# Patient Record
Sex: FEMALE | Race: BLACK OR AFRICAN AMERICAN | Employment: FULL TIME | URBAN - METROPOLITAN AREA
[De-identification: names, ages, dates, MRNs, and addresses within clinical notes are randomized per-mention and may not be internally consistent; named-entity substitution may affect disease eponyms.]

---

## 2017-01-04 ENCOUNTER — TELEPHONE (OUTPATIENT)
Dept: INTERNAL MEDICINE CLINIC | Age: 47
End: 2017-01-04

## 2017-01-04 NOTE — TELEPHONE ENCOUNTER
Patient called in to get her lab results. Patient was informed that results are not ready yet and that she will receive a call once we receive them. Please call patient when we get the results.

## 2017-01-05 NOTE — TELEPHONE ENCOUNTER
Called patient to give positive Ureaplasma results but patient states that she saw her GYN yesterday and she is completely back to normal.  I re-iterated to her that the test was only done because she was tearful that even though she was treated for BV and Yeast and her tests were all negative, that she was still symptomatic. It was explained to the patient while in the office that the ureaplasma does not cause yeast or BV but could possible cause vaginal discharge and she agreed to have the testing done. As this was explained to her again today and she is no longer symptomatic, she has declined treatment. I explained that at this time, she needs no further follow up.

## 2017-03-17 ENCOUNTER — OFFICE VISIT (OUTPATIENT)
Dept: FAMILY MEDICINE CLINIC | Age: 47
End: 2017-03-17

## 2017-03-17 ENCOUNTER — HOSPITAL ENCOUNTER (OUTPATIENT)
Dept: LAB | Age: 47
Discharge: HOME | End: 2017-03-17

## 2017-03-17 VITALS
SYSTOLIC BLOOD PRESSURE: 136 MMHG | DIASTOLIC BLOOD PRESSURE: 80 MMHG | HEART RATE: 77 BPM | HEIGHT: 62 IN | TEMPERATURE: 98.9 F | WEIGHT: 198 LBS | RESPIRATION RATE: 16 BRPM | OXYGEN SATURATION: 98 % | BODY MASS INDEX: 36.44 KG/M2

## 2017-03-17 DIAGNOSIS — M10.079 ACUTE IDIOPATHIC GOUT INVOLVING TOE, UNSPECIFIED LATERALITY: Primary | ICD-10-CM

## 2017-03-17 PROCEDURE — 99001 SPECIMEN HANDLING PT-LAB: CPT | Performed by: FAMILY MEDICINE

## 2017-03-17 RX ORDER — INDOMETHACIN 50 MG/1
50 CAPSULE ORAL 3 TIMES DAILY
Qty: 90 CAP | Refills: 2 | Status: SHIPPED | OUTPATIENT
Start: 2017-03-17 | End: 2017-04-26

## 2017-03-17 RX ORDER — ALLOPURINOL 100 MG/1
100 TABLET ORAL DAILY
Qty: 90 TAB | Refills: 1 | Status: SHIPPED | OUTPATIENT
Start: 2017-03-17 | End: 2017-12-07 | Stop reason: ALTCHOICE

## 2017-03-17 NOTE — PROGRESS NOTES
HISTORY OF PRESENT ILLNESS    Kamille flynn a 42y.o. year old female comes in today to be evaluated and treated for: bilateral foot pain    Patients symptoms have been present for 4 months. Pain level 6/10 bilateral feet, It is unchanged with different shoes. Patient has tried:  mobic from Dr. Omi Carrizales. It is described as pain in bilateral feet great toe into foot. Will swell at ended of day. Did try arch supports as well. Current Outpatient Prescriptions   Medication Sig Dispense Refill    meloxicam (MOBIC) 15 mg tablet Take 1 Tab by mouth daily. 30 Tab 1    nystatin (MYCOSTATIN) topical cream Apply  to affected area two (2) times a day. 30 g 0    triamcinolone acetonide (KENALOG) 0.1 % topical cream Apply  to affected area two (2) times daily as needed for Skin Irritation. use thin layer 60 g 0    ibuprofen (MOTRIN) 800 mg tablet   0    fluticasone (FLONASE) 50 mcg/actuation nasal spray 2 Sprays by Both Nostrils route daily. 1 Bottle 3    ferrous fumarate 324 mg (106 mg iron) tab Take 1 tablet by mouth two (2) times a day. 190 tablet 1     Past Medical History:   Diagnosis Date    Left foot pain     Plantar fasciitis, left     Right shoulder pain        ROS:  No numb. Objective:  Visit Vitals    /80 (BP 1 Location: Right arm, BP Patient Position: Sitting)    Pulse 77    Temp 98.9 °F (37.2 °C) (Oral)    Resp 16    Ht 5' 2\" (1.575 m)    Wt 198 lb (89.8 kg)    SpO2 98%    BMI 36.21 kg/m2       GEN: Appears stated age in NAD. HEAD:  Normocephalic, atraumatic. NEURO:  Sensation intact light touch B/L lower extremities. MS:  Strength normal throughout upper and lower extremities bilateral .   bilateral  ankle/foot:  Positive tenderness at 1st MTP lateral.  Anterior drawer test negative. Talar tilt negative. Kleiger test negative. Syndesmosis squeeze negative. negative fibular head tenderness. Fibular head motion normal. Gait normal.  no clubbing/cyanosis.   ROM normal.  EXT: no clubbing/cyanosis. no edema. SKIN: Warm/dry without rash. Assessment/Plan:     ICD-10-CM ICD-9-CM    1. Acute idiopathic gout involving toe, unspecified laterality M10.079 274.01      Orders Placed This Encounter    URIC ACID     Standing Status:   Future     Standing Expiration Date:   8/87/3373    METABOLIC PANEL, BASIC     Standing Status:   Future     Standing Expiration Date:   12/12/2017     Scheduling Instructions:      [facility] [place]      Monday-Friday  8am-4:30pm            [address]: 7 am  7:30 pm, Saturday: 8 am  noon    CBC WITH AUTOMATED DIFF     Standing Status:   Future     Standing Expiration Date:   6/15/2017     Scheduling Instructions:      [facility] [place]      Monday-Friday  8am-4:30pm            [address]: 6 am  7:30 pm, Saturday: 7 am  noon    indomethacin (INDOCIN) 50 mg capsule     Sig: Take 1 Cap by mouth three (3) times daily for 90 days. Dispense:  90 Cap     Refill:  2    allopurinol (ZYLOPRIM) 100 mg tablet     Sig: Take 1 Tab by mouth daily. Dispense:  90 Tab     Refill:  1     Will use indocin for pain and once controlled likely start allopurinol once I notify result next week. Patient verbalized understanding of plan.

## 2017-03-17 NOTE — PROGRESS NOTES
Patient is currently not taking the following medications and wants them removed from their list:    no    Learning Assessment (baseline): complete  Depression Screening: complete      1. Have you been to the ER, urgent care clinic since your last visit? Hospitalized since your last visit? No    2. Have you seen or consulted any other health care providers outside of the [address] [place] [address] since your last visit? Include any pap smears or colon screening.  Yes Dr. Charlie Garcia (foot)      Patient is due for the following immunizations:    Influenza: declined

## 2017-03-17 NOTE — MR AVS SNAPSHOT
Visit Information Date & Time Provider Department Dept. Phone Encounter #  
 3/17/2017  9:00 AM Alberto Isidro, [facility] [id number] Follow-up Instructions Return if symptoms worsen or fail to improve. Follow-up and Disposition History Upcoming Health Maintenance Date Due DTaP/Tdap/Td series (1 - Tdap) 9/3/1993 INFLUENZA AGE 9 TO ADULT 8/1/2016 PAP AKA CERVICAL CYTOLOGY 5/4/2018 COLONOSCOPY 2/17/2026 Allergies as of 3/17/2017  Review Complete On: 3/17/2017 By: Alberto Isidro DO Severity Noted Reaction Type Reactions Codeine High 02/21/2012    Nausea and Vomiting Patient states she gets jittery, has upset stomach Current Immunizations  Never Reviewed Name Date Influenza Vaccine (Quad) PF 9/18/2014 Not reviewed this visit You Were Diagnosed With   
  
 Codes Comments Acute idiopathic gout involving toe, unspecified laterality    -  Primary ICD-10-CM: M10.079 ICD-9-CM: 274.01 Vitals BP Pulse Temp Resp Height(growth percentile) Weight(growth percentile) 136/80 (BP 1 Location: Right arm, BP Patient Position: Sitting) 77 98.9 °F (37.2 °C) (Oral) 16 5' 2\" (1.575 m) 198 lb (89.8 kg) SpO2 BMI OB Status Smoking Status 98% 36.21 kg/m2 Having regular periods Never Smoker BMI and BSA Data Body Mass Index Body Surface Area  
 36.21 kg/m 2 1.98 m 2 Preferred Pharmacy Pharmacy Name Phone [facility] [address] [place] [address] [phone] Your Updated Medication List  
  
   
This list is accurate as of: 3/17/17  9:15 AM.  Always use your most recent med list.  
  
  
  
  
 allopurinol 100 mg tablet Commonly known as:  Josefina Bibber Take 1 Tab by mouth daily. ferrous fumarate 324 mg (106 mg iron) Tab Take 1 tablet by mouth two (2) times a day. fluticasone 50 mcg/actuation nasal spray Commonly known as:  Laura Fryer 2 Sprays by Both Nostrils route daily. ibuprofen 800 mg tablet Commonly known as:  MOTRIN  
  
 indomethacin 50 mg capsule Commonly known as:  INDOCIN Take 1 Cap by mouth three (3) times daily for 90 days. nystatin topical cream  
Commonly known as:  MYCOSTATIN Apply  to affected area two (2) times a day. triamcinolone acetonide 0.1 % topical cream  
Commonly known as:  KENALOG Apply  to affected area two (2) times daily as needed for Skin Irritation. use thin layer Prescriptions Sent to Pharmacy Refills  
 indomethacin (INDOCIN) 50 mg capsule 2 Sig: Take 1 Cap by mouth three (3) times daily for 90 days. Class: Normal  
 Pharmacy: [facility] [id number] [place] [address] Ph #: [phone] Route: Oral  
 allopurinol (ZYLOPRIM) 100 mg tablet 1 Sig: Take 1 Tab by mouth daily. Class: Normal  
 Pharmacy: [facility] [id number] [place] [address] Ph #: [phone] Route: Oral  
  
Follow-up Instructions Return if symptoms worsen or fail to improve. To-Do List   
 03/17/2017 Lab:  URIC ACID Around 04/16/2017 Lab:  CBC WITH AUTOMATED DIFF Around 06/15/2017 Lab:  METABOLIC PANEL, BASIC Patient Instructions Gout: Care Instructions Your Care Instructions Gout is a form of arthritis caused by a buildup of uric acid crystals in a joint. It causes sudden attacks of pain, swelling, redness, and stiffness, usually in one joint, especially the big toe. Gout usually comes on without a cause. But it can be brought on by drinking alcohol (especially beer) or eating seafood and red meat. Taking certain medicines, such as diuretics or aspirin, also can bring on an attack of gout. Taking your medicines as prescribed and following up with your doctor regularly can help you avoid gout attacks in the future. Follow-up care is a key part of your treatment and safety. Be sure to make and go to all appointments, and call your doctor if you are having problems. Its also a good idea to know your test results and keep a list of the medicines you take. How can you care for yourself at home? · If the joint is swollen, put ice or a cold pack on the area for 10 to 20 minutes at a time. Put a thin cloth between the ice and your skin. · Prop up the sore limb on a pillow when you ice it or anytime you sit or lie down during the next 3 days. Try to keep it above the level of your heart. This will help reduce swelling. · Rest sore joints. Avoid activities that put weight or strain on the joints for a few days. Take short rest breaks from your regular activities during the day. · Take your medicines exactly as prescribed. Call your doctor if you think you are having a problem with your medicine. · Take pain medicines exactly as directed. ¨ If the doctor gave you a prescription medicine for pain, take it as prescribed. ¨ If you are not taking a prescription pain medicine, ask your doctor if you can take an over-the-counter medicine. · Eat less seafood and red meat. · Check with your doctor before drinking alcohol. · Losing weight, if you are overweight, may help reduce attacks of gout. But do not go on a [employer]. \" Losing a lot of weight in a short amount of time can cause a gout attack. When should you call for help? Call your doctor now or seek immediate medical care if: 
· You have a fever. · The joint is so painful you cannot use it. · You have sudden, unexplained swelling, redness, warmth, or severe pain in one or more joints. Watch closely for changes in your health, and be sure to contact your doctor if: 
· You have joint pain. · Your symptoms get worse or are not improving after 2 or 3 days. Where can you learn more? Go to http://elana-bela.info/. Enter U096 in the search box to learn more about \"Gout: Care Instructions. \" Current as of: February 24, 2016 Content Version: 11.1 © 0111-2521 [employer]. Care instructions adapted under license by [employer] (which disclaims liability or warranty for this information). If you have questions about a medical condition or this instruction, always ask your healthcare professional. [address] any warranty or liability for your use of this information. Introducing [facility] & HEALTH SERVICES! Dear Monique Read: 
Thank you for requesting a [employer] account. Our records indicate that you already have an active [employer] account. You can access your account anytime at [email or web address] Did you know that you can access your hospital and ER discharge instructions at any time in [employer]? You can also review all of your test results from your hospital stay or ER visit. Additional Information If you have questions, please visit the Frequently Asked Questions section of the [employer] website at [email or web address] Remember, [employer] is NOT to be used for urgent needs. For medical emergencies, dial 911. Now available from your iPhone and Android! Please provide this summary of care documentation to your next provider. Your primary care clinician is listed as [address]. If you have any questions after today's visit, please call [phone].

## 2017-03-17 NOTE — PATIENT INSTRUCTIONS

## 2017-03-18 LAB
BASOPHILS # BLD AUTO: 0.1 X10E3/UL (ref 0–0.2)
BASOPHILS NFR BLD AUTO: 1 %
BUN SERPL-MCNC: 10 MG/DL (ref 6–24)
BUN/CREAT SERPL: 12 (ref 9–23)
CALCIUM SERPL-MCNC: 9.1 MG/DL (ref 8.7–10.2)
CHLORIDE SERPL-SCNC: 98 MMOL/L (ref 96–106)
CO2 SERPL-SCNC: 22 MMOL/L (ref 18–29)
CREAT SERPL-MCNC: 0.85 MG/DL (ref 0.57–1)
EOSINOPHIL # BLD AUTO: 0.1 X10E3/UL (ref 0–0.4)
EOSINOPHIL NFR BLD AUTO: 1 %
ERYTHROCYTE [DISTWIDTH] IN BLOOD BY AUTOMATED COUNT: 13.5 % (ref 12.3–15.4)
GLUCOSE SERPL-MCNC: 87 MG/DL (ref 65–99)
HCT VFR BLD AUTO: 36.8 % (ref 34–46.6)
HGB BLD-MCNC: 12.4 G/DL (ref 11.1–15.9)
IMM GRANULOCYTES # BLD: 0 X10E3/UL (ref 0–0.1)
IMM GRANULOCYTES NFR BLD: 0 %
LYMPHOCYTES # BLD AUTO: 1.9 X10E3/UL (ref 0.7–3.1)
LYMPHOCYTES NFR BLD AUTO: 24 %
MCH RBC QN AUTO: 30 PG (ref 26.6–33)
MCHC RBC AUTO-ENTMCNC: 33.7 G/DL (ref 31.5–35.7)
MCV RBC AUTO: 89 FL (ref 79–97)
MONOCYTES # BLD AUTO: 0.7 X10E3/UL (ref 0.1–0.9)
MONOCYTES NFR BLD AUTO: 9 %
NEUTROPHILS # BLD AUTO: 5.2 X10E3/UL (ref 1.4–7)
NEUTROPHILS NFR BLD AUTO: 65 %
PLATELET # BLD AUTO: 315 X10E3/UL (ref 150–379)
POTASSIUM SERPL-SCNC: 4.2 MMOL/L (ref 3.5–5.2)
RBC # BLD AUTO: 4.14 X10E6/UL (ref 3.77–5.28)
SODIUM SERPL-SCNC: 135 MMOL/L (ref 134–144)
URATE SERPL-MCNC: 5 MG/DL (ref 2.5–7.1)
WBC # BLD AUTO: 7.9 X10E3/UL (ref 3.4–10.8)

## 2017-03-22 ENCOUNTER — TELEPHONE (OUTPATIENT)
Dept: FAMILY MEDICINE CLINIC | Age: 47
End: 2017-03-22

## 2017-03-23 ENCOUNTER — TELEPHONE (OUTPATIENT)
Dept: FAMILY MEDICINE CLINIC | Age: 47
End: 2017-03-23

## 2017-03-23 NOTE — TELEPHONE ENCOUNTER
Pt called about lab results and would like to know if she needs to still take indocin. Pt states that she hasn't picked up zyloprim yet.  Please contact pt

## 2017-03-23 NOTE — TELEPHONE ENCOUNTER
Called and advised patient per Dr. Sean Jorge:  Should start zyloprim to decrease uric acid and prevent flares.  Use indocin as needed. Patient verbalized understanding.

## 2017-04-10 ENCOUNTER — OFFICE VISIT (OUTPATIENT)
Dept: ORTHOPEDIC SURGERY | Age: 47
End: 2017-04-10

## 2017-04-10 VITALS
TEMPERATURE: 98.4 F | SYSTOLIC BLOOD PRESSURE: 137 MMHG | BODY MASS INDEX: 42.88 KG/M2 | HEART RATE: 67 BPM | DIASTOLIC BLOOD PRESSURE: 86 MMHG | HEIGHT: 62 IN | WEIGHT: 233 LBS

## 2017-04-10 DIAGNOSIS — G57.93 NEUROPATHY OF BOTH FEET: ICD-10-CM

## 2017-04-10 DIAGNOSIS — M77.42 METATARSALGIA OF BOTH FEET: Primary | ICD-10-CM

## 2017-04-10 DIAGNOSIS — L84 CALLUS OF FOOT: ICD-10-CM

## 2017-04-10 DIAGNOSIS — M77.41 METATARSALGIA OF BOTH FEET: Primary | ICD-10-CM

## 2017-04-10 RX ORDER — AMMONIUM LACTATE 12 G/100G
CREAM TOPICAL 2 TIMES DAILY
Qty: 280 G | Refills: 0 | Status: SHIPPED | OUTPATIENT
Start: 2017-04-10 | End: 2017-04-10 | Stop reason: CLARIF

## 2017-04-10 RX ORDER — DICLOFENAC SODIUM 20 MG/G
2 SOLUTION TOPICAL 2 TIMES DAILY
Qty: 1 BOTTLE | Refills: 0 | Status: SHIPPED | OUTPATIENT
Start: 2017-04-10 | End: 2019-02-28

## 2017-04-10 RX ORDER — AMMONIUM LACTATE 12 G/100G
CREAM TOPICAL 2 TIMES DAILY
Qty: 280 G | Refills: 0 | Status: SHIPPED | OUTPATIENT
Start: 2017-04-10 | End: 2019-03-24

## 2017-04-10 NOTE — MR AVS SNAPSHOT
Visit Information Date & Time Provider Department Dept. Phone Encounter #  
 4/10/2017 10:40 AM Tori Fernandez MD [facility] [phone] [id number] Follow-up Instructions Return in about 4 weeks (around 5/8/2017) for follow up evaluation. Routing History Upcoming Health Maintenance Date Due DTaP/Tdap/Td series (1 - Tdap) 9/3/1993 INFLUENZA AGE 9 TO ADULT 8/1/2016 PAP AKA CERVICAL CYTOLOGY 5/4/2018 COLONOSCOPY 2/17/2026 Allergies as of 4/10/2017  Review Complete On: 4/10/2017 By: Jessica Guerra Severity Noted Reaction Type Reactions Codeine High 02/21/2012    Nausea and Vomiting Patient states she gets jittery, has upset stomach Current Immunizations  Never Reviewed Name Date Influenza Vaccine (Quad) PF 9/18/2014 Not reviewed this visit You Were Diagnosed With   
  
 Codes Comments Metatarsalgia of both feet    -  Primary ICD-10-CM: M77.41, M77.42 
ICD-9-CM: 726.70 Sesamoid pain     ICD-10-CM: M89.9 ICD-9-CM: 733.90 Neuropathy     ICD-10-CM: G62.9 ICD-9-CM: 355.9 Callus of foot     ICD-10-CM: L84 
ICD-9-CM: 468 Vitals BP Pulse Temp Height(growth percentile) Weight(growth percentile) BMI  
 137/86 67 98.4 °F (36.9 °C) (Oral) 5' 2\" (1.575 m) 233 lb (105.7 kg) 42.62 kg/m2 OB Status Smoking Status Having regular periods Never Smoker BMI and BSA Data Body Mass Index Body Surface Area  
 42.62 kg/m 2 2.15 m 2 Preferred Pharmacy Pharmacy Name Phone Bette Jarrell [address] [address] [facility], [address] [place] [address] Avenue [phone] Your Updated Medication List  
  
   
This list is accurate as of: 4/10/17 12:18 PM.  Always use your most recent med list.  
  
  
  
  
 allopurinol 100 mg tablet Commonly known as:  Marcio Hurt Take 1 Tab by mouth daily.   
  
 ammonium lactate 12 % topical cream  
 Commonly known as:  LAC-HYDRIN Apply  to affected area two (2) times a day. rub in to affected area well  
  
 diclofenac sodium 20 mg/gram /actuation(2 %) Sopm  
Commonly known as:  PENNSAID  
2 Pump(s) by Apply Externally route two (2) times a day. ferrous fumarate 324 mg (106 mg iron) Tab Take 1 tablet by mouth two (2) times a day. fluticasone 50 mcg/actuation nasal spray Commonly known as:  Nicole Thelma 2 Sprays by Both Nostrils route daily. ibuprofen 800 mg tablet Commonly known as:  MOTRIN  
  
 indomethacin 50 mg capsule Commonly known as:  INDOCIN Take 1 Cap by mouth three (3) times daily for 90 days. nystatin topical cream  
Commonly known as:  MYCOSTATIN Apply  to affected area two (2) times a day. triamcinolone acetonide 0.1 % topical cream  
Commonly known as:  KENALOG Apply  to affected area two (2) times daily as needed for Skin Irritation. use thin layer Prescriptions Printed Refills  
 ammonium lactate (LAC-HYDRIN) 12 % topical cream 0 Sig: Apply  to affected area two (2) times a day. rub in to affected area well Class: Print Route: Topical  
  
Prescriptions Sent to Pharmacy Refills  
 diclofenac sodium (PENNSAID) 20 mg/gram /actuation(2 %) sopm 0 Si Pump(s) by Apply Externally route two (2) times a day. Class: Normal  
 Pharmacy: [address] [place] [address] Melvin Collazo [address] Ph #: [phone] Route: Apply Externally We Performed the Following AMB SUPPLY ORDER [[id number] Custom] Comments:  
 Order date: 4/10/2017 Custom Trilam Orthotic bilat to offload great toe Dx: metatarsalgia Follow-up Instructions Return in about 4 weeks (around 2017) for follow up evaluation. Patient Instructions Order provided for Lac-Hydrin, please apply to affected area as directed Order provided for bilateral custom orthotics to offload 1st MTP joint Order provided for Pennsaid. This will be mailed to your home address. Please follow up in 4 weeks or sooner as needed Metatarsalgia: Care Instructions Your Care Instructions Metatarsalgia (say \"met-renetta-tar-SAL-kaiser-renetta\") is pain in the ball of the foot. It sometimes spreads to the toes. The ball of the foot is the bottom of the foot, where the toes join the foot. While walking might be very painful, the pain is usually not a sign of a serious or permanent problem. But any pain can affect your life, so it is important that you treat it. Pain in this area can be caused by many things. For example, you may have this pain if you stand or walk a lot or wear tight shoes or high heels. Your pain might be caused by inflammation of a joint (capsulitis). It is most common in the joint at the base of the second toe, near the ball of the foot. Capsulitis happens when ligaments that go around the joint become inflamed. The joint may be swollen. It may feel like there is a small rock under it. You may have had an X-ray if your doctor wanted to make sure a more serious problem is not causing your pain. Treatment may consist of home care, such as rest, wearing different shoes, and taking over-the-counter pain medicines. It can take months for the pain to go away. If the ligaments around a joint are torn, or if a toe has started to slant toward the toe next to it, you may need surgery. Follow-up care is a key part of your treatment and safety. Be sure to make and go to all appointments, and call your doctor if you are having problems. It's also a good idea to know your test results and keep a list of the medicines you take. How can you care for yourself at home? · Rest your foot. If an activity is causing the pain, find another one to do that does not put so much pressure on your foot.  
· Put ice or a cold pack on your foot when it hurts or after you've done something that usually causes pain. Do this for 10 to 20 minutes at a time. Put a thin cloth between the ice and your skin. · Take an over-the-counter pain medicine, such as acetaminophen (Tylenol), ibuprofen (Advil, Motrin), or naproxen (Aleve). Be safe with medicines. Read and follow all instructions on the label. · Do not take two or more pain medicines at the same time unless the doctor told you to. Many pain medicines have acetaminophen, which is Tylenol. Too much acetaminophen (Tylenol) can be harmful. · Wear roomy, comfortable shoes. · If your doctor recommends it, use special pads to relieve the pressure on your foot. The pads may fit into your shoes, or they may stick to the soles of your feet. · Ask your doctor about using orthotic shoe devices. These are molded pieces of rubber, leather, metal, plastic, or other synthetic material that are inserted into a shoe. · Wear shoes with good arch support. · Try not to wear high heels or narrow shoes. · Follow your doctor's or physical therapist's directions for exercise. When should you call for help? Watch closely for changes in your health, and be sure to contact your doctor if: 
· You have new or worse symptoms. · You do not get better as expected. Where can you learn more? Go to http://elana-bela.info/. Enter O312 in the search box to learn more about \"Metatarsalgia: Care Instructions. \" Current as of: August 19, 2016 Content Version: 11.2 © 8559-1726 [employer]. Care instructions adapted under license by [employer] (which disclaims liability or warranty for this information). If you have questions about a medical condition or this instruction, always ask your healthcare professional. [place] [address] any warranty or liability for your use of this information. Introducing hospitals & HEALTH SERVICES!    
 Dear Elis Dixon: 
 Thank you for requesting a [employer] account. Our records indicate that you already have an active [employer] account. You can access your account anytime at [email or web address] Did you know that you can access your hospital and ER discharge instructions at any time in [employer]? You can also review all of your test results from your hospital stay or ER visit. Additional Information If you have questions, please visit the Frequently Asked Questions section of the [employer] website at [email or web address] Remember, [employer] is NOT to be used for urgent needs. For medical emergencies, dial 911. Now available from your iPhone and Android! Please provide this summary of care documentation to your next provider. Your primary care clinician is listed as [address]. If you have any questions after today's visit, please call [phone].

## 2017-04-10 NOTE — PROGRESS NOTES
AMBULATORY PROGRESS NOTE      Patient: Sampson Deras             MRN: [id number]     SSN: [id number] There is no height or weight on file to calculate BMI. YOB: 1970     AGE: 42 y.o. EX: female    PCP: Dom Rose MD    IMPRESSION/DIAGNOSIS AND TREATMENT PLAN     DIAGNOSES  No diagnosis found. No orders of the defined types were placed in this encounter. Sampson Deras understands her diagnoses and the proposed plan. Plan:    1)  2)    RTO     HPI AND EXAMINATION     Margarita Gamboa IS A 42 y.o. female who presents to my outpatient office for follow up of left foot metatarsalgia, bilateral foot pain, and bursitis disorder. At last visit, I ordered Spenco firm arch supports, and I prescribed Mobic 15 mg. The patient presents to the office today    Patient works as a  and spends the majority of her day walking. Patient denies gastric disorders and denies taking blood thinners.     ANKLE/FOOT Bilateral     Psychiatry: Alert, Oriented x 3; Speech normal in context and clarity,    Memory intact grossly, no involuntary movements - tremors, no dementia  Gait: normal  Tenderness: moderate tenderness bilateral great toe pain medial aspect (medial eminence), left greater than right, left number two toe plantar (methead) tenderness  Cutaneous: WNL on left foot, but, Fullness to dorsal part of right midfoot  Joint Motion: WNL  Joint / Tendon Stability: No Ankle or Subtalar instability or joint laxity. No peroneal sublux ability or dislocation  Alignment: Bilateral Planar Valgus   Neuro Motor/Sensory: NL/NL, Vascular: NL foot/ankle pulses  Lymphatics: No extremity lymphedema, No calf swelling, no tenderness to calf muscles.     CHART REVIEW     Past Medical History:   Diagnosis Date    Left foot pain     Plantar fasciitis, left     Right shoulder pain      Current Outpatient Prescriptions   Medication Sig    indomethacin (INDOCIN) 50 mg capsule Take 1 Cap by mouth three (3) times daily for 90 days.  allopurinol (ZYLOPRIM) 100 mg tablet Take 1 Tab by mouth daily.  nystatin (MYCOSTATIN) topical cream Apply  to affected area two (2) times a day.  triamcinolone acetonide (KENALOG) 0.1 % topical cream Apply  to affected area two (2) times daily as needed for Skin Irritation. use thin layer    ibuprofen (MOTRIN) 800 mg tablet     ferrous fumarate 324 mg (106 mg iron) tab Take 1 tablet by mouth two (2) times a day.  fluticasone (FLONASE) 50 mcg/actuation nasal spray 2 Sprays by Both Nostrils route daily. No current facility-administered medications for this visit. Allergies   Allergen Reactions    Codeine Nausea and Vomiting     Patient states she gets jittery, has upset stomach      Past Surgical History:   Procedure Laterality Date    HX TUBAL LIGATION  2009    Tubal ligation     Social History     Occupational History    nursing home [employer]     Social History Main Topics    Smoking status: Never Smoker    Smokeless tobacco: Never Used    Alcohol use No    Drug use: No    Sexual activity: Yes     Partners: Male     Family History   Problem Relation Age of Onset    Hypertension Mother     Hypertension Father     Arthritis-osteo Other        REVIEW OF SYSTEMS : 4/10/2017  ALL BELOW ARE Negative except : SEE HPI       Constitutional: Negative for fever, chills and weight loss. Neg Weigh Loss  Cardiovascular: Negative for chest pain, claudication and leg swelling. SOB, SMITH   Gastrointestinal: Negative for  pain, N/V/D/C, Blood in stool or urine,dysuria, hematuria,        Incontinence, pelvic pain  Musculoskeletal: see HPI. Neurological: Negative for dizziness and weakness. Negative for headaches,Visual Changes, Confusion, Seizures,   Psychiatric/Behavioral: Negative for depression, memory loss and substance abuse. Extremities:  Negative for  hair changes, rash or skin lesion changes.   Hematologic: Negative for Bleeding problems, bruising, pallor or swollen lymph nodes. Peripheral Vascular: No calf pain, vascular vein tenderness to calf pain              No calf throbbing, posterior knee throbbing pain    DIAGNOSTIC IMAGING     No notes on file    Written by Nitza Schaefer, as dictated by Luc Lowe MD. I, , Luc Lowe MD, confirm that all documentation is accurate.

## 2017-04-10 NOTE — PATIENT INSTRUCTIONS
Order provided for Lac-Hydrin, please apply to affected area as directed  Order provided for bilateral custom orthotics to offload 1st MTP joint  Order provided for Pennsaid. This will be mailed to your home address. Please follow up in 4 weeks or sooner as needed             Metatarsalgia: Care Instructions  Your Care Instructions    Metatarsalgia (say \"met-uh-tar-SAL-kaiser-uh\") is pain in the ball of the foot. It sometimes spreads to the toes. The ball of the foot is the bottom of the foot, where the toes join the foot. While walking might be very painful, the pain is usually not a sign of a serious or permanent problem. But any pain can affect your life, so it is important that you treat it. Pain in this area can be caused by many things. For example, you may have this pain if you stand or walk a lot or wear tight shoes or high heels. Your pain might be caused by inflammation of a joint (capsulitis). It is most common in the joint at the base of the second toe, near the ball of the foot. Capsulitis happens when ligaments that go around the joint become inflamed. The joint may be swollen. It may feel like there is a small rock under it. You may have had an X-ray if your doctor wanted to make sure a more serious problem is not causing your pain. Treatment may consist of home care, such as rest, wearing different shoes, and taking over-the-counter pain medicines. It can take months for the pain to go away. If the ligaments around a joint are torn, or if a toe has started to slant toward the toe next to it, you may need surgery. Follow-up care is a key part of your treatment and safety. Be sure to make and go to all appointments, and call your doctor if you are having problems. It's also a good idea to know your test results and keep a list of the medicines you take. How can you care for yourself at home? · Rest your foot.  If an activity is causing the pain, find another one to do that does not put so much pressure on your foot. · Put ice or a cold pack on your foot when it hurts or after you've done something that usually causes pain. Do this for 10 to 20 minutes at a time. Put a thin cloth between the ice and your skin. · Take an over-the-counter pain medicine, such as acetaminophen (Tylenol), ibuprofen (Advil, Motrin), or naproxen (Aleve). Be safe with medicines. Read and follow all instructions on the label. · Do not take two or more pain medicines at the same time unless the doctor told you to. Many pain medicines have acetaminophen, which is Tylenol. Too much acetaminophen (Tylenol) can be harmful. · Wear roomy, comfortable shoes. · If your doctor recommends it, use special pads to relieve the pressure on your foot. The pads may fit into your shoes, or they may stick to the soles of your feet. · Ask your doctor about using orthotic shoe devices. These are molded pieces of rubber, leather, metal, plastic, or other synthetic material that are inserted into a shoe. · Wear shoes with good arch support. · Try not to wear high heels or narrow shoes. · Follow your doctor's or physical therapist's directions for exercise. When should you call for help? Watch closely for changes in your health, and be sure to contact your doctor if:  · You have new or worse symptoms. · You do not get better as expected. Where can you learn more? Go to http://elana-bela.info/. Enter X674 in the search box to learn more about \"Metatarsalgia: Care Instructions. \"  Current as of: August 19, 2016  Content Version: 11.2  © 3457-1469 [employer]. Care instructions adapted under license by [employer] (which disclaims liability or warranty for this information). If you have questions about a medical condition or this instruction, always ask your healthcare professional. [address] any warranty or liability for your use of this information.

## 2017-04-10 NOTE — PROGRESS NOTES
Patient: Marianne Maya                MRN: [id number]       SSN: [id number]  YOB: 1970                         AGE: 42 y.o. SEX: female    Bev Hammond MD      Chief Complaint:   Chief Complaint   Patient presents with    Foot Pain     Speedy         HPI:     Patient is a 42 y.o. female who presents today for follow up evaluation of bilateral foot pain. Patient was last seen in the office by Dr. Sonia Brenner on 11/8/16 and was provided prescription for Mobic and instructed to obtain Marika Sanders Dr. She states that the Spenco Arch supports do not help and she noticed no difference with the Mobic. She states that she went to see her PCP and he ordered labs and advised that she has gout and prescribed Allopurinol and Indomethacin. She states that these medications are making her feel worse. I reviewed the patient's labs completed on 3/17/17 and her Uric Acid is 5 which is within normal limits. She states that the pain resides in the following areas: bilateral great toe pain medial aspect, and to the plantar great toe tatarsal head in the area of sesamoids. She also reports some sharp, shooting pain on occasion. She reports no history of DM an her blood glucose level was WNL on labs completed on 3/17/17. Patient works as a  and spends the majority of her day walking. Patient denies gastric disorders and denies taking blood thinners. PHYSICAL EXAM:       Visit Vitals    /86    Pulse 67    Temp 98.4 °F (36.9 °C) (Oral)    Ht 5' 2\" (1.575 m)    Wt 233 lb (105.7 kg)    BMI 42.62 kg/m2       GEN:  Alert, well developed, well nourished, well appearing 42 y.o. female in no acute distress. PSYCH:  Normal affect, mood, and conduct.  alert, oriented x 3 alert, oriented x 3, no dementia  M/S EXAMINATION OF: ANKLE/FOOT Bilateral  Gait: normal  Tenderness: No tenderness bilateral great toe pain medial aspect (medial eminence), No warmth, redness or soft tissue swelling. She reports some discomfort at great toe MTP region as well as plantar. There is a callus noted in the area of the great toe MTP  Cutaneous: WNL on left foot, but, Fullness to dorsal part of right midfoot  Joint Motion: WNL  Joint / Tendon Stability: No Ankle or Subtalar instability or joint laxity. No peroneal sublux ability or dislocation  Alignment: Bilateral Planar Valgus   Neuro Motor/Sensory: NL/NL, Vascular: NL foot/ankle pulses  Lymphatics: No extremity lymphedema, No calf swelling, no tenderness to calf muscles. IMPRESSION:       Encounter Diagnoses     ICD-10-CM ICD-9-CM   1. Metatarsalgia of both feet M77.41 726.70    M77.42    2. Sesamoid pain M89.9 733.90   3. Neuropathy G62.9 355.9   4. Callus of foot L84 700       PLAN:         Orders Placed This Encounter    AMB SUPPLY ORDER    diclofenac sodium (PENNSAID) 20 mg/gram /actuation(2 %) sopm    ammonium lactate (LAC-HYDRIN) 12 % topical cream               Discontinue Mobic, Allopurinol and Indomethocin  Order provided for Lac-Hydrin, please apply to affected area as directed  Order provided for bilateral custom orthotics to offload 1st MTP joint  Order provided for Pennsaid. This will be mailed to your home address. Please follow up in 4 weeks or sooner as needed            Patient understands treatment plan and has been provided with patient education. PAST MEDICAL HISTORY:     Past Medical History:   Diagnosis Date    Left foot pain     Plantar fasciitis, left     Right shoulder pain        MEDICATIONS:     Current Outpatient Prescriptions   Medication Sig    diclofenac sodium (PENNSAID) 20 mg/gram /actuation(2 %) sopm 2 Pump(s) by Apply Externally route two (2) times a day.  ammonium lactate (LAC-HYDRIN) 12 % topical cream Apply  to affected area two (2) times a day. rub in to affected area well    indomethacin (INDOCIN) 50 mg capsule Take 1 Cap by mouth three (3) times daily for 90 days.  allopurinol (ZYLOPRIM) 100 mg tablet Take 1 Tab by mouth daily.  nystatin (MYCOSTATIN) topical cream Apply  to affected area two (2) times a day.  triamcinolone acetonide (KENALOG) 0.1 % topical cream Apply  to affected area two (2) times daily as needed for Skin Irritation. use thin layer    ibuprofen (MOTRIN) 800 mg tablet     ferrous fumarate 324 mg (106 mg iron) tab Take 1 tablet by mouth two (2) times a day.  fluticasone (FLONASE) 50 mcg/actuation nasal spray 2 Sprays by Both Nostrils route daily. No current facility-administered medications for this visit. ALLERGIES:     Allergies   Allergen Reactions    Codeine Nausea and Vomiting     Patient states she gets jittery, has upset stomach          PAST SURGICAL HISTORY:     Past Surgical History:   Procedure Laterality Date    HX TUBAL LIGATION  2009    Tubal ligation       SOCIAL HISTORY:     Social History     Social History    Marital status:      Spouse name: N/A    Number of children: N/A    Years of education: N/A     Occupational History    [employer]     Social History Main Topics    Smoking status: Never Smoker    Smokeless tobacco: Never Used    Alcohol use No    Drug use: No    Sexual activity: Yes     Partners: Male     Other Topics Concern    Not on file     Social History Narrative       FAMILY HISTORY:     Family History   Problem Relation Age of Onset    Hypertension Mother     Hypertension Father     Arthritis-osteo Other        REVIEW OF SYSTEMS:       REVIEW OF SYSTEMS:     Otherwise as noted in HPI     REVIEW OF SYSTEMS: All Below are Negative except: See HPI      RADIOGRAPHS & DIAGNOSTIC STUDIES     No results found for any visits on 04/10/17.     [address] PA-C  4/10/2017

## 2017-04-19 NOTE — TELEPHONE ENCOUNTER
Dr. Jet Barr discontinued Mobic at the last office visit due to the patient being unable to find relief with the medication. A prescription for alternative medication (Pennsaid) was provided to the patient. Please advise. Last Visit: 04/10/2017 with MD Oquendo    Next Appointment: noted to f/u in 4 weeks   Previous Refill Encounters: 11/08/2016 per MD Oquendo #[id number] with 1 refill     Requested Prescriptions     Pending Prescriptions Disp Refills    meloxicam (MOBIC) 15 mg tablet 30 Tab 1     Sig: Take 1 Tab by mouth daily.

## 2017-04-24 RX ORDER — MELOXICAM 15 MG/1
15 TABLET ORAL DAILY
Qty: 30 TAB | Refills: 1 | OUTPATIENT
Start: 2017-04-24

## 2017-04-25 NOTE — TELEPHONE ENCOUNTER
Called and spoke with the patient to advise her of the denied request/message below. She seemed a little confused when I mentioned Pennsaid and stated she is not sure what that is and reports she has never used this medication. She also states she is not sure why Dr. Lenora Stapleton discontinued the medication when she has been taking it for about a year with great benefit. She wishes to receive further refills of Meloxicam. Please advise.

## 2017-04-26 RX ORDER — MELOXICAM 15 MG/1
15 TABLET ORAL DAILY
Qty: 30 TAB | Refills: 1 | Status: SHIPPED | OUTPATIENT
Start: 2017-04-26 | End: 2017-12-07 | Stop reason: ALTCHOICE

## 2017-10-13 ENCOUNTER — OFFICE VISIT (OUTPATIENT)
Dept: FAMILY MEDICINE CLINIC | Age: 47
End: 2017-10-13

## 2017-10-13 VITALS
OXYGEN SATURATION: 99 % | SYSTOLIC BLOOD PRESSURE: 130 MMHG | HEART RATE: 75 BPM | WEIGHT: 237.2 LBS | TEMPERATURE: 97.5 F | DIASTOLIC BLOOD PRESSURE: 80 MMHG | BODY MASS INDEX: 43.65 KG/M2 | RESPIRATION RATE: 16 BRPM | HEIGHT: 62 IN

## 2017-10-13 DIAGNOSIS — M25.561 PAIN IN BOTH KNEES, UNSPECIFIED CHRONICITY: Primary | ICD-10-CM

## 2017-10-13 DIAGNOSIS — G89.29 CHRONIC BILATERAL LOW BACK PAIN WITHOUT SCIATICA: ICD-10-CM

## 2017-10-13 DIAGNOSIS — M25.511 CHRONIC RIGHT SHOULDER PAIN: ICD-10-CM

## 2017-10-13 DIAGNOSIS — M54.50 CHRONIC BILATERAL LOW BACK PAIN WITHOUT SCIATICA: ICD-10-CM

## 2017-10-13 DIAGNOSIS — G89.29 CHRONIC RIGHT SHOULDER PAIN: ICD-10-CM

## 2017-10-13 DIAGNOSIS — M25.562 PAIN IN BOTH KNEES, UNSPECIFIED CHRONICITY: Primary | ICD-10-CM

## 2017-10-13 RX ORDER — IBUPROFEN 800 MG/1
800 TABLET ORAL
Qty: 100 TAB | Refills: 1 | Status: SHIPPED | OUTPATIENT
Start: 2017-10-13 | End: 2019-03-24

## 2017-10-13 RX ORDER — IBUPROFEN 800 MG/1
TABLET ORAL
COMMUNITY
End: 2017-10-13 | Stop reason: SDUPTHER

## 2017-10-13 NOTE — MR AVS SNAPSHOT
Visit Information Date & Time Provider Department Dept. Phone Encounter #  
 10/13/2017  8:00 AM Sacha Quiñonez  [facility] [id number] Your Appointments 10/18/2017  3:30 PM  
New Patient with Dontrell Díaz MD  
[address] [facility], [address] and [facility] ([facility]) Appt Note: bilat knee pain and rt shoulder pain nx bcbs hk/bcbs fep [address], [address] [facility]  
[phone] [address] [place] [address], [facility] [address] Upcoming Health Maintenance Date Due DTaP/Tdap/Td series (1 - Tdap) 9/3/1993 INFLUENZA AGE 9 TO ADULT 8/1/2017 PAP AKA CERVICAL CYTOLOGY 5/4/2018 COLONOSCOPY 2/17/2026 Allergies as of 10/13/2017  Review Complete On: 10/13/2017 By: Sacha Quiñonez NP Severity Noted Reaction Type Reactions Codeine High 02/21/2012    Nausea and Vomiting Patient states she gets jittery, has upset stomach Current Immunizations  Never Reviewed Name Date Influenza Vaccine (Quad) PF 9/18/2014 Not reviewed this visit You Were Diagnosed With   
  
 Codes Comments Pain in both knees, unspecified chronicity    -  Primary ICD-10-CM: M25.561, M25.562 ICD-9-CM: 719.46 Chronic bilateral low back pain without sciatica     ICD-10-CM: M54.5, G89.29 ICD-9-CM: 724.2, 338.29 Chronic right shoulder pain     ICD-10-CM: M25.511, G89.29 ICD-9-CM: 719.41, 338.29 Vitals BP Pulse Temp Resp Height(growth percentile) Weight(growth percentile) 130/80 (BP 1 Location: Left arm, BP Patient Position: Sitting) 75 97.5 °F (36.4 °C) (Oral) 16 5' 2\" (1.575 m) 237 lb 3.2 oz (107.6 kg) LMP SpO2 BMI OB Status Smoking Status 10/10/2017 99% 43.38 kg/m2 Having regular periods Never Smoker BMI and BSA Data Body Mass Index Body Surface Area  
 43.38 kg/m 2 2.17 m 2 Preferred Pharmacy Pharmacy Name [facility] [place], [address] [facility] [phone] Your Updated Medication List  
  
   
This list is accurate as of: 10/13/17  8:36 AM.  Always use your most recent med list.  
  
  
  
  
 allopurinol 100 mg tablet Commonly known as:  Ollen Epley Take 1 Tab by mouth daily. ammonium lactate 12 % topical cream  
Commonly known as:  LAC-HYDRIN Apply  to affected area two (2) times a day. rub in to affected area well  
  
 diclofenac sodium 20 mg/gram /actuation(2 %) Sopm  
Commonly known as:  PENNSAID  
2 Pump(s) by Apply Externally route two (2) times a day. ferrous fumarate 324 mg (106 mg iron) Tab Take 1 tablet by mouth two (2) times a day. fluticasone 50 mcg/actuation nasal spray Commonly known as:  Carlos Riser 2 Sprays by Both Nostrils route daily. ibuprofen 800 mg tablet Commonly known as:  MOTRIN Take 1 Tab by mouth every eight (8) hours as needed for Pain.  
  
 meloxicam 15 mg tablet Commonly known as:  MOBIC Take 1 Tab by mouth daily. nystatin topical cream  
Commonly known as:  MYCOSTATIN Apply  to affected area two (2) times a day. triamcinolone acetonide 0.1 % topical cream  
Commonly known as:  KENALOG Apply  to affected area two (2) times daily as needed for Skin Irritation. use thin layer Prescriptions Sent to Pharmacy Refills  
 ibuprofen (MOTRIN) 800 mg tablet 1 Sig: Take 1 Tab by mouth every eight (8) hours as needed for Pain. Class: Normal  
 Pharmacy: [facility], [address] Ph #: [phone] Route: Oral  
  
We Performed the Following REFERRAL TO ORTHOPEDICS [PNN091 Custom] Referral Information Referral ID Referred By Referred To  
  
 [id number] [facility] Not Available Visits Status Start Date End Date 1 New Request 10/13/17 10/13/18  If your referral has a status of pending review or denied, additional information will be sent to support the outcome of this decision. Introducing [facility]! Dear Nataly Darling: 
Thank you for requesting a "[employer] account. Our records indicate that you already have an active "[employer] account. You can access your account anytime at [email or web address] Did you know that you can access your hospital and ER discharge instructions at any time in "[employer]? You can also review all of your test results from your hospital stay or ER visit. Additional Information If you have questions, please visit the Frequently Asked Questions section of the "[employer] website at [email or web address] Remember, "[employer] is NOT to be used for urgent needs. For medical emergencies, dial 911. Now available from your iPhone and Android! Please provide this summary of care documentation to your next provider. Your primary care clinician is listed as [address]. If you have any questions after today's visit, please call [phone].

## 2017-10-13 NOTE — PROGRESS NOTES
1. Have you been to the ER, urgent care clinic since your last visit? Hospitalized since your last visit? No    2. Have you seen or consulted any other health care providers outside of the [address] [place] [address] since your last visit? Include any pap smears or colon screening.  No

## 2017-10-13 NOTE — PROGRESS NOTES
HISTORY OF PRESENT ILLNESS  Shahid Yates is a 41 y.o. female. Patient presents today with right should, bilateral knee and low back pain. HPI  Pt has done everything she can, even physical therapy. Pt has an appointment with Dr Aashish Dean schedule. She is limited range of motion with her shoulder and has pain going down her arm. Review of Systems   Constitutional: Negative. Respiratory: Negative. Cardiovascular: Negative. Musculoskeletal: Positive for back pain and joint pain (casie knees and right shoulder. ). Visit Vitals    /80 (BP 1 Location: Left arm, BP Patient Position: Sitting)    Pulse 75    Temp 97.5 °F (36.4 °C) (Oral)    Resp 16    Ht 5' 2\" (1.575 m)    Wt 237 lb 3.2 oz (107.6 kg)    LMP 10/10/2017    SpO2 99%    BMI 43.38 kg/m2       Physical Exam   Constitutional: She appears well-developed. No distress. Cardiovascular: Normal rate, regular rhythm and normal heart sounds. No murmur heard. Pulmonary/Chest: Effort normal and breath sounds normal. No respiratory distress. She has no wheezes. She has no rales. Musculoskeletal:        Right shoulder: She exhibits decreased range of motion, tenderness, effusion and crepitus. Left shoulder: Normal.        Right knee: She exhibits decreased range of motion and effusion. Tenderness found. Left knee: She exhibits decreased range of motion and effusion. Tenderness found. Lumbar back: She exhibits decreased range of motion, tenderness and spasm. ASSESSMENT and PLAN    ICD-10-CM ICD-9-CM    1. Pain in both knees, unspecified chronicity M25.561 719.46 REFERRAL TO ORTHOPEDICS    M25.562  ibuprofen (MOTRIN) 800 mg tablet   2. Chronic bilateral low back pain without sciatica M54.5 724.2 REFERRAL TO ORTHOPEDICS    G89.29 338.29 ibuprofen (MOTRIN) 800 mg tablet   3.  Chronic right shoulder pain M25.511 719.41 REFERRAL TO ORTHOPEDICS    G89.29 338.29 ibuprofen (MOTRIN) 800 mg tablet     PLAN:  Pt was given ref to ortho at her request.  Pt is to RTC with any concerns. Pt was given after visit summary.

## 2017-10-18 ENCOUNTER — OFFICE VISIT (OUTPATIENT)
Dept: ORTHOPEDIC SURGERY | Age: 47
End: 2017-10-18

## 2017-10-18 VITALS
BODY MASS INDEX: 43.43 KG/M2 | HEART RATE: 71 BPM | HEIGHT: 62 IN | WEIGHT: 236 LBS | DIASTOLIC BLOOD PRESSURE: 74 MMHG | RESPIRATION RATE: 18 BRPM | SYSTOLIC BLOOD PRESSURE: 116 MMHG | OXYGEN SATURATION: 100 % | TEMPERATURE: 97.6 F

## 2017-10-18 DIAGNOSIS — M25.562 ACUTE PAIN OF BOTH KNEES: ICD-10-CM

## 2017-10-18 DIAGNOSIS — M25.561 ACUTE PAIN OF BOTH KNEES: ICD-10-CM

## 2017-10-18 DIAGNOSIS — M17.0 PRIMARY OSTEOARTHRITIS OF BOTH KNEES: Primary | ICD-10-CM

## 2017-10-18 RX ORDER — TRIAMCINOLONE ACETONIDE 40 MG/ML
40 INJECTION, SUSPENSION INTRA-ARTICULAR; INTRAMUSCULAR ONCE
Qty: 1 ML | Refills: 0
Start: 2017-10-18 | End: 2017-10-18

## 2017-10-18 RX ORDER — LIDOCAINE HYDROCHLORIDE 10 MG/ML
INJECTION, SOLUTION EPIDURAL; INFILTRATION; INTRACAUDAL; PERINEURAL
Qty: 3 ML | Refills: 0
Start: 2017-10-18 | End: 2017-10-18

## 2017-10-18 NOTE — MR AVS SNAPSHOT
Visit Information Date & Time Provider Department Dept. Phone Encounter #  
 10/18/2017  3:30 PM Gisselle Suzanne, [address] [facility] [phone] [id number] Upcoming Health Maintenance Date Due DTaP/Tdap/Td series (1 - Tdap) 9/3/1993 INFLUENZA AGE 9 TO ADULT 8/1/2017 PAP AKA CERVICAL CYTOLOGY 5/4/2018 COLONOSCOPY 2/17/2026 Allergies as of 10/18/2017  Review Complete On: 10/18/2017 By: Barron Hernandez Severity Noted Reaction Type Reactions Codeine High 02/21/2012    Nausea and Vomiting Patient states she gets jittery, has upset stomach Current Immunizations  Never Reviewed Name Date Influenza Vaccine (Quad) PF 9/18/2014 Not reviewed this visit You Were Diagnosed With   
  
 Codes Comments Acute pain of both knees    -  Primary ICD-10-CM: M25.561, M25.562 ICD-9-CM: 338.19, 719.46 Primary osteoarthritis of both knees     ICD-10-CM: M17.0 ICD-9-CM: 715.16 Vitals BP Pulse Temp Resp Height(growth percentile) Weight(growth percentile) 116/74 71 97.6 °F (36.4 °C) (Oral) 18 5' 2\" (1.575 m) 236 lb (107 kg) LMP SpO2 BMI OB Status Smoking Status 10/10/2017 100% 43.16 kg/m2 Having regular periods Never Smoker BMI and BSA Data Body Mass Index Body Surface Area  
 43.16 kg/m 2 2.16 m 2 Preferred Pharmacy Pharmacy Name Phone ON-SITE [address] [phone] Your Updated Medication List  
  
   
This list is accurate as of: 10/18/17  5:00 PM.  Always use your most recent med list.  
  
  
  
  
 allopurinol 100 mg tablet Commonly known as:  Veronica Daniels Take 1 Tab by mouth daily. ammonium lactate 12 % topical cream  
Commonly known as:  LAC-HYDRIN Apply  to affected area two (2) times a day.  rub in to affected area well  
  
 diclofenac sodium 20 mg/gram /actuation(2 %) Sopm  
Commonly known as:  PENNSAID  
 2 Pump(s) by Apply Externally route two (2) times a day. ferrous fumarate 324 mg (106 mg iron) Tab Take 1 tablet by mouth two (2) times a day. fluticasone 50 mcg/actuation nasal spray Commonly known as:  Nikki Fetch 2 Sprays by Both Nostrils route daily. ibuprofen 800 mg tablet Commonly known as:  MOTRIN Take 1 Tab by mouth every eight (8) hours as needed for Pain.  
  
 meloxicam 15 mg tablet Commonly known as:  MOBIC Take 1 Tab by mouth daily. nystatin topical cream  
Commonly known as:  MYCOSTATIN Apply  to affected area two (2) times a day. triamcinolone acetonide 0.1 % topical cream  
Commonly known as:  KENALOG Apply  to affected area two (2) times daily as needed for Skin Irritation. use thin layer We Performed the Following AMB POC XRAY, KNEE; COMPLETE, 4+ VIEW [09942 CPT(R)] AMB POC XRAY, KNEE; COMPLETE, 4+ VIEW [92626 CPT(R)] Patient Instructions Please follow up in 4 weeks. You are advised to contact us if your condition worsens. Knee Arthritis: Care Instructions Your Care Instructions Knee arthritis is a breakdown of the cartilage that cushions your knee joint. When the cartilage wears down, your bones rub against each other. This causes pain and stiffness. Knee arthritis tends to get worse with time. Treatment for knee arthritis involves reducing pain, making the leg muscles stronger, and staying at a healthy body weight. The treatment usually does not improve the health of the cartilage, but it can reduce pain and improve how well your knee works. You can take simple measures to protect your knee joints, ease your pain, and help you stay active. Follow-up care is a key part of your treatment and safety. Be sure to make and go to all appointments, and call your doctor if you are having problems. It's also a good idea to know your test results and keep a list of the medicines you take. How can you care for yourself at home? · Know that knee arthritis will cause more pain on some days than on others. · Stay at a healthy weight. Lose weight if you are overweight. When you stand up, the pressure on your knees from every pound of body weight is multiplied four times. So if you lose 10 pounds, you will reduce the pressure on your knees by 40 pounds. · Talk to your doctor or physical therapist about exercises that will help ease joint pain. ¨ Stretch to help prevent stiffness and to prevent injury before you exercise. You may enjoy gentle forms of yoga to help keep your knee joints and muscles flexible. ¨ Walk instead of jog. ¨ Ride a bike. This makes your thigh muscles stronger and takes pressure off your knee. ¨ Wear well-fitting and comfortable shoes. ¨ Exercise in chest-deep water. This can help you exercise longer with less pain. ¨ Avoid exercises that include squatting or kneeling. They can put a lot of strain on your knees. ¨ Talk to your doctor to make sure that the exercise you do is not making the arthritis worse. · Do not sit for long periods of time. Try to walk once in a while to keep your knee from getting stiff. · Ask your doctor or physical therapist whether shoe inserts may reduce your arthritis pain. · If you can afford it, get new athletic shoes at least every year. This can help reduce the strain on your knees. · Use a device to help you do everyday activities. ¨ A cane or walking stick can help you keep your balance when you walk. Hold the cane or walking stick in the hand opposite the painful knee. ¨ If you feel like you may fall when you walk, try using crutches or a front-wheeled walker. These can prevent falls that could cause more damage to your knee. ¨ A knee brace may help keep your knee stable and prevent pain. ¨ You also can use other things to make life easier, such as a higher toilet seat and handrails in the bathtub or shower. · Take pain medicines exactly as directed. ¨ Do not wait until you are in severe pain. You will get better results if you take it sooner. ¨ If you are not taking a prescription pain medicine, take an over-the-counter medicine such as acetaminophen (Tylenol), ibuprofen (Advil, Motrin), or naproxen (Aleve). Read and follow all instructions on the label. ¨ Do not take two or more pain medicines at the same time unless the doctor told you to. Many pain medicines have acetaminophen, which is Tylenol. Too much acetaminophen (Tylenol) can be harmful. ¨ Tell your doctor if you take a blood thinner, have diabetes, or have allergies to shellfish. · Ask your doctor if you might benefit from a shot of steroid medicine into your knee. This may provide pain relief for several months. · Many people take the supplements glucosamine and chondroitin for osteoarthritis. Some people feel they help, but the medical research does not show that they work. Talk to your doctor before you take these supplements. When should you call for help? Call your doctor now or seek immediate medical care if: 
· You have sudden swelling, warmth, or pain in your knee. · You have knee pain and a fever or rash. · You have such bad pain that you cannot use your knee. Watch closely for changes in your health, and be sure to contact your doctor if you have any problems. Where can you learn more? Go to http://elana-bela.info/. Enter Q048 in the search box to learn more about \"Knee Arthritis: Care Instructions. \" Current as of: October 31, 2016 Content Version: 11.3 © 3972-0560 [employer]. Care instructions adapted under license by [employer] (which disclaims liability or warranty for this information).  If you have questions about a medical condition or this instruction, always ask your healthcare professional. [address] any warranty or liability for your use of this information. Knee Arthritis: Exercises Your Care Instructions Here are some examples of exercises for knee arthritis. Start each exercise slowly. Ease off the exercise if you start to have pain. Your doctor or physical therapist will tell you when you can start these exercises and which ones will work best for you. How to do the exercises Knee flexion with heel slide 1. Lie on your back with your knees bent. 2. Slide your heel back by bending your affected knee as far as you can. Then hook your other foot around your ankle to help pull your heel even farther back. 3. Hold for about 6 seconds, then rest for up to 10 seconds. 4. Repeat 8 to 12 times. 5. Switch legs and repeat steps 1 through 4, even if only one knee is sore. [place] 1. Sit with your affected leg straight and supported on the floor or a firm bed. Place a small, rolled-up towel under your knee. Your other leg should be bent, with that foot flat on the floor. 2. Tighten the thigh muscles of your affected leg by pressing the back of your knee down into the towel. 3. Hold for about 6 seconds, then rest for up to 10 seconds. 4. Repeat 8 to 12 times. 5. Switch legs and repeat steps 1 through 4, even if only one knee is sore. Straight-leg raises to the front 1. Lie on your back with your good knee bent so that your foot rests flat on the floor. Your affected leg should be straight. Make sure that your low back has a normal curve. You should be able to slip your hand in between the floor and the small of your back, with your palm touching the floor and your back touching the back of your hand. 2. Tighten the thigh muscles in your affected leg by pressing the back of your knee flat down to the floor. Hold your knee straight. 3. Keeping the thigh muscles tight and your leg straight, lift your affected leg up so that your heel is about 12 inches off the floor. Hold for about 6 seconds, then lower slowly. 4. Relax for up to 10 seconds between repetitions. 5. Repeat 8 to 12 times. 6. Switch legs and repeat steps 1 through 5, even if only one knee is sore. Active knee flexion 1. Lie on your stomach with your knees straight. If your kneecap is uncomfortable, roll up a washcloth and put it under your leg just above your kneecap. 2. Lift the foot of your affected leg by bending the knee so that you bring the foot up toward your buttock. If this motion hurts, try it without bending your knee quite as far. This may help you avoid any painful motion. 3. Slowly move your leg up and down. 4. Repeat 8 to 12 times. 5. Switch legs and repeat steps 1 through 4, even if only one knee is sore. Quadriceps stretch (facedown) 1. Lie flat on your stomach, and rest your face on the floor. 2. Wrap a towel or belt strap around the lower part of your affected leg. Then use the towel or belt strap to slowly pull your heel toward your buttock until you feel a stretch. 3. Hold for about 15 to 30 seconds, then relax your leg against the towel or belt strap. 4. Repeat 2 to 4 times. 5. Switch legs and repeat steps 1 through 4, even if only one knee is sore. Stationary exercise bike If you do not have a stationary exercise bike at home, you can find one to ride at your local health club or community center. 1. Adjust the height of the bike seat so that your knee is slightly bent when your leg is extended downward. If your knee hurts when the pedal reaches the top, you can raise the seat so that your knee does not bend as much. 2. Start slowly. At first, try to do 5 to 10 minutes of cycling with little to no resistance. Then increase your time and the resistance bit by bit until you can do 20 to 30 minutes without pain. 3. If you start to have pain, rest your knee until your pain gets back to the level that is normal for you. Or cycle for less time or with less effort. Follow-up care is a key part of your treatment and safety. Be sure to make and go to all appointments, and call your doctor if you are having problems. It's also a good idea to know your test results and keep a list of the medicines you take. Where can you learn more? Go to http://elana-bela.info/. Enter C159 in the search box to learn more about \"Knee Arthritis: Exercises. \" Current as of: March 21, 2017 Content Version: 11.3 © 4753-9295 [employer]. Care instructions adapted under license by [employer] (which disclaims liability or warranty for this information). If you have questions about a medical condition or this instruction, always ask your healthcare professional. [address] any warranty or liability for your use of this information. Introducing [facility]! Dear Sanya Head: 
Thank you for requesting a [employer] account. Our records indicate that you already have an active [employer] account. You can access your account anytime at [email or web address] Did you know that you can access your hospital and ER discharge instructions at any time in [employer]? You can also review all of your test results from your hospital stay or ER visit. Additional Information If you have questions, please visit the Frequently Asked Questions section of the [employer] website at [email or web address] Remember, [employer] is NOT to be used for urgent needs. For medical emergencies, dial 911. Now available from your iPhone and Android! Please provide this summary of care documentation to your next provider. Your primary care clinician is listed as [address]. If you have any questions after today's visit, please call [phone].

## 2017-10-18 NOTE — LETTER
NOTIFICATION RETURN TO WORK / SCHOOL 
 
10/18/2017 4:58 PM 
 
Ms. Tereza Lockwood [address] 
[address] To Whom It May Concern: 
 
Tereza Lockwood is currently under the care of [address] [place] [address] Larry Atwood. Please allow Ms. Madan Garcia to remain out of work until 10/23/2017. If there are questions or concerns please have the patient contact our office.  
 
 
 
Sincerely, 
 
 
Silvano Thornton MD

## 2017-10-18 NOTE — PROCEDURES
DIAGNOSTIC STUDIES:  X-rays of the bilateral knees:    Right knee, three views, AP, lateral, and oblique: There is some medial joint space narrowing. No osteolytic or osteoblastic lesions are seen. No fracture, subluxation, or dislocation. No gross joint effusions to the right knee on these three views. Left knee, three views: There is medial greater than lateral compartment knee OA. There is patellofemoral joint OA of the left knee. No osteolytic or osteoblastic lesions are seen on these three views of the left knee.

## 2017-10-18 NOTE — PROGRESS NOTES
AMBULATORY PROGRESS NOTE      Patient: Ritika Torres             MRN: [id number]     SSN: [id number] Body mass index is 43.16 kg/(m^2). YOB: 1970     AGE: 41 y.o. EX: female    PCP: Vipul Chopra MD    IMPRESSION/DIAGNOSIS AND TREATMENT PLAN     DIAGNOSES  1. Primary osteoarthritis of both knees    2. Acute pain of both knees        Orders Placed This Encounter    DRAIN/INJECT LARGE JOINT/BURSA    [72085] Knee 4V    [id number] Knee 4V    TRIAMCINOLONE ACETONIDE INJ    DISCONTD: triamcinolone acetonide (KENALOG) 40 mg/mL injection    DISCONTD: lidocaine, PF, (XYLOCAINE) 10 mg/mL (1 %) injection    DISCONTD: triamcinolone acetonide (KENALOG) 40 mg/mL injection    DISCONTD: lidocaine, PF, (XYLOCAINE) 10 mg/mL (1 %) injection    triamcinolone acetonide (KENALOG) 40 mg/mL injection      Margarita Rasmussen understands her diagnoses and the proposed plan. Plan:    1) Cortisone Injection to the Bilateral Knees: 1 mL of Kenalog and 3 mL Lidocaine  2) Work Note: Remain out of work for the next two days. Return to work, 10/23/2017. RTO - 4 weeks    HPI AND EXAMINATION     Ritika Torres IS A 41 y.o. female who presents to my outpatient office for follow up evaluation of bilateral foot pain. At last visit, Ahsan Mcdonnell PA-C, I recommended to discontinue Mobic, ordered Lac-Hydrin, and provided bilateral custom orthotics to offload 1st MTP joint. The patient presents to the office today stating that she experiences pain along her bilateral knees. She reports pain while squatting, bending, and while using stairs or steps. The patient notes that she had injections to her bilateral knees about a year ago. Ms. Antwon Jordan states that she cannot stand for prolonged periods of time without experiencing pain. She endorses start-up stiffness. The patient denies using blood thinners. The patient works as a  for [employer].      GEN:  Alert, well developed, well nourished, well appearing 42 y.o. female in no acute distress. PSYCH:  Normal affect, mood, and conduct. alert, oriented x 3 alert, oriented x 3, no dementia  M/S EXAMINATION OF: ANKLE/FOOT Bilateral  Gait: normal  Tenderness: No tenderness bilateral great toe pain medial aspect (medial eminence), No warmth, redness or soft tissue swelling. She reports some discomfort at great toe MTP region as well as plantar. There is a callus noted in the area of the great toe MTP  Cutaneous: WNL on left foot, but, Fullness to dorsal part of right midfoot  Joint Motion: WNL  Joint / Tendon Stability: No Ankle or Subtalar instability or joint laxity. No peroneal sublux ability or dislocation  Alignment: Bilateral Planar Valgus   Neuro Motor/Sensory: NL/NL  Vascular: NL foot/ankle pulses  Lymphatics: No extremity lymphedema, No calf swelling, no tenderness to calf muscles. CHART REVIEW     Past Medical History:   Diagnosis Date    Left foot pain     Plantar fasciitis, left     Right shoulder pain      Current Outpatient Prescriptions   Medication Sig    triamcinolone acetonide (KENALOG) 40 mg/mL injection 1 mL by Intra artICUlar route once for 1 dose.  ibuprofen (MOTRIN) 800 mg tablet Take 1 Tab by mouth every eight (8) hours as needed for Pain.  diclofenac sodium (PENNSAID) 20 mg/gram /actuation(2 %) sopm 2 Pump(s) by Apply Externally route two (2) times a day.  ammonium lactate (LAC-HYDRIN) 12 % topical cream Apply  to affected area two (2) times a day. rub in to affected area well    nystatin (MYCOSTATIN) topical cream Apply  to affected area two (2) times a day.  triamcinolone acetonide (KENALOG) 0.1 % topical cream Apply  to affected area two (2) times daily as needed for Skin Irritation. use thin layer    meloxicam (MOBIC) 15 mg tablet Take 1 Tab by mouth daily.  allopurinol (ZYLOPRIM) 100 mg tablet Take 1 Tab by mouth daily.     ferrous fumarate 324 mg (106 mg iron) tab Take 1 tablet by mouth two (2) times a day.  fluticasone (FLONASE) 50 mcg/actuation nasal spray 2 Sprays by Both Nostrils route daily. No current facility-administered medications for this visit. Allergies   Allergen Reactions    Codeine Nausea and Vomiting     Patient states she gets jittery, has upset stomach      Past Surgical History:   Procedure Laterality Date    HX TUBAL LIGATION  2009    Tubal ligation     Social History     Occupational History    [employer]term [employer]     Social History Main Topics    Smoking status: Never Smoker    Smokeless tobacco: Never Used    Alcohol use No    Drug use: No    Sexual activity: Yes     Partners: Male     Family History   Problem Relation Age of Onset    Hypertension Mother     Hypertension Father     Arthritis-osteo Other        REVIEW OF SYSTEMS : 10/18/2017  ALL BELOW ARE Negative except : SEE HPI       Constitutional: Negative for fever, chills and weight loss. Neg Weigh Loss  Cardiovascular: Negative for chest pain, claudication and leg swelling. SOB, SMITH   Gastrointestinal: Negative for  pain, N/V/D/C, Blood in stool or urine,dysuria, hematuria,        Incontinence, pelvic pain  Musculoskeletal: see HPI. Neurological: Negative for dizziness and weakness. Negative for headaches,Visual Changes, Confusion, Seizures,   Psychiatric/Behavioral: Negative for depression, memory loss and substance abuse. Extremities:  Negative for  hair changes, rash or skin lesion changes. Hematologic: Negative for Bleeding problems, bruising, pallor or swollen lymph nodes.   Peripheral Vascular: No calf pain, vascular vein tenderness to calf pain              No calf throbbing, posterior knee throbbing pain    DIAGNOSTIC IMAGING      Dictation on: 10/18/2017  5:01 PM by: Ivan Perez [[id number]]         Written by Lainey Phillips, as dictated by Ion Massey MD. IDr., Ion Massey MD, confirm that all documentation is accurate.

## 2017-10-18 NOTE — PATIENT INSTRUCTIONS
Please follow up in 4 weeks. You are advised to contact us if your condition worsens. Knee Arthritis: Care Instructions  Your Care Instructions  Knee arthritis is a breakdown of the cartilage that cushions your knee joint. When the cartilage wears down, your bones rub against each other. This causes pain and stiffness. Knee arthritis tends to get worse with time. Treatment for knee arthritis involves reducing pain, making the leg muscles stronger, and staying at a healthy body weight. The treatment usually does not improve the health of the cartilage, but it can reduce pain and improve how well your knee works. You can take simple measures to protect your knee joints, ease your pain, and help you stay active. Follow-up care is a key part of your treatment and safety. Be sure to make and go to all appointments, and call your doctor if you are having problems. It's also a good idea to know your test results and keep a list of the medicines you take. How can you care for yourself at home? · Know that knee arthritis will cause more pain on some days than on others. · Stay at a healthy weight. Lose weight if you are overweight. When you stand up, the pressure on your knees from every pound of body weight is multiplied four times. So if you lose 10 pounds, you will reduce the pressure on your knees by 40 pounds. · Talk to your doctor or physical therapist about exercises that will help ease joint pain. ¨ Stretch to help prevent stiffness and to prevent injury before you exercise. You may enjoy gentle forms of yoga to help keep your knee joints and muscles flexible. ¨ Walk instead of jog. ¨ Ride a bike. This makes your thigh muscles stronger and takes pressure off your knee. ¨ Wear well-fitting and comfortable shoes. ¨ Exercise in chest-deep water. This can help you exercise longer with less pain. ¨ Avoid exercises that include squatting or kneeling. They can put a lot of strain on your knees.   ¨ Talk to your doctor to make sure that the exercise you do is not making the arthritis worse. · Do not sit for long periods of time. Try to walk once in a while to keep your knee from getting stiff. · Ask your doctor or physical therapist whether shoe inserts may reduce your arthritis pain. · If you can afford it, get new athletic shoes at least every year. This can help reduce the strain on your knees. · Use a device to help you do everyday activities. ¨ A cane or walking stick can help you keep your balance when you walk. Hold the cane or walking stick in the hand opposite the painful knee. ¨ If you feel like you may fall when you walk, try using crutches or a front-wheeled walker. These can prevent falls that could cause more damage to your knee. ¨ A knee brace may help keep your knee stable and prevent pain. ¨ You also can use other things to make life easier, such as a higher toilet seat and handrails in the bathtub or shower. · Take pain medicines exactly as directed. ¨ Do not wait until you are in severe pain. You will get better results if you take it sooner. ¨ If you are not taking a prescription pain medicine, take an over-the-counter medicine such as acetaminophen (Tylenol), ibuprofen (Advil, Motrin), or naproxen (Aleve). Read and follow all instructions on the label. ¨ Do not take two or more pain medicines at the same time unless the doctor told you to. Many pain medicines have acetaminophen, which is Tylenol. Too much acetaminophen (Tylenol) can be harmful. ¨ Tell your doctor if you take a blood thinner, have diabetes, or have allergies to shellfish. · Ask your doctor if you might benefit from a shot of steroid medicine into your knee. This may provide pain relief for several months. · Many people take the supplements glucosamine and chondroitin for osteoarthritis. Some people feel they help, but the medical research does not show that they work.  Talk to your doctor before you take these supplements. When should you call for help? Call your doctor now or seek immediate medical care if:  · You have sudden swelling, warmth, or pain in your knee. · You have knee pain and a fever or rash. · You have such bad pain that you cannot use your knee. Watch closely for changes in your health, and be sure to contact your doctor if you have any problems. Where can you learn more? Go to http://elana-bela.info/. Enter J200 in the search box to learn more about \"Knee Arthritis: Care Instructions. \"  Current as of: October 31, 2016  Content Version: 11.3  © 9944-9124 [employer]. Care instructions adapted under license by [employer] (which disclaims liability or warranty for this information). If you have questions about a medical condition or this instruction, always ask your healthcare professional. [place] [address] any warranty or liability for your use of this information. Knee Arthritis: Exercises  Your Care Instructions  Here are some examples of exercises for knee arthritis. Start each exercise slowly. Ease off the exercise if you start to have pain. Your doctor or physical therapist will tell you when you can start these exercises and which ones will work best for you. How to do the exercises  Knee flexion with heel slide    1. Lie on your back with your knees bent. 2. Slide your heel back by bending your affected knee as far as you can. Then hook your other foot around your ankle to help pull your heel even farther back. 3. Hold for about 6 seconds, then rest for up to 10 seconds. 4. Repeat 8 to 12 times. 5. Switch legs and repeat steps 1 through 4, even if only one knee is sore. Quad sets    1. Sit with your affected leg straight and supported on the floor or a firm bed. Place a small, rolled-up towel under your knee. Your other leg should be bent, with that foot flat on the floor.   2. Tighten the thigh muscles of your affected leg by pressing the back of your knee down into the towel. 3. Hold for about 6 seconds, then rest for up to 10 seconds. 4. Repeat 8 to 12 times. 5. Switch legs and repeat steps 1 through 4, even if only one knee is sore. Straight-leg raises to the front    1. Lie on your back with your good knee bent so that your foot rests flat on the floor. Your affected leg should be straight. Make sure that your low back has a normal curve. You should be able to slip your hand in between the floor and the small of your back, with your palm touching the floor and your back touching the back of your hand. 2. Tighten the thigh muscles in your affected leg by pressing the back of your knee flat down to the floor. Hold your knee straight. 3. Keeping the thigh muscles tight and your leg straight, lift your affected leg up so that your heel is about 12 inches off the floor. Hold for about 6 seconds, then lower slowly. 4. Relax for up to 10 seconds between repetitions. 5. Repeat 8 to 12 times. 6. Switch legs and repeat steps 1 through 5, even if only one knee is sore. Active knee flexion    1. Lie on your stomach with your knees straight. If your kneecap is uncomfortable, roll up a washcloth and put it under your leg just above your kneecap. 2. Lift the foot of your affected leg by bending the knee so that you bring the foot up toward your buttock. If this motion hurts, try it without bending your knee quite as far. This may help you avoid any painful motion. 3. Slowly move your leg up and down. 4. Repeat 8 to 12 times. 5. Switch legs and repeat steps 1 through 4, even if only one knee is sore. Quadriceps stretch (facedown)    1. Lie flat on your stomach, and rest your face on the floor. 2. Wrap a towel or belt strap around the lower part of your affected leg. Then use the towel or belt strap to slowly pull your heel toward your buttock until you feel a stretch.   3. Hold for about 15 to 30 seconds, then relax your leg against the towel or belt strap. 4. Repeat 2 to 4 times. 5. Switch legs and repeat steps 1 through 4, even if only one knee is sore. Stationary exercise bike    If you do not have a stationary exercise bike at home, you can find one to ride at your local health club or community center. 1. Adjust the height of the bike seat so that your knee is slightly bent when your leg is extended downward. If your knee hurts when the pedal reaches the top, you can raise the seat so that your knee does not bend as much. 2. Start slowly. At first, try to do 5 to 10 minutes of cycling with little to no resistance. Then increase your time and the resistance bit by bit until you can do 20 to 30 minutes without pain. 3. If you start to have pain, rest your knee until your pain gets back to the level that is normal for you. Or cycle for less time or with less effort. Follow-up care is a key part of your treatment and safety. Be sure to make and go to all appointments, and call your doctor if you are having problems. It's also a good idea to know your test results and keep a list of the medicines you take. Where can you learn more? Go to http://elana-bela.info/. Enter C159 in the search box to learn more about \"Knee Arthritis: Exercises. \"  Current as of: March 21, 2017  Content Version: 11.3  © 9135-5587 [employer]. Care instructions adapted under license by [employer] (which disclaims liability or warranty for this information). If you have questions about a medical condition or this instruction, always ask your healthcare professional. [address] any warranty or liability for your use of this information.

## 2017-11-08 ENCOUNTER — OFFICE VISIT (OUTPATIENT)
Dept: ORTHOPEDIC SURGERY | Age: 47
End: 2017-11-08

## 2017-11-08 VITALS
HEART RATE: 57 BPM | OXYGEN SATURATION: 100 % | TEMPERATURE: 98.2 F | SYSTOLIC BLOOD PRESSURE: 117 MMHG | WEIGHT: 235 LBS | HEIGHT: 62 IN | BODY MASS INDEX: 43.24 KG/M2 | DIASTOLIC BLOOD PRESSURE: 79 MMHG

## 2017-11-08 DIAGNOSIS — M75.101 ROTATOR CUFF TEAR, NON-TRAUMATIC, RIGHT: Primary | ICD-10-CM

## 2017-11-08 NOTE — PATIENT INSTRUCTIONS
MRI of the Shoulder: About This Test  What is it? MRI (magnetic resonance imaging) is a test that uses a magnetic field and pulses of radio wave energy to make pictures of the organs and structures inside the body. An MRI can give your doctor information about your shoulder, the bones around it, and the tissues around it, such as cartilage, ligaments, and tendons. When you have an MRI, you lie on a table and the table moves into the MRI machine. Why is this test done? An MRI of the shoulder can find problems such as damage to the cartilage, tendons, and ligaments around the shoulder. It can also look for the cause of shoulder pain and find rotator cuff problems. How can you prepare for the test?  Talk to your doctor about all your health conditions before the test. For example, tell your doctor if:  · You are allergic to any medicines. · You are or might be pregnant. · You have a pacemaker, an artificial limb, any metal pins or metal parts in your body, metal heart valves, metal clips in your brain, metal implants in your ears, or any other implanted or prosthetic medical device. · You have an intrauterine device (IUD) in place. · You get nervous in confined spaces. You may need medicine to help you relax. · You wear a patch that contains medicine. · You have kidney disease. What happens before the test?  · You will remove all metal objects, such as hearing aids, dentures, jewelry, watches, and hairpins. · You will take off all or most of your clothes and change into a gown. If you do leave some clothes on, make sure you take everything out of your pockets. · You may have contrast material (dye) put into your arm through a tube called an IV or directly into your shoulder joint. Contrast material helps doctors see specific organs, blood vessels, and most tumors. What happens during the test?  · You will lie on your back on a table that is part of the MRI scanner.  Your head, chest, and arms may be held with straps to help you remain still. · The table will slide into the space that contains the magnet. A device called a coil may be placed over or wrapped around your shoulder. · Inside the scanner you will hear a fan and feel air moving. You may hear tapping, thumping, or snapping noises. You may be given earplugs or headphones to reduce the noise. · You will be asked to hold still during the scan. You may be asked to hold your breath for short periods. · You may be alone in the scanning room, but a technologist will be watching you through a window and talking with you during the test.  What else should you know about the test?  · An MRI does not hurt. · If a dye is used, you may feel a quick sting or pinch and some coolness when the IV is started. The dye may give you a metallic taste in your mouth. Some people feel sick to their stomach or get a headache. · If you breastfeed and are concerned about whether the dye used in this test is safe, talk to your doctor. Most experts believe that very little dye passes into breast milk and even less is passed on to the baby. But if you prefer, you can store some of your breast milk ahead of time and use it for a day or two after the test.  · You may feel warmth in the area being examined. This is normal.  How long does the test take? · The test usually takes 30 to 60 minutes but can take as long as 2 hours. What happens after the test?  · You will probably be able to go home right away, depending on the reason for the test.  · You can go back to your usual activities right away. Follow-up care is a key part of your treatment and safety. Be sure to make and go to all appointments, and call your doctor if you are having problems. It's also a good idea to keep a list of the medicines you take. Ask your doctor when you can expect to have your test results. Where can you learn more? Go to http://elana-bela.info/.   Enter K233 in the search box to learn more about \"MRI of the Shoulder: About This Test.\"  Current as of: October 14, 2016  Content Version: 11.4  © 9373-2391 [employer]. Care instructions adapted under license by [employer] (which disclaims liability or warranty for this information). If you have questions about a medical condition or this instruction, always ask your healthcare professional. [place] [address] any warranty or liability for your use of this information.

## 2017-11-08 NOTE — PROGRESS NOTES
Camron Raza  1970   Chief Complaint   Patient presents with    Shoulder Pain     right        HISTORY OF PRESENT ILLNESS  Camron Raza is a 41 y.o. female who presents today for evaluation of right shoulder pain. Patient referred by Dr. Gary Givens for further evaluation. she rates her pain 0/10 today. Pain has been present for about 5 years. Recalls a TV fell onto her shoulder while at work. States the shoulder has bothered her since. Reports that the shoulder occassionally locks up on her for 2-3 minutes at a time, not associated with any movement or activuty. Patient describes the pain as aching and catching that is Intermittent in nature. Symptoms are worse with Activity, Work and motions of the arm and is better with  Rest. Associated symptoms include locking. Since problem started, it: is unchanged. Pain does not wake patient up at night. Has taken no medication for the problem. Works as a . Has tried following treatments: Injections:NO; Brace:NO;  Therapy:NO; Cane/Crutch:NO       Allergies   Allergen Reactions    Codeine Nausea and Vomiting     Patient states she gets jittery, has upset stomach         Past Medical History:   Diagnosis Date    Left foot pain     Plantar fasciitis, left     Right shoulder pain       Social History     Social History    Marital status:      Spouse name: N/A    Number of children: N/A    Years of education: N/A     Occupational History    [employer]     Social History Main Topics    Smoking status: Never Smoker    Smokeless tobacco: Never Used    Alcohol use No    Drug use: No    Sexual activity: Yes     Partners: Male     Other Topics Concern    Not on file     Social History Narrative      Past Surgical History:   Procedure Laterality Date    HX TUBAL LIGATION  2009    Tubal ligation      Family History   Problem Relation Age of Onset    Hypertension Mother     Hypertension Father     Arthritis-osteo Other Current Outpatient Prescriptions   Medication Sig    ibuprofen (MOTRIN) 800 mg tablet Take 1 Tab by mouth every eight (8) hours as needed for Pain.  ammonium lactate (LAC-HYDRIN) 12 % topical cream Apply  to affected area two (2) times a day. rub in to affected area well    nystatin (MYCOSTATIN) topical cream Apply  to affected area two (2) times a day.  triamcinolone acetonide (KENALOG) 0.1 % topical cream Apply  to affected area two (2) times daily as needed for Skin Irritation. use thin layer    meloxicam (MOBIC) 15 mg tablet Take 1 Tab by mouth daily.  diclofenac sodium (PENNSAID) 20 mg/gram /actuation(2 %) sopm 2 Pump(s) by Apply Externally route two (2) times a day.  allopurinol (ZYLOPRIM) 100 mg tablet Take 1 Tab by mouth daily.  ferrous fumarate 324 mg (106 mg iron) tab Take 1 tablet by mouth two (2) times a day.  fluticasone (FLONASE) 50 mcg/actuation nasal spray 2 Sprays by Both Nostrils route daily. No current facility-administered medications for this visit. REVIEW OF SYSTEM   Patient denies: Weight loss, Fever/Chills, HA, Visual changes, Fatigue, Chest pain, SOB, Abdominal pain, N/V/D/C, Blood in stool or urine, Edema. Pertinent positive as above in HPI. All others were negative    PHYSICAL EXAM:   Visit Vitals    /79    Pulse (!) 57    Temp 98.2 °F (36.8 °C) (Oral)    Ht 5' 2\" (1.575 m)    Wt 235 lb (106.6 kg)    LMP 10/10/2017    SpO2 100%    BMI 42.98 kg/m2     The patient is a well-developed, well-nourished female   in no acute distress. The patient is alert and oriented times three. The patient is alert and oriented times three. Mood and affect are normal.  LYMPHATIC: lymph nodes are not enlarged and are within normal limits  SKIN: normal in color and non tender to palpation. There are no bruises or abrasions noted. NEUROLOGICAL: Motor sensory exam is within normal limits. Reflexes are equal bilaterally.  There is normal sensation to pinprick and light touch  MUSCULOSKELETAL:  Examination Right shoulder   Skin Intact   AC joint tenderness -   Biceps tenderness -   Forward flexion/Elevation    Active abduction    Glenohumeral abduction 60   External rotation ROM 45   Internal rotation ROM 30   Apprehension -   Rachells Relocation -   Jerk -   Load and Shift -   Obriens -   Speeds -   Impingement sign +   Supraspinatus/Empty Can ++   External Rotation Strength -, 5/5   Lift Off/Belly Press -, 5/5   Neurovascular Intact          IMAGING:   XR of the right shoulder dated 12/11/14 was reviewed and read:   IMPRESSION:  1. Degenerative changes right shoulder. 2. Synovial osteochondromatosis    MRI of the right shoulder dated 12/19/17 was reviewed and read: Impression:  1. Rotator cuff tendinosis with no focal tear. Subacromial bursitis present  2. Degenerative joint disease with large osteophyte in the humeral head and  flattening, irregularity of the glenoid with attenuated labrum. 3. Multiple loose bodies within the biceps tendon sheath. 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 Also possible loose  bodies in the subscapularis recess. 4. Red marrow reconversion. Clinical correlation? Marrow replacement process in malignancy or chronic anemia?         IMPRESSION:      ICD-10-CM ICD-9-CM    1. Rotator cuff tear, non-traumatic, right M75.101 727.61 MRI SHOULDER RT WO CONT        PLAN:  1. I am concerned about the locking that the patient is experiencing. I would like to proceed with an MRI to assess the loose bodies found in the first MRI performed in 2014. Risk factors include: n/a  2. No cortisone injection indicated today   3. No Physical/Occupational Therapy indicated today  4. Yes diagnostic test indicated today: MRI RIGHT SHOULDER  5. No durable medical equipment indicated today  6. No referral indicated today   7. No medications indicated today  8.  No Narcotic indicated today    RTC following completion of the MRI  Office note will be sent to the referring provider. Follow-up Disposition: Not on File    Scribed by Toño Javier [address]) as dictated by Marla Neumann MD    I, Dr. Marla Neumann, confirm that all documentation is accurate.     Marla Neumann M.D.   [facility]

## 2017-11-15 ENCOUNTER — OFFICE VISIT (OUTPATIENT)
Dept: ORTHOPEDIC SURGERY | Age: 47
End: 2017-11-15

## 2017-11-15 VITALS
DIASTOLIC BLOOD PRESSURE: 75 MMHG | TEMPERATURE: 97.4 F | HEART RATE: 79 BPM | SYSTOLIC BLOOD PRESSURE: 125 MMHG | WEIGHT: 236 LBS | BODY MASS INDEX: 43.43 KG/M2 | HEIGHT: 62 IN | OXYGEN SATURATION: 100 %

## 2017-11-15 DIAGNOSIS — M22.41 CHONDROMALACIA OF RIGHT PATELLOFEMORAL JOINT: ICD-10-CM

## 2017-11-15 DIAGNOSIS — M17.0 PRIMARY OSTEOARTHRITIS OF BOTH KNEES: Primary | ICD-10-CM

## 2017-11-15 NOTE — PATIENT INSTRUCTIONS
Please follow up in 4 weeks. You are advised to contact us if your condition worsens. Knee Arthritis: Exercises  Your Care Instructions  Here are some examples of exercises for knee arthritis. Start each exercise slowly. Ease off the exercise if you start to have pain. Your doctor or physical therapist will tell you when you can start these exercises and which ones will work best for you. How to do the exercises  Knee flexion with heel slide    1. Lie on your back with your knees bent. 2. Slide your heel back by bending your affected knee as far as you can. Then hook your other foot around your ankle to help pull your heel even farther back. 3. Hold for about 6 seconds, then rest for up to 10 seconds. 4. Repeat 8 to 12 times. 5. Switch legs and repeat steps 1 through 4, even if only one knee is sore. Quad sets    1. Sit with your affected leg straight and supported on the floor or a firm bed. Place a small, rolled-up towel under your knee. Your other leg should be bent, with that foot flat on the floor. 2. Tighten the thigh muscles of your affected leg by pressing the back of your knee down into the towel. 3. Hold for about 6 seconds, then rest for up to 10 seconds. 4. Repeat 8 to 12 times. 5. Switch legs and repeat steps 1 through 4, even if only one knee is sore. Straight-leg raises to the front    1. Lie on your back with your good knee bent so that your foot rests flat on the floor. Your affected leg should be straight. Make sure that your low back has a normal curve. You should be able to slip your hand in between the floor and the small of your back, with your palm touching the floor and your back touching the back of your hand. 2. Tighten the thigh muscles in your affected leg by pressing the back of your knee flat down to the floor. Hold your knee straight. 3. Keeping the thigh muscles tight and your leg straight, lift your affected leg up so that your heel is about 12 inches off the floor. Hold for about 6 seconds, then lower slowly. 4. Relax for up to 10 seconds between repetitions. 5. Repeat 8 to 12 times. 6. Switch legs and repeat steps 1 through 5, even if only one knee is sore. Active knee flexion    1. Lie on your stomach with your knees straight. If your kneecap is uncomfortable, roll up a washcloth and put it under your leg just above your kneecap. 2. Lift the foot of your affected leg by bending the knee so that you bring the foot up toward your buttock. If this motion hurts, try it without bending your knee quite as far. This may help you avoid any painful motion. 3. Slowly move your leg up and down. 4. Repeat 8 to 12 times. 5. Switch legs and repeat steps 1 through 4, even if only one knee is sore. Quadriceps stretch (facedown)    1. Lie flat on your stomach, and rest your face on the floor. 2. Wrap a towel or belt strap around the lower part of your affected leg. Then use the towel or belt strap to slowly pull your heel toward your buttock until you feel a stretch. 3. Hold for about 15 to 30 seconds, then relax your leg against the towel or belt strap. 4. Repeat 2 to 4 times. 5. Switch legs and repeat steps 1 through 4, even if only one knee is sore. Stationary exercise bike    1. If you do not have a stationary exercise bike at home, you can find one to ride at your local health club or community center. 2. Adjust the height of the bike seat so that your knee is slightly bent when your leg is extended downward. If your knee hurts when the pedal reaches the top, you can raise the seat so that your knee does not bend as much. 3. Start slowly. At first, try to do 5 to 10 minutes of cycling with little to no resistance. Then increase your time and the resistance bit by bit until you can do 20 to 30 minutes without pain. 4. If you start to have pain, rest your knee until your pain gets back to the level that is normal for you.  Or cycle for less time or with less effort. Follow-up care is a key part of your treatment and safety. Be sure to make and go to all appointments, and call your doctor if you are having problems. It's also a good idea to know your test results and keep a list of the medicines you take. Where can you learn more? Go to http://elana-bela.info/. Enter C159 in the search box to learn more about \"Knee Arthritis: Exercises. \"  Current as of: March 21, 2017  Content Version: 11.4  © 4152-4547 [employer]. Care instructions adapted under license by [employer] (which disclaims liability or warranty for this information). If you have questions about a medical condition or this instruction, always ask your healthcare professional. [address] any warranty or liability for your use of this information.

## 2017-11-15 NOTE — PROGRESS NOTES
AMBULATORY PROGRESS NOTE      Patient: Haim Fonseca             MRN: [id number]     SSN: [id number] Body mass index is 43.16 kg/(m^2). YOB: 1970     AGE: 41 y.o. EX: female    PCP: Roberta Ca MD    IMPRESSION/DIAGNOSIS AND TREATMENT PLAN     DIAGNOSES  1. Primary osteoarthritis of both knees    2. Chondromalacia of right patellofemoral joint        Orders Placed This Encounter    AMB SUPPLY ORDER    REFERRAL TO [address] [place] [address] understands her diagnoses and the proposed plan. Plan:    1) Referral for Physical Therapy: Isometric Quadricept strengthening. 2) DME Order: Right Knee Brace  3) Continue activity modification as directed. 4) Euflexxa brochure given    RTO - 4 weeks    HPI AND EXAMINATION     Margarita Castillo IS A 41 y.o. female who presents to my outpatient office for follow up evaluation of bilateral foot pain. At last visit, I injected Cortisone to the bilateral knee. The patient presents to the office today with a CC of continuing bilateral knee pain (R>L). Patient states that she has been going to the gym to strengthen her bilateral legs. She has been instructed about the exercises that would be good for her condition. Additionally, she notes continuing pain while going up and down steps. The patient denies using blood thinners. The patient works as a  for [employer] well developed, well nourished, well appearing 42 y. o. female in no acute distress. PSYCH:  Normal affect, mood, and conduct. alert, oriented x 3 alert, oriented x 3, no dementia  M/S EXAMINATION OF: ANKLE/FOOT Bilateral  Gait: normal  Tenderness: No tenderness bilateral great toe pain medial aspect (medial eminence), No warmth, redness or soft tissue swelling. She reports some discomfort at great toe MTP region as well as plantar.  There is a callus noted in the area of the great toe MTP  Cutaneous: WNL on left foot, but, Fullness to dorsal part of right midfoot  Joint Motion: WNL  Joint / Tendon Stability: No Ankle or Subtalar instability or joint laxity.                                                               No peroneal sublux ability or dislocation  Alignment: Bilateral Planar Valgus   Neuro Motor/Sensory: NL/NL  Vascular: NL foot/ankle pulses  Lymphatics: No extremity lymphedema, No calf swelling, no tenderness to calf muscles. Knees:  Bilateral        Gait: normal         Cutaneous: Skin intact, no abrasions, blisters, wounds, erythema        Effusion: Is not present        Crepitus:  mild PF joint crepitus (right knee)       Tenderness:Lateral joint line    Alignment of Knee: mild varus when standing        ROM: full range of motion        Fullness or swelling: None to popliteal fossa region        Stability: No instability to anterior, posterior, varus, valgus stress testing        Trust: No varus trust with gait        Contractures: No Achilles or Gastrocnemius Contractures. Calf tenderness: Absent for calf or gastrocnemius muscle regions            Soft, supple, non tender, non taut lower extremity compartments  Extremities:   No embolic phenomena to the toes          No significant edema to the foot and or toes. Edema is not present to distal 1/3 tib/fib or ankle regions. Lower extremities are warm and appear well perfused    DVT: No evidence of DVT seen on examination at this time     No calf swelling, no tenderness to calf muscles  Lymphatic:  No Evidence of Lymphedema  Vascular: Medial Border of Tibia Region: Edema is not present        Pulses: Dorsalis Pedis &  Posterior Tibial Pulses : Palpable yes        Varicosities Lower Limbs : None noted . Neuro: Negative bilateral Straight leg raise (seated position)    See Musculoskeletal section for pertinent individual extremity examination    No abnormal hand/wrist, foot/ankle, or facial/neck tremors.     Extensor mechanism is intact    CHART REVIEW     Past Medical History:   Diagnosis Date    Left foot pain     Plantar fasciitis, left     Right shoulder pain      Current Outpatient Prescriptions   Medication Sig    ibuprofen (MOTRIN) 800 mg tablet Take 1 Tab by mouth every eight (8) hours as needed for Pain.  ammonium lactate (LAC-HYDRIN) 12 % topical cream Apply  to affected area two (2) times a day. rub in to affected area well    nystatin (MYCOSTATIN) topical cream Apply  to affected area two (2) times a day.  triamcinolone acetonide (KENALOG) 0.1 % topical cream Apply  to affected area two (2) times daily as needed for Skin Irritation. use thin layer    meloxicam (MOBIC) 15 mg tablet Take 1 Tab by mouth daily.  diclofenac sodium (PENNSAID) 20 mg/gram /actuation(2 %) sopm 2 Pump(s) by Apply Externally route two (2) times a day.  allopurinol (ZYLOPRIM) 100 mg tablet Take 1 Tab by mouth daily.  ferrous fumarate 324 mg (106 mg iron) tab Take 1 tablet by mouth two (2) times a day.  fluticasone (FLONASE) 50 mcg/actuation nasal spray 2 Sprays by Both Nostrils route daily. No current facility-administered medications for this visit. Allergies   Allergen Reactions    Codeine Nausea and Vomiting     Patient states she gets jittery, has upset stomach      Past Surgical History:   Procedure Laterality Date    HX TUBAL LIGATION  2009    Tubal ligation     Social History     Occupational History    senior care [employer]     Social History Main Topics    Smoking status: Never Smoker    Smokeless tobacco: Never Used    Alcohol use No    Drug use: No    Sexual activity: Yes     Partners: Male     Family History   Problem Relation Age of Onset    Hypertension Mother     Hypertension Father     Arthritis-osteo Other        REVIEW OF SYSTEMS : 11/15/2017  ALL BELOW ARE Negative except : SEE HPI       Constitutional: Negative for fever, chills and weight loss.  Neg Weigh Loss  Cardiovascular: Negative for chest pain, claudication and leg swelling. SOB, SMITH   Gastrointestinal: Negative for  pain, N/V/D/C, Blood in stool or urine,dysuria, hematuria,        Incontinence, pelvic pain  Musculoskeletal: see HPI. Neurological: Negative for dizziness and weakness. Negative for headaches,Visual Changes, Confusion, Seizures,   Psychiatric/Behavioral: Negative for depression, memory loss and substance abuse. Extremities:  Negative for  hair changes, rash or skin lesion changes. Hematologic: Negative for Bleeding problems, bruising, pallor or swollen lymph nodes. Peripheral Vascular: No calf pain, vascular vein tenderness to calf pain              No calf throbbing, posterior knee throbbing pain    DIAGNOSTIC IMAGING     No notes on file    Written by Juana Bedolla, as dictated by Octavia Lizarraga MD. IDr., Octavia Lizarraga MD, confirm that all documentation is accurate.

## 2017-11-15 NOTE — MR AVS SNAPSHOT
Visit Information Date & Time Provider Department Dept. Phone Encounter #  
 11/15/2017  7:40 AM Dianna Reed, [facility] [phone] Upcoming Health Maintenance Date Due DTaP/Tdap/Td series (1 - Tdap) 9/3/1993 Influenza Age 5 to Adult 8/1/2017 PAP AKA CERVICAL CYTOLOGY 5/4/2018 COLONOSCOPY 2/17/2026 Allergies as of 11/15/2017  Review Complete On: 11/15/2017 By: Dianna Reed MD  
  
 Severity Noted Reaction Type Reactions Codeine High 02/21/2012    Nausea and Vomiting Patient states she gets jittery, has upset stomach Current Immunizations  Never Reviewed Name Date Influenza Vaccine (Quad) PF 9/18/2014 Not reviewed this visit You Were Diagnosed With   
  
 Codes Comments Primary osteoarthritis of both knees    -  Primary ICD-10-CM: M17.0 ICD-9-CM: 715.16 Chondromalacia of right patellofemoral joint     ICD-10-CM: M22.41 
ICD-9-CM: 717.7 Vitals BP Pulse Temp Height(growth percentile) Weight(growth percentile) SpO2  
 125/75 79 97.4 °F (36.3 °C) (Oral) 5' 2\" (1.575 m) 236 lb (107 kg) 100% BMI OB Status Smoking Status 43.16 kg/m2 Having regular periods Never Smoker BMI and BSA Data Body Mass Index Body Surface Area  
 43.16 kg/m 2 2.16 m 2 Preferred Pharmacy Pharmacy Name Phone ON-SITE [address] [phone] Your Updated Medication List  
  
   
This list is accurate as of: 11/15/17  8:38 AM.  Always use your most recent med list.  
  
  
  
  
 allopurinol 100 mg tablet Commonly known as:  July Casa Take 1 Tab by mouth daily. ammonium lactate 12 % topical cream  
Commonly known as:  LAC-HYDRIN Apply  to affected area two (2) times a day.  rub in to affected area well  
  
 diclofenac sodium 20 mg/gram /actuation(2 %) Sopm  
Commonly known as:  PENNSAID  
 2 Pump(s) by Apply Externally route two (2) times a day. ferrous fumarate 324 mg (106 mg iron) Tab Take 1 tablet by mouth two (2) times a day. fluticasone 50 mcg/actuation nasal spray Commonly known as:  Lindalee Wheaton 2 Sprays by Both Nostrils route daily. ibuprofen 800 mg tablet Commonly known as:  MOTRIN Take 1 Tab by mouth every eight (8) hours as needed for Pain.  
  
 meloxicam 15 mg tablet Commonly known as:  MOBIC Take 1 Tab by mouth daily. nystatin topical cream  
Commonly known as:  MYCOSTATIN Apply  to affected area two (2) times a day. triamcinolone acetonide 0.1 % topical cream  
Commonly known as:  KENALOG Apply  to affected area two (2) times daily as needed for Skin Irritation. use thin layer We Performed the Following AMB SUPPLY ORDER [[id number] Custom] Comments:  
 Right knee brace REFERRAL TO PHYSICAL THERAPY [YUJ22 Custom] Comments:  
 Evaluation and treatment of OA of both knees, Patellofemoral chondromalacia . Please treat two to three times a week for four weeks. Please utilize the following: isometric quadricept strenghtening. Referral Information Referral ID Referred By Referred To  
  
 [id number] SIDDHARTHA SHAHID [address]   
   [address] [place] [address] [address] [place] [address] Phone: [phone] Fax: [phone] Visits Status Start Date End Date 1 New Request 11/15/17 11/15/18 If your referral has a status of pending review or denied, additional information will be sent to support the outcome of this decision. Patient Instructions Please follow up in 4 weeks. You are advised to contact us if your condition worsens. Knee Arthritis: Exercises Your Care Instructions Here are some examples of exercises for knee arthritis. Start each exercise slowly. Ease off the exercise if you start to have pain. Your doctor or physical therapist will tell you when you can start these exercises and which ones will work best for you. How to do the exercises Knee flexion with heel slide 1. Lie on your back with your knees bent. 2. Slide your heel back by bending your affected knee as far as you can. Then hook your other foot around your ankle to help pull your heel even farther back. 3. Hold for about 6 seconds, then rest for up to 10 seconds. 4. Repeat 8 to 12 times. 5. Switch legs and repeat steps 1 through 4, even if only one knee is sore. [facility] 1. Sit with your affected leg straight and supported on the floor or a firm bed. Place a small, rolled-up towel under your knee. Your other leg should be bent, with that foot flat on the floor. 2. Tighten the thigh muscles of your affected leg by pressing the back of your knee down into the towel. 3. Hold for about 6 seconds, then rest for up to 10 seconds. 4. Repeat 8 to 12 times. 5. Switch legs and repeat steps 1 through 4, even if only one knee is sore. Straight-leg raises to the front 1. Lie on your back with your good knee bent so that your foot rests flat on the floor. Your affected leg should be straight. Make sure that your low back has a normal curve. You should be able to slip your hand in between the floor and the small of your back, with your palm touching the floor and your back touching the back of your hand. 2. Tighten the thigh muscles in your affected leg by pressing the back of your knee flat down to the floor. Hold your knee straight. 3. Keeping the thigh muscles tight and your leg straight, lift your affected leg up so that your heel is about 12 inches off the floor. Hold for about 6 seconds, then lower slowly. 4. Relax for up to 10 seconds between repetitions. 5. Repeat 8 to 12 times. 6. Switch legs and repeat steps 1 through 5, even if only one knee is sore. Active knee flexion 1. Lie on your stomach with your knees straight. If your kneecap is uncomfortable, roll up a washcloth and put it under your leg just above your kneecap. 2. Lift the foot of your affected leg by bending the knee so that you bring the foot up toward your buttock. If this motion hurts, try it without bending your knee quite as far. This may help you avoid any painful motion. 3. Slowly move your leg up and down. 4. Repeat 8 to 12 times. 5. Switch legs and repeat steps 1 through 4, even if only one knee is sore. Quadriceps stretch (facedown) 1. Lie flat on your stomach, and rest your face on the floor. 2. Wrap a towel or belt strap around the lower part of your affected leg. Then use the towel or belt strap to slowly pull your heel toward your buttock until you feel a stretch. 3. Hold for about 15 to 30 seconds, then relax your leg against the towel or belt strap. 4. Repeat 2 to 4 times. 5. Switch legs and repeat steps 1 through 4, even if only one knee is sore. Stationary exercise bike 1. If you do not have a stationary exercise bike at home, you can find one to ride at your local health club or community center. 2. Adjust the height of the bike seat so that your knee is slightly bent when your leg is extended downward. If your knee hurts when the pedal reaches the top, you can raise the seat so that your knee does not bend as much. 3. Start slowly. At first, try to do 5 to 10 minutes of cycling with little to no resistance. Then increase your time and the resistance bit by bit until you can do 20 to 30 minutes without pain. 4. If you start to have pain, rest your knee until your pain gets back to the level that is normal for you. Or cycle for less time or with less effort. Follow-up care is a key part of your treatment and safety. Be sure to make and go to all appointments, and call your doctor if you are having problems.  It's also a good idea to know your test results and keep a list of the medicines you take. Where can you learn more? Go to http://elana-bela.info/. Enter C159 in the search box to learn more about \"Knee Arthritis: Exercises. \" Current as of: March 21, 2017 Content Version: 11.4 © 2572-6111 [employer]. Care instructions adapted under license by [employer] (which disclaims liability or warranty for this information). If you have questions about a medical condition or this instruction, always ask your healthcare professional. [address] any warranty or liability for your use of this information. Introducing [facility]! Dear Gigi Space: 
Thank you for requesting a Asurint account. Our records indicate that you already have an active Asurint account. You can access your account anytime at [email or web address] Did you know that you can access your hospital and ER discharge instructions at any time in Asurint? You can also review all of your test results from your hospital stay or ER visit. Additional Information If you have questions, please visit the Frequently Asked Questions section of the Asurint website at [email or web address] Remember, Asurint is NOT to be used for urgent needs. For medical emergencies, dial 911. Now available from your iPhone and Android! Please provide this summary of care documentation to your next provider. Your primary care clinician is listed as [address]. If you have any questions after today's visit, please call [phone].

## 2017-11-27 ENCOUNTER — HOSPITAL ENCOUNTER (OUTPATIENT)
Dept: PHYSICAL THERAPY | Age: 47
Discharge: HOME | End: 2017-11-27
Payer: COMMERCIAL

## 2017-11-27 PROCEDURE — 97110 THERAPEUTIC EXERCISES: CPT

## 2017-11-27 PROCEDURE — 97162 PT EVAL MOD COMPLEX 30 MIN: CPT

## 2017-11-27 NOTE — PROGRESS NOTES
[facility]  [address]  [place], [address]  [phone] fax    Plan of Care/ Statement of Necessity for Physical Therapy Services    Patient name: Elda Lilly Start of Care: 2017   Referral source: Kandy George MD : 1970    Medical Diagnosis: Right knee pain [M25.561]  Left knee pain [M25.562]   Onset Date:2017    Treatment Diagnosis: R>L knee pain   Prior Hospitalization: see medical history Provider#: [id number]   Medications: Verified on Patient summary List    Comorbidities: R hip bursitis, anemia, arthritis   Prior Level of Function: Pt reports intermittent knee pain in past relieved with injections. Pain with work duties and ADLs as of late     The Plan of Care and following information is based on the information from the initial evaluation. Assessment/ key information: Pt is a 40 y/o F presenting with c/o R>L knee pain since September, with no known RHYS. She reports previous knee pain \"years ago\" that subsided with injections. Injections earlier this month did not improve her pain. Pain worse with ascending stairs/curbs and squatting to lift, pt denies any instability complaints. Upon examination, painful distal vastus lateralis TTP with crepitus during PROM. Poor eccentric quad strength and pain with quad activation on R. Pt would benefit from PT to address deficits in flexibility, strength and ADL ease to improve quality of life.     Evaluation Complexity History MEDIUM  Complexity : 1-2 comorbidities / personal factors will impact the outcome/ POC ; Examination MEDIUM Complexity : 3 Standardized tests and measures addressing body structure, function, activity limitation and / or participation in recreation  ;Presentation LOW Complexity : Stable, uncomplicated  ;Clinical Decision Making MEDIUM Complexity : FOTO score of 26-74  Overall Complexity Rating: MEDIUM  Problem List: pain affecting function, decrease strength, edema affecting function, impaired gait/ balance, decrease ADL/ functional abilitiies, decrease activity tolerance and decrease flexibility/ joint mobility   Treatment Plan may include any combination of the following: Therapeutic exercise, Therapeutic activities, Neuromuscular re-education, Physical agent/modality, Gait/balance training, Manual therapy, Patient education, Self Care training, Functional mobility training and Stair training  Patient / Family readiness to learn indicated by: trying to perform skills  Persons(s) to be included in education: patient (P)  Barriers to Learning/Limitations: None  Patient Goal (s): Get rid of this pain  Patient Self Reported Health Status: excellent  Rehabilitation Potential: good    Short Term Goals: To be accomplished in 2 weeks:  1. Pt will be I and compliant with HEP to promote self management of condition. 2. Pt will demonstrate pain free quad setting on R for improved knee mechanics with daily tasks. Long Term Goals: To be accomplished in 4 weeks:  1. Pt will demonstrate step down from 6\" step without pain or instability to improve ease of community ambulation. 2. Pt will demonstrate 5/5 HS and glute max strength for improved stability with work duties. 3. Pt will report ability to ambulate 10 stairs with only a little bit of difficulty to improve functional mobility. Frequency / Duration: Patient to be seen 2 times per week for 4 weeks. Patient/ Caregiver education and instruction: Diagnosis, prognosis, activity modification and exercises   [x]  Plan of care has been reviewed with LUCRECIA Castro DPT, CMTPT 11/27/2017 8:14 AM    ________________________________________________________________________    I certify that the above Therapy Services are being furnished while the patient is under my care. I agree with the treatment plan and certify that this therapy is necessary.     Physician's Signature:____________________ Date:____________Time: _________    Please sign and return to [facility] [address] [place] [address] & [facility] [address] [facility]  [place], [address]  [phone] fax

## 2017-11-29 ENCOUNTER — HOSPITAL ENCOUNTER (OUTPATIENT)
Dept: PHYSICAL THERAPY | Age: 47
Discharge: HOME | End: 2017-11-29
Payer: COMMERCIAL

## 2017-11-29 PROCEDURE — 97112 NEUROMUSCULAR REEDUCATION: CPT

## 2017-11-29 PROCEDURE — 97110 THERAPEUTIC EXERCISES: CPT

## 2017-11-29 NOTE — PROGRESS NOTES
PT DAILY TREATMENT NOTE     Patient Name: Dmitry Adame  Date:2017  : 1970  [x]  Patient  Verified  Payor: Amy Amaro / Plan: Horacio Paniagua / Product Type: HMO /    In time:7:30  Out time:8:03  Total Treatment Time (min): 33  Visit #: 2 of 8    Treatment Area: Right knee pain [M25.561]  Left knee pain [M25.562]    SUBJECTIVE  Pain Level (0-10 scale): /10  Any medication changes, allergies to medications, adverse drug reactions, diagnosis change, or new procedure performed?: [x] No    [] Yes (see summary sheet for update)  Subjective functional status/changes:   [] No changes reported  Pt reports no new complaints of pain. Pt reports continued (R) >(L) knee pain. Pt reports performing HEP without difficulty. OBJECTIVE    23 min Therapeutic Exercise:  [x] See flow sheet :   Rationale: increase ROM and increase strength to improve the patients ability to perform functional activities. 8 min Neuromuscular Re-education:  [x]  See flow sheet :   Rationale: increase strength, improve coordination and increase proprioception  to improve the patients ability to perform functional activities. With   [] TE   [] TA   [] neuro   [] other: Patient Education: [x] Review HEP    [] Progressed/Changed HEP based on:   [] positioning   [] body mechanics   [] transfers   [] heat/ice application    [] other:      Other Objective/Functional Measures: initiated exercises as per flow sheet. Quad lag present with supine SLR on (R)     Pain Level (0-10 scale) post treatment: 1/10    ASSESSMENT/Changes in Function: Pt demonstrates quad weakness on (R) as evidenced by quad lag with supine SLR.      Patient will continue to benefit from skilled PT services to modify and progress therapeutic interventions, address functional mobility deficits, address ROM deficits, address strength deficits, analyze and address soft tissue restrictions, analyze and cue movement patterns, analyze and modify body mechanics/ergonomics and assess and modify postural abnormalities to attain remaining goals. []  See Plan of Care  []  See progress note/recertification  []  See Discharge Summary         Progress towards goals / Updated goals:  Short Term Goals: To be accomplished in 2 weeks:  1. Pt will be I and compliant with HEP to promote self management of condition. - progressing per patient report. 11/29/17  2. Pt will demonstrate pain free quad setting on R for improved knee mechanics with daily tasks. Long Term Goals: To be accomplished in 4 weeks:  1. Pt will demonstrate step down from 6\" step without pain or instability to improve ease of community ambulation. 2. Pt will demonstrate 5/5 HS and glute max strength for improved stability with work duties.   3. Pt will report ability to ambulate 10 stairs with only a little bit of difficulty to improve functional mobility.       PLAN  []  Upgrade activities as tolerated     [x]  Continue plan of care  []  Update interventions per flow sheet       []  Discharge due to:_  []  Other:_      Jessica Kumar, PTA 11/29/2017  7:47 AM    Future Appointments  Date Time Provider Yi Osborn   12/5/2017 7:30 AM Jessica Kumar, PTA [facility]   12/8/2017 7:00 AM Lance Prom, PTA [facility]   12/12/2017 7:30 AM Jessica Kumar, PTA [facility]   12/13/2017 7:35 AM Kahlil Lainez MD [address]   12/15/2017 7:30 AM Lance Prom, PTA [facility]   12/18/2017 7:30 AM Janae Mitchell [facility]   12/21/2017 7:30 AM Janae Leghorlilibeth [facility]

## 2017-12-05 ENCOUNTER — HOSPITAL ENCOUNTER (OUTPATIENT)
Dept: PHYSICAL THERAPY | Age: 47
Discharge: HOME | End: 2017-12-05
Payer: COMMERCIAL

## 2017-12-05 PROCEDURE — 97110 THERAPEUTIC EXERCISES: CPT

## 2017-12-05 PROCEDURE — 97112 NEUROMUSCULAR REEDUCATION: CPT

## 2017-12-05 NOTE — PROGRESS NOTES
PT DAILY TREATMENT NOTE     Patient Name: Phoenix Klein  Date:2017  : 1970  [x]  Patient  Verified  Payor: Petrona Contreras / Plan: Subblimes Kin / Product Type: HMO /    In time:7:30  Out time:8:02  Total Treatment Time (min): 32  Visit #: 3 of 8    Treatment Area: Right knee pain [M25.561]  Left knee pain [M25.562]    SUBJECTIVE  Pain Level (0-10 scale): 3/10  Any medication changes, allergies to medications, adverse drug reactions, diagnosis change, or new procedure performed?: [x] No    [] Yes (see summary sheet for update)  Subjective functional status/changes:   [] No changes reported  Pt reports no new complaints of pain. Pt reports compliance with HEP. \"I feel popping in my right knee when I am walking. \"     OBJECTIVE    24 min Therapeutic Exercise:  [x] See flow sheet :   Rationale: increase ROM, increase strength and improve coordination to improve the patients ability to tolerate ADLs. 8 min Neuromuscular Re-education:  [x]  See flow sheet :   Rationale: increase strength, improve coordination and increase proprioception  to improve the patients ability to perform functional activities. With   [] TE   [] TA   [] neuro   [] other: Patient Education: [x] Review HEP    [] Progressed/Changed HEP based on:   [] positioning   [] body mechanics   [] transfers   [] heat/ice application    [] other:      Other Objective/Functional Measures: added LAQ    Pain Level (0-10 scale) post treatment: 0/10    ASSESSMENT/Changes in Function: Pt demonstrates fair control with all exercises but is limited due to pain. Patient will continue to benefit from skilled PT services to modify and progress therapeutic interventions, address functional mobility deficits, address ROM deficits, address strength deficits, analyze and address soft tissue restrictions, analyze and cue movement patterns and analyze and modify body mechanics/ergonomics to attain remaining goals.      []  See Plan of Care  []  See progress note/recertification  []  See Discharge Summary         Progress towards goals / Updated goals:  Short Term Goals: To be accomplished in 2 weeks:  1. Pt will be I and compliant with HEP to promote self management of condition. - progressing per patient report. 11/29/17  2. Pt will demonstrate pain free quad setting on R for improved knee mechanics with daily tasks. Long Term Goals: To be accomplished in 4 weeks:  1. Pt will demonstrate step down from 6\" step without pain or instability to improve ease of community ambulation. 2. Pt will demonstrate 5/5 HS and glute max strength for improved stability with work duties. 3. Pt will report ability to ambulate 10 stairs with only a little bit of difficulty to improve functional mobility.     PLAN  []  Upgrade activities as tolerated     [x]  Continue plan of care  []  Update interventions per flow sheet       []  Discharge due to:_  []  Other:_      Monie Yao PTA 12/5/2017  7:36 AM    Future Appointments  Date Time Provider Yi Osborn   12/8/2017 7:00 AM Andreia Reyes PTA [facility]   12/12/2017 7:30 AM Monie Yao PTA [facility]   12/13/2017 7:35 AM Silvano Thornton MD [place] [address]   12/15/2017 7:30 AM Andreia Reyes PTA [facility]   12/18/2017 7:30 AM Cosmo Scheuermann [facility]   12/21/2017 7:30 AM Uche Eaton [facility]

## 2017-12-07 ENCOUNTER — OFFICE VISIT (OUTPATIENT)
Dept: FAMILY MEDICINE CLINIC | Age: 47
End: 2017-12-07

## 2017-12-07 VITALS
TEMPERATURE: 98 F | RESPIRATION RATE: 18 BRPM | SYSTOLIC BLOOD PRESSURE: 120 MMHG | WEIGHT: 238 LBS | BODY MASS INDEX: 43.79 KG/M2 | HEART RATE: 78 BPM | HEIGHT: 62 IN | DIASTOLIC BLOOD PRESSURE: 68 MMHG | OXYGEN SATURATION: 98 %

## 2017-12-07 DIAGNOSIS — B37.31 CANDIDA VAGINITIS: Primary | ICD-10-CM

## 2017-12-07 PROBLEM — E66.01 OBESITY, MORBID (HCC): Status: ACTIVE | Noted: 2017-12-07

## 2017-12-07 RX ORDER — METRONIDAZOLE 500 MG/1
500 TABLET ORAL 2 TIMES DAILY
Qty: 14 TAB | Refills: 0 | Status: SHIPPED | OUTPATIENT
Start: 2017-12-07 | End: 2017-12-14

## 2017-12-07 RX ORDER — FLUCONAZOLE 150 MG/1
150 TABLET ORAL DAILY
Qty: 1 TAB | Refills: 0 | Status: SHIPPED | OUTPATIENT
Start: 2017-12-07 | End: 2017-12-08

## 2017-12-07 NOTE — MR AVS SNAPSHOT
Visit Information Date & Time Provider Department Dept. Phone Encounter #  
 12/7/2017 12:40 PM Kenneth Hills[address] [phone] [id number] Your Appointments 12/13/2017  7:35 AM  
Follow Up with Dianna Reed MD  
[address] [facility], [address] and [facility] [place] [address] ([address]) Appt Note: 1 month casie knee f/u  
 [address], [address] [facility]  
[phone] [address] [facility], [facility] [address] Upcoming Health Maintenance Date Due DTaP/Tdap/Td series (1 - Tdap) 9/3/1993 Influenza Age 5 to Adult 8/1/2017 PAP AKA CERVICAL CYTOLOGY 5/4/2018 COLONOSCOPY 2/17/2026 Allergies as of 12/7/2017  Review Complete On: 12/7/2017 By: iMke Luke LPN Severity Noted Reaction Type Reactions Codeine High 02/21/2012    Nausea and Vomiting Patient states she gets jittery, has upset stomach Current Immunizations  Never Reviewed Name Date Influenza Vaccine (Quad) PF 9/18/2014 Not reviewed this visit You Were Diagnosed With   
  
 Codes Comments Candida vaginitis    -  Primary ICD-10-CM: B37.3 ICD-9-CM: 112.1 Vitals BP Pulse Temp Resp Height(growth percentile) Weight(growth percentile) 120/68 (BP 1 Location: Left arm, BP Patient Position: Sitting) 78 98 °F (36.7 °C) (Oral) 18 5' 2\" (1.575 m) 238 lb (108 kg) LMP SpO2 BMI OB Status Smoking Status 11/30/2017 98% 43.53 kg/m2 Having regular periods Never Smoker Vitals History BMI and BSA Data Body Mass Index Body Surface Area  
 43.53 kg/m 2 2.17 m 2 Preferred Pharmacy Pharmacy Name Phone [facility] [place] [phone] Your Updated Medication List  
  
   
This list is accurate as of: 12/7/17  1:56 PM.  Always use your most recent med list.  
  
  
  
  
 ammonium lactate 12 % topical cream  
 Commonly known as:  LAC-HYDRIN Apply  to affected area two (2) times a day. rub in to affected area well  
  
 diclofenac sodium 20 mg/gram /actuation(2 %) Sopm  
Commonly known as:  PENNSAID  
2 Pump(s) by Apply Externally route two (2) times a day. fluconazole 150 mg tablet Commonly known as:  DIFLUCAN Take 1 Tab by mouth daily for 1 day. FDA advises cautious prescribing of oral fluconazole in pregnancy. fluticasone 50 mcg/actuation nasal spray Commonly known as:  [place] 2 Sprays by Both Nostrils route daily. ibuprofen 800 mg tablet Commonly known as:  MOTRIN Take 1 Tab by mouth every eight (8) hours as needed for Pain. metroNIDAZOLE 500 mg tablet Commonly known as:  FLAGYL Take 1 Tab by mouth two (2) times a day for 7 days. nystatin topical cream  
Commonly known as:  MYCOSTATIN Apply  to affected area two (2) times a day. triamcinolone acetonide 0.1 % topical cream  
Commonly known as:  KENALOG Apply  to affected area two (2) times daily as needed for Skin Irritation. use thin layer Prescriptions Sent to Pharmacy Refills  
 fluconazole (DIFLUCAN) 150 mg tablet 0 Sig: Take 1 Tab by mouth daily for 1 day. FDA advises cautious prescribing of oral fluconazole in pregnancy. Class: Normal  
 Pharmacy: [facility] [address] [facility] Ph #: [phone] Route: Oral  
 metroNIDAZOLE (FLAGYL) 500 mg tablet 0 Sig: Take 1 Tab by mouth two (2) times a day for 7 days. Class: Normal  
 Pharmacy: [facility] [address] [facility] Ph #: [phone] Route: Oral  
  
We Performed the Following AMB POC URINE PREGNANCY TEST, VISUAL COLOR COMPARISON [42091 CPT(R)] To-Do List   
 12/07/2017   Lab:  NUSWAB VAGINITIS PLUS   
  
 12/08/2017 7:00 AM  
  Appointment with Freddie Monteiro PTA at [facility]  [facility] ([phone])  
  
 12/12/2017 7:30 AM  
 Appointment with Seda Messina, LUCRECIA at [facility]  [facility] [address] ([phone])  
  
 12/15/2017 7:30 AM  
  Appointment with Isaias Nogueira PTA at [address] ([phone])  
  
 12/18/2017 7:30 AM  
  Appointment with Osei Lay at [address] ([phone])  
  
 12/21/2017 7:30 AM  
  Appointment with Adriana Brothers at [address] ([phone]) Patient Instructions Candidiasis: Care Instructions Your Care Instructions Candidiasis (say \"gea-hew-VC-uh-isabela\") is a yeast infection. Yeast normally lives in your body. But it can cause problems if your body's defenses don't work as they should. Some medicines can increase your chance of getting a yeast infection. These include antibiotics, steroids, and cancer drugs. And some diseases like AIDS and diabetes can make you more likely to get yeast infections. There are different types of yeast infections. Michelle Salter is a yeast infection in the mouth. It usually occurs in people with weak immune systems. It causes white patches inside the mouth and throat. Yeast infections of the skin usually occur in skin folds where the skin stays moist. They cause red, oozing patches on your skin. Babies can get these infections under the diaper. People who often wear gloves can get them on their hands. Many women get vaginal yeast infections. They are most common when women take antibiotics. These infections can cause the vagina to itch and burn. They also cause white discharge that looks like cottage cheese. In rare cases, yeast infects the blood. This can cause serious disease. This kind of infection is treated with medicine given through a needle into a vein (IV). After you start treatment, a yeast infection usually goes away quickly. But if your immune system is weak, the infection may come back. Tell your doctor if you get yeast infections often. Follow-up care is a key part of your treatment and safety.  Be sure to make and go to all appointments, and call your doctor if you are having problems. It's also a good idea to know your test results and keep a list of the medicines you take. How can you care for yourself at home? · Take your medicines exactly as prescribed. Call your doctor if you think you are having a problem with your medicine. · Use antibiotics only as directed by your doctor. · Eat yogurt with live cultures. It has bacteria called lactobacillus. It may help prevent some types of yeast infections. · Keep your skin clean and dry. Put powder on moist places. · If you are using a cream or suppository to treat a vaginal yeast infection, don't use condoms or a diaphragm. Use a different type of birth control. · Eat a healthy diet and get regular exercise. This will help keep your immune system strong. When should you call for help? Watch closely for changes in your health, and be sure to contact your doctor if: 
? · You do not get better as expected. Where can you learn more? Go to http://elana-bela.info/. Enter H925 in the search box to learn more about \"Candidiasis: Care Instructions. \" Current as of: October 13, 2016 Content Version: 11.4 © 4429-2666 [employer]. Care instructions adapted under license by [employer] (which disclaims liability or warranty for this information). If you have questions about a medical condition or this instruction, always ask your healthcare professional. [address] any warranty or liability for your use of this information. Introducing [facility]! Dear Bakari Hansen: 
Thank you for requesting a Bawte account. Our records indicate that you already have an active Bawte account. You can access your account anytime at [email or web address] Did you know that you can access your hospital and ER discharge instructions at any time in Bawte?   You can also review all of your test results from your hospital stay or ER visit. Additional Information If you have questions, please visit the Frequently Asked Questions section of the [employer] website at [email or web address] Remember, [employer] is NOT to be used for urgent needs. For medical emergencies, dial 911. Now available from your iPhone and Android! Please provide this summary of care documentation to your next provider. Your primary care clinician is listed as [address]. If you have any questions after today's visit, please call [phone].

## 2017-12-07 NOTE — PROGRESS NOTES
HISTORY OF PRESENT ILLNESS  Haim Fonseca is a 41 y.o. female. HPI  Patient is here today for evaluation and treatment of;    C/o 1 day h/o vaginal itching and burning. She state sx feel like a vaginal yeast infection. She has not tried any meds for sx    PMH,  Meds, Allergies, Family History, Social history reviewed       Review of Systems   Constitutional: Negative for chills and fever. Cardiovascular: Negative for chest pain and palpitations. Physical Exam   Constitutional: She appears well-developed and well-nourished. No distress. Pulmonary/Chest: Breath sounds normal. No respiratory distress. She has no wheezes. Abdominal: Tenderness: abdomen;    Genitourinary: Vaginal discharge (moderate discharge in vault; thick and runny; some yellowish hue to discharge.) found. Genitourinary Comments: + TTP on pelvic exam ( bimanual)   Nursing note and vitals reviewed. KOH - budding yeast noted; no trichomonads,   Wet prep? Clue cells    Urine preg neg    ASSESSMENT and PLAN    ICD-10-CM ICD-9-CM    1. Candida vaginitis- ? Concomitant BV B37.3 112.1 NUSWAB VAGINITIS PLUS      fluconazole (DIFLUCAN) 150 mg tablet      metroNIDAZOLE (FLAGYL) 500 mg tablet      AMB POC URINE PREGNANCY TEST, VISUAL COLOR COMPARISON      NUSWAB VAGINITIS PLUS       As above, new   treatment plan as listed below  Orders Placed This Encounter    NUSWAB VAGINITIS PLUS    AMB POC URINE PREGNANCY TEST, VISUAL COLOR COMPARISON    fluconazole (DIFLUCAN) 150 mg tablet    metroNIDAZOLE (FLAGYL) 500 mg tablet     Follow-up Disposition:  Return if symptoms worsen or fail to improve. An After Visit Summary was printed and given to the patient. This has been fully explained to the patient, who indicates understanding.

## 2017-12-07 NOTE — PROGRESS NOTES
1. Have you been to the ER, urgent care clinic since your last visit? Hospitalized since your last visit? No    2. Have you seen or consulted any other health care providers outside of the [address] [place] [address] since your last visit? Include any pap smears or colon screening.  No

## 2017-12-07 NOTE — PATIENT INSTRUCTIONS
Candidiasis: Care Instructions  Your Care Instructions  Candidiasis (say \"jdd-siw-QJ-uh-isabela\") is a yeast infection. Yeast normally lives in your body. But it can cause problems if your body's defenses don't work as they should. Some medicines can increase your chance of getting a yeast infection. These include antibiotics, steroids, and cancer drugs. And some diseases like AIDS and diabetes can make you more likely to get yeast infections. There are different types of yeast infections. Todha Boros is a yeast infection in the mouth. It usually occurs in people with weak immune systems. It causes white patches inside the mouth and throat. Yeast infections of the skin usually occur in skin folds where the skin stays moist. They cause red, oozing patches on your skin. Babies can get these infections under the diaper. People who often wear gloves can get them on their hands. Many women get vaginal yeast infections. They are most common when women take antibiotics. These infections can cause the vagina to itch and burn. They also cause white discharge that looks like cottage cheese. In rare cases, yeast infects the blood. This can cause serious disease. This kind of infection is treated with medicine given through a needle into a vein (IV). After you start treatment, a yeast infection usually goes away quickly. But if your immune system is weak, the infection may come back. Tell your doctor if you get yeast infections often. Follow-up care is a key part of your treatment and safety. Be sure to make and go to all appointments, and call your doctor if you are having problems. It's also a good idea to know your test results and keep a list of the medicines you take. How can you care for yourself at home? · Take your medicines exactly as prescribed. Call your doctor if you think you are having a problem with your medicine. · Use antibiotics only as directed by your doctor. · Eat yogurt with live cultures.  It has bacteria called lactobacillus. It may help prevent some types of yeast infections. · Keep your skin clean and dry. Put powder on moist places. · If you are using a cream or suppository to treat a vaginal yeast infection, don't use condoms or a diaphragm. Use a different type of birth control. · Eat a healthy diet and get regular exercise. This will help keep your immune system strong. When should you call for help? Watch closely for changes in your health, and be sure to contact your doctor if:  ? · You do not get better as expected. Where can you learn more? Go to http://elana-bela.info/. Enter G933 in the search box to learn more about \"Candidiasis: Care Instructions. \"  Current as of: October 13, 2016  Content Version: 11.4  © 0633-9881 [employer]. Care instructions adapted under license by [employer] (which disclaims liability or warranty for this information). If you have questions about a medical condition or this instruction, always ask your healthcare professional. [address] any warranty or liability for your use of this information.

## 2017-12-08 ENCOUNTER — HOSPITAL ENCOUNTER (OUTPATIENT)
Dept: PHYSICAL THERAPY | Age: 47
Discharge: HOME | End: 2017-12-08
Payer: COMMERCIAL

## 2017-12-08 PROCEDURE — 97112 NEUROMUSCULAR REEDUCATION: CPT

## 2017-12-08 PROCEDURE — 97110 THERAPEUTIC EXERCISES: CPT

## 2017-12-08 NOTE — PROGRESS NOTES
PT DAILY TREATMENT NOTE     Patient Name: Sherwin Pinto  Date:2017  : 1970  [x]  Patient  Verified  Payor: Rodrigue Meehan / Plan: Edna Serrano / Product Type: HMO /    In time:7:03  Out time:7:40  Total Treatment Time (min): 37  Visit #: 4 of 8    Treatment Area: Right knee pain [M25.561]  Left knee pain [M25.562]    SUBJECTIVE  Pain Level (0-10 scale): 2/10  Any medication changes, allergies to medications, adverse drug reactions, diagnosis change, or new procedure performed?: [x] No    [] Yes (see summary sheet for update)  Subjective functional status/changes:   [] No changes reported  \"Feel about the same but now I feel popping in my knees. \"    OBJECTIVE    29 min Therapeutic Exercise:  [x] See flow sheet :   Rationale: increase ROM and increase strength to improve the patients ability to perform ADL's.    8 min Neuromuscular Re-education:  [x]  See flow sheet :   Rationale: increase strength and increase proprioception  to improve the patients ability to perform functional activities. With   [x] TE   [] TA   [] neuro   [] other: Patient Education: [x] Review HEP    [] Progressed/Changed HEP based on:   [] positioning   [] body mechanics   [] transfers   [] heat/ice application    [] other:      Other Objective/Functional Measures: Cueing to correct genu recurvatum with standing exercises. Cueing for trunk flexion with mini-squats to correct mechanics. Pain Level (0-10 scale) post treatment: 0/10    ASSESSMENT/Changes in Function: Pt denied pain with exercises, post-treatment pt stated \"I'm good now. \"    Patient will continue to benefit from skilled PT services to modify and progress therapeutic interventions, address functional mobility deficits, address ROM deficits, address strength deficits, analyze and cue movement patterns and analyze and modify body mechanics/ergonomics to attain remaining goals.      [x]  See Plan of Care  []  See progress note/recertification  []  See Discharge Summary         Progress towards goals / Updated goals:  Short Term Goals: To be accomplished in 2 weeks:  1. Pt will be I and compliant with HEP to promote self management of condition. - progressing per patient report. 11/29/17  2. Pt will demonstrate pain free quad setting on R for improved knee mechanics with daily tasks. - Performed quad sets and LAQ without pain. 12/8/2017  Long Term Goals: To be accomplished in 4 weeks:  1. Pt will demonstrate step down from 6\" step without pain or instability to improve ease of community ambulation. 2. Pt will demonstrate 5/5 HS and glute max strength for improved stability with work duties.   3. Pt will report ability to ambulate 10 stairs with only a little bit of difficulty to improve functional mobility.- Performing 4\" step up. 12/8/2017    PLAN  []  Upgrade activities as tolerated     [x]  Continue plan of care  []  Update interventions per flow sheet       []  Discharge due to:_  []  Other:_      Ginette Rojas PTA 12/8/2017  7:10 AM    Future Appointments  Date Time Provider Yi Osborn   12/12/2017 7:30 AM [facility]   12/13/2017 7:35 AM MD Isacc Ferreira [address]   12/15/2017 7:30 AM [facility]   12/18/2017 7:30 AM [facility]   12/21/2017 7:30 AM [facility]

## 2017-12-12 ENCOUNTER — TELEPHONE (OUTPATIENT)
Dept: FAMILY MEDICINE CLINIC | Age: 47
End: 2017-12-12

## 2017-12-12 ENCOUNTER — HOSPITAL ENCOUNTER (OUTPATIENT)
Dept: PHYSICAL THERAPY | Age: 47
Discharge: HOME | End: 2017-12-12
Payer: COMMERCIAL

## 2017-12-12 PROCEDURE — 97110 THERAPEUTIC EXERCISES: CPT

## 2017-12-12 PROCEDURE — 97112 NEUROMUSCULAR REEDUCATION: CPT

## 2017-12-12 NOTE — PROGRESS NOTES
PT DAILY TREATMENT NOTE     Patient Name: Dmitry Adame  Date:2017  : 1970  [x]  Patient  Verified  Payor: Amy Amaro / Plan: Horacio Paniagua / Product Type: HMO /    In time:7:30  Out time:8:00  Total Treatment Time (min): 30  Visit #: 5 of 8    Treatment Area: Right knee pain [M25.561]  Left knee pain [M25.562]    SUBJECTIVE  Pain Level (0-10 scale): 10  Any medication changes, allergies to medications, adverse drug reactions, diagnosis change, or new procedure performed?: [x] No    [] Yes (see summary sheet for update)  Subjective functional status/changes:   [] No changes reported  Pt reports she hasn't had much change in symptoms since start of PT. Pt reports she has a follow up with referring physician tomorrow morning. OBJECTIVE    20 min Therapeutic Exercise:  [x] See flow sheet :   Rationale: increase ROM and increase strength to improve the patients ability to tolerate ADLs. 10 min Neuromuscular Re-education:  [x]  See flow sheet :   Rationale: increase strength, improve coordination and increase proprioception  to improve the patients ability to tolerate ADLs. With   [] TE   [] TA   [] neuro   [] other: Patient Education: [x] Review HEP    [] Progressed/Changed HEP based on:   [] positioning   [] body mechanics   [] transfers   [] heat/ice application    [] other:      Other Objective/Functional Measures: continued pain with 4 inch step up. Pt had increased LBP with prone Ely stretch. Pain Level (0-10 scale) post treatment: 2/10    ASSESSMENT/Changes in Function: Pt continues to demonstrates fair tolerance to weight bearing exercises due to pain.      Patient will continue to benefit from skilled PT services to modify and progress therapeutic interventions, address functional mobility deficits, address ROM deficits, address strength deficits, analyze and address soft tissue restrictions, analyze and cue movement patterns, analyze and modify body mechanics/ergonomics and assess and modify postural abnormalities to attain remaining goals. []  See Plan of Care  []  See progress note/recertification  []  See Discharge Summary         Progress towards goals / Updated goals:  Short Term Goals: To be accomplished in 2 weeks:  1. Pt will be I and compliant with HEP to promote self management of condition. - progressing per patient report. 11/29/17  2. Pt will demonstrate pain free quad setting on R for improved knee mechanics with daily tasks. - Performed quad sets and LAQ without pain. 12/8/2017  Long Term Goals: To be accomplished in 4 weeks:  1. Pt will demonstrate step down from 6\" step without pain or instability to improve ease of community ambulation. - not yet initiated due to increased pain. 12/12/17  2. Pt will demonstrate 5/5 HS and glute max strength for improved stability with work duties.   3. Pt will report ability to ambulate 10 stairs with only a little bit of difficulty to improve functional mobility.- Performing 4\" step up. 12/8/2017    PLAN  []  Upgrade activities as tolerated     [x]  Continue plan of care  []  Update interventions per flow sheet       []  Discharge due to:_  []  Other:_      Saranya Herring, PTA 12/12/2017  7:38 AM    Future Appointments  Date Time Provider Yi Osborn   12/13/2017 7:35 AM MD Davis Garcia   12/15/2017 7:30 AM Aki Tran PTA [facility]   12/18/2017 7:30 AM Carlene Mcdaniel [facility]   12/21/2017 7:30 AM Armando Payment [facility]

## 2017-12-12 NOTE — TELEPHONE ENCOUNTER
Pt called to check to see if her results were in. Pt notified that her results could be in tomorrow. Please call and notify when they are available.

## 2017-12-13 ENCOUNTER — OFFICE VISIT (OUTPATIENT)
Dept: ORTHOPEDIC SURGERY | Age: 47
End: 2017-12-13

## 2017-12-13 VITALS
OXYGEN SATURATION: 100 % | WEIGHT: 240.6 LBS | TEMPERATURE: 96.8 F | BODY MASS INDEX: 44.27 KG/M2 | SYSTOLIC BLOOD PRESSURE: 124 MMHG | RESPIRATION RATE: 18 BRPM | HEART RATE: 74 BPM | HEIGHT: 62 IN | DIASTOLIC BLOOD PRESSURE: 71 MMHG

## 2017-12-13 DIAGNOSIS — M17.0 PRIMARY OSTEOARTHRITIS OF BOTH KNEES: Primary | ICD-10-CM

## 2017-12-13 DIAGNOSIS — M25.561 RIGHT KNEE PAIN, UNSPECIFIED CHRONICITY: ICD-10-CM

## 2017-12-13 DIAGNOSIS — M22.41 CHONDROMALACIA OF RIGHT PATELLOFEMORAL JOINT: ICD-10-CM

## 2017-12-13 DIAGNOSIS — E66.01 OBESITY, MORBID (HCC): ICD-10-CM

## 2017-12-13 LAB
A VAGINAE DNA VAG QL NAA+PROBE: ABNORMAL SCORE
BVAB2 DNA VAG QL NAA+PROBE: ABNORMAL SCORE
C ALBICANS DNA VAG QL NAA+PROBE: POSITIVE
C GLABRATA DNA VAG QL NAA+PROBE: NEGATIVE
C TRACH RRNA SPEC QL NAA+PROBE: NEGATIVE
MEGA1 DNA VAG QL NAA+PROBE: ABNORMAL SCORE
N GONORRHOEA RRNA SPEC QL NAA+PROBE: NEGATIVE
T VAGINALIS RRNA SPEC QL NAA+PROBE: NEGATIVE

## 2017-12-13 NOTE — PROGRESS NOTES
AMBULATORY PROGRESS NOTE      Patient: Shaunna Braswell             MRN: [id number]     SSN: [id number] Body mass index is 44.01 kg/(m^2). YOB: 1970     AGE: 41 y.o. EX: female    PCP: Karen Cabello MD    IMPRESSION/DIAGNOSIS AND TREATMENT PLAN     DIAGNOSES  1. Primary osteoarthritis of both knees    2. Chondromalacia of right patellofemoral joint    3. Obesity, morbid ([address]        Orders Placed This Encounter    AMB SUPPLY ORDER    MRI KNEE RT WO CONT      Margarita Tinoco understands her diagnoses and the proposed plan. Plan:    1) MRI without IV contrast of Right Knee. 2) DME Order: PTO Right Knee Brace     RTO - After MRI    HPI AND EXAMINATION     Margarita Tinoco IS A 41 y.o. female who presents to my outpatient office for follow up evaluation of bilateral foot pain. At last visit, I provided a referral for physical therapy, an order for a right knee brace, gave her a [employer] brochure, and recommended to continue activity modification as directed. The patient presents to the office today noting that her knees continue to cause pain and discomfort. She notes the right is worse than the left. Patient reports pain while going up and down steps and when she twists. She mentions that her knees started \"popping\" as she walks. Patient notes it hurts along the lateral side of the right knee. She is in physical therapy, and states it helps but as she continues her day the pain and discomfort is elicited. The patient denies using blood thinners. The patient works as a  for [employer], well developed, well nourished, well appearing 42 y. o. female in no acute distress. PSYCH:  Normal affect, mood, and conduct.  alert, oriented x 3 alert, oriented x 3, no dementia           Knees:  right                        Gait: normal                          Cutaneous: Skin intact, no abrasions, blisters, wounds, erythema Effusion: Is not present                        Crepitus:  mild PF joint crepitus (right knee)                        Tenderness: PF joint when compressing it. Alignment of Knee: mild varus when standing                        ROM: full range of motion                        Fullness or swelling: None to popliteal fossa region                        Stability: No instability to anterior, posterior, varus, valgus stress testing                        Contractures: No Achilles or Gastrocnemius Contractures. Calf tenderness: Absent for calf or gastrocnemius muscle regions                                      Soft, supple, non tender, non taut lower extremity compartments  Extremities:   No embolic phenomena to the toes                           No significant edema to the foot and or toes. Edema is not present to distal 1/3 tib/fib or ankle regions. Lower extremities are warm and appear well perfused                          DVT: No evidence of DVT seen on examination at this time                          No calf swelling, no tenderness to calf muscles  Lymphatic:  No Evidence of Lymphedema  Vascular: Medial Border of Tibia Region: Edema is not present                   Pulses: Dorsalis Pedis &  Posterior Tibial Pulses : Palpable yes                   Varicosities Lower Limbs : None noted . Neuro:   Extensor mechanism is intact    CHART REVIEW     Past Medical History:   Diagnosis Date    Left foot pain     Plantar fasciitis, left     Right shoulder pain      Current Outpatient Prescriptions   Medication Sig    metroNIDAZOLE (FLAGYL) 500 mg tablet Take 1 Tab by mouth two (2) times a day for 7 days.  ibuprofen (MOTRIN) 800 mg tablet Take 1 Tab by mouth every eight (8) hours as needed for Pain.  diclofenac sodium (PENNSAID) 20 mg/gram /actuation(2 %) sopm 2 Pump(s) by Apply Externally route two (2) times a day.  ammonium lactate (LAC-HYDRIN) 12 % topical cream Apply  to affected area two (2) times a day. rub in to affected area well    nystatin (MYCOSTATIN) topical cream Apply  to affected area two (2) times a day.  triamcinolone acetonide (KENALOG) 0.1 % topical cream Apply  to affected area two (2) times daily as needed for Skin Irritation. use thin layer    fluticasone (FLONASE) 50 mcg/actuation nasal spray 2 Sprays by Both Nostrils route daily. No current facility-administered medications for this visit. Allergies   Allergen Reactions    Codeine Nausea and Vomiting     Patient states she gets jittery, has upset stomach      Past Surgical History:   Procedure Laterality Date    HX TUBAL LIGATION  2009    Tubal ligation     Social History     Occupational History    [employer]     Social History Main Topics    Smoking status: Never Smoker    Smokeless tobacco: Never Used    Alcohol use No    Drug use: No    Sexual activity: Yes     Partners: Male     Family History   Problem Relation Age of Onset    Hypertension Mother     Hypertension Father     Arthritis-osteo Other        REVIEW OF SYSTEMS : 12/13/2017  ALL BELOW ARE Negative except : SEE HPI       Constitutional: Negative for fever, chills and weight loss. Neg Weigh Loss  Cardiovascular: Negative for chest pain, claudication and leg swelling. SOB, SMITH   Gastrointestinal: Negative for  pain, N/V/D/C, Blood in stool or urine,dysuria, hematuria,        Incontinence, pelvic pain  Musculoskeletal: see HPI. Neurological: Negative for dizziness and weakness. Negative for headaches,Visual Changes, Confusion, Seizures,   Psychiatric/Behavioral: Negative for depression, memory loss and substance abuse. Extremities:  Negative for  hair changes, rash or skin lesion changes. Hematologic: Negative for Bleeding problems, bruising, pallor or swollen lymph nodes.   Peripheral Vascular: No calf pain, vascular vein tenderness to calf pain              No calf throbbing, posterior knee throbbing pain    DIAGNOSTIC IMAGING     No notes on file    Written by Bridgette Liu, as dictated by Rach Dash MD. I, DrDeborah, Rach Dash MD, confirm that all documentation is accurate.

## 2017-12-13 NOTE — PATIENT INSTRUCTIONS
Please follow up after MRI. You are advised to contact us if your condition worsens. An MRI or CT has been ordered for you. A [employer] will be contacting you to schedule the appointment as soon as it has been approved with your insurance company. Please schedule an appointment to follow up with the doctor or the physicians assistant after the MRI or CT has been conducted. Arthritis: Care Instructions  Your Care Instructions  Arthritis, also called osteoarthritis, is a breakdown of the cartilage that cushions your joints. When the cartilage wears down, your bones rub against each other. This causes pain and stiffness. Many people have some arthritis as they age. Arthritis most often affects the joints of the spine, hands, hips, knees, or feet. You can take simple measures to protect your joints, ease your pain, and help you stay active. Follow-up care is a key part of your treatment and safety. Be sure to make and go to all appointments, and call your doctor if you are having problems. It's also a good idea to know your test results and keep a list of the medicines you take. How can you care for yourself at home? · Stay at a healthy weight. Being overweight puts extra strain on your joints. · Talk to your doctor or physical therapist about exercises that will help ease joint pain. ¨ Stretch. You may enjoy gentle forms of yoga to help keep your joints and muscles flexible. ¨ Walk instead of jog. Other types of exercise that are less stressful on the joints include riding a bicycle, swimming, john paul chi, or water exercise. ¨ Lift weights. Strong muscles help reduce stress on your joints. Stronger thigh muscles, for example, take some of the stress off of the knees and hips. Learn the right way to lift weights so you do not make joint pain worse. · Take your medicines exactly as prescribed. Call your doctor if you think you are having a problem with your medicine.   · Take pain medicines exactly as directed. ¨ If the doctor gave you a prescription medicine for pain, take it as prescribed. ¨ If you are not taking a prescription pain medicine, ask your doctor if you can take an over-the-counter medicine. · Use a cane, crutch, walker, or another device if you need help to get around. These can help rest your joints. You also can use other things to make life easier, such as a higher toilet seat and padded handles on kitchen utensils. · Do not sit in low chairs, which can make it hard to get up. · Put heat or cold on your sore joints as needed. Use whichever helps you most. You also can take turns with hot and cold packs. ¨ Apply heat 2 or 3 times a day for 20 to 30 minutes-using a heating pad, hot shower, or hot pack-to relieve pain and stiffness. ¨ Put ice or a cold pack on your sore joint for 10 to 20 minutes at a time. Put a thin cloth between the ice and your skin. When should you call for help? Call your doctor now or seek immediate medical care if:  ? · You have sudden swelling, warmth, or pain in any joint. ? · You have joint pain and a fever or rash. ? · You have such bad pain that you cannot use a joint. ? Watch closely for changes in your health, and be sure to contact your doctor if:  ? · You have mild joint symptoms that continue even with more than 6 weeks of care at home. ? · You have stomach pain or other problems with your medicine. Where can you learn more? Go to http://elana-bela.info/. Enter R129 in the search box to learn more about \"Arthritis: Care Instructions. \"  Current as of: October 31, 2016  Content Version: 11.4  © 0856-7427 [employer]. Care instructions adapted under license by [employer] (which disclaims liability or warranty for this information).  If you have questions about a medical condition or this instruction, always ask your healthcare professional. [employer] disclaims any warranty or liability for your use of this information.

## 2017-12-13 NOTE — TELEPHONE ENCOUNTER
Patient called for lab results. Results just finalized this morning, waiting for Dr. Anastacio Schuster to review. Advised patient that office will call her after pcp has reviewed labs. Patient verbalized understanding.

## 2017-12-13 NOTE — MR AVS SNAPSHOT
Visit Information Date & Time Provider Department Dept. Phone Encounter #  
 12/13/2017  7:35 AM Ignacio Capone, [address] [facility] [phone] Upcoming Health Maintenance Date Due DTaP/Tdap/Td series (1 - Tdap) 9/3/1993 Influenza Age 5 to Adult 8/1/2017 PAP AKA CERVICAL CYTOLOGY 5/4/2018 COLONOSCOPY 2/17/2026 Allergies as of 12/13/2017  Review Complete On: 12/13/2017 By: Ignacio Capone MD  
  
 Severity Noted Reaction Type Reactions Codeine High 02/21/2012    Nausea and Vomiting Patient states she gets jittery, has upset stomach Current Immunizations  Never Reviewed Name Date Influenza Vaccine (Quad) PF 9/18/2014 Not reviewed this visit You Were Diagnosed With   
  
 Codes Comments Primary osteoarthritis of both knees    -  Primary ICD-10-CM: M17.0 ICD-9-CM: 715.16 Chondromalacia of right patellofemoral joint     ICD-10-CM: M22.41 
ICD-9-CM: 717.7 Obesity, morbid ([address]     ICD-10-CM: E66.01 
ICD-9-CM: 278.01 Vitals BP Pulse Temp Resp Height(growth percentile) Weight(growth percentile) 124/71 (BP 1 Location: Left arm, BP Patient Position: Sitting) 74 96.8 °F (36 °C) (Oral) 18 5' 2\" (1.575 m) 240 lb 9.6 oz (109.1 kg) LMP SpO2 BMI OB Status Smoking Status 11/30/2017 100% 44.01 kg/m2 Having regular periods Never Smoker Vitals History BMI and BSA Data Body Mass Index Body Surface Area 44.01 kg/m 2 2.18 m 2 Preferred Pharmacy Pharmacy Name [facility] [place], [address] [facility] [phone] Your Updated Medication List  
  
   
This list is accurate as of: 12/13/17  8:09 AM.  Always use your most recent med list.  
  
  
  
  
 ammonium lactate 12 % topical cream  
Commonly known as:  LAC-HYDRIN Apply  to affected area two (2) times a day. rub in to affected area well diclofenac sodium 20 mg/gram /actuation(2 %) Sopm  
Commonly known as:  PENNSAID  
2 Pump(s) by Apply Externally route two (2) times a day. fluticasone 50 mcg/actuation nasal spray Commonly known as:  Lynne Guild 2 Sprays by Both Nostrils route daily. ibuprofen 800 mg tablet Commonly known as:  MOTRIN Take 1 Tab by mouth every eight (8) hours as needed for Pain. metroNIDAZOLE 500 mg tablet Commonly known as:  FLAGYL Take 1 Tab by mouth two (2) times a day for 7 days. nystatin topical cream  
Commonly known as:  MYCOSTATIN Apply  to affected area two (2) times a day. triamcinolone acetonide 0.1 % topical cream  
Commonly known as:  KENALOG Apply  to affected area two (2) times daily as needed for Skin Irritation. use thin layer We Performed the Following AMB SUPPLY ORDER [[id number] Custom] Comments:  
 Right PTO Brace for the knee To-Do List   
 12/13/2017 Imaging:  MRI KNEE RT WO CONT   
  
 12/15/2017 7:30 AM  
  Appointment with Ken Huddleston PTA at [facility]  [facility] ([phone])  
  
 12/18/2017 7:30 AM  
  Appointment with Chad Baker at [address] [facility] ([phone])  
  
 12/21/2017 7:30 AM  
  Appointment with Sulma Casillas at [address] [facility] ([phone]) Patient Instructions Please follow up after MRI. You are advised to contact us if your condition worsens. An MRI or CT has been ordered for you. A [employer] will be contacting you to schedule the appointment as soon as it has been approved with your insurance company. Please schedule an appointment to follow up with the doctor or the physicians assistant after the MRI or CT has been conducted. Arthritis: Care Instructions Your Care Instructions Arthritis, also called osteoarthritis, is a breakdown of the cartilage that cushions your joints.  When the cartilage wears down, your bones rub against each other. This causes pain and stiffness. Many people have some arthritis as they age. Arthritis most often affects the joints of the spine, hands, hips, knees, or feet. You can take simple measures to protect your joints, ease your pain, and help you stay active. Follow-up care is a key part of your treatment and safety. Be sure to make and go to all appointments, and call your doctor if you are having problems. It's also a good idea to know your test results and keep a list of the medicines you take. How can you care for yourself at home? · Stay at a healthy weight. Being overweight puts extra strain on your joints. · Talk to your doctor or physical therapist about exercises that will help ease joint pain. ¨ Stretch. You may enjoy gentle forms of yoga to help keep your joints and muscles flexible. ¨ Walk instead of jog. Other types of exercise that are less stressful on the joints include riding a bicycle, swimming, john paul chi, or water exercise. ¨ Lift weights. Strong muscles help reduce stress on your joints. Stronger thigh muscles, for example, take some of the stress off of the knees and hips. Learn the right way to lift weights so you do not make joint pain worse. · Take your medicines exactly as prescribed. Call your doctor if you think you are having a problem with your medicine. · Take pain medicines exactly as directed. ¨ If the doctor gave you a prescription medicine for pain, take it as prescribed. ¨ If you are not taking a prescription pain medicine, ask your doctor if you can take an over-the-counter medicine. · Use a cane, crutch, walker, or another device if you need help to get around. These can help rest your joints. You also can use other things to make life easier, such as a higher toilet seat and padded handles on kitchen utensils. · Do not sit in low chairs, which can make it hard to get up. · Put heat or cold on your sore joints as needed.  Use whichever helps you most. You also can take turns with hot and cold packs. ¨ Apply heat 2 or 3 times a day for 20 to 30 minutes-using a heating pad, hot shower, or hot pack-to relieve pain and stiffness. ¨ Put ice or a cold pack on your sore joint for 10 to 20 minutes at a time. Put a thin cloth between the ice and your skin. When should you call for help? Call your doctor now or seek immediate medical care if: 
? · You have sudden swelling, warmth, or pain in any joint. ? · You have joint pain and a fever or rash. ? · You have such bad pain that you cannot use a joint. ? Watch closely for changes in your health, and be sure to contact your doctor if: 
? · You have mild joint symptoms that continue even with more than 6 weeks of care at home. ? · You have stomach pain or other problems with your medicine. Where can you learn more? Go to http://elana-bela.info/. Enter R584 in the search box to learn more about \"Arthritis: Care Instructions. \" Current as of: October 31, 2016 Content Version: 11.4 © 3817-6741 [employer]. Care instructions adapted under license by [employer] (which disclaims liability or warranty for this information). If you have questions about a medical condition or this instruction, always ask your healthcare professional. [address] any warranty or liability for your use of this information. Introducing [facility] & HEALTH SERVICES! Dear Isabel Clayton: 
Thank you for requesting a [employer] account. Our records indicate that you already have an active [employer] account. You can access your account anytime at [email or web address] Did you know that you can access your hospital and ER discharge instructions at any time in [employer]? You can also review all of your test results from your hospital stay or ER visit. Additional Information If you have questions, please visit the Frequently Asked Questions section of the [employer] website at [email or web address] Remember, [employer] is NOT to be used for urgent needs. For medical emergencies, dial 911. Now available from your iPhone and Android! Please provide this summary of care documentation to your next provider. Your primary care clinician is listed as [address]. If you have any questions after today's visit, please call [phone].

## 2017-12-14 NOTE — TELEPHONE ENCOUNTER
Pt called this morning to check the status of her test results from last week. Pt has become very anxious to get her results and she was notified that the results are in. Please review as soon possible and notify patient.

## 2017-12-15 ENCOUNTER — APPOINTMENT (OUTPATIENT)
Dept: PHYSICAL THERAPY | Age: 47
End: 2017-12-15
Payer: COMMERCIAL

## 2017-12-18 ENCOUNTER — HOSPITAL ENCOUNTER (OUTPATIENT)
Dept: PHYSICAL THERAPY | Age: 47
Discharge: HOME | End: 2017-12-18
Payer: COMMERCIAL

## 2017-12-18 PROCEDURE — 97112 NEUROMUSCULAR REEDUCATION: CPT

## 2017-12-18 PROCEDURE — 97110 THERAPEUTIC EXERCISES: CPT

## 2017-12-18 NOTE — PROGRESS NOTES
PT DAILY TREATMENT NOTE 3-16    Patient Name: Delbert Moreno  Date:2017  : 1970  [x]  Patient  Verified  Payor: Cristiano Bread / Plan: Valri Pulling / Product Type: HMO /    In time:7:30  Out time:8:06  Total Treatment Time (min): 36  Visit #: 6 of 8    Treatment Area: Right knee pain [M25.561]  Left knee pain [M25.562]    SUBJECTIVE  Pain Level (0-10 scale): 0  Any medication changes, allergies to medications, adverse drug reactions, diagnosis change, or new procedure performed?: [x] No    [] Yes (see summary sheet for update)  Subjective functional status/changes:   [] No changes reported  \"I don't have any pain right now. \"    OBJECTIVE  26 min Therapeutic Exercise:  [x] See flow sheet :   Rationale: increase ROM, increase strength and improve coordination to improve the patients ability to increase ease with ADLs    10 min Neuromuscular Re-education:  [x]  See flow sheet :   Rationale: increase strength, improve coordination, improve balance and increase proprioception  to improve the patients ability to ease with prolonged ambulation     With   [] TE   [] TA   [] neuro   [] other: Patient Education: [x] Review HEP    [] Progressed/Changed HEP based on:   [] positioning   [] body mechanics   [] transfers   [] heat/ice application    [] other:      Other Objective/Functional Measures:   Fair eccentric control with step ups     Pain Level (0-10 scale) post treatment: 0    ASSESSMENT/Changes in Function:   Though patient reports no pain upon arrival, she declines to perform 2\" step downs after only two reps secondary to c/o pain. She continues to report \"popping\" in bilateral knee, though no audible crepitus noted during today's session.      Patient will continue to benefit from skilled PT services to modify and progress therapeutic interventions, address functional mobility deficits, address ROM deficits, address strength deficits, analyze and address soft tissue restrictions, analyze and cue movement patterns, analyze and modify body mechanics/ergonomics and assess and modify postural abnormalities to attain remaining goals. []  See Plan of Care  []  See progress note/recertification  []  See Discharge Summary         Progress towards goals / Updated goals:  Short Term Goals: To be accomplished in 2 weeks:  1. Pt will be I and compliant with HEP to promote self management of condition. - progressing per patient report. 11/29/17  2. Pt will demonstrate pain free quad setting on R for improved knee mechanics with daily tasks. - Performed quad sets and LAQ without pain. 12/8/2017  Long Term Goals: To be accomplished in 4 weeks:  1. Pt will demonstrate step down from 6\" step without pain or instability to improve ease of community ambulation. - not yet initiated due to increased pain. 12/12/17 patient declines performing 2\" step downs (12/18/2017)  2. Pt will demonstrate 5/5 HS and glute max strength for improved stability with work duties.   3. Pt will report ability to ambulate 10 stairs with only a little bit of difficulty to improve functional mobility.- Performing 4\" step up. 12/8/2017    PLAN  []  Upgrade activities as tolerated     [x]  Continue plan of care  []  Update interventions per flow sheet       []  Discharge due to:_  []  Other:_      Deangelo Smith 12/18/2017  7:32 AM    Future Appointments  Date Time Provider Yi Osborn   12/21/2017 7:30 AM [id number] [facility]   12/22/2017 7:00 AM [facility] MRI RM 1 [facility]   12/26/2017 7:00 AM [facility] MRI RM 1 [facility]   12/26/2017 8:30 AM Kapil Barrett PTA [facility]

## 2017-12-21 ENCOUNTER — APPOINTMENT (OUTPATIENT)
Dept: PHYSICAL THERAPY | Age: 47
End: 2017-12-21
Payer: COMMERCIAL

## 2017-12-22 ENCOUNTER — HOSPITAL ENCOUNTER (OUTPATIENT)
Age: 47
Discharge: HOME | End: 2017-12-22
Attending: ORTHOPAEDIC SURGERY
Payer: COMMERCIAL

## 2017-12-22 DIAGNOSIS — M25.561 RIGHT KNEE PAIN, UNSPECIFIED CHRONICITY: ICD-10-CM

## 2017-12-22 PROCEDURE — 73721 MRI JNT OF LWR EXTRE W/O DYE: CPT

## 2017-12-26 ENCOUNTER — HOSPITAL ENCOUNTER (OUTPATIENT)
Age: 47
Discharge: HOME | End: 2017-12-26
Attending: ORTHOPAEDIC SURGERY
Payer: COMMERCIAL

## 2017-12-26 ENCOUNTER — APPOINTMENT (OUTPATIENT)
Dept: PHYSICAL THERAPY | Age: 47
End: 2017-12-26
Payer: COMMERCIAL

## 2017-12-26 DIAGNOSIS — M75.101 ROTATOR CUFF TEAR, NON-TRAUMATIC, RIGHT: ICD-10-CM

## 2017-12-26 PROCEDURE — 73221 MRI JOINT UPR EXTREM W/O DYE: CPT

## 2018-01-09 ENCOUNTER — OFFICE VISIT (OUTPATIENT)
Dept: ORTHOPEDIC SURGERY | Age: 48
End: 2018-01-09

## 2018-01-09 VITALS
DIASTOLIC BLOOD PRESSURE: 83 MMHG | SYSTOLIC BLOOD PRESSURE: 134 MMHG | BODY MASS INDEX: 43.61 KG/M2 | HEART RATE: 72 BPM | TEMPERATURE: 97.3 F | WEIGHT: 237 LBS | HEIGHT: 62 IN | OXYGEN SATURATION: 99 %

## 2018-01-09 DIAGNOSIS — S83.206A ACUTE MENISCAL TEAR OF RIGHT KNEE, INITIAL ENCOUNTER: Primary | ICD-10-CM

## 2018-01-09 NOTE — PATIENT INSTRUCTIONS
Please follow up with Dr. Katy Michaels. You are advised to contact us if your condition worsens. Meniscus Tear: Care Instructions  Your Care Instructions    The meniscus is rubbery tissue in the knee that acts as a shock absorber between the upper and lower leg bones. The meniscus also keeps your knee stable by spreading weight across it. Each knee has two menisci (plural of meniscus). You can tear a meniscus if you plant your foot and twist, or pivot. The meniscus also can wear down as you age, and it can tear from squatting or kneeling. Small tears may heal on their own with rest and some physical therapy. But a more serious tear may need surgery to repair it or to remove part of the meniscus. Your doctor may want you to see a doctor who specializes in bones and sports injuries. Follow-up care is a key part of your treatment and safety. Be sure to make and go to all appointments, and call your doctor if you are having problems. It's also a good idea to know your test results and keep a list of the medicines you take. How can you care for yourself at home? · Rest your knee when possible. · Do not squat or kneel. · Take pain medicines exactly as directed. ¨ If the doctor gave you a prescription medicine for pain, take it as prescribed. ¨ If you are not taking a prescription pain medicine, ask your doctor if you can take an over-the-counter medicine. · Put ice or a cold pack on your knee for 10 to 20 minutes at a time. Try to do this every 1 to 2 hours for the next 3 days (when you are awake) or until the swelling goes down. Put a thin cloth between the ice and your skin. · Prop up the sore leg on a pillow when you ice your knee or any time you sit or lie down during the next 3 days. Try to keep your leg above the level of your heart. This will help reduce swelling. · Follow your doctor's directions for using crutches or a knee brace, if suggested.   · Follow your doctor's directions for exercises to keep your knee mobile and your leg muscles strong. Here are a few exercises you can try if your doctor says it is okay. ¨ Quad sets: Lie down on the floor or the bed with your injured leg straight. Fully extend your leg-there should be no or little bend in your knee. Tighten the thigh (quadriceps) of your injured leg for 6 seconds. Do not lift your heel up. Relax your quadriceps for 10 seconds. Repeat this exercise 8 to 12 times several times during the day. ¨ Straight-leg raises: Lie down on the floor or the bed with your injured leg flat and your uninjured leg bent so that the bottom of your foot is on the floor or bed. Tighten the quadriceps of your injured leg. Keeping your knee as straight as possible, lift your injured leg off the bed until it is about 18 inches above the bed or floor. Lower your leg back down and relax for 5 seconds. Do 3 sets of 20 repetitions, or if you tire quickly, 3 sets of 8 to 12 repetitions. ¨ Heel raises: Stand with your feet a few inches apart. Rest your hands lightly on a counter or chair in front of you. Slowly raise your heels off the floor while keeping your knees straight. Hold for 3 seconds, then slowly lower your heels to the floor. Do 3 sets of 8 to 12 repetitions. ¨ Heel slides: Lie down on the floor or the bed with your leg flat. Slowly begin to slide your heel toward your rear end (buttocks), keeping your heel on the floor. Your knee will begin to bend. Slide your heel and bend your knee until it becomes a little sore and you can feel a small amount of pressure inside your knee. Hold this position for 10 seconds. Slide your heel back down until your leg is straight on the floor. Relax for 10 seconds. Repeat this exercise 20 times. When should you call for help? Watch closely for changes in your health, and be sure to contact your doctor if:  ? · You have increasing knee pain or swelling or both. ? · Your knee is so sore or stiff that you cannot walk on it.    ? · You do not get better as expected. Where can you learn more? Go to http://elana-bela.info/. Enter Z888 in the search box to learn more about \"Meniscus Tear: Care Instructions. \"  Current as of: March 21, 2017  Content Version: 11.4  © 4677-6632 [employer]. Care instructions adapted under license by [employer] (which disclaims liability or warranty for this information). If you have questions about a medical condition or this instruction, always ask your healthcare professional. [address] any warranty or liability for your use of this information.

## 2018-01-09 NOTE — PROGRESS NOTES
AMBULATORY PROGRESS NOTE      Patient: Josefa Julio             MRN: [id number]     SSN: [id number] Body mass index is 43.35 kg/(m^2). YOB: 1970     AGE: 41 y.o. EX: female    PCP: Marla Mendez MD    IMPRESSION/DIAGNOSIS AND TREATMENT PLAN     DIAGNOSES  1. Acute meniscal tear of right knee, initial encounter        No orders of the defined types were placed in this encounter. Josefa Julio understands her diagnoses and the proposed plan. Plan:    1) Referral for Dr. Bertin Summers. RTO - Follow up with Dr. Gina Pastrana IS A 41 y.o. female who presents to my outpatient office for follow up evaluation of bilateral foot pain. At last visit, I ordered a MRI without IV contrast of the right knee, and provided an order for a right knee brace. Patient reports that she continues to experience pain along her right knee that has not improved. She experiences pain if she turns or twists her knee, and going up and down steps. Additionally she notes having clicking and popping of her right knee. MR imaging have been reviewed with the patient showing a complex degenerative tear of medial meniscus posterior horn through body segment. Surgical intervention for treatment of her meniscal tear have been discussed with the patient. She is already seeing Dr. Bertin Summers for her right shoulder and has an appointment for next Wednesday. The patient denies using blood thinners. The patient works as a  for [employer], well developed, well nourished, well appearing 42 y. o. female in no acute distress. PSYCH:  Normal affect, mood, and conduct.  alert, oriented x 3 alert, oriented x 3, no dementia     Knees:  right                        Gait: normal                          Cutaneous: Skin intact, no abrasions, blisters, wounds, erythema                        Effusion: Is not present                        Crepitus:  mild PF joint crepitus (right knee)                        Tenderness: PF joint when compressing it.                        Alignment of Knee: mild varus when standing                        ROM: full range of motion                        Fullness or swelling: None to popliteal fossa region                        Stability: No instability to anterior, posterior, varus, valgus stress testing                        Contractures: No Achilles or Gastrocnemius Contractures.                         Calf tenderness: Absent for calf or gastrocnemius muscle regions                                                                                Soft, supple, non tender, non taut lower extremity compartments  Extremities:   No embolic phenomena to the toes                           No significant edema to the foot and or toes.                         TOGML is not present to distal 1/3 tib/fib or ankle regions.                         Lower extremities are warm and appear well perfused                          DVT: No evidence of DVT seen on examination at this time                          No calf swelling, no tenderness to calf muscles  Lymphatic:  No Evidence of Lymphedema  Vascular: Medial Border of Tibia Region: Edema is not present                   Pulses: Dorsalis Pedis &  Posterior Tibial Pulses : Palpable yes                   Varicosities Lower Limbs : None noted . Neuro:   Extensor mechanism is intact    CHART REVIEW     Past Medical History:   Diagnosis Date    Left foot pain     Plantar fasciitis, left     Right shoulder pain      Current Outpatient Prescriptions   Medication Sig    ibuprofen (MOTRIN) 800 mg tablet Take 1 Tab by mouth every eight (8) hours as needed for Pain.  diclofenac sodium (PENNSAID) 20 mg/gram /actuation(2 %) sopm 2 Pump(s) by Apply Externally route two (2) times a day.     ammonium lactate (LAC-HYDRIN) 12 % topical cream Apply  to affected area two (2) times a day. rub in to affected area well    triamcinolone acetonide (KENALOG) 0.1 % topical cream Apply  to affected area two (2) times daily as needed for Skin Irritation. use thin layer    nystatin (MYCOSTATIN) topical cream Apply  to affected area two (2) times a day.  fluticasone (FLONASE) 50 mcg/actuation nasal spray 2 Sprays by Both Nostrils route daily. No current facility-administered medications for this visit. Allergies   Allergen Reactions    Codeine Nausea and Vomiting     Patient states she gets jittery, has upset stomach      Past Surgical History:   Procedure Laterality Date    HX TUBAL LIGATION  2009    Tubal ligation     Social History     Occupational History    [employer]     Social History Main Topics    Smoking status: Never Smoker    Smokeless tobacco: Never Used    Alcohol use No    Drug use: No    Sexual activity: Yes     Partners: Male     Family History   Problem Relation Age of Onset    Hypertension Mother     Hypertension Father     Arthritis-osteo Other        REVIEW OF SYSTEMS : 1/9/2018  ALL BELOW ARE Negative except : SEE HPI       Constitutional: Negative for fever, chills and weight loss. Neg Weigh Loss  Cardiovascular: Negative for chest pain, claudication and leg swelling. SOB, SMITH   Gastrointestinal: Negative for  pain, N/V/D/C, Blood in stool or urine,dysuria, hematuria,        Incontinence, pelvic pain  Musculoskeletal: see HPI. Neurological: Negative for dizziness and weakness. Negative for headaches,Visual Changes, Confusion, Seizures,   Psychiatric/Behavioral: Negative for depression, memory loss and substance abuse. Extremities:  Negative for  hair changes, rash or skin lesion changes. Hematologic: Negative for Bleeding problems, bruising, pallor or swollen lymph nodes.   Peripheral Vascular: No calf pain, vascular vein tenderness to calf pain              No calf throbbing, posterior knee throbbing pain    DIAGNOSTIC IMAGING     No notes on file    Result Information   Status Provider Status      Final result (Exam End: 12/22/2017  7:13 AM) Reviewed    Study Result   EXAMINATION: MRI right knee without contrast     INDICATION: Right knee pain, lateral, chronic     COMPARISON: None     TECHNIQUE: Multiplanar multiecho MRI sequences of the right knee performed  without contrast.     FINDINGS:     Menisci: Medial meniscus posterior horn through body segment intrasubstance  degeneration with evidence of horizontal oblique tear at posterior horn. Lateral  meniscus intact.     Ligaments: ACL intact. PCL intact. MCL intact. LCL intact. Medial and lateral  patellar retinaculum appear intact.     Tendons: Popliteus tendon intact. Quadriceps and patellar tendons intact. Semimembranosus and passed anserine is tendons intact. Biceps femoris tendon  intact. Gastrocnemius tendons intact.     Cartilage: In the patellofemoral compartment, there is high-grade cartilage  fissuring in the lateral facet. In the lateral compartment, there is patchy  full-thickness cartilage loss in the weightbearing condyle and apposing tibial  plateau. In the lateral compartment, there is no discrete cartilage defect  identified.     Bones: Tricompartmental marginal osteophytes. Mild marrow edema in the medial  femoral condyle and medial tibial plateau. No acute osseous findings.     Miscellaneous: Moderate sized joint effusion. Edema along MCL and pes anserine  tendons.     IMPRESSION  IMPRESSION:     1. Complex degenerative tear of medial meniscus posterior horn through body  segment.     -Edema along MCL and passed anserine tendons, likely reactive. MCL sprain or  mild bursitis not excluded.     2. Degenerative changes with patellofemoral and medial compartment high-grade  cartilage defects, grade 3-4 as above.     3. Moderate-sized joint effusion.    Imaging   MRI KNEE RT WO CONT (Order #[id number]) on 12/22/2017 - Imaging Information 12/22/2017  9:04 AM - [place], Rad Results In   Narrative   EXAMINATION: MRI right knee without contrast    INDICATION: Right knee pain, lateral, chronic    COMPARISON: None    TECHNIQUE: Multiplanar multiecho MRI sequences of the right knee performed  without contrast.    FINDINGS:    Menisci: Medial meniscus posterior horn through body segment intrasubstance  degeneration with evidence of horizontal oblique tear at posterior horn. Lateral  meniscus intact. Ligaments: ACL intact. PCL intact. MCL intact. LCL intact. Medial and lateral  patellar retinaculum appear intact. Tendons: Popliteus tendon intact. Quadriceps and patellar tendons intact. Semimembranosus and passed anserine is tendons intact. Biceps femoris tendon  intact. Gastrocnemius tendons intact. Cartilage: In the patellofemoral compartment, there is high-grade cartilage  fissuring in the lateral facet. In the lateral compartment, there is patchy  full-thickness cartilage loss in the weightbearing condyle and apposing tibial  plateau. In the lateral compartment, there is no discrete cartilage defect  identified. Bones: Tricompartmental marginal osteophytes. Mild marrow edema in the medial  femoral condyle and medial tibial plateau. No acute osseous findings. Miscellaneous: Moderate sized joint effusion. Edema along MCL and pes anserine  tendons. Impression   IMPRESSION:    1. Complex degenerative tear of medial meniscus posterior horn through body  segment.    -Edema along MCL and passed anserine tendons, likely reactive. MCL sprain or  mild bursitis not excluded. 2. Degenerative changes with patellofemoral and medial compartment high-grade  cartilage defects, grade 3-4 as above. 3. Moderate-sized joint effusion. Written by Nadya Patel, as dictated by Bon Bernal MD. Dr. VIOLETTE, Bon Bernal MD, confirm that all documentation is accurate.

## 2018-01-09 NOTE — MR AVS SNAPSHOT
Visit Information Date & Time Provider Department Dept. Phone Encounter #  
 1/9/2018  7:35 AM Frannie Caraballo, [address] [facility] [phone] [id number] Your Appointments 1/17/2018  8:20 AM  
Follow Up with Jaxon Harry MD  
[address] [facility], [address] [facility] - [facility] ([address]) Appt Note: right shoulder f/u; right shoulder f/u**PT WAS WAS RESCHEDULED  DUE TO PROVIDER OUT OF THE OFFICE ON 1/10/18 [address], [address] [facility]  
[phone] [address] [facility], [address] Upcoming Health Maintenance Date Due DTaP/Tdap/Td series (1 - Tdap) 9/3/1993 Influenza Age 5 to Adult 8/1/2017 PAP AKA CERVICAL CYTOLOGY 5/4/2018 COLONOSCOPY 2/17/2026 Allergies as of 1/9/2018  Review Complete On: 1/9/2018 By: Frannie Caraballo MD  
  
 Severity Noted Reaction Type Reactions Codeine High 02/21/2012    Nausea and Vomiting Patient states she gets jittery, has upset stomach Current Immunizations  Never Reviewed Name Date Influenza Vaccine (Quad) PF 9/18/2014 Not reviewed this visit You Were Diagnosed With   
  
 Codes Comments Acute meniscal tear of right knee, initial encounter    -  Primary ICD-10-CM: K69.205Z ICD-9-CM: 446. 2 Vitals BP Pulse Temp Height(growth percentile) Weight(growth percentile) SpO2  
 134/83 72 97.3 °F (36.3 °C) (Oral) 5' 2\" (1.575 m) 237 lb (107.5 kg) 99% BMI OB Status Smoking Status 43.35 kg/m2 Having regular periods Never Smoker BMI and BSA Data Body Mass Index Body Surface Area  
 43.35 kg/m 2 2.17 m 2 Preferred Pharmacy Pharmacy Name Phone [facility] [place], [address] [facility] [phone] Your Updated Medication List  
  
   
This list is accurate as of: 1/9/18  8:24 AM.  Always use your most recent med list.  
  
  
  
 ammonium lactate 12 % topical cream  
Commonly known as:  LAC-HYDRIN Apply  to affected area two (2) times a day. rub in to affected area well  
  
 diclofenac sodium 20 mg/gram /actuation(2 %) Sopm  
Commonly known as:  PENNSAID  
2 Pump(s) by Apply Externally route two (2) times a day. fluticasone 50 mcg/actuation nasal spray Commonly known as:  Jermaine Lover 2 Sprays by Both Nostrils route daily. ibuprofen 800 mg tablet Commonly known as:  MOTRIN Take 1 Tab by mouth every eight (8) hours as needed for Pain. nystatin topical cream  
Commonly known as:  MYCOSTATIN Apply  to affected area two (2) times a day. triamcinolone acetonide 0.1 % topical cream  
Commonly known as:  KENALOG Apply  to affected area two (2) times daily as needed for Skin Irritation. use thin layer Patient Instructions Please follow up with Dr. Treva Martinez. You are advised to contact us if your condition worsens. Meniscus Tear: Care Instructions Your Care Instructions The meniscus is rubbery tissue in the knee that acts as a shock absorber between the upper and lower leg bones. The meniscus also keeps your knee stable by spreading weight across it. Each knee has two menisci (plural of meniscus). You can tear a meniscus if you plant your foot and twist, or pivot. The meniscus also can wear down as you age, and it can tear from squatting or kneeling. Small tears may heal on their own with rest and some physical therapy. But a more serious tear may need surgery to repair it or to remove part of the meniscus. Your doctor may want you to see a doctor who specializes in bones and sports injuries. Follow-up care is a key part of your treatment and safety. Be sure to make and go to all appointments, and call your doctor if you are having problems. It's also a good idea to know your test results and keep a list of the medicines you take. How can you care for yourself at home? · Rest your knee when possible. · Do not squat or kneel. · Take pain medicines exactly as directed. ¨ If the doctor gave you a prescription medicine for pain, take it as prescribed. ¨ If you are not taking a prescription pain medicine, ask your doctor if you can take an over-the-counter medicine. · Put ice or a cold pack on your knee for 10 to 20 minutes at a time. Try to do this every 1 to 2 hours for the next 3 days (when you are awake) or until the swelling goes down. Put a thin cloth between the ice and your skin. · Prop up the sore leg on a pillow when you ice your knee or any time you sit or lie down during the next 3 days. Try to keep your leg above the level of your heart. This will help reduce swelling. · Follow your doctor's directions for using crutches or a knee brace, if suggested. · Follow your doctor's directions for exercises to keep your knee mobile and your leg muscles strong. Here are a few exercises you can try if your doctor says it is okay. ¨ Quad sets: Lie down on the floor or the bed with your injured leg straight. Fully extend your leg-there should be no or little bend in your knee. Tighten the thigh (quadriceps) of your injured leg for 6 seconds. Do not lift your heel up. Relax your quadriceps for 10 seconds. Repeat this exercise 8 to 12 times several times during the day. ¨ Straight-leg raises: Lie down on the floor or the bed with your injured leg flat and your uninjured leg bent so that the bottom of your foot is on the floor or bed. Tighten the quadriceps of your injured leg. Keeping your knee as straight as possible, lift your injured leg off the bed until it is about 18 inches above the bed or floor. Lower your leg back down and relax for 5 seconds. Do 3 sets of 20 repetitions, or if you tire quickly, 3 sets of 8 to 12 repetitions. ¨ Heel raises: Stand with your feet a few inches apart.  Rest your hands lightly on a counter or chair in front of you. Slowly raise your heels off the floor while keeping your knees straight. Hold for 3 seconds, then slowly lower your heels to the floor. Do 3 sets of 8 to 12 repetitions. ¨ Heel slides: Lie down on the floor or the bed with your leg flat. Slowly begin to slide your heel toward your rear end (buttocks), keeping your heel on the floor. Your knee will begin to bend. Slide your heel and bend your knee until it becomes a little sore and you can feel a small amount of pressure inside your knee. Hold this position for 10 seconds. Slide your heel back down until your leg is straight on the floor. Relax for 10 seconds. Repeat this exercise 20 times. When should you call for help? Watch closely for changes in your health, and be sure to contact your doctor if: 
? · You have increasing knee pain or swelling or both. ? · Your knee is so sore or stiff that you cannot walk on it. ? · You do not get better as expected. Where can you learn more? Go to http://elana-bela.info/. Enter I273 in the search box to learn more about \"Meniscus Tear: Care Instructions. \" Current as of: March 21, 2017 Content Version: 11.4 © 0811-4360 [employer]. Care instructions adapted under license by [employer] (which disclaims liability or warranty for this information). If you have questions about a medical condition or this instruction, always ask your healthcare professional. [place] [address] any warranty or liability for your use of this information. Introducing [facility] & HE[facility]! Dear Abel Cleveland: 
Thank you for requesting a Flickme account. Our records indicate that you already have an active Flickme account. You can access your account anytime at [email or web address] Did you know that you can access your hospital and ER discharge instructions at any time in FastConnect? You can also review all of your test results from your hospital stay or ER visit. Additional Information If you have questions, please visit the Frequently Asked Questions section of the FastConnect website at [email or web address] Remember, FastConnect is NOT to be used for urgent needs. For medical emergencies, dial 911. Now available from your iPhone and Android! Please provide this summary of care documentation to your next provider. Your primary care clinician is listed as Navi Young. If you have any questions after today's visit, please call [phone].

## 2018-01-17 ENCOUNTER — OFFICE VISIT (OUTPATIENT)
Dept: ORTHOPEDIC SURGERY | Age: 48
End: 2018-01-17

## 2018-01-17 VITALS
BODY MASS INDEX: 44.16 KG/M2 | WEIGHT: 240 LBS | HEIGHT: 62 IN | DIASTOLIC BLOOD PRESSURE: 68 MMHG | HEART RATE: 76 BPM | OXYGEN SATURATION: 100 % | RESPIRATION RATE: 14 BRPM | SYSTOLIC BLOOD PRESSURE: 115 MMHG

## 2018-01-17 DIAGNOSIS — S83.241A ACUTE MEDIAL MENISCUS TEAR, RIGHT, INITIAL ENCOUNTER: Primary | ICD-10-CM

## 2018-01-17 DIAGNOSIS — M19.011 GLENOHUMERAL ARTHRITIS, RIGHT: ICD-10-CM

## 2018-01-17 NOTE — PROGRESS NOTES
Valeriano Teresa  1970   Chief Complaint   Patient presents with    Follow-up     Rt shoulder    Knee Pain     Rt         HISTORY OF PRESENT ILLNESS  Valeriano Teresa is a 41 y.o. female who presents today for reevaluation of right knee pain and to review MRI results. She was previously seen by Dr. Nata Sue. Patient rates pain as 6/10 today. Her pain has been present for 4-5 months. Denies initial injury. The pain has been dull and aching. It is worse with prolonged walking and standing. Reports swelling over the knee as well as popping. Patient has been attending PT. Has tried motrin 800 mg. Patient is also here to reevaluate her right shoulder pain and to review MRI results. She had an injury 3 years ago. Since then she reports having a locking sensation. Her ROM has decreased mostly due to being restricted by her pain. Patient denies any fever, chills, chest pain, shortness of breath or calf pain. There are no new illness or injuries to report since last seen in the office. There are no changes to medications, allergies, family or social history. PHYSICAL EXAM:   Visit Vitals    /68 (BP 1 Location: Left arm, BP Patient Position: Sitting)    Pulse 76    Resp 14    Ht 5' 2\" (1.575 m)    Wt 240 lb (108.9 kg)    SpO2 100%    BMI 43.9 kg/m2     The patient is a well-developed, well-nourished female   in no acute distress. The patient is alert and oriented times three. The patient is alert and oriented times three. Mood and affect are normal.  LYMPHATIC: lymph nodes are not enlarged and are within normal limits  SKIN: normal in color and non tender to palpation. There are no bruises or abrasions noted. NEUROLOGICAL: Motor sensory exam is within normal limits. Reflexes are equal bilaterally.  There is normal sensation to pinprick and light touch  MUSCULOSKELETAL:  Examination Right knee   Skin Intact   Range of motion 0-130   Effusion + small   Medial joint line tenderness - Lateral joint line tenderness -   Tenderness Pes Bursa -   Tenderness insertion MCL -   Tenderness insertion LCL -   Razias -   Patella crepitus -   Patella grind -   Lachman -   Pivot shift -   Anterior drawer -   Posterior drawer -   Varus stress -   Valgus stress -   Neurovascular Intact   Calf Swelling and Tenderness to Palpation -   Re's Test -   Hamstring Cord Tightness -     Examination Right shoulder   Skin Intact   AC joint tenderness -   Biceps tenderness -   Forward flexion/Elevation    Active abduction    Glenohumeral abduction 45   External rotation ROM 10   Internal rotation ROM 10   Apprehension -   Rachells Relocation -   Jerk -   Load and Shift -   Obriens -   Speeds -   Impingement sign +   Supraspinatus/Empty Can ++   External Rotation Strength -, 5/5   Lift Off/Belly Press -, 5/5   Neurovascular Intact        IMAGING: MRI of the right knee dated 12/22/17 was reviewed and read: IMPRESSION:  1. Complex degenerative tear of medial meniscus posterior horn through body  segment.  -Edema along MCL and passed anserine tendons, likely reactive. MCL sprain or  mild bursitis not excluded. 2. Degenerative changes with patellofemoral and medial compartment high-grade  cartilage defects, grade 3-4 as above. 3. Moderate-sized joint effusion. MRI of the right shoulder dated 12/26/17 was reviewed and read:   IMPRESSION:  1. Advanced arthritis of the shoulder joint proper with extensive cartilage  loss. The cartilage loss is the presumed source of the handful of loose bodies  within the biceps sulcus. 2. Rotator cuff tendinosis without discrete tear. No tendon retraction or muscle  atrophy. 3. Moderate AC joint arthritis. XR of the right shoulder dated 12/11/14 was reviewed and read:   IMPRESSION:  1. Degenerative changes right shoulder. 2. Synovial osteochondromatosis     MRI of the right shoulder dated 12/19/17 was reviewed and read: Impression:  1.  Rotator cuff tendinosis with no focal tear. Subacromial bursitis present  2. Degenerative joint disease with large osteophyte in the humeral head and  flattening, irregularity of the glenoid with attenuated labrum. 3. Multiple loose bodies within the biceps tendon sheath. 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 Also possible loose  bodies in the subscapularis recess. 4. Red marrow reconversion. Clinical correlation? Marrow replacement process in malignancy or chronic anemia? IMPRESSION:      ICD-10-CM ICD-9-CM    1. Acute medial meniscus tear, right, initial encounter S83.241A 836.0    2. Glenohumeral arthritis, right M19.011 715.91         PLAN:   1. I discussed the results of the MRI and the treatment options with the patient. Patient has an MRI documented medial meniscus tear of the right knee and arthritis/loose bodies in the right shoulder. She feels that her right knee pain is worse than the shoulder at this time. I discussed the risks and benefits and potential adverse outcomes of both operative vs non operative treatment of right medial meniscus tear with the patient. Patient wishes to proceed with arthroscopic right medial mensicectomy. Risks of operative intervention include but not limited to bleeding, infection, deep vein thrombosis, pulmonary embolism, death, limb length discrepancy, reflexive sympathetic dystrophy, fat embolism syndrome,damage to blood vessels and nerves, malunion, non-union, delayed union, failure of hardware, post traumatic arthritis, stroke, heart attack, and death. Patient understands that infection may arise and may require numerous surgeries. History and physical exam scheduled for a later date. Risk factors include: n/a  2. No cortisone injection indicated today   3. No Physical/Occupational Therapy indicated today  4. No diagnostic test indicated today  5. No durable medical equipment indicated today  6. No referral indicated today   7. No medications indicated today  8. No Narcotic indicated today     RTC H&P  Follow-up Disposition: Not on File  Office note will be sent to referring provider.     Scribed by Omid Cano ([address] [place] [address] [address]) as dictated by MARILEE Castro [address] and [facility]ist

## 2018-01-19 NOTE — PROGRESS NOTES
[facility]  [address]  [place], [address]  [phone] fax    Physical Therapy Discharge Summary  Patient name: Jocelyn Ingram Start of Care: 2017   Referral source: Elis Serna MD : 1970                          Medical Diagnosis: Right knee pain [M25.561]  Left knee pain [M25.562] Onset Date:2017                          Treatment Diagnosis: R>L knee pain   Prior Hospitalization: see medical history Provider#: [id number]   Medications: Verified on Patient summary List    Comorbidities: R hip bursitis, anemia, arthritis   Prior Level of Function: Pt reports intermittent knee pain in past relieved with injections. Pain with work duties and ADLs as of late  Visits from Start of Care: 6    Missed Visits: 1  Reporting Period : 2017 to 2017      Summary of Care:  Short Term Goals: To be accomplished in 2 weeks:  1. Pt will be I and compliant with HEP to promote self management of condition. - progressing per patient report. 17  2. Pt will demonstrate pain free quad setting on R for improved knee mechanics with daily tasks. - Performed quad sets and LAQ without pain. 2017  Long Term Goals: To be accomplished in 4 weeks:  1. Pt will demonstrate step down from 6\" step without pain or instability to improve ease of community ambulation. - not yet initiated due to increased pain. 17 patient declines performing 2\" step downs (2017)  2. Pt will demonstrate 5/5 HS and glute max strength for improved stability with work duties. 3. Pt will report ability to ambulate 10 stairs with only a little bit of difficulty to improve functional mobility.- Performing 4\" step up. 2017      ASSESSMENT/RECOMMENDATIONS: Pt reports no change in condition since [facility]. Will D/C at this time per pt report of surgery scheduled.     [x]Discontinue therapy: []Patient has reached or is progressing toward set goals      []Patient is non-compliant or has abdicated      [x]Due to lack of appreciable progress towards set [address] Dmitry Whitfield DPT, CMTPT 1/19/2018 2:58 PM

## 2018-01-31 ENCOUNTER — DOCUMENTATION ONLY (OUTPATIENT)
Dept: ORTHOPEDIC SURGERY | Age: 48
End: 2018-01-31

## 2018-01-31 ENCOUNTER — OFFICE VISIT (OUTPATIENT)
Dept: ORTHOPEDIC SURGERY | Age: 48
End: 2018-01-31

## 2018-01-31 VITALS
HEIGHT: 62 IN | HEART RATE: 75 BPM | DIASTOLIC BLOOD PRESSURE: 75 MMHG | BODY MASS INDEX: 45.3 KG/M2 | WEIGHT: 246.2 LBS | OXYGEN SATURATION: 100 % | SYSTOLIC BLOOD PRESSURE: 141 MMHG | TEMPERATURE: 97.6 F

## 2018-01-31 DIAGNOSIS — M19.011 GLENOHUMERAL ARTHRITIS, RIGHT: Primary | ICD-10-CM

## 2018-01-31 RX ORDER — TRIAMCINOLONE ACETONIDE 40 MG/ML
40 INJECTION, SUSPENSION INTRA-ARTICULAR; INTRAMUSCULAR ONCE
Qty: 1 ML | Refills: 0
Start: 2018-01-31 | End: 2018-01-31

## 2018-01-31 NOTE — PROGRESS NOTES
Thor Braun  1970   Chief Complaint   Patient presents with    Shoulder Pain     right        HISTORY OF PRESENT ILLNESS  Thor Braun is a 41 y.o. female who presents today for reevaluation of right shoulder. She is still having persistent pain and loss of range of motion. We are going to focus surgically on her knee first. Would like an injection in her shoulder as discussed prior. Patient denies any fever, chills, chest pain, shortness of breath or calf pain. There are no new illness or injuries to report since last seen in the office. No changes in medications, allergies, social or family history. PHYSICAL EXAM:   Visit Vitals    /75    Pulse 75    Temp 97.6 °F (36.4 °C) (Oral)    Ht 5' 2\" (1.575 m)    Wt 246 lb 3.2 oz (111.7 kg)    SpO2 100%    BMI 45.03 kg/m2     The patient is a well-developed, well-nourished female   in no acute distress. The patient is alert and oriented times three. The patient is alert and oriented times three. Mood and affect are normal.  LYMPHATIC: lymph nodes are not enlarged and are within normal limits  SKIN: normal in color and non tender to palpation. There are no bruises or abrasions noted. NEUROLOGICAL: Motor sensory exam is within normal limits. Reflexes are equal bilaterally. There is normal sensation to pinprick and light touch  MUSCULOSKELETAL:  Examination Right shoulder   Skin Intact   AC joint tenderness -   Biceps tenderness -   Forward flexion/Elevation    Active abduction    Glenohumeral abduction 45   External rotation ROM 10   Internal rotation ROM 10   Apprehension -   Rachells Relocation -   Jerk -   Load and Shift -   Obriens -   Speeds -   Impingement sign +   Supraspinatus/Empty Can ++   External Rotation Strength -, 5/5   Lift Off/Belly Press -, 5/5   Neurovascular Intact        PROCEDURE: After sterile prep, 6 cc of Xylocaine and 1 cc of Kenalog were injected into the right shoulder joint. [facility]  OFFICE PROCEDURE PROGRESS NOTE        Chart reviewed for the following:  Mile OAKES PA, have reviewed the History, Physical and updated the Allergic reactions for [address] [address] performed immediately prior to start of procedure:  Mile OAKES PA-C, have performed the following reviews on [facility] prior to the start of the procedure:            * Patient was identified by name and date of birth   * Agreement on procedure being performed was verified  * Risks and Benefits explained to the patient  * Procedure site verified and marked as necessary  * Patient was positioned for comfort  * Consent was signed and verified     Time: 9:01 AM    Date of procedure: 1/31/2018    Procedure performed by:  CRISTELA Barker    Provider assisted by: (see medication administration)    How tolerated by patient: tolerated the procedure well with no complications    Comments: none          IMPRESSION:      ICD-10-CM ICD-9-CM    1. Glenohumeral arthritis, right M19.011 715.91         PLAN:   1. Patient with chronic right shoulder pain. Will cont with conservative treatment for now. Surgical treatment when patient is ready would be arthroscopic removal of loose bodies with manipulation under anesthesia  Risk factors include: BMI> 45  2. Yes cortisone injection indicated today right shoulder  3. No Physical/Occupational Therapy indicated today  4. No diagnostic test indicated today:   5. No durable medical equipment indicated today  6. No referral indicated today   7. No medications indicated today:   8.  No Narcotic indicated today for short term acute pain       RTC PRN    Follow-up Disposition: Not on [address] [place] [address], MARILEE [facility]

## 2018-02-06 ENCOUNTER — DOCUMENTATION ONLY (OUTPATIENT)
Dept: ORTHOPEDIC SURGERY | Age: 48
End: 2018-02-06

## 2018-03-01 ENCOUNTER — DOCUMENTATION ONLY (OUTPATIENT)
Dept: ORTHOPEDIC SURGERY | Age: 48
End: 2018-03-01

## 2018-03-01 NOTE — PROGRESS NOTES
Patient dropped of an Attending Physician Statement Form at the [facility] loc to be completed by Dr. Guillermo Fabian . Patient will pick-up at [address] [place] [address] when ready and can be contacted at [phone].

## 2018-03-05 ENCOUNTER — DOCUMENTATION ONLY (OUTPATIENT)
Dept: ORTHOPEDIC SURGERY | Age: 48
End: 2018-03-05

## 2018-03-05 NOTE — PROGRESS NOTES
[employer] form completed, copy sent to scanning, and patient advised she may  at [facility] location.

## 2018-03-23 ENCOUNTER — OFFICE VISIT (OUTPATIENT)
Dept: ORTHOPEDIC SURGERY | Age: 48
End: 2018-03-23

## 2018-03-23 VITALS
SYSTOLIC BLOOD PRESSURE: 128 MMHG | WEIGHT: 242.6 LBS | DIASTOLIC BLOOD PRESSURE: 80 MMHG | BODY MASS INDEX: 44.64 KG/M2 | TEMPERATURE: 98.4 F | HEIGHT: 62 IN | HEART RATE: 70 BPM | OXYGEN SATURATION: 100 %

## 2018-03-23 DIAGNOSIS — S83.241D OTHER TEAR OF MEDIAL MENISCUS OF RIGHT KNEE AS CURRENT INJURY, SUBSEQUENT ENCOUNTER: Primary | ICD-10-CM

## 2018-03-23 RX ORDER — HYDROCODONE BITARTRATE AND ACETAMINOPHEN 10; 325 MG/1; MG/1
1-2 TABLET ORAL
Qty: 60 TAB | Refills: 0 | Status: SHIPPED | OUTPATIENT
Start: 2018-03-23 | End: 2018-12-13 | Stop reason: ALTCHOICE

## 2018-03-23 NOTE — H&P
HISTORY AND PHYSICAL          Patient: Bri Bernabe                MRN: [id number]       SSN: [id number]  YOB: 1970          AGE: 41 y.o. SEX: female      Patient scheduled for:  right knee arthroscopic partial medial menisectomy    Surgeon: Gregory Stanton MD    ANESTHESIA TYPE:  General    HISTORY:     The patient was seen in the office today for a preoperative history and physical for an upcoming above listed surgery. The patient is a pleasant 41 y.o. female who has a history of right knee pain. She was previously seen by Dr. Kiki Renee. Patient rates pain as 6/10 today. Her pain has been present for 4-5 months. Denies initial injury. The pain has been dull and aching. It is worse with prolonged walking and standing. Reports swelling over the knee as well as popping. Patient has been attending PT. Has tried motrin 800 mg. Pain level is a 6/10. Due to the current findings, affected activity of daily living and continued pain and discomfort, surgical intervention is indicated. The alternatives, risks, and complications, including but not limited to infection, blood loss, need for blood transfusion, neurovascular damage, manuela-incisional numbness, subcutaneous hematoma, bone fracture, anesthetic complications, DVT, PE, death, RSD, postoperative stiffness and pain, possible surgical scar, delayed healing and nonhealing, reflexive sympathetic dystrophy, damage to blood vessels and nerves, need for more surgery, MI, and stroke,  failure of hardware, gait disturbances,have been discussed. The patient understands and wishes to proceed with surgery.      PAST MEDICAL HISTORY:     Past Medical History:   Diagnosis Date    Left foot pain     Plantar fasciitis, left     Right shoulder pain        CURRENT MEDICATIONS:     Current Outpatient Prescriptions   Medication Sig Dispense Refill    HYDROcodone-acetaminophen (NORCO)  mg tablet Take 1-2 Tabs by mouth every four (4) hours as needed for Pain. Max Daily Amount: 12 Tabs. Do not take until after surgery 60 Tab 0    ibuprofen (MOTRIN) 800 mg tablet Take 1 Tab by mouth every eight (8) hours as needed for Pain. 100 Tab 1    diclofenac sodium (PENNSAID) 20 mg/gram /actuation(2 %) sopm 2 Pump(s) by Apply Externally route two (2) times a day. 1 Bottle 0    ammonium lactate (LAC-HYDRIN) 12 % topical cream Apply  to affected area two (2) times a day. rub in to affected area well 280 g 0    nystatin (MYCOSTATIN) topical cream Apply  to affected area two (2) times a day. 30 g 0    triamcinolone acetonide (KENALOG) 0.1 % topical cream Apply  to affected area two (2) times daily as needed for Skin Irritation. use thin layer 60 g 0    fluticasone (FLONASE) 50 mcg/actuation nasal spray 2 Sprays by Both Nostrils route daily. 1 Bottle 3       ALLERGIES:     Allergies   Allergen Reactions    Codeine Nausea and Vomiting     Patient states she gets jittery, has upset stomach          SURGICAL HISTORY:     Past Surgical History:   Procedure Laterality Date    HX TUBAL LIGATION  2009    Tubal ligation       SOCIAL HISTORY:     Social History     Social History    Marital status:      Spouse name: N/A    Number of children: N/A    Years of education: N/A     Occupational History    [employer]     Social History Main Topics    Smoking status: Never Smoker    Smokeless tobacco: Never Used    Alcohol use No    Drug use: No    Sexual activity: Yes     Partners: Male     Other Topics Concern    Not on file     Social History Narrative       FAMILY HISTORY:     Family History   Problem Relation Age of Onset    Hypertension Mother     Hypertension Father     Arthritis-osteo Other        REVIEW OF SYSTEMS:     Negative for fevers, chills, chest pain, shortness of breath, weight loss, recent illness     General: Negative for fever and chills. No unexpected change in weight. Denies fatigue. No change in appetite.    Skin: Negative for rash or itching. HEENT: Negative for congestion, sore throat, neck pain and neck stiffness. No change in vision or hearing. Hasn't noted any enlarged lymph nodes in the neck. Cardiovascular:  Negative for chest pain and palpitations. Has not noted pedal edema. Respiratory: Negative for cough, colds, sinus, hemoptysis, shortness of breath and wheezing. Gastrointestinal: Negative for nausea and vomiting, rectal bleeding, coffee ground emesis, abdominal pain, diarrhea and constipation. Genitourinary: Negative for dysuria, frequency urgency, or burning on micturition. No flank pain, no foul smelling urine, no difficulty with initiating urination. Hematological: Negative for bleeding or easy bruising. Musculoskeletal: Negative  for arthralgias, back pain or neck pain. Neurological: Negative for dizziness, seizures or syncopal episodes. Denies headaches. Endocrine: Denies excessive thirst.  No heat/cold intolerance. Psychiatric: Negative for depression or insomnia. PHYSICAL EXAMINATION:     VITALS: There were no vitals taken for this visit. GEN:  Well developed, well nourished 41 y.o. female in no acute distress. HEENT: Normocephalic and atraumatic. Eyes: Conjunctivae and EOM are normal.Pupils are equal, round, and reactive to light. External ear normal appearance, external nose normal appearing. Mouth/Throat: Oropharynx is clear and moist, able to handle oral secretions w/out difficulty, airway patent  NECK: Supple. Normal ROM, No lymphadenopathy. Trachea is midline. No bruising, swelling or deformity  RESP: Clear to auscultation bilaterally. No wheezes, rales, rhonchi. Normal effort and breath sounds. No respiratory distress  CARDIO:  Normal rate, regular rhythm and normal heart sounds. No MGR. ABDOMEN: Soft, non-tender, non-distended, normoactive bowel sounds in all four quadrants. There is no tenderness. There is no rebound and no guarding.    BACK: No CVA or spinal tenderness  BREAST: Deferred  PELVIC:    Deferred   RECTAL:  Deferred   :           Deferred  EXTREMITIES: EXAMINATION OF: right knee  Examination Right knee   Skin Intact   Range of motion 0-120   Effusion +   Medial joint line tenderness +   Lateral joint line tenderness -   Tenderness Pes Bursa -   Tenderness insertion MCL -   Tenderness insertion LCL -   Razias +   Patella crepitus +   Patella grind -   Lachman -   Pivot shift -   Anterior drawer -   Posterior drawer -   Varus stress -   Valgus stress -   Neurovascular Intact   Calf Swelling and Tenderness to Palpation -   Re's Test -   Hamstring Cord Tightness -       NEUROVASCULAR: Sensation intact to light touch and strength grossly intact and symmetrical. No nystagmus. Positive distal pulses and capillary refill. DVT ASSESSMENT:  There is not  calf tenderness. No evidence of DVT seen on physical exam.  MOTOR: In tact  PSYCH: Alert an oriented to person, place and time. Mood, memory, affect, behavior and judgment normal       RADIOGRAPHS & DIAGNOSTIC STUDIES:     MRI/xray reveals : 1. Complex degenerative tear of medial meniscus posterior horn through body  segment.     -Edema along MCL and passed anserine tendons, likely reactive. MCL sprain or  mild bursitis not excluded.     2. Degenerative changes with patellofemoral and medial compartment high-grade  cartilage defects, grade 3-4 as above.     3. Moderate-sized joint effusion. LABS:     None needed      ASSESSMENT:       Encounter Diagnosis   Name Primary?  Other tear of medial meniscus of right knee as current injury, subsequent encounter Yes       PLAN:     Again, the alternatives, risks, and complications, as well as expected outcome were discussed. The patient understands and agrees to proceed with right knee arthroscopic partial medial menisectomy.  Patient given orders listed below:    Orders Placed This Encounter    HYDROcodone-acetaminophen (NORCO)  mg tablet         Rafael Byers MARILEE  3/23/2018  2:54 PM

## 2018-03-30 ENCOUNTER — TELEPHONE (OUTPATIENT)
Dept: FAMILY MEDICINE CLINIC | Age: 48
End: 2018-03-30

## 2018-03-30 NOTE — TELEPHONE ENCOUNTER
Patient called in and stated that she need a referral placed to Dr. Paul Park office for her upcoming surgery on April 6, 2018.      Dr. Paul Park   Dx:S83.206A (ICD-10-CM) - Acute meniscal tear of right knee, initial encounter  Surgery date: 04/06/2018

## 2018-04-05 ENCOUNTER — ANESTHESIA EVENT (OUTPATIENT)
Dept: SURGERY | Age: 48
End: 2018-04-05
Payer: COMMERCIAL

## 2018-04-06 ENCOUNTER — HOSPITAL ENCOUNTER (OUTPATIENT)
Age: 48
Setting detail: OUTPATIENT SURGERY
Discharge: HOME | End: 2018-04-06
Attending: ORTHOPAEDIC SURGERY | Admitting: ORTHOPAEDIC SURGERY
Payer: COMMERCIAL

## 2018-04-06 ENCOUNTER — ANESTHESIA (OUTPATIENT)
Dept: SURGERY | Age: 48
End: 2018-04-06
Payer: COMMERCIAL

## 2018-04-06 VITALS
WEIGHT: 240.25 LBS | RESPIRATION RATE: 15 BRPM | SYSTOLIC BLOOD PRESSURE: 130 MMHG | BODY MASS INDEX: 44.21 KG/M2 | HEIGHT: 62 IN | DIASTOLIC BLOOD PRESSURE: 84 MMHG | TEMPERATURE: 97.6 F | OXYGEN SATURATION: 100 % | HEART RATE: 62 BPM

## 2018-04-06 LAB — HCG UR QL: NEGATIVE

## 2018-04-06 PROCEDURE — 74011000250 HC RX REV CODE- 250: Performed by: ORTHOPAEDIC SURGERY

## 2018-04-06 PROCEDURE — 74011250636 HC RX REV CODE- 250/636: Performed by: NURSE ANESTHETIST, CERTIFIED REGISTERED

## 2018-04-06 PROCEDURE — 76210000026 HC REC RM PH II 1 TO 1.5 HR: Performed by: ORTHOPAEDIC SURGERY

## 2018-04-06 PROCEDURE — 77030008574 HC TBNG SUC IRR STRY -B: Performed by: ORTHOPAEDIC SURGERY

## 2018-04-06 PROCEDURE — 77030002933 HC SUT MCRYL J&J -A: Performed by: ORTHOPAEDIC SURGERY

## 2018-04-06 PROCEDURE — 76010000132 HC OR TIME 2.5 TO 3 HR: Performed by: ORTHOPAEDIC SURGERY

## 2018-04-06 PROCEDURE — 81025 URINE PREGNANCY TEST: CPT

## 2018-04-06 PROCEDURE — 74011250637 HC RX REV CODE- 250/637: Performed by: NURSE ANESTHETIST, CERTIFIED REGISTERED

## 2018-04-06 PROCEDURE — 76210000006 HC OR PH I REC 0.5 TO 1 HR: Performed by: ORTHOPAEDIC SURGERY

## 2018-04-06 PROCEDURE — 77030008496 HC TBNG ARTHSC IRR S&N -B: Performed by: ORTHOPAEDIC SURGERY

## 2018-04-06 PROCEDURE — 77030032490 HC SLV COMPR SCD KNE COVD -B: Performed by: ORTHOPAEDIC SURGERY

## 2018-04-06 PROCEDURE — 76060000036 HC ANESTHESIA 2.5 TO 3 HR: Performed by: ORTHOPAEDIC SURGERY

## 2018-04-06 PROCEDURE — 77030018836 HC SOL IRR NACL ICUM -A: Performed by: ORTHOPAEDIC SURGERY

## 2018-04-06 PROCEDURE — 74011250636 HC RX REV CODE- 250/636

## 2018-04-06 PROCEDURE — 74011250636 HC RX REV CODE- 250/636: Performed by: PHYSICIAN ASSISTANT

## 2018-04-06 PROCEDURE — 77030020782 HC GWN BAIR PAWS FLX 3M -B: Performed by: ORTHOPAEDIC SURGERY

## 2018-04-06 PROCEDURE — 77030022036 HC BLD SHV TOMCAT STRY -B: Performed by: ORTHOPAEDIC SURGERY

## 2018-04-06 RX ORDER — ONDANSETRON 2 MG/ML
INJECTION INTRAMUSCULAR; INTRAVENOUS AS NEEDED
Status: DISCONTINUED | OUTPATIENT
Start: 2018-04-06 | End: 2018-04-06 | Stop reason: HOSPADM

## 2018-04-06 RX ORDER — ONDANSETRON 2 MG/ML
4 INJECTION INTRAMUSCULAR; INTRAVENOUS AS NEEDED
Status: DISCONTINUED | OUTPATIENT
Start: 2018-04-06 | End: 2018-04-07 | Stop reason: HOSPADM

## 2018-04-06 RX ORDER — BUPIVACAINE HYDROCHLORIDE 2.5 MG/ML
INJECTION, SOLUTION EPIDURAL; INFILTRATION; INTRACAUDAL AS NEEDED
Status: DISCONTINUED | OUTPATIENT
Start: 2018-04-06 | End: 2018-04-06 | Stop reason: HOSPADM

## 2018-04-06 RX ORDER — SODIUM CHLORIDE 0.9 % (FLUSH) 0.9 %
5-10 SYRINGE (ML) INJECTION AS NEEDED
Status: DISCONTINUED | OUTPATIENT
Start: 2018-04-06 | End: 2018-04-06 | Stop reason: HOSPADM

## 2018-04-06 RX ORDER — KETOROLAC TROMETHAMINE 30 MG/ML
INJECTION, SOLUTION INTRAMUSCULAR; INTRAVENOUS AS NEEDED
Status: DISCONTINUED | OUTPATIENT
Start: 2018-04-06 | End: 2018-04-06 | Stop reason: HOSPADM

## 2018-04-06 RX ORDER — LABETALOL HYDROCHLORIDE 5 MG/ML
INJECTION, SOLUTION INTRAVENOUS AS NEEDED
Status: DISCONTINUED | OUTPATIENT
Start: 2018-04-06 | End: 2018-04-06 | Stop reason: HOSPADM

## 2018-04-06 RX ORDER — FENTANYL CITRATE 50 UG/ML
INJECTION, SOLUTION INTRAMUSCULAR; INTRAVENOUS AS NEEDED
Status: DISCONTINUED | OUTPATIENT
Start: 2018-04-06 | End: 2018-04-06 | Stop reason: HOSPADM

## 2018-04-06 RX ORDER — SODIUM CHLORIDE, SODIUM LACTATE, POTASSIUM CHLORIDE, CALCIUM CHLORIDE 600; 310; 30; 20 MG/100ML; MG/100ML; MG/100ML; MG/100ML
75 INJECTION, SOLUTION INTRAVENOUS CONTINUOUS
Status: DISCONTINUED | OUTPATIENT
Start: 2018-04-06 | End: 2018-04-07 | Stop reason: HOSPADM

## 2018-04-06 RX ORDER — MORPHINE SULFATE 2 MG/ML
2 INJECTION, SOLUTION INTRAMUSCULAR; INTRAVENOUS
Status: DISCONTINUED | OUTPATIENT
Start: 2018-04-06 | End: 2018-04-07 | Stop reason: HOSPADM

## 2018-04-06 RX ORDER — MIDAZOLAM HYDROCHLORIDE 1 MG/ML
INJECTION, SOLUTION INTRAMUSCULAR; INTRAVENOUS AS NEEDED
Status: DISCONTINUED | OUTPATIENT
Start: 2018-04-06 | End: 2018-04-06 | Stop reason: HOSPADM

## 2018-04-06 RX ORDER — DEXAMETHASONE SODIUM PHOSPHATE 4 MG/ML
INJECTION, SOLUTION INTRA-ARTICULAR; INTRALESIONAL; INTRAMUSCULAR; INTRAVENOUS; SOFT TISSUE AS NEEDED
Status: DISCONTINUED | OUTPATIENT
Start: 2018-04-06 | End: 2018-04-06 | Stop reason: HOSPADM

## 2018-04-06 RX ORDER — CEFAZOLIN SODIUM 2 G/50ML
2 SOLUTION INTRAVENOUS ONCE
Status: COMPLETED | OUTPATIENT
Start: 2018-04-06 | End: 2018-04-06

## 2018-04-06 RX ORDER — SODIUM CHLORIDE 0.9 % (FLUSH) 0.9 %
5-10 SYRINGE (ML) INJECTION AS NEEDED
Status: DISCONTINUED | OUTPATIENT
Start: 2018-04-06 | End: 2018-04-07 | Stop reason: HOSPADM

## 2018-04-06 RX ORDER — FAMOTIDINE 20 MG/1
20 TABLET, FILM COATED ORAL ONCE
Status: COMPLETED | OUTPATIENT
Start: 2018-04-06 | End: 2018-04-06

## 2018-04-06 RX ORDER — SODIUM CHLORIDE, SODIUM LACTATE, POTASSIUM CHLORIDE, CALCIUM CHLORIDE 600; 310; 30; 20 MG/100ML; MG/100ML; MG/100ML; MG/100ML
75 INJECTION, SOLUTION INTRAVENOUS CONTINUOUS
Status: DISCONTINUED | OUTPATIENT
Start: 2018-04-06 | End: 2018-04-06 | Stop reason: HOSPADM

## 2018-04-06 RX ORDER — SODIUM CHLORIDE 0.9 % (FLUSH) 0.9 %
5-10 SYRINGE (ML) INJECTION EVERY 8 HOURS
Status: DISCONTINUED | OUTPATIENT
Start: 2018-04-06 | End: 2018-04-06 | Stop reason: HOSPADM

## 2018-04-06 RX ADMIN — FENTANYL CITRATE 25 MCG: 50 INJECTION, SOLUTION INTRAMUSCULAR; INTRAVENOUS at 16:27

## 2018-04-06 RX ADMIN — MIDAZOLAM HYDROCHLORIDE 2 MG: 1 INJECTION, SOLUTION INTRAMUSCULAR; INTRAVENOUS at 16:12

## 2018-04-06 RX ADMIN — LABETALOL HYDROCHLORIDE 5 MG: 5 INJECTION, SOLUTION INTRAVENOUS at 16:27

## 2018-04-06 RX ADMIN — Medication 2 MG: at 17:22

## 2018-04-06 RX ADMIN — FENTANYL CITRATE 25 MCG: 50 INJECTION, SOLUTION INTRAMUSCULAR; INTRAVENOUS at 16:22

## 2018-04-06 RX ADMIN — FENTANYL CITRATE 25 MCG: 50 INJECTION, SOLUTION INTRAMUSCULAR; INTRAVENOUS at 16:37

## 2018-04-06 RX ADMIN — DEXAMETHASONE SODIUM PHOSPHATE 4 MG: 4 INJECTION, SOLUTION INTRA-ARTICULAR; INTRALESIONAL; INTRAMUSCULAR; INTRAVENOUS; SOFT TISSUE at 16:25

## 2018-04-06 RX ADMIN — KETOROLAC TROMETHAMINE 30 MG: 30 INJECTION, SOLUTION INTRAMUSCULAR; INTRAVENOUS at 17:00

## 2018-04-06 RX ADMIN — Medication 10 ML: at 14:10

## 2018-04-06 RX ADMIN — FENTANYL CITRATE 25 MCG: 50 INJECTION, SOLUTION INTRAMUSCULAR; INTRAVENOUS at 16:31

## 2018-04-06 RX ADMIN — CEFAZOLIN SODIUM 2 G: 2 SOLUTION INTRAVENOUS at 16:12

## 2018-04-06 RX ADMIN — SODIUM CHLORIDE, SODIUM LACTATE, POTASSIUM CHLORIDE, AND CALCIUM CHLORIDE 75 ML/HR: 600; 310; 30; 20 INJECTION, SOLUTION INTRAVENOUS at 14:09

## 2018-04-06 RX ADMIN — FAMOTIDINE 20 MG: 20 TABLET, FILM COATED ORAL at 14:09

## 2018-04-06 RX ADMIN — ONDANSETRON 4 MG: 2 INJECTION INTRAMUSCULAR; INTRAVENOUS at 16:50

## 2018-04-06 NOTE — ANESTHESIA POSTPROCEDURE EVALUATION
Post-Anesthesia Evaluation and Assessment    Patient: Arthur Younger MRN: [id number]  SSN: [id number]    YOB: 1970  Age: 41 y.o. Sex: female     VS from flow sheet    Cardiovascular Function/Vital Signs  Visit Vitals    /84 (BP 1 Location: Right arm, BP Patient Position: At rest)    Pulse 62    Temp 36.4 °C (97.6 °F)    Resp 15    Ht 5' 2\" (1.575 m)    Wt 109 kg (240 lb 4 oz)    SpO2 100%    BMI 43.94 kg/m2       Patient is status post general anesthesia for Procedure(s):  RIGHT KNEE ARTHROSCOPIC PARTIAL MEDIAL MENISECTOMY. Nausea/Vomiting: None    Postoperative hydration reviewed and adequate. Pain:  Pain Scale 1: Numeric (0 - 10) (04/06/18 1737)  Pain Intensity 1: 2 (04/06/18 1737)   Managed    Neurological Status:   Neuro (WDL): Within Defined Limits (04/06/18 1737)   At baseline    Mental Status and Level of Consciousness: Arousable    Pulmonary Status:   O2 Device: Room air (04/06/18 1737)   Adequate oxygenation and airway patent    Complications related to anesthesia: None    Post-anesthesia assessment completed.  No concerns    Signed By: Piero Phelps MD     April 6, 2018

## 2018-04-06 NOTE — IP AVS SNAPSHOT
[address] Patient: Shantal Hollins MRN: [id number] ARNOLD:[birth year] About your hospitalization You were admitted on:  April 6, 2018 You last received care in the:  [facility] PHASE 2 RECOVERY You were discharged on:  April 6, 2018 Why you were hospitalized Your primary diagnosis was:  Not on File Follow-up Information Follow up With Details Comments Contact Info Ruth Anderson MD   [facility] [address] [address] 
[phone] Neha Camacho MD Follow up in 1 week(s)  [facility] [address] [facility] [address] 
[phone] Your Scheduled Appointments Friday April 13, 2018  1:15 PM EDT  
POST OP with Fauzia Agarwal [facility] [place] [address], [address] [facility] [address]  
[phone] Discharge Orders None A check dante indicates which time of day the medication should be taken. My Medications CONTINUE taking these medications Instructions Each Dose to Equal  
 Morning Noon Evening Bedtime  
 ammonium lactate 12 % topical cream  
Commonly known as:  LAC-HYDRIN Your last dose was: Your next dose is:    
   
   
 Apply  to affected area two (2) times a day. rub in to affected area well  
     
   
   
   
  
 diclofenac sodium 20 mg/gram /actuation(2 %) Sopm  
Commonly known as:  PENNSAID Your last dose was: Your next dose is:    
   
   
 2 Pump(s) by Apply Externally route two (2) times a day. 2 Pump(s)  
    
   
   
   
  
 fluticasone 50 mcg/actuation nasal spray Commonly known as:  Ruteliud Para Your last dose was: Your next dose is: 2 Sprays by Both Nostrils route daily. 2 [place] HYDROcodone-acetaminophen  mg tablet Commonly known as:  Rhenda Dunn Your last dose was: Your next dose is: Take 1-2 Tabs by mouth every four (4) hours as needed for Pain. Max Daily Amount: 12 Tabs. Do not take until after surgery 1-2 Tab  
    
   
   
   
  
 ibuprofen 800 mg tablet Commonly known as:  MOTRIN Your last dose was: Your next dose is: Take 1 Tab by mouth every eight (8) hours as needed for Pain. 800 mg  
    
   
   
   
  
 nystatin topical cream  
Commonly known as:  MYCOSTATIN Your last dose was: Your next dose is:    
   
   
 Apply  to affected area two (2) times a day. triamcinolone acetonide 0.1 % topical cream  
Commonly known as:  KENALOG Your last dose was: Your next dose is:    
   
   
 Apply  to affected area two (2) times daily as needed for Skin Irritation. use thin layer Opioid Education Prescription Opioids: What You Need to Know: 
 
Prescription opioids can be used to help relieve moderate-to-severe pain and are often prescribed following a surgery or injury, or for certain health conditions. These medications can be an important part of treatment but also come with serious risks. Opioids are strong pain medicines. Examples include hydrocodone, oxycodone, fentanyl, and morphine. Heroin is an example of an illegal opioid. It is important to work with your health care provider to make sure you are getting the safest, most effective care. WHAT ARE THE RISKS AND SIDE EFFECTS OF OPIOID USE? Prescription opioids carry serious risks of addiction and overdose, especially with prolonged use. An opioid overdose, often marked by slow breathing, can cause sudden death. The use of prescription opioids can have a number of side effects as well, even when taken as directed. · Tolerance-meaning you might need to take more of a medication for the same pain relief · Physical dependence-meaning you have symptoms of withdrawal when the medication is stopped. Withdrawal symptoms can include nausea, sweating, chills, diarrhea, stomach cramps, and muscle aches. Withdrawal can last up to several weeks, depending on which drug you took and how long you took it. · Increased sensitivity to pain · Constipation · Nausea, vomiting, and dry mouth · Sleepiness and dizziness · Confusion · Depression · Low levels of testosterone that can result in lower sex drive, energy, and strength · Itching and sweating RISKS ARE GREATER WITH:      
· History of drug misuse, substance use disorder, or overdose · Mental health conditions (such as depression or anxiety) · Sleep apnea · Older age (72 years or older) · Pregnancy Avoid alcohol while taking prescription opioids. Also, unless specifically advised by your health care provider, medications to avoid include: · Benzodiazepines (such as Xanax or Valium) · Muscle relaxants (such as Soma or Flexeril) · Hypnotics (such as Ambien or Lunesta) · Other prescription opioids KNOW YOUR OPTIONS Talk to your health care provider about ways to manage your pain that don't involve prescription opioids. Some of these options may actually work better and have fewer risks and side effects. Options may include: 
· Pain relievers such as acetaminophen, ibuprofen, and naproxen · Some medications that are also used for depression or seizures · Physical therapy and exercise · Counseling to help patients learn how to cope better with triggers of pain and stress. · Application of heat or cold compress · Massage therapy · Relaxation techniques Be Informed Make sure you know the name of your medication, how much and how often to take it, and its potential risks & side effects.  
 
IF YOU ARE PRESCRIBED OPIOIDS FOR PAIN: 
 · Never take opioids in greater amounts or more often than prescribed. Remember the goal is not to be pain-free but to manage your pain at a tolerable level. · Follow up with your primary care provider to: · Work together to create a plan on how to manage your pain. · Talk about ways to help manage your pain that don't involve prescription opioids. · Talk about any and all concerns and side effects. · Help prevent misuse and abuse. · Never sell or share prescription opioids · Help prevent misuse and abuse. · Store prescription opioids in a secure place and out of reach of others (this may include visitors, children, friends, and family). · Safely dispose of unused/unwanted prescription opioids: Find your community drug take-back program or your pharmacy mail-back program, or flush them down the toilet, following guidance from the Food and Drug Administration (www.fda.gov/Drugs/ResourcesForYou). · Visit www.cdc.gov/drugoverdose to learn about the risks of opioid abuse and overdose. · If you believe you may be struggling with addiction, tell your health care provider and ask for guidance or call [address] [place], [place] [address] at 1-760-248-XIIO. Discharge Instructions Dr. Alyce Godwin Postoperative Information You will be given a prescription for pain medication. It may be taken every 4-6 hours as needed for the first 4-5 days. You may elevate your leg and place an ice pack on top of the dressing in 
order to prevent swelling. A soft bandage was placed on your knee to soak up blood and fluid. You may take the bandage off the day after surgery, carefully cleaning the incision sites with peroxide. Band-Aids should be used for the first 4-5 days over each incision. There are 2 small incisions in your knee that may be sore and develop bruising over the next several days.  This should resolve over the next few weeks. No special care will be needed. You should expect swelling in the area. You may elevate your leg and apply an icepack on top of the dressing to help minimize the swelling. Deep massage to the lower leg may also be utilized. It is safe to take a shower two days after surgery. You may begin bearing weight on your leg with the use of crutches for the first 4-5 days. Apply as much weight as tolerated so that at the end of 4-5 days, you can walk without crutches. Avoid prolonged walking or standing during the first few weeks after surgery. During your first week please work on gentle range of motion of your knee. Even though your incisions are small, there has been an operation inside and around the knee joint. Complete healing may take several months. If you have a high temperature, unexpected pain, redness or swelling, or any drainage around your knee area, please call my office immediately. Please make an appointment to return to my office in one week. Dr. Colindres [facility] office number [phone] Knee Arthroscopy: What to Expect at [facility] Your Recovery Arthroscopy is a way to find problems and do surgery inside a joint without making a large cut (incision). Your doctor put a lighted tube with a tiny camera-called an arthroscope, or scope-and surgical tools through small incisions in your knee. You will feel tired for several days. Your knee will be swollen, and you may notice that your skin is a different color near the cuts (incisions). The swelling is normal and will start to go away in a few days. Keeping your leg higher than your heart will help with swelling and pain. You will probably need about 6 weeks to recover. If your doctor repaired damaged tissue, recovery will take longer.  You may have to limit your activity until your knee strength and movement return to normal. You may also be in a physical rehabilitation (rehab) program. 
 You may be able to return to a desk job or your normal routine in a few days. But if you do physical labor, it may be as long as 2 months before you can return to work. This care sheet gives you a general idea about how long it will take for you to recover. But each person recovers at a different pace. Follow the steps below to get better as quickly as possible. How can you care for yourself at home? Activity ? · Rest when you feel tired. Getting enough sleep will help you recover. Use pillows to raise your ankle and leg above the level of your heart. ? · Try to walk each day, after your doctor has said you can. Start by walking a little more than you did the day before. Bit by bit, increase the amount you walk. Walking boosts blood flow and helps prevent pneumonia and constipation. ? · You may have a brace or crutches or both. ? · Your doctor will tell you how often and how much you can move your leg and knee. ? · If you have a desk job, you may be able to return to work a few days after the surgery. If you lift things or stand or walk a lot at work, it may be as long as 2 months before you can return. ? · You can take a shower 48 to 72 hours after surgery and clean the incisions with regular soap and water. Do not take a bath or soak your knee until your doctor says it is okay. ? · Ask your doctor when you can drive again. ? · If you had a repair of torn tissue, follow your doctor's instructions for lifting things or moving your knee. Diet ? · You can eat your normal diet. If your stomach is upset, try bland, low-fat foods like plain rice, broiled chicken, toast, and yogurt. ? · Drink plenty of fluids, unless your doctor tells you not to. ? · You may notice that your bowel movements are not regular right after your surgery. This is common. Try to avoid constipation and straining with bowel movements. You may want to take a fiber supplement every day.  If you have not had a bowel movement after a couple of days, ask your doctor about taking a mild laxative. Medicines ? · Your doctor will tell you if and when you can restart your medicines. He or she will also give you instructions about taking any new medicines. ? · If you take blood thinners, such as warfarin (Coumadin), clopidogrel (Plavix), or aspirin, be sure to talk to your doctor. He or she will tell you if and when to start taking those medicines again. Make sure that you understand exactly what your doctor wants you to do. ? · Be safe with medicines. Take pain medicines exactly as directed. ¨ If the doctor gave you a prescription medicine for pain, take it as prescribed. ¨ If you are not taking a prescription pain medicine, ask your doctor if you can take an over-the-counter medicine. ? · If you think your pain medicine is making you sick to your stomach: 
¨ Take your medicine after meals (unless your doctor has told you not to). ¨ Ask your doctor for a different pain medicine. ? · If your doctor prescribed antibiotics, take them as directed. Do not stop taking them just because you feel better. You need to take the full course of antibiotics. Incision care ? · If you have a dressing over your cuts (incisions), keep it clean and dry. You may remove it 48 to 72 hours after the surgery. ? · If your incisions are open to the air, keep the area clean and dry. ? · If you have strips of tape on the incisions, leave the tape on for a week or until it falls off. Exercise ? · Move your toes and ankle as much as your bandages will allow. ? · Bend and straighten your knee slowly several times during the day. ? · Depending on why you had the surgery, you may have to do ankle and leg exercises. Your doctor or physical therapist will give you exercises as part of a rehabilitation program.  
? · Stop any activity that causes sharp pain.  Talk to your doctor or physical therapist about what sports or other exercise you can do. Ice and elevation ? · To reduce swelling and pain, put ice or a cold pack on your knee for 10 to 20 minutes at a time. Do this every 1 to 2 hours. Put a thin cloth between the ice and your skin. Follow-up care is a key part of your treatment and safety. Be sure to make and go to all appointments, and call your doctor if you are having problems. It's also a good idea to know your test results and keep a list of the medicines you take. When should you call for help? Call 911 anytime you think you may need emergency care. For example, call if: 
? · You passed out (lost consciousness). ? · You have severe trouble breathing. ? · You have sudden chest pain and shortness of breath, or you cough up blood. ?Call your doctor now or seek immediate medical care if: 
? · Your foot or toes are numb or tingling. ? · Your foot is cool or pale, or it changes color. ? · You have signs of a blood clot, such as: 
¨ Pain in your calf, back of the knee, thigh, or groin. ¨ Redness and swelling in your leg or groin. ? · You are sick to your stomach or cannot keep fluids down. ? · You have pain that does not get better after you take pain medicine. ? · You have loose stitches, or your incision comes open. ? · Bright red blood has soaked through the bandage over your incision. ? · You have signs of infection, such as: 
¨ Increased pain, swelling, warmth, or redness. ¨ Red streaks leading from the incisions. ¨ Pus draining from the incisions. ¨ A fever. ? Watch closely for any changes in your health, and be sure to contact your doctor if: 
? · You do not have a bowel movement after taking a laxative. Where can you learn more? Go to http://elana-bela.info/. Enter A029 in the search box to learn more about \"Knee Arthroscopy: What to Expect at Home. \" Current as of: March 21, 2017 Content Version: 11.4 © 0590-2034 Healthwise, Incorporated. Care instructions adapted under license by [employer] (which disclaims liability or warranty for this information). If you have questions about a medical condition or this instruction, always ask your healthcare professional. [address] any warranty or liability for your use of this information. DISCHARGE SUMMARY from Nurse PATIENT INSTRUCTIONS: 
 
 
F-face looks uneven A-arms unable to move or move unevenly S-speech slurred or non-existent T-time-call 911 as soon as signs and symptoms begin-DO NOT go Back to bed or wait to see if you get better-TIME IS BRAIN. Warning Signs of HEART ATTACK Call 911 if you have these symptoms: 
? Chest discomfort. Most heart attacks involve discomfort in the center of the chest that lasts more than a few minutes, or that goes away and comes back. It can feel like uncomfortable pressure, squeezing, fullness, or pain. ? Discomfort in other areas of the upper body. Symptoms can include pain or discomfort in one or both arms, the back, neck, jaw, or stomach. ? Shortness of breath with or without chest discomfort. ? Other signs may include breaking out in a cold sweat, nausea, or lightheadedness. Don't wait more than five minutes to call [address]! Fast action can save your life. Calling 911 is almost always the fastest way to get lifesaving treatment. Emergency Medical Services staff can begin treatment when they arrive  up to an hour sooner than if someone gets to the hospital by car. The discharge information has been reviewed with the patient and spouse. The patient and spouse verbalized understanding. Discharge medications reviewed with the patient and spouse and appropriate educational materials and side effects teaching were provided. ___________________________________________________________________________________________________________________________________ Introducing [facility]! Dear Charbel Romero: 
Thank you for requesting a [facility] account. Our records indicate that you already have an active [facility] [employer] account. You can access your account anytime at [email or web address]://[employer]CHA/[employer] Did you know that you can access your hospital and ER discharge instructions at any time in [facility] [employer]? You can also review all of your test results from your hospital stay or ER visit. Additional Information If you have questions, please visit the Frequently Asked Questions section of the [facility] [employer] website at https://[employer]. Remember, [employer] [facility] [employer] is NOT to be used for urgent needs. For medical emergencies, dial 911. Now available from your iPhone and Android! Introducing [facility] As a [facility] patient, I wanted to make you aware of our electronic visit tool called [employer]. [facility] 24/7 allows you to connect within minutes with a medical provider 24 hours a day, seven days a week via a mobile device or tablet or logging into a secure website from your computer. You can access [employer] [facility] from anywhere in the [place]. A virtual visit might be right for you when you have a simple condition and feel like you just dont want to get out of bed, or cant get away from work for an appointment, when your regular [facility] provider is not available (evenings, weekends or holidays), or when youre out of town and need minor care. Electronic visits cost only $49 and if the [facility] 24/7 provider determines a prescription is needed to treat your condition, one can be electronically transmitted to a nearby pharmacy*. Please take a moment to enroll today if you have not already done so. The enrollment process is free and takes just a few minutes. To enroll, please download the [employer] 24/7 kiko to your tablet or phone, or visit [email or web address] to enroll on your computer. And, as an [address] [place] [address] patient with a blogfoster account, the results of your visits will be scanned into your electronic medical record and your primary care provider will be able to view the scanned results. We urge you to continue to see your regular [employer] provider for your ongoing medical care. And while your primary care provider may not be the one available when you seek a [employer] virtual visit, the peace of mind you get from getting a real diagnosis real time can be priceless. For more information on [employer], view our Frequently Asked Questions (FAQs) at [email or web address]. Sincerely, 
 
Alireza Camargo MD 
Chief Medical Officer [employer] *:  certain medications cannot be prescribed via [employer] Providers Seen During Your Hospitalization Provider Specialty Primary office phone Ana Paula Oswald MD Orthopedic Surgery [phone] Your Primary Care Physician (PCP) Primary Care Physician Office Phone Office Fax William Bhatia [phone] You are allergic to the following Allergen Reactions Codeine Nausea and Vomiting Patient states she gets jittery, has upset stomach Recent Documentation Height Weight BMI OB Status Smoking Status 1.575 m 109 kg 43.94 kg/m2 Having regular periods Never Smoker Emergency Contacts Name Discharge Info Relation Home Work Mobile DaijaElver DISCHARGE CAREGIVER [3] Spouse [3] [phone] Patient Belongings The following personal items are in your possession at time of discharge: Dental Appliances: None  Visual Aid: Glasses (removed)      Home Medications: None   Jewelry: None  Clothing: Pants, Shirt, Undergarments, Footwear, Jacket/Coat, Socks    Other Valuables: None Please provide this summary of care documentation to your next provider. Signatures-by signing, you are acknowledging that this After Visit Summary has been reviewed with you and you have received a copy. Patient Signature:  ____________________________________________________________ Date:  ____________________________________________________________  
  
[facility] Signature:  ____________________________________________________________ Date:  ____________________________________________________________

## 2018-04-06 NOTE — BRIEF OP NOTE
BRIEF OPERATIVE NOTE    Date of Procedure: 4/6/2018   Preoperative Diagnosis: Acute medial meniscal tear, right, initial encounter [S83.241A]  Glenohumeral arthritis, right [M19.011]  Postoperative Diagnosis: Acute medial meniscal tear, right, initial encounter [S83.241A]  Glenohumeral arthritis, right [M19.011]    Procedure(s):  RIGHT KNEE ARTHROSCOPIC PARTIAL MEDIAL MENISECTOMY  Surgeon(s) and Role:     * Rhonda Ignacio MD - Primary         Assistant Staff: None      Surgical Staff:  Circ-1: Eloisa Tatum RN  Scrub Tech-1: Maikol Emanuel  Surg Asst-1: Tasia Michael  Event Time In   Incision Start 1634   Incision Close      Anesthesia: General   Estimated Blood Loss: none  Specimens: * No specimens in log *   Findings: as above   Complications: none  Implants: * No implants in log *

## 2018-04-06 NOTE — PROGRESS NOTES
Date of Surgery Update:  Bry Temple was seen and examined. History and physical has been reviewed. The patient has been examined.  There have been no significant clinical changes since the completion of the originally dated History and Physical.    Signed By: Justin Concepcion MD     April 6, 2018 3:36 PM

## 2018-04-06 NOTE — ANESTHESIA PREPROCEDURE EVALUATION
Anesthetic History   No history of anesthetic complications            Review of Systems / Medical History  Patient summary reviewed and pertinent labs reviewed    Pulmonary                   Neuro/Psych   Within defined limits           Cardiovascular                  Exercise tolerance: <4 METS     GI/Hepatic/Renal  Within defined limits              Endo/Other        Morbid obesity     Other Findings   Comments: Documentation of current medication  Current medications obtained, documented and obtained? YES      Risk Factors for Postoperative nausea/vomiting:       History of postoperative nausea/vomiting? NO       Female? YES       Motion sickness? NO       Intended opioid administration for postoperative analgesia? YES      Smoking Abstinence:  Current Smoker? NO  Elective Surgery? YES  Seen preoperatively by anesthesiologist or proxy prior to day of surgery? YES  Pt abstained from smoking 24 hours prior to anesthesia?  N/A    Preventive care/screening for High Blood Pressure:  Aged 18 years and older: YES  Screened for high blood pressure: YES  Patients with high blood pressure referred to primary care provider   for BP management: YES                 Physical Exam    Airway  Mallampati: II  TM Distance: 4 - 6 cm  Neck ROM: normal range of motion   Mouth opening: Normal     Cardiovascular  Regular rate and rhythm,  S1 and S2 normal,  no murmur, click, rub, or gallop             Dental    Dentition: Caps/crowns and Poor dentition     Pulmonary  Breath sounds clear to auscultation               Abdominal  GI exam deferred       Other Findings            Anesthetic Plan    ASA: 2  Anesthesia type: general          Induction: Intravenous  Anesthetic plan and risks discussed with: Patient and Family

## 2018-04-06 NOTE — DISCHARGE INSTRUCTIONS
Dr. Yaquelin Oconnor Knee Arthroscopy  Postoperative Information    You will be given a prescription for pain medication. It may be taken every 4-6 hours as needed for the first 4-5 days. You may elevate your leg and place an ice pack on top of the dressing in  order to prevent swelling. A soft bandage was placed on your knee to soak up blood and fluid. You may take the bandage off the day after surgery, carefully cleaning the incision sites with peroxide. Band-Aids should be used for the first 4-5 days over each incision. There are 2 small incisions in your knee that may be sore and develop bruising over the next several days. This should resolve over the next few weeks. No special care will be needed. You should expect swelling in the area. You may elevate your leg and apply an icepack on top of the dressing to help minimize the swelling. Deep massage to the lower leg may also be utilized. It is safe to take a shower two days after surgery. You may begin bearing weight on your leg with the use of crutches for the first 4-5 days. Apply as much weight as tolerated so that at the end of 4-5 days, you can walk without crutches. Avoid prolonged walking or standing during the first few weeks after surgery. During your first week please work on gentle range of motion of your knee. Even though your incisions are small, there has been an operation inside and around the knee joint. Complete healing may take several months. If you have a high temperature, unexpected pain, redness or swelling, or any drainage around your knee area, please call my office immediately. Please make an appointment to return to my office in one week. Dr. Yaquelin Oconnor office number [phone]         Knee Arthroscopy: What to Expect at Home  Your Recovery    Arthroscopy is a way to find problems and do surgery inside a joint without making a large cut (incision).  Your doctor put a lighted tube with a tiny camera-called an arthroscope, or scope-and surgical tools through small incisions in your knee. You will feel tired for several days. Your knee will be swollen, and you may notice that your skin is a different color near the cuts (incisions). The swelling is normal and will start to go away in a few days. Keeping your leg higher than your heart will help with swelling and pain. You will probably need about 6 weeks to recover. If your doctor repaired damaged tissue, recovery will take longer. You may have to limit your activity until your knee strength and movement return to normal. You may also be in a physical rehabilitation (rehab) program.  You may be able to return to a desk job or your normal routine in a few days. But if you do physical labor, it may be as long as 2 months before you can return to work. This care sheet gives you a general idea about how long it will take for you to recover. But each person recovers at a different pace. Follow the steps below to get better as quickly as possible. How can you care for yourself at home? Activity  ? · Rest when you feel tired. Getting enough sleep will help you recover. Use pillows to raise your ankle and leg above the level of your heart. ? · Try to walk each day, after your doctor has said you can. Start by walking a little more than you did the day before. Bit by bit, increase the amount you walk. Walking boosts blood flow and helps prevent pneumonia and constipation. ? · You may have a brace or crutches or both. ? · Your doctor will tell you how often and how much you can move your leg and knee. ? · If you have a desk job, you may be able to return to work a few days after the surgery. If you lift things or stand or walk a lot at work, it may be as long as 2 months before you can return. ? · You can take a shower 48 to 72 hours after surgery and clean the incisions with regular soap and water.  Do not take a bath or soak your knee until your doctor says it is okay.   ? · Ask your doctor when you can drive again. ? · If you had a repair of torn tissue, follow your doctor's instructions for lifting things or moving your knee. Diet  ? · You can eat your normal diet. If your stomach is upset, try bland, low-fat foods like plain rice, broiled chicken, toast, and yogurt. ? · Drink plenty of fluids, unless your doctor tells you not to. ? · You may notice that your bowel movements are not regular right after your surgery. This is common. Try to avoid constipation and straining with bowel movements. You may want to take a fiber supplement every day. If you have not had a bowel movement after a couple of days, ask your doctor about taking a mild laxative. Medicines  ? · Your doctor will tell you if and when you can restart your medicines. He or she will also give you instructions about taking any new medicines. ? · If you take blood thinners, such as warfarin (Coumadin), clopidogrel (Plavix), or aspirin, be sure to talk to your doctor. He or she will tell you if and when to start taking those medicines again. Make sure that you understand exactly what your doctor wants you to do. ? · Be safe with medicines. Take pain medicines exactly as directed. ¨ If the doctor gave you a prescription medicine for pain, take it as prescribed. ¨ If you are not taking a prescription pain medicine, ask your doctor if you can take an over-the-counter medicine. ? · If you think your pain medicine is making you sick to your stomach:  ¨ Take your medicine after meals (unless your doctor has told you not to). ¨ Ask your doctor for a different pain medicine. ? · If your doctor prescribed antibiotics, take them as directed. Do not stop taking them just because you feel better. You need to take the full course of antibiotics. Incision care  ? · If you have a dressing over your cuts (incisions), keep it clean and dry. You may remove it 48 to 72 hours after the surgery.    ? · If your incisions are open to the air, keep the area clean and dry. ? · If you have strips of tape on the incisions, leave the tape on for a week or until it falls off. Exercise  ? · Move your toes and ankle as much as your bandages will allow. ? · Bend and straighten your knee slowly several times during the day. ? · Depending on why you had the surgery, you may have to do ankle and leg exercises. Your doctor or physical therapist will give you exercises as part of a rehabilitation program.   ? · Stop any activity that causes sharp pain. Talk to your doctor or physical therapist about what sports or other exercise you can do. Ice and elevation  ? · To reduce swelling and pain, put ice or a cold pack on your knee for 10 to 20 minutes at a time. Do this every 1 to 2 hours. Put a thin cloth between the ice and your skin. Follow-up care is a key part of your treatment and safety. Be sure to make and go to all appointments, and call your doctor if you are having problems. It's also a good idea to know your test results and keep a list of the medicines you take. When should you call for help? Call 911 anytime you think you may need emergency care. For example, call if:  ? · You passed out (lost consciousness). ? · You have severe trouble breathing. ? · You have sudden chest pain and shortness of breath, or you cough up blood. ?Call your doctor now or seek immediate medical care if:  ? · Your foot or toes are numb or tingling. ? · Your foot is cool or pale, or it changes color. ? · You have signs of a blood clot, such as:  ¨ Pain in your calf, back of the knee, thigh, or groin. ¨ Redness and swelling in your leg or groin. ? · You are sick to your stomach or cannot keep fluids down. ? · You have pain that does not get better after you take pain medicine. ? · You have loose stitches, or your incision comes open. ? · Bright red blood has soaked through the bandage over your incision.    ? · You have signs of infection, such as:  ¨ Increased pain, swelling, warmth, or redness. ¨ Red streaks leading from the incisions. ¨ Pus draining from the incisions. ¨ A fever. ? Watch closely for any changes in your health, and be sure to contact your doctor if:  ? · You do not have a bowel movement after taking a laxative. Where can you learn more? Go to http://elana-bela.info/. Enter D779 in the search box to learn more about \"Knee Arthroscopy: What to Expect at Home. \"  Current as of: March 21, 2017  Content Version: 11.4  © 7098-5947 [employer]. Care instructions adapted under license by [employer] (which disclaims liability or warranty for this information). If you have questions about a medical condition or this instruction, always ask your healthcare professional. [address] any warranty or liability for your use of this information. DISCHARGE SUMMARY from Nurse    PATIENT INSTRUCTIONS:    After general anesthesia or intravenous sedation, for 24 hours or while taking prescription Narcotics:  · Limit your activities  · Do not drive and operate hazardous machinery  · Do not make important personal or business decisions  · Do  not drink alcoholic beverages  · If you have not urinated within 8 hours after discharge, please contact your surgeon on call. Report the following to your surgeon:  · Excessive pain, swelling, redness or odor of or around the surgical area  · Temperature over 100.5  · Nausea and vomiting lasting longer than 4 hours or if unable to take medications  · Any signs of decreased circulation or nerve impairment to extremity: change in color, persistent  numbness, tingling, coldness or increase pain  · Any questions    What to do at Home:  Recommended activity: Activity as tolerated and no driving for today, or while taking narcotic pain medication.     *  Please give a list of your current medications to your Primary Care Provider. *  Please update this list whenever your medications are discontinued, doses are      changed, or new medications (including over-the-counter products) are added. *  Please carry medication information at all times in case of emergency situations. These are general instructions for a healthy lifestyle:    No smoking/ No tobacco products/ Avoid exposure to second hand smoke  Surgeon General's Warning:  Quitting smoking now greatly reduces serious risk to your health. Obesity, smoking, and sedentary lifestyle greatly increases your risk for illness    A healthy diet, regular physical exercise & weight monitoring are important for maintaining a healthy lifestyle    You may be retaining fluid if you have a history of heart failure or if you experience any of the following symptoms:  Weight gain of 3 pounds or more overnight or 5 pounds in a week, increased swelling in our hands or feet or shortness of breath while lying flat in bed. Please call your doctor as soon as you notice any of these symptoms; do not wait until your next office visit. Recognize signs and symptoms of STROKE:    F-face looks uneven    A-arms unable to move or move unevenly    S-speech slurred or non-existent    T-time-call 911 as soon as signs and symptoms begin-DO NOT go       Back to bed or wait to see if you get better-TIME IS BRAIN. Warning Signs of HEART ATTACK     Call 911 if you have these symptoms:   Chest discomfort. Most heart attacks involve discomfort in the center of the chest that lasts more than a few minutes, or that goes away and comes back. It can feel like uncomfortable pressure, squeezing, fullness, or pain.  Discomfort in other areas of the upper body. Symptoms can include pain or discomfort in one or both arms, the back, neck, jaw, or stomach.  Shortness of breath with or without chest discomfort.  Other signs may include breaking out in a cold sweat, nausea, or lightheadedness.   Don't wait more than five minutes to call 911 - MINUTES MATTER! Fast action can save your life. Calling 911 is almost always the fastest way to get lifesaving treatment. Emergency Medical Services staff can begin treatment when they arrive -- up to an hour sooner than if someone gets to the hospital by car. The discharge information has been reviewed with the patient and spouse. The patient and spouse verbalized understanding. Discharge medications reviewed with the patient and spouse and appropriate educational materials and side effects teaching were provided.   ___________________________________________________________________________________________________________________________________

## 2018-04-10 NOTE — OP NOTES
[facility]  OPERATIVE REPORT    Cruzito Castaneda  MR#: [id number]  : 1970  ACCOUNT #: [de-identified]   DATE OF SERVICE: 2018    PREOPERATIVE DIAGNOSIS:  Medial meniscus tear, right knee. POSTOPERATIVE DIAGNOSIS:  Medial meniscus tear, right knee. PROCEDURE PERFORMED:  Arthroscopy, partial medial meniscectomy, right knee. SURGEON:  Boom Gibson MD.     ASSISTANT:  Kevin Narvaez. ESTIMATED BLOOD LOSS:  Minimal.    COMPLICATIONS:  None. SPECIMENS REMOVED:  None. 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 IMPLANTS:  None. ANESTHESIA:  General.    BRIEF HISTORY:  The patient has had a significant amount of problems of the right knee. He has failed conservative treatment and was consented for surgery after having discussed at length the possible risks and complications of surgery including infection, bleeding, recurrence, pain, along with other possible problems. PROCEDURE IN DETAIL:  The patient was taken to the operating room and placed under general endotracheal anesthesia by the anesthesia staff, with leg in the arthroscopy bray. The right leg was prepped with ChloraPrep solution, draped as a free sterile field. Through a lateral portal, arthroscopy portal medial portal as a work portal.  Portals made with 11 blade followed by blunt trocar sites. Once the arthroscope was in place, the knee was systematically evaluated  suprapatellar pouch, patellofemoral joint. No changes were noted. Medial compartment a horizontal cleavage tear tear in the posterior third of the medial meniscus was debrided using straight straight basket forceps and 3.5 shaver, leaving a stable rim. A 2 cm diameter area of grade III changes were noted and were debrided to a stable base using a shaver. Lateral compartment, no evidence of tears. The articular surface was intact.   The anterior cruciate ligament was intact, medial and lateral gutters were clean. Fluid was suctioned and the knee was injected with a mixture of Marcaine. Portals were closed with 4-0 Monocryl. Sterile dressings were applied. Patient tolerated the procedure well and was taken to the recovery room without problems.       MD ANUEL Goss / KAMALJIT  D: 04/10/2018 13:32     T: 04/10/2018 13:58  JOB #: [id number]

## 2018-04-13 ENCOUNTER — OFFICE VISIT (OUTPATIENT)
Dept: ORTHOPEDIC SURGERY | Age: 48
End: 2018-04-13

## 2018-04-13 VITALS
WEIGHT: 249 LBS | BODY MASS INDEX: 45.82 KG/M2 | SYSTOLIC BLOOD PRESSURE: 130 MMHG | HEART RATE: 72 BPM | HEIGHT: 62 IN | DIASTOLIC BLOOD PRESSURE: 89 MMHG | OXYGEN SATURATION: 100 % | TEMPERATURE: 98 F

## 2018-04-13 DIAGNOSIS — M17.11 PRIMARY OSTEOARTHRITIS OF RIGHT KNEE: ICD-10-CM

## 2018-04-13 DIAGNOSIS — S83.241A ACUTE MEDIAL MENISCUS TEAR, RIGHT, INITIAL ENCOUNTER: ICD-10-CM

## 2018-04-13 DIAGNOSIS — Z98.890 H/O ARTHROSCOPY OF RIGHT KNEE: Primary | ICD-10-CM

## 2018-04-13 NOTE — PROGRESS NOTES
Patient: Taylor Dugan  YOB: 1970       HISTORY:  The patient presents for reevaluation of her right knee status post arthroscopic partial medial menisectomy with grade III-IV chondromalacia on 4/6/18. Patient is improved, states pain is a 0 out of 10.  she has not gone to physical therapy. Patient denies any fever, chills, chest pain, shortness of breath or calf pain. There are no new illness or injuries to report since last seen in the office. PHYSICAL EXAMINATION:    Visit Vitals    /89    Pulse 72    Temp 98 °F (36.7 °C) (Oral)    Ht 5' 2\" (1.575 m)    Wt 249 lb (112.9 kg)    LMP 03/30/2018 (LMP Unknown)    SpO2 100%    BMI 45.54 kg/m2     The patient is a well-developed, well-nourished female in no acute distress. The patient is alert and oriented times three. The patient appears to be well groomed. Mood and affect are normal.   ORTHOPEDIC EXAM of Right knee: Inspection: Effusion present,  incisions clean, dry intact, sutures in place  TTP: medial joint line  Range of motion: 0-100 flexion  Stability: Stable  Strength: 5/5  2+ distal pulses    IMPRESSION:  Status post Right knee arthroscopic partial medial menisectomy with degenerative arthritis with joint space narrowing. PLAN: Incisions cleaned. Surgery was discussed at length today. Stressed to patient that nothing causes an increase in pain or swelling. Patient is weight bearing as tolerated. OK to d/c use of crutches/walker. Will set up with physical therapy. Patient does not request refill of pain medication. Patient will follow up in 3 weeks.     MARILEE Manley [facility]

## 2018-04-16 ENCOUNTER — HOSPITAL ENCOUNTER (OUTPATIENT)
Dept: PHYSICAL THERAPY | Age: 48
Discharge: HOME | End: 2018-04-16
Payer: COMMERCIAL

## 2018-04-16 PROCEDURE — 97161 PT EVAL LOW COMPLEX 20 MIN: CPT

## 2018-04-16 PROCEDURE — 97110 THERAPEUTIC EXERCISES: CPT

## 2018-04-16 NOTE — PROGRESS NOTES
PT DAILY TREATMENT NOTE - [facility]     Patient Name: Jax Pod  Date:2018  : 1970  [x]  Patient  Verified  Payor: Elaine Lacey / Plan: Sean Henderson / Product Type: HMO /    In time:4:09  Out time:4:39  Total Treatment Time (min): 30  Visit #: 1 of 10    Treatment Area: Right knee pain [M25.561]  S/P knee surgery [Z98.890]    SUBJECTIVE  Pain Level (0-10 scale): 0  Any medication changes, allergies to medications, adverse drug reactions, diagnosis change, or new procedure performed?: [x] No    [] Yes (see summary sheet for update)  Subjective functional status/changes:   [] No changes reported  See POC    OBJECTIVE    20 min [x]Eval                  []Re-Eval     10 min Therapeutic Exercise:  [] See flow sheet : HEP instruction and demonstration, pt education regarding anatomy and physiology of the LEs and how it relates to the pt's condition. Rationale: increase ROM and increase strength to improve the patients ability to tolerate ADLs          With   [x] TE   [] TA   [] neuro   [] other: Patient Education: [x] Review HEP    [] Progressed/Changed HEP based on:   [] positioning   [] body mechanics   [] transfers   [] heat/ice application    [] other:      Other Objective/Functional Measures: See evaluation. Pain Level (0-10 scale) post treatment: 2    ASSESSMENT/Changes in Function: Pt given HEP handout to perform. Pt understood exercises in HEP handout. Educated pt that she can use an ice pack on her right knee for 15-20 mins at a time with right LE elevated to improve swelling and inflammation. Adjusted pt's SPC to the appropriate height to improve efficiency of gait. Pt demonstrated decreased AROM, decreased strength, tenderness to palpation and increased swelling. No signs of infection or DVT noted in the right LE and at incisions. Pt would benefit from physical therapy to improve the above impairments to help the pt return to performing ADLs, functional, and work activities. Patient will continue to benefit from skilled PT services to modify and progress therapeutic interventions, address functional mobility deficits, address ROM deficits, address strength deficits, analyze and address soft tissue restrictions, analyze and cue movement patterns, analyze and modify body mechanics/ergonomics, address imbalance/dizziness and instruct in home and community integration to attain remaining goals. [x]  See Plan of Care  []  See progress note/recertification  []  See Discharge Summary         Progress towards goals / Updated goals:  Short Term Goals: To be accomplished in 2 weeks:  1. Pt will report compliance and independence to HEP to help the pt manage their pain and symptoms. Eval: Established                  Long Term Goals: To be accomplished in 5 weeks:  1. Pt will increase FOTO score to 62 points to improve ability to perform ADLs. Eval: 46 points  2. Pt will increase MMT right knee EXT, right hip flex, and B hip ABD to 4/5 to improve pain with sit to stands. Eval: right knee EXT/hip flex 3+/5, B hip ABD 4-/5  3. Pt will increase AROM right knee flex to 115 degs to improve ability to ascend/descend stairs with more ease. Eval: 106 degs  4. Pt will ambulate 500 ft with no AD, on level surfaces, with no LOB, and minimal to no gait devations to improve tolerance to prolonged walking at home and at work.    Eval: ambulated in the evaluation with SPC, limping on the right LE, no LOB    PLAN  [x]  Upgrade activities as tolerated     [x]  Continue plan of care  [x]  Update interventions per flow sheet       []  Discharge due to:_  []  Other:_      Moni Macias PT 4/16/2018  4:46 PM    Future Appointments  Date Time Provider Yi Osborn   4/16/2018 4:00 PM [facility]

## 2018-04-16 NOTE — PROGRESS NOTES
[facility] [address], [address] [place] [address], [address] [place], [place] [address],[address]  [phone] fax    Plan of Care/ Statement of Necessity for Physical Therapy Services  Patient name: Eugenio Dalal Start of Care: 2018   Referral source: Baron Woods,* : 1970    Medical Diagnosis: Right knee pain [M25.561]  S/P knee surgery [Z98.890]   Onset Date: DOS 2018    Treatment Diagnosis: right knee pain   Prior Hospitalization: see medical history Provider#: [id number]   Medications: Verified on Patient summary List    Comorbidities: arthritis   Prior Level of Function: Independent with ADLs, functional and work tasks before the initial onset about 1 year ago. Reports having pain in the right knee with functional tasks before the surgery. The Plan of Care and following information is based on the information from the initial evaluation. 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 Pt reports having no complications with the surgery. Pt reports the initial injury occurred about 1 year ago when she was trying to lose weight and did a lot of walking at work and on the TM. Pt presented to the clinic today with antalgic gait and with a SPC. Pt demonstrated decreased AROM, decreased strength, tenderness to palpation and increased swelling. No signs of infection or DVT noted in the right LE and at incisions. Pt would benefit from physical therapy to improve the above impairments to help the pt return to performing ADLs, functional, and work activities.      Evaluation Complexity History MEDIUM  Complexity : 1-2 comorbidities / personal factors will impact the outcome/ POC ; Examination MEDIUM Complexity : 3 Standardized tests and measures addressing body structure, function, activity limitation and / or participation in recreation ;Presentation LOW Complexity : Stable, uncomplicated  ;Clinical Decision Making MEDIUM Complexity : FOTO score of 26-74  Overall Complexity Rating: LOW   Problem List: pain affecting function, decrease ROM, decrease strength, edema affecting function, impaired gait/ balance, decrease ADL/ functional abilitiies, decrease activity tolerance, decrease flexibility/ joint mobility and decrease transfer abilities   Treatment Plan may include any combination of the following: Therapeutic exercise, Therapeutic activities, Neuromuscular re-education, Physical agent/modality, Gait/balance training, Manual therapy, Patient education, Self Care training, Functional mobility training, Home safety training and Stair training  Patient / Family readiness to learn indicated by: asking questions, trying to perform skills and interest  Persons(s) to be included in education: patient (P)  Barriers to Learning/Limitations: None  Patient Goal (s): able to climb stairs and walk with speed  Patient Self Reported Health Status: excellent  Rehabilitation Potential: good    Short Term Goals: To be accomplished in 2 weeks:  1. Pt will report compliance and independence to [facility] to help the pt manage their pain and symptoms. Long Term Goals: To be accomplished in 5 weeks:  1. Pt will increase FOTO score to 62 points to improve ability to perform ADLs. 2. Pt will increase MMT right knee EXT, right hip flex, and B hip ABD to 4/5 to improve pain with sit to stands. 3. Pt will increase AROM right knee flex to 115 degs to improve ability to ascend/descend stairs with more ease. 4. Pt will ambulate 500 ft with no AD, on level surfaces, with no LOB, and minimal to no gait devations to improve tolerance to prolonged walking at home and at work. Frequency / Duration: Patient to be seen 2 times per week for 5 weeks.     Patient/ Caregiver education and instruction: Diagnosis, prognosis, self care, activity modification and exercises   [x]  Plan of care has been reviewed with LUCRECIA Rivera, PT 4/16/2018 4:43 PM  _____________________________________________________________________  I certify that the above Therapy Services are being furnished while the patient is under my care. I agree with the treatment plan and certify that this therapy is necessary.     Physician's Signature:____________________  Date:__________Time:______    Please sign and return to [facility] [address] [place] [address], [address] [place] [address], [address] [place] [address],[facility] [phone] fax

## 2018-04-18 ENCOUNTER — HOSPITAL ENCOUNTER (OUTPATIENT)
Dept: PHYSICAL THERAPY | Age: 48
Discharge: HOME | End: 2018-04-18
Payer: COMMERCIAL

## 2018-04-18 PROCEDURE — 97140 MANUAL THERAPY 1/> REGIONS: CPT

## 2018-04-18 PROCEDURE — 97110 THERAPEUTIC EXERCISES: CPT

## 2018-04-18 NOTE — PROGRESS NOTES
PT DAILY TREATMENT NOTE     Patient Name: Helena Calderón  Date:2018  : 1970  [x]  Patient  Verified  Payor: Judy Ogden / Plan: Ros[place] / Product Type: HMO /    In time:8:00  Out time:8:51  Total Treatment Time (min): 48  Visit #: 2 of 10    Treatment Area: Right knee pain [M25.561]  S/P knee surgery [Z98.890]    SUBJECTIVE  Pain Level (0-10 scale): 0  Any medication changes, allergies to medications, adverse drug reactions, diagnosis change, or new procedure performed?: [x] No    [] Yes (see summary sheet for update)  Subjective functional status/changes:   [] No changes reported  Just  Achy.     OBJECTIVE    Modality rationale: decrease edema, decrease inflammation, decrease pain and increase tissue extensibility to improve the patients ability to perform ADL    Min Type Additional Details    [] Estim:  []Unatt       []IFC  []Premod                        []Other:  []w/ice   []w/heat  Position:  Location:    [] Estim: []Att    []TENS instruct  []NMES                    []Other:  []w/US   []w/ice   []w/heat  Position:  Location:    []  Traction: [] Cervical       []Lumbar                       [] Prone          []Supine                       []Intermittent   []Continuous Lbs:  [] before manual  [] after manual    []  Ultrasound: []Continuous   [] Pulsed                           []1MHz   []3MHz W/cm2:  Location:    []  Iontophoresis with dexamethasone         Location: [] Take home patch   [] In clinic   10 [x]  Ice  post   []  heat  []  Ice massage  []  Laser   []  Anodyne Position:long sit  Location:(R) knee    []  Laser with stim  []  Other:  Position:  Location:    []  Vasopneumatic Device Pressure:       [] lo [] med [] hi   Temperature: [] lo [] med [] hi   [x] Skin assessment post-treatment:  [x]intact []redness- no adverse reaction    []redness  adverse reaction:      min []Eval                  []Re-Eval       30 min Therapeutic Exercise:  [x] See flow sheet : Rationale: increase ROM and increase strength to improve the patients ability to perform ADL      min Therapeutic Activity:  []  See flow sheet :   Rationale:   to improve the patients ability to       min Neuromuscular Re-education:  []  See flow sheet :   Rationale:   to improve the patients ability to     8 min Manual Therapy:  Passive  Stretch  Knee  Flex/ext   Rationale: decrease pain, increase ROM, increase tissue extensibility and decrease edema  to perform ADL      min Gait Training:  ___ feet with ___ device on level surfaces with ___ level of assist   Rationale: With   [x] TE   [] TA   [] neuro   [] other: Patient Education: [x] Review HEP    [] Progressed/Changed HEP based on:   [] positioning   [] body mechanics   [] transfers   [] heat/ice application    [] other:      Other Objective/Functional Measures:  TTP  During  Manual/  Pt  Experienced sharp  Pain with bike. Pt  Was able  To tolerate  TM. Pain Level (0-10 scale) post treatment: 3    ASSESSMENT/Changes in Function: Fair  Response  To each there ex  With cues. Patient will continue to benefit from skilled PT services to address functional mobility deficits, address ROM deficits, address strength deficits, analyze and address soft tissue restrictions and instruct in home and community integration to attain remaining goals. [x]  See Plan of Care  []  See progress note/recertification  []  See Discharge Summary         Progress towards goals / Updated goals:  Short Term Goals: To be accomplished in 2 weeks:   1. Pt will report compliance and independence to HEP to help the pt manage their pain and symptoms.        Eval: Established                  Long Term Goals: To be accomplished in 5 weeks:  1. Pt will increase FOTO score to 62 points to improve ability to perform ADLs. Eval: 46 points  2. Pt will increase MMT right knee EXT, right hip flex, and B hip ABD to 4/5 to improve pain with sit to stands.   Eval: right knee EXT/hip flex 3+/5, B hip ABD 4-/5  3. Pt will increase AROM right knee flex to 115 degs to improve ability to ascend/descend stairs with more ease. Eval: 106 degs  4. Pt will ambulate 500 ft with no AD, on level surfaces, with no LOB, and minimal to no gait devations to improve tolerance to prolonged walking at home and at work.    Eval: ambulated in the evaluation with SPC, limping on the right LE, no LOB       PLAN  []  Upgrade activities as tolerated     [x]  Continue plan of care  []  Update interventions per flow sheet       []  Discharge due to:_  []  Other:_      Crystal Ebony, PTA 4/18/2018  8:22 AM    Future Appointments  Date Time Provider Yi Osborn   4/25/2018 8:00 AM Crystal Ebony, PTA [facility]   4/27/2018 8:00 AM Crystal Ebony, PTA [facility]   4/30/2018 8:30 AM Isaeblla Beck, PTA [facility]   5/2/2018 8:00 AM Crystal Ebony, PTA [facility]   5/7/2018 8:00 AM Isabella Beck, PTA [facility]   5/9/2018 8:00 AM Neville Grubbs, PT [facility]

## 2018-04-25 ENCOUNTER — HOSPITAL ENCOUNTER (OUTPATIENT)
Dept: PHYSICAL THERAPY | Age: 48
Discharge: HOME | End: 2018-04-25
Payer: COMMERCIAL

## 2018-04-25 PROCEDURE — 97112 NEUROMUSCULAR REEDUCATION: CPT

## 2018-04-25 PROCEDURE — 97110 THERAPEUTIC EXERCISES: CPT

## 2018-04-25 NOTE — PROGRESS NOTES
PT DAILY TREATMENT NOTE     Patient Name: Melany Hewitt  Date:2018  : 1970  [x]  Patient  Verified  Payor: Ion Costa / Plan: Jen Barr / Product Type: HMO /    In time:8:02  Out time:8:38  Total Treatment Time (min): 36  Visit #: 3 of 10    Treatment Area: Right knee pain [M25.561]  S/P knee surgery [Z98.890]    SUBJECTIVE  Pain Level (0-10 scale): Any medication changes, allergies to medications, adverse drug reactions, diagnosis change, or new procedure performed?: [x] No    [] Yes (see summary sheet for update)  Subjective functional status/changes:   [] No changes reported  Feel  As if  Bone  Is rubbing.     OBJECTIVE    Modality rationale: decrease edema, decrease inflammation, decrease pain and increase tissue extensibility to improve the patients ability to perform ADL    Min Type Additional Details    [] Estim:  []Unatt       []IFC  []Premod                        []Other:  []w/ice   []w/heat  Position:  Location:    [] Estim: []Att    []TENS instruct  []NMES                    []Other:  []w/US   []w/ice   []w/heat  Position:  Location:    []  Traction: [] Cervical       []Lumbar                       [] Prone          []Supine                       []Intermittent   []Continuous Lbs:  [] before manual  [] after manual    []  Ultrasound: []Continuous   [] Pulsed                           []1MHz   []3MHz W/cm2:  Location:    []  Iontophoresis with dexamethasone         Location: [] Take home patch   [] In clinic    []  Ice     []  heat  []  Ice massage  []  Laser   []  Anodyne Position:  Location:    []  Laser with stim  []  Other:  Position:  Location:    []  Vasopneumatic Device Pressure:       [] lo [] med [] hi   Temperature: [] lo [] med [] hi   [x] Skin assessment post-treatment:  [x]intact []redness- no adverse reaction    []redness  adverse reaction:      min []Eval                  []Re-Eval       28 min Therapeutic Exercise:  [] See flow sheet :   Rationale: increase ROM and increase strength to improve the patients ability to perform ADL      min Therapeutic Activity:  []  See flow sheet :   Rationale:   to improve the patients ability to      8 min Neuromuscular Re-education:  []  See flow sheet :KT M-L  knee   Rationale:   to improve the patients ability to perform ADL      min Manual Therapy:     Rationale: decrease pain, increase ROM, increase tissue extensibility and decrease edema  to perform ADL      min Gait Training:  ___ feet with ___ device on level surfaces with ___ level of assist   Rationale: With   [x] TE   [] TA   [] neuro   [] other: Patient Education: [x] Review HEP    [] Progressed/Changed HEP based on:   [] positioning   [] body mechanics   [] transfers   [] heat/ice application    [] other:      Other Objective/Functional Measures:  C/o  Pain with WB unto lateral  Aspect  (R) knee    Pain Level (0-10 scale) post treatment: .5    ASSESSMENT/Changes in Function: Benefited  With KT  To decrease  Pressure  Unto knee. Minimal  Pain with sit  To stand. Patient will continue to benefit from skilled PT services to address functional mobility deficits, address ROM deficits, address strength deficits, analyze and address soft tissue restrictions, analyze and cue movement patterns and instruct in home and community integration to attain remaining goals. [x]  See Plan of Care  []  See progress note/recertification  []  See Discharge Summary         Progress towards goals / Updated goals:  Short Term Goals: To be accomplished in 2 weeks:   1. Pt will report compliance and independence to HEP to help the pt manage their pain and symptoms.        Eval: Established          current:  Met  4/25/18        Long Term Goals: To be accomplished in 5 weeks:  1. Pt will increase FOTO score to 62 points to improve ability to perform ADLs. Eval: 46 points  2.  Pt will increase MMT right knee EXT, right hip flex, and B hip ABD to 4/5 to improve pain with sit to stands. Eval: right knee EXT/hip flex 3+/5, B hip ABD 4-/5  3. Pt will increase AROM right knee flex to 115 degs to improve ability to ascend/descend stairs with more ease. Eval: 106 degs  4. Pt will ambulate 500 ft with no AD, on level surfaces, with no LOB, and minimal to no gait devations to improve tolerance to prolonged walking at home and at work.    Eval: ambulated in the evaluation with SPC, limping on the right LE, no LOB       PLAN  []  Upgrade activities as tolerated     [x]  Continue plan of care  []  Update interventions per flow sheet       []  Discharge due to:_  []  Other:_      Isabella Beck PTA 4/25/2018  8:12 AM    Future Appointments  Date Time Provider Yi Osborn   4/27/2018 8:00 AM Isabella Beck PTA [facility]   4/30/2018 8:30 AM Isabella Beck PTA [facility]   5/2/2018 8:00 AM Isabella Beck PTA [facility]   5/7/2018 8:00 AM Isabella Beck PTA [facility]   5/9/2018 8:00 AM Amy Miranda PT [facility]

## 2018-04-27 ENCOUNTER — HOSPITAL ENCOUNTER (OUTPATIENT)
Dept: PHYSICAL THERAPY | Age: 48
Discharge: HOME | End: 2018-04-27
Payer: COMMERCIAL

## 2018-04-27 PROCEDURE — 97110 THERAPEUTIC EXERCISES: CPT

## 2018-04-27 PROCEDURE — 97112 NEUROMUSCULAR REEDUCATION: CPT

## 2018-04-27 NOTE — PROGRESS NOTES
PT DAILY TREATMENT NOTE 12    Patient Name: Deyanira Peter  Date:2018  : 1970  [x]  Patient  Verified  Payor: Randi Flynn / Plan: Yosef Laura / Product Type: HMO /    In time8:04   Out time:8:32  Total Treatment Time (min): 28  Visit #: 4 of 10    Treatment Area: Right knee pain [M25.561]  S/P knee surgery [Z98.890]    SUBJECTIVE  Pain Level (0-10 scale): 0  Any medication changes, allergies to medications, adverse drug reactions, diagnosis change, or new procedure performed?: [x] No    [] Yes (see summary sheet for update)  Subjective functional status/changes:   [] No changes reported  Ache.     OBJECTIVE    Modality rationale: decrease edema, decrease inflammation, decrease pain and increase tissue extensibility to improve the patients ability to perform ADL    Min Type Additional Details    [] Estim:  []Unatt       []IFC  []Premod                        []Other:  []w/ice   []w/heat  Position:  Location:    [] Estim: []Att    []TENS instruct  []NMES                    []Other:  []w/US   []w/ice   []w/heat  Position:  Location:    []  Traction: [] Cervical       []Lumbar                       [] Prone          []Supine                       []Intermittent   []Continuous Lbs:  [] before manual  [] after manual    []  Ultrasound: []Continuous   [] Pulsed                           []1MHz   []3MHz W/cm2:  Location:    []  Iontophoresis with dexamethasone         Location: [] Take home patch   [] In clinic    []  Ice     []  heat  []  Ice massage  []  Laser   []  Anodyne Position:  Location:    []  Laser with stim  []  Other:  Position:  Location:    []  Vasopneumatic Device Pressure:       [] lo [] med [] hi   Temperature: [] lo [] med [] hi   [x] Skin assessment post-treatment:  [x]intact []redness- no adverse reaction    []redness  adverse reaction:      min []Eval                  []Re-Eval       20 min Therapeutic Exercise:  [x] See flow sheet :   Rationale: increase ROM and increase strength to improve the patients ability to perform ADL     min Therapeutic Activity:  []  See flow sheet :   Rationale:   to improve the patients ability to      8 min Neuromuscular Re-education:  []  See flow sheet :KT  M-L  knee   Rationale:   to improve the patients ability to perform ADL      min Manual Therapy:     Rationale: decrease pain, increase ROM, increase tissue extensibility and decrease edema  to perform ADL      min Gait Training:  ___ feet with ___ device on level surfaces with ___ level of assist   Rationale: With   [x] TE   [] TA   [] neuro   [] other: Patient Education: [x] Review HEP    [] Progressed/Changed HEP based on:   [] positioning   [] body mechanics   [] transfers   [] heat/ice application    [] other:      Other Objective/Functional Measures: Fair  Response  To each there ex. Pain Level (0-10 scale) post treatment: 2       ASSESSMENT/Changes in Function: pt  C/o  Dull  Ache  Following there ex. Patient will continue to benefit from skilled PT services to address functional mobility deficits, address ROM deficits, address strength deficits, analyze and address soft tissue restrictions, analyze and cue movement patterns and instruct in home and community integration to attain remaining goals. [x]  See Plan of Care  []  See progress note/recertification  []  See Discharge Summary         Progress towards goals / Updated goals:  Short Term Goals: To be accomplished in 2 weeks:   1. Pt will report compliance and independence to HEP to help the pt manage their pain and symptoms.        Eval: Established          current:  Met  4/25/18        Long Term Goals: To be accomplished in 5 weeks:  1. Pt will increase FOTO score to 62 points to improve ability to perform ADLs. Eval: 46 points  2. Pt will increase MMT right knee EXT, right hip flex, and B hip ABD to 4/5 to improve pain with sit to stands. Eval: right knee EXT/hip flex 3+/5, B hip ABD 4-/5  3.  Pt will increase AROM right knee flex to 115 degs to improve ability to ascend/descend stairs with more ease. Eval: 106 degs  4. Pt will ambulate 500 ft with no AD, on level surfaces, with no LOB, and minimal to no gait devations to improve tolerance to prolonged walking at home and at work.    Eval: ambulated in the evaluation with SPC, limping on the right LE, no LOB    PLAN  []  Upgrade activities as tolerated     [x]  Continue plan of care  []  Update interventions per flow sheet       []  Discharge due to:_  [x]  Other:_Ambulation  NV      Isabella Beck PTA 4/27/2018  8:21 AM    Future Appointments  Date Time Provider Yi Osborn   4/30/2018 8:30 AM Isabella Beck PTA [facility]   5/2/2018 8:00 AM Isabella Beck PTA [facility]   5/7/2018 8:00 AM Isabella Beck PTA [facility]   5/9/2018 8:00 AM Nirmala Cain, PT [facility]

## 2018-04-30 ENCOUNTER — HOSPITAL ENCOUNTER (OUTPATIENT)
Dept: PHYSICAL THERAPY | Age: 48
Discharge: HOME | End: 2018-04-30
Payer: COMMERCIAL

## 2018-04-30 PROCEDURE — 97110 THERAPEUTIC EXERCISES: CPT

## 2018-04-30 NOTE — PROGRESS NOTES
PT DAILY TREATMENT NOTE     Patient Name: Bri Mitchell  Date:2018  : 1970  [x]  Patient  Verified  Payor: Guilherme Greene / Plan: Evangelista Belling / Product Type: HMO /    In time:8:30   Out time:9:23  Total Treatment Time (min): 40  Visit #: 5 of 10    Treatment Area: Right knee pain [M25.561]  S/P knee surgery [Z98.890]    SUBJECTIVE  Pain Level (0-10 scale): 0  Any medication changes, allergies to medications, adverse drug reactions, diagnosis change, or new procedure performed?: [x] No    [] Yes (see summary sheet for update)  Subjective functional status/changes:   [] No changes reported  Sore.     OBJECTIVE    Modality rationale: decrease edema, decrease inflammation, decrease pain and increase tissue extensibility to improve the patients ability to perform ADL    Min Type Additional Details    [] Estim:  []Unatt       []IFC  []Premod                        []Other:  []w/ice   []w/heat  Position:  Location:    [] Estim: []Att    []TENS instruct  []NMES                    []Other:  []w/US   []w/ice   []w/heat  Position:  Location:    []  Traction: [] Cervical       []Lumbar                       [] Prone          []Supine                       []Intermittent   []Continuous Lbs:  [] before manual  [] after manual    []  Ultrasound: []Continuous   [] Pulsed                           []1MHz   []3MHz W/cm2:  Location:    []  Iontophoresis with dexamethasone         Location: [] Take home patch   [] In clinic   10 [x]  Ice  post   []  heat  []  Ice massage  []  Laser   []  Anodyne Position:long sit  Location:(R) knee    []  Laser with stim  []  Other:  Position:  Location:    []  Vasopneumatic Device Pressure:       [] lo [] med [] hi   Temperature: [] lo [] med [] hi   [x] Skin assessment post-treatment:  [x]intact []redness- no adverse reaction    []redness  adverse reaction:      min []Eval                  []Re-Eval       30 min Therapeutic Exercise:  [x] See flow sheet :   Rationale: increase ROM and increase strength to improve the patients ability to perform ADL      min Therapeutic Activity:  []  See flow sheet :   Rationale:   to improve the patients ability to       min Neuromuscular Re-education:  []  See flow sheet :   Rationale:   to improve the patients ability to      min Manual Therapy:     Rationale: decrease pain, increase ROM, increase tissue extensibility and decrease edema  to perform ADL      min Gait Training:  ___ feet with ___ device on level surfaces with ___ level of assist   Rationale: With   [x] TE   [] TA   [] neuro   [] other: Patient Education: [x] Review HEP    [] Progressed/Changed HEP based on:   [] positioning   [] body mechanics   [] transfers   [] heat/ice application    [] other:      Other Objective/Functional Measures:  FOTO;  43    Pain Level (0-10 scale) post treatment: 0    ASSESSMENT/Changes in Function: FOTO has decreased  Slightly. Fair  Response  To each there ex. Patient will continue to benefit from skilled PT services to address functional mobility deficits, address ROM deficits, address strength deficits, analyze and address soft tissue restrictions, analyze and cue movement patterns and instruct in home and community integration to attain remaining goals. []  See Plan of Care  []  See progress note/recertification  []  See Discharge Summary         Progress towards goals / Updated goals:  Short Term Goals: To be accomplished in 2 weeks:   1. Pt will report compliance and independence to HEP to help the pt manage their pain and symptoms.        Eval: Established          current:  Met  4/25/18        Long Term Goals: To be accomplished in 5 weeks:  1. Pt will increase FOTO score to 62 points to improve ability to perform ADLs. Eval: 46 points   Current:  43  4/30/18  2. Pt will increase MMT right knee EXT, right hip flex, and B hip ABD to 4/5 to improve pain with sit to stands.   Eval: right knee EXT/hip flex 3+/5, B hip ABD 4-/5  3. Pt will increase AROM right knee flex to 115 degs to improve ability to ascend/descend stairs with more ease. Eval: 106 degs  4. Pt will ambulate 500 ft with no AD, on level surfaces, with no LOB, and minimal to no gait devations to improve tolerance to prolonged walking at home and at work.    Eval: ambulated in the evaluation with SPC, limping on the right LE, no LOB       PLAN  []  Upgrade activities as tolerated     []  Continue plan of care  []  Update interventions per flow sheet       []  Discharge due to:_  []  Other:_      Isabella Beck PTA 4/30/2018  9:08 AM    Future Appointments  Date Time Provider Yi Osborn   5/2/2018 8:00 AM Isabella Beck PTA [facility]   5/7/2018 8:00 AM Isabella Beck PTA [facility]   5/9/2018 8:00 AM Satish Coronado PT [facility]

## 2018-05-02 ENCOUNTER — HOSPITAL ENCOUNTER (OUTPATIENT)
Dept: PHYSICAL THERAPY | Age: 48
Discharge: HOME | End: 2018-05-02
Payer: COMMERCIAL

## 2018-05-02 PROCEDURE — 97110 THERAPEUTIC EXERCISES: CPT

## 2018-05-02 PROCEDURE — 97035 APP MDLTY 1+ULTRASOUND EA 15: CPT

## 2018-05-02 NOTE — PROGRESS NOTES
PT DAILY TREATMENT NOTE     Patient Name: Makenna Tolbert  Date:2018  : 1970  [x]  Patient  Verified  Payor: Camelia Napoles / Plan: Mert Rivera / Product Type: HMO /    In time:8:05  Out time:8:47  Total Treatment Time (min): 36  Visit #: 6 of 10    Treatment Area: Right knee pain [M25.561]  S/P knee surgery [Z98.890]    SUBJECTIVE  Pain Level (0-10 scale): 0  Any medication changes, allergies to medications, adverse drug reactions, diagnosis change, or new procedure performed?: [x] No    [] Yes (see summary sheet for update)  Subjective functional status/changes:   [] No changes reported  Achy.     OBJECTIVE    Modality rationale: decrease edema, decrease inflammation, decrease pain and increase tissue extensibility to improve the patients ability to perform ADL    Min Type Additional Details    [] Estim:  []Unatt       []IFC  []Premod                        []Other:  []w/ice   []w/heat  Position:  Location:    [] Estim: []Att    []TENS instruct  []NMES                    []Other:  []w/US   []w/ice   []w/heat  Position:  Location:    []  Traction: [] Cervical       []Lumbar                       [] Prone          []Supine                       []Intermittent   []Continuous Lbs:  [] before manual  [] after manual   8 [x]  Ultrasound: [x]Continuous   [] Pulsed                           [x]1MHz   []3MHz W/cm2: 1.3  Location:medial/inferior  (R) knee    []  Iontophoresis with dexamethasone         Location: [] Take home patch   [] In clinic    []  Ice     []  heat  []  Ice massage  []  Laser   []  Anodyne Position:  Location:    []  Laser with stim  []  Other:  Position:  Location:    []  Vasopneumatic Device Pressure:       [] lo [] med [] hi   Temperature: [] lo [] med [] hi   [x] Skin assessment post-treatment:  [x]intact []redness- no adverse reaction    []redness  adverse reaction:      min []Eval                  []Re-Eval       28 min Therapeutic Exercise:  [x] See flow sheet : Rationale: increase ROM and increase strength to improve the patients ability to perform ADL      min Therapeutic Activity:  []  See flow sheet :   Rationale:   to improve the patients ability to       min Neuromuscular Re-education:  []  See flow sheet :   Rationale:   to improve the patients ability to      min Manual Therapy:     Rationale: decrease pain, increase ROM, increase tissue extensibility and decrease edema  to perform ADL      min Gait Training:  ___ feet with ___ device on level surfaces with ___ level of assist   Rationale: With   [x] TE   [] TA   [] neuro   [] other: Patient Education: [x] Review HEP    [] Progressed/Changed HEP based on:   [] positioning   [] body mechanics   [] transfers   [] heat/ice application    [] other:      Other Objective/Functional Measures:  Pt  C/o  Knee feeling  Achy  Following there ex. Pain Level (0-10 scale) post treatment: 0    ASSESSMENT/Changes in Function: Fair  Response  To each there ex. Benefited  With US. Patient will continue to benefit from skilled PT services to address functional mobility deficits, address ROM deficits, address strength deficits, analyze and address soft tissue restrictions, analyze and cue movement patterns and instruct in home and community integration to attain remaining goals. [x]  See Plan of Care  []  See progress note/recertification  []  See Discharge Summary         Progress towards goals / Updated goals:  Short Term Goals: To be accomplished in 2 weeks:   1. Pt will report compliance and independence to HEP to help the pt manage their pain and symptoms.        Eval: Established          current:  Met  4/25/18        Long Term Goals: To be accomplished in 5 weeks:  1. Pt will increase FOTO score to 62 points to improve ability to perform ADLs. Eval: 46 points   Current:  43  4/30/18  2. Pt will increase MMT right knee EXT, right hip flex, and B hip ABD to 4/5 to improve pain with sit to stands.   Eval: right knee EXT/hip flex 3+/5, B hip ABD 4-/5  3. Pt will increase AROM right knee flex to 115 degs to improve ability to ascend/descend stairs with more ease. Eval: 106 degs  4. Pt will ambulate 500 ft with no AD, on level surfaces, with no LOB, and minimal to no gait devations to improve tolerance to prolonged walking at home and at work.    Eval: ambulated in the evaluation with SPC, limping on the right LE, no LOB  Current:  Ambulated  On TM  .8 miles  5/2/18    PLAN  []  Upgrade activities as tolerated     [x]  Continue plan of care  []  Update interventions per flow sheet       []  Discharge due to:_  [x]  Other:_ REASSESS MMT  NV    Isabella Beck PTA 5/2/2018  8:28 AM    Future Appointments  Date Time Provider Yi Osborn   5/3/2018 8:45 AM C[facility]STEL[facility] [address]   5/7/2018 8:00 AM Isabella Beck PTA [facility]   5/9/2018 8:00 AM Villa Walsh PT [facility]

## 2018-05-03 ENCOUNTER — OFFICE VISIT (OUTPATIENT)
Dept: ORTHOPEDIC SURGERY | Age: 48
End: 2018-05-03

## 2018-05-03 DIAGNOSIS — M17.11 PRIMARY OSTEOARTHRITIS OF RIGHT KNEE: Primary | ICD-10-CM

## 2018-05-03 NOTE — PROGRESS NOTES
Patient: Deyanira Peter  YOB: 1970       HISTORY:  The patient presents for reevaluation of her right knee status post arthroscopic partial medial menisectomy with grade III-IV chondromalacia on 4/6/18. Patient is improved, states pain is a 0 out of 10. She does have pain however and notices she is limping after she has been standing or limping for prolonged periods of time. she has gone to physical therapy. Patient denies any fever, chills, chest pain, shortness of breath or calf pain. There are no new illness or injuries to report since last seen in the office. PHYSICAL EXAMINATION:    Visit Vitals    LMP 03/30/2018 (LMP Unknown)     The patient is a well-developed, well-nourished female in no acute distress. The patient is alert and oriented times three. The patient appears to be well groomed. Mood and affect are normal.   ORTHOPEDIC EXAM of Right knee: Inspection: Effusion absent, incisions well healed  TTP: none  Range of motion: 0-120 flexion  Stability: Stable  Strength: 5/5  2+ distal pulses    IMPRESSION:  Status post Right knee arthroscopic partial medial menisectomy with degenerative arthritis with joint space narrowing. PLAN:   1. Patient is doing well post operatively  2. She has some residual stiffness and pain after prolonged walking or standing likely secondary to her degenerative arthritis  3.  Will authorize Severo Lee for her and determine if we need to proceed when we reevaluate in 2 weeks    RTC 2 weeks    MARILEE Epstein [facility]

## 2018-05-07 ENCOUNTER — HOSPITAL ENCOUNTER (OUTPATIENT)
Dept: PHYSICAL THERAPY | Age: 48
Discharge: HOME | End: 2018-05-07
Payer: COMMERCIAL

## 2018-05-07 PROCEDURE — 97110 THERAPEUTIC EXERCISES: CPT

## 2018-05-07 NOTE — PROGRESS NOTES
PT DAILY TREATMENT NOTE     Patient Name: Ej Cuadra  Date:2018  : 1970  [x]  Patient  Verified  Payor: Swathi Parry / Plan: Kvng Stein / Product Type: HMO /    In time: 8:07  Out time: 8:47  Total Treatment Time (min): 40  Visit #: 7 of 10    Treatment Area: Right knee pain [M25.561]  S/P knee surgery [Z98.890]    SUBJECTIVE  Pain Level (0-10 scale): 0  Any medication changes, allergies to medications, adverse drug reactions, diagnosis change, or new procedure performed?: [x] No    [] Yes (see summary sheet for update)  Subjective functional status/changes:   [] No changes reported  Wednesday  Is my last  Day. MD  Will  Decide  If  I should  Get  The gel.     OBJECTIVE    Modality rationale: decrease edema, decrease inflammation, decrease pain and increase tissue extensibility to improve the patients ability to perform ADL    Min Type Additional Details    [] Estim:  []Unatt       []IFC  []Premod                        []Other:  []w/ice   []w/heat  Position:  Location:    [] Estim: []Att    []TENS instruct  []NMES                    []Other:  []w/US   []w/ice   []w/heat  Position:  Location:    []  Traction: [] Cervical       []Lumbar                       [] Prone          []Supine                       []Intermittent   []Continuous Lbs:  [] before manual  [] after manual    []  Ultrasound: []Continuous   [] Pulsed                           []1MHz   []3MHz W/cm2:  Location:    []  Iontophoresis with dexamethasone         Location: [] Take home patch   [] In clinic    []  Ice     []  heat  []  Ice massage  []  Laser   []  Anodyne Position:  Location:    []  Laser with stim  []  Other:  Position:  Location:    []  Vasopneumatic Device Pressure:       [] lo [] med [] hi   Temperature: [] lo [] med [] hi   [x] Skin assessment post-treatment:  [x]intact []redness- no adverse reaction    []redness  adverse reaction:      min []Eval                  []Re-Eval       40 min Therapeutic Exercise:  [x] See flow sheet :   Rationale: increase ROM and increase strength to improve the patients ability to perform ADL      min Therapeutic Activity:  []  See flow sheet :   Rationale:   to improve the patients ability to       min Neuromuscular Re-education:  []  See flow sheet :   Rationale:   to improve the patients ability to      min Manual Therapy:     Rationale: decrease pain, increase ROM, increase tissue extensibility and decrease edema  to perform ADL      min Gait Training:  ___ feet with ___ device on level surfaces with ___ level of assist   Rationale: With   [x] TE   [] TA   [] neuro   [] other: Patient Education: [x] Review HEP    [] Progressed/Changed HEP based on:   [] positioning   [] body mechanics   [] transfers   [] heat/ice application    [x] other: REASSESS MMT     Other Objective/Functional Measures:    MMT:   (R)  Knee  Ext  4/4+   (R)  Hip F     4+   (B)   Hip   ABD   4+    Knee  F  4+     Increased  Rep  Per  Flow  sheet    Pain Level (0-10 scale) post treatment: 0  ASSESSMENT/Changes in Function: MMT have  Improved since  Initial eval.  Fair  Response  To each there ex. Patient will continue to benefit from skilled PT services to address functional mobility deficits, address ROM deficits, address strength deficits, analyze and address soft tissue restrictions, analyze and cue movement patterns and instruct in home and community integration to attain remaining goals. [x]  See Plan of Care  []  See progress note/recertification  []  See Discharge Summary         Progress towards goals / Updated goals:  Short Term Goals: To be accomplished in 2 weeks:   1. Pt will report compliance and independence to HEP to help the pt manage their pain and symptoms.        Eval: Established          current:  Met  4/25/18        Long Term Goals: To be accomplished in 5 weeks:  1. Pt will increase FOTO score to 62 points to improve ability to perform ADLs.   Eval: 46 points   Current:  50  4/30/18  2. Pt will increase MMT right knee EXT, right hip flex, and B hip ABD to 4/5 to improve pain with sit to stands. Eval: right knee EXT/hip flex 3+/5, B hip ABD 4-/5   Curent:MMT:   (R)  Knee  Ext  4/4+   (R)  Hip F     4+   (B)   Hip   ABD   4+    Knee  F  4+     3. Pt will increase AROM right knee flex to 115 degs to improve ability to ascend/descend stairs with more ease. Eval: 106 degs  4. Pt will ambulate 500 ft with no AD, on level surfaces, with no LOB, and minimal to no gait devations to improve tolerance to prolonged walking at home and at work.    Eval: ambulated in the evaluation with SPC, limping on the right LE, no LOB  Current:  Ambulated  On TM  .8 miles  5/2/18    PLAN  []  Upgrade activities as tolerated     [x]  Continue plan of care  []  Update interventions per flow sheet       []  Discharge due to:_  []  Other:_      Shauna Suarez, LUCRECIA 5/7/2018  8:36 AM    Future Appointments  Date Time Provider Yi Osborn   5/9/2018 8:00 AM Dali Burnham [address] [facility]   5/9/2018 11:45 AM Marc Cox NP 11 [facility]   5/17/2018 10:00 AM Leonard Aguirre, [id number] [facility]

## 2018-05-09 ENCOUNTER — OFFICE VISIT (OUTPATIENT)
Dept: FAMILY MEDICINE CLINIC | Age: 48
End: 2018-05-09

## 2018-05-09 ENCOUNTER — HOSPITAL ENCOUNTER (OUTPATIENT)
Dept: PHYSICAL THERAPY | Age: 48
Discharge: HOME | End: 2018-05-09
Payer: COMMERCIAL

## 2018-05-09 ENCOUNTER — HOSPITAL ENCOUNTER (OUTPATIENT)
Dept: LAB | Age: 48
Discharge: HOME | End: 2018-05-09

## 2018-05-09 VITALS
HEIGHT: 62 IN | TEMPERATURE: 98.2 F | HEART RATE: 70 BPM | WEIGHT: 239 LBS | RESPIRATION RATE: 17 BRPM | SYSTOLIC BLOOD PRESSURE: 132 MMHG | OXYGEN SATURATION: 99 % | DIASTOLIC BLOOD PRESSURE: 84 MMHG | BODY MASS INDEX: 43.98 KG/M2

## 2018-05-09 DIAGNOSIS — Z13.1 SCREENING FOR DIABETES MELLITUS: ICD-10-CM

## 2018-05-09 DIAGNOSIS — Z13.220 SCREENING FOR HYPERLIPIDEMIA: ICD-10-CM

## 2018-05-09 DIAGNOSIS — Z12.31 ENCOUNTER FOR SCREENING MAMMOGRAM FOR BREAST CANCER: ICD-10-CM

## 2018-05-09 DIAGNOSIS — Z13.0 SCREENING FOR DEFICIENCY ANEMIA: ICD-10-CM

## 2018-05-09 DIAGNOSIS — Z13.21 ENCOUNTER FOR VITAMIN DEFICIENCY SCREENING: ICD-10-CM

## 2018-05-09 DIAGNOSIS — Z11.3 SCREENING FOR STD (SEXUALLY TRANSMITTED DISEASE): ICD-10-CM

## 2018-05-09 DIAGNOSIS — Z12.4 ENCOUNTER FOR SCREENING FOR CERVICAL CANCER: ICD-10-CM

## 2018-05-09 DIAGNOSIS — Z01.419 WELL WOMAN EXAM WITH ROUTINE GYNECOLOGICAL EXAM: Primary | ICD-10-CM

## 2018-05-09 DIAGNOSIS — Z13.29 SCREENING FOR THYROID DISORDER: ICD-10-CM

## 2018-05-09 PROBLEM — N94.6 DYSMENORRHEA: Status: ACTIVE | Noted: 2018-05-09

## 2018-05-09 PROBLEM — R53.83 FATIGUE: Status: ACTIVE | Noted: 2018-05-09

## 2018-05-09 PROBLEM — D25.9 UTERINE LEIOMYOMA: Status: ACTIVE | Noted: 2018-05-09

## 2018-05-09 PROBLEM — N83.209 CYST OF OVARY: Status: ACTIVE | Noted: 2018-05-09

## 2018-05-09 PROBLEM — N85.2 HYPERTROPHY OF UTERUS: Status: ACTIVE | Noted: 2018-05-09

## 2018-05-09 PROCEDURE — 97530 THERAPEUTIC ACTIVITIES: CPT

## 2018-05-09 PROCEDURE — 99001 SPECIMEN HANDLING PT-LAB: CPT | Performed by: NURSE PRACTITIONER

## 2018-05-09 PROCEDURE — 97110 THERAPEUTIC EXERCISES: CPT

## 2018-05-09 NOTE — PROGRESS NOTES
PT DAILY TREATMENT NOTE 3-16    Patient Name: Dl Mcdonald  Date:2018  : 1970  [x]  Patient  Verified  Payor: Ivan Barraza / Plan: Corinna Hines / Product Type: HMO /    In time:0803  Out BJEU:[id number]  Total Treatment Time (min): 45  Visit #: 8 of 10    Treatment Area: Right knee pain [M25.561]  S/P knee surgery [Z98.890]    SUBJECTIVE  Pain Level (0-10 scale): 0  Any medication changes, allergies to medications, adverse drug reactions, diagnosis change, or new procedure performed?: [x] No    [] Yes (see summary sheet for update)  Subjective functional status/changes:   [] No changes reported  Doing good. Some of the exer    OBJECTIVE   35 min Therapeutic Exercise:  [x] See flow sheet :   Rationale: increase ROM, increase strength and improve coordination to improve the patients ability to perform increased ADL  10 min Therapeutic Activity:  [x]  See flow sheet :   Rationale: increase ROM, increase strength and Improve walking tolerance  to improve the patients ability to perform increased ADL         With   [x] TE   [] TA   [] neuro   [] other: Patient Education: [x] Review HEP    [] Progressed/Changed HEP based on:   [] positioning   [] body mechanics   [] transfers   [] heat/ice application    [] other:      Other Objective/Functional Measures:   - Knee Flx 134  - Change TKE to Long sitting  - Good tolerance with increased weight and reps     Pain Level (0-10 scale) post treatment: 0    ASSESSMENT/Changes in Function:      Patient will continue to benefit from skilled PT services to modify and progress therapeutic interventions, address functional mobility deficits, address ROM deficits, address strength deficits, analyze and cue movement patterns and analyze and modify body mechanics/ergonomics to attain remaining goals.      []  See Plan of Care  []  See progress note/recertification  []  See Discharge Summary         Progress towards goals / Updated goals:  Short Term Goals: To be accomplished in 2 weeks:   1. Pt will report compliance and independence to HEP to help the pt manage their pain and symptoms.        Eval: Established          current:  Met  4/25/18        Long Term Goals: To be accomplished in 5 weeks:  1. Pt will increase FOTO score to 62 points to improve ability to perform ADLs. Eval: 46 points   Current:  43  4/30/18  2. Pt will increase MMT right knee EXT, right hip flex, and B hip ABD to 4/5 to improve pain with sit to stands. Eval: right knee EXT/hip flex 3+/5, B hip ABD 4-/5     Curent:MMT:   (R)  Knee  Ext  5 Flx 4+   (R)  Hip F     4+   (B)   Hip   ABD   4+    5/9/18  3. Pt will increase AROM right knee flex to 115 degs to improve ability to ascend/descend stairs with more ease. Eval: 106 degs  4. Pt will ambulate 500 ft with no AD, on level surfaces, with no LOB, and minimal to no gait devations to improve tolerance to prolonged walking at home and at work.    Eval: ambulated in the evaluation with SPC, limping on the right LE, no LOB  Current:  Ambulated  On TM  .30 miles  5/8/18    PLAN  [x]  Upgrade activities as tolerated     [x]  Continue plan of care  []  Update interventions per flow sheet       []  Discharge due to:_  []  Other:_      Yrn Cancer, PT 5/9/2018  8:12 AM    Future Appointments  Date Time Provider Yi Osborn   5/9/2018 11:45 AM Michelle Wiggins NP 11 [facility]   5/17/2018 10:00 AM CRISTELA Partida [address]

## 2018-05-09 NOTE — PROGRESS NOTES
Subjective:   41 y.o. female for Well Woman Check. Patient's last menstrual period was 04/25/2018. Social History: single partner, contraception - none. Pertinent past medical hstory: no history of HTN, DVT, CAD, DM, liver disease, migraines or smoking. Patient Active Problem List    Diagnosis Date Noted    Cyst of ovary 05/09/2018    Dysmenorrhea 05/09/2018    Fatigue 05/09/2018    Hypertrophy of uterus 05/09/2018    Uterine leiomyoma 05/09/2018    Obesity, morbid ([address] 12/07/2017    Iron deficiency anemia 01/02/2015     Current Outpatient Prescriptions   Medication Sig Dispense Refill    HYDROcodone-acetaminophen (NORCO)  mg tablet Take 1-2 Tabs by mouth every four (4) hours as needed for Pain. Max Daily Amount: 12 Tabs. Do not take until after surgery 60 Tab 0    ibuprofen (MOTRIN) 800 mg tablet Take 1 Tab by mouth every eight (8) hours as needed for Pain. 100 Tab 1    diclofenac sodium (PENNSAID) 20 mg/gram /actuation(2 %) sopm 2 Pump(s) by Apply Externally route two (2) times a day. 1 Bottle 0    ammonium lactate (LAC-HYDRIN) 12 % topical cream Apply  to affected area two (2) times a day. rub in to affected area well 280 g 0    nystatin (MYCOSTATIN) topical cream Apply  to affected area two (2) times a day. 30 g 0    triamcinolone acetonide (KENALOG) 0.1 % topical cream Apply  to affected area two (2) times daily as needed for Skin Irritation. use thin layer 60 g 0    fluticasone (FLONASE) 50 mcg/actuation nasal spray 2 Sprays by Both Nostrils route daily.  1 Bottle 3     Allergies   Allergen Reactions    Codeine Nausea and Vomiting     Patient states she gets jittery, has upset stomach      Past Medical History:   Diagnosis Date    Left foot pain     Plantar fasciitis, left     Right shoulder pain      Past Surgical History:   Procedure Laterality Date    HX TUBAL LIGATION  2009    Tubal ligation     Family History   Problem Relation Age of Onset    Hypertension Mother    [facility] Hypertension Father     Arthritis-osteo Other      Social History   Substance Use Topics    Smoking status: Never Smoker    Smokeless tobacco: Never Used    Alcohol use No        ROS:  Feeling well. No dyspnea or chest pain on exertion. No abdominal pain, change in bowel habits, black or bloody stools. No urinary tract symptoms. GYN ROS: normal menses, no abnormal bleeding, pelvic pain or discharge, no breast pain or new or enlarging lumps on self exam. No neurological complaints. Objective:     Visit Vitals    /84 (BP 1 Location: Left arm, BP Patient Position: Sitting)    Pulse 70    Temp 98.2 °F (36.8 °C)    Resp 17    Ht 5' 2\" (1.575 m)    Wt 239 lb (108.4 kg)    LMP 04/25/2018    SpO2 99%    BMI 43.71 kg/m2     The patient appears well, alert, oriented x 3, in no distress. ENT normal.  Neck supple. No adenopathy or thyromegaly. MAYELA. Lungs are clear, good air entry, no wheezes, rhonchi or rales. S1 and S2 normal, no murmurs, regular rate and rhythm. Abdomen soft without tenderness, guarding, mass or organomegaly. Extremities show no edema, normal peripheral pulses. Neurological is normal, no focal findings. BREAST EXAM: breasts appear normal, no suspicious masses, no skin or nipple changes or axillary nodes    PELVIC EXAM: normal external genitalia, vulva, vagina, cervix, uterus and adnexa    Assessment/Plan:   well woman  mammogram  pap smear  additional lab tests per orders  return annually or prn    ICD-10-CM ICD-9-CM    1. Well woman exam with routine gynecological exam Z01.419 V72.31    2. Encounter for screening for cervical cancer  Z12.4 V76.2 PAP, LB, RFX HPV [id number]([id number])   3.  Screening for deficiency anemia Z13.0 V78.1 HGB & HCT      HGB & HCT   4. Screening for thyroid disorder Z13.29 V77.0 TSH 3RD GENERATION      TSH 3RD GENERATION   5. Screening for STD (sexually transmitted disease) Z11.3 V74.5 NUSWAB VAGINITIS PLUS   6. Screening for diabetes mellitus Z13.1 V77.1 METABOLIC PANEL, COMPREHENSIVE      METABOLIC PANEL, COMPREHENSIVE   7. Encounter for vitamin deficiency screening Z13.21 V77.99 VITAMIN D, 1, 25 DIHYDROXY      VITAMIN D, 1, 25 DIHYDROXY   8. Screening for hyperlipidemia Z13.220 V77.91 LIPID PANEL      LIPID PANEL   9. Encounter for screening mammogram for breast cancer Z12.31 V76.12 TALA MAMMO BI SCREENING INCL CAD   . PLAN:  We discussed screening recommendations. Pt given ACP to review    Pt given after visit summary.

## 2018-05-09 NOTE — MR AVS SNAPSHOT
[facility] 
 
 
 [address] [address] [address] [address] 
[phone] Patient: Shantal Hollins MRN:  [id number] Visit Information Date & Time Provider Department Dept. Phone Encounter #  
 5/9/2018 11:45 AM Flower Rendon NP [address] [facility] [id number] Your Appointments 5/17/2018 10:00 AM  
POST OP with Fauzia Agarwal [facility] ([address]) Appt Note: RIGHT KNEE 2wk fu  
 [address] [facility] [facility]  
[phone] [address] [place] [address], [facility] [address] Upcoming Health Maintenance Date Due DTaP/Tdap/Td series (1 - Tdap) 9/3/1993 PAP AKA CERVICAL CYTOLOGY 5/4/2018 Influenza Age 5 to Adult 8/1/2018 COLONOSCOPY 2/17/2026 Allergies as of 5/9/2018  Review Complete On: 5/9/2018 By: Dragan Ferreira LPN Severity Noted Reaction Type Reactions Codeine High 02/21/2012    Nausea and Vomiting Patient states she gets jittery, has upset stomach Current Immunizations  Never Reviewed Name Date Influenza Vaccine (Quad) PF 9/18/2014 Not reviewed this visit You Were Diagnosed With   
  
 Codes Comments Well woman exam with routine gynecological exam    -  Primary ICD-10-CM: F76.765 ICD-9-CM: V72.31 Encounter for screening for cervical cancer      ICD-10-CM: Z12.4 ICD-9-CM: V76.2 Screening for deficiency anemia     ICD-10-CM: Z13.0 ICD-9-CM: V78.1 Screening for thyroid disorder     ICD-10-CM: Z13.29 ICD-9-CM: V77.0 Screening for STD (sexually transmitted disease)     ICD-10-CM: Z11.3 ICD-9-CM: V74.5 Screening for diabetes mellitus     ICD-10-CM: Z13.1 ICD-9-CM: V77.1 Encounter for vitamin deficiency screening     ICD-10-CM: Z13.21 ICD-9-CM: V77.99 Screening for hyperlipidemia     ICD-10-CM: Z13.220 ICD-9-CM: V77.91   
 Encounter for screening mammogram for breast cancer     ICD-10-CM: Z12.31 
ICD-9-CM: V76.12 Vitals BP Pulse Temp Resp Height(growth percentile) Weight(growth percentile) 132/84 (BP 1 Location: Left arm, BP Patient Position: Sitting) 70 98.2 °F (36.8 °C) 17 5' 2\" (1.575 m) 239 lb (108.4 kg) LMP SpO2 BMI OB Status Smoking Status 04/25/2018 99% 43.71 kg/m2 Having regular periods Never Smoker BMI and BSA Data Body Mass Index Body Surface Area 43.71 kg/m 2 2.18 m 2 Preferred Pharmacy Pharmacy Name [facility]  - [place], [address] [facility] [phone] Your Updated Medication List  
  
   
This list is accurate as of 5/9/18 12:34 PM.  Always use your most recent med list.  
  
  
  
  
 ammonium lactate 12 % topical cream  
Commonly known as:  LAC-HYDRIN Apply  to affected area two (2) times a day. rub in to affected area well  
  
 diclofenac sodium 20 mg/gram /actuation(2 %) Sopm  
Commonly known as:  PENNSAID  
2 Pump(s) by Apply Externally route two (2) times a day. fluticasone 50 mcg/actuation nasal spray Commonly known as:  Monet Mealy 2 Sprays by Both Nostrils route daily. HYDROcodone-acetaminophen  mg tablet Commonly known as:  Marie Nate Take 1-2 Tabs by mouth every four (4) hours as needed for Pain. Max Daily Amount: 12 Tabs. Do not take until after surgery  
  
 ibuprofen 800 mg tablet Commonly known as:  MOTRIN Take 1 Tab by mouth every eight (8) hours as needed for Pain. nystatin topical cream  
Commonly known as:  MYCOSTATIN Apply  to affected area two (2) times a day. triamcinolone acetonide 0.1 % topical cream  
Commonly known as:  KENALOG Apply  to affected area two (2) times daily as needed for Skin Irritation. use thin layer To-Do List   
 05/09/2018 Lab:  HGB & HCT   
  
 05/09/2018 Lab:  LIPID PANEL   
  
 05/09/2018 Imaging:  TALA MAMMO BI SCREENING INCL CAD   
  
 05/09/2018 Lab:  METABOLIC PANEL, COMPREHENSIVE   
  
 05/09/2018 Lab:  NUSWAB VAGINITIS PLUS   
  
 05/09/2018 Pathology:  PAP, LB, RFX HPV ASYKO([id number])   
  
 05/09/2018 Lab:  TSH 3RD GENERATION   
  
 05/09/2018 Lab:  VITAMIN D, 1, 25 DIHYDROXY   
  
 05/16/2018 8:00 AM  
  Appointment with Jena Ghosh PT at [facility] ([phone])  
  
 05/18/2018 7:30 AM  
  Appointment with Minerva Ambrocio PT at [facility] ([phone]) [facility]! Dear Deidre Lyn: 
Thank you for requesting a Revver account. Our records indicate that you already have an active Revver account. You can access your account anytime at [email or web address] Did you know that you can access your hospital and ER discharge instructions at any time in Revver? You can also review all of your test results from your hospital stay or ER visit. Additional Information If you have questions, please visit the Frequently Asked Questions section of the Revver website at [email or web address] Remember, Revver is NOT to be used for urgent needs. For medical emergencies, dial 911. Now available from your iPhone and Android! Please provide this summary of care documentation to your next provider. Your primary care clinician is listed as [address]. If you have any questions after today's visit, please call [phone].

## 2018-05-09 NOTE — PROGRESS NOTES
Chief Complaint   Patient presents with    Well Woman     1. Have you been to the ER, urgent care clinic since your last visit? Hospitalized since your last visit? No    2. Have you seen or consulted any other health care providers outside of the [facility] since your last visit? Include any pap smears or colon screening.  No

## 2018-05-09 NOTE — ACP (ADVANCE CARE PLANNING)
Advance Care Planning (ACP) Provider Conversation Snapshot    Date of ACP Conversation: 05/09/18  Persons included in Conversation:  patient  Length of ACP Conversation in minutes:  <16 minutes (Non-Billable)    Authorized Decision Maker (if patient is incapable of making informed decisions): This person is:   Healthcare Agent/Medical Power of  under Advance Directive          For Patients with Decision Making Capacity:   Values/Goals: Exploration of values, goals, and preferences if recovery is not expected, even with continued medical treatment in the event of:  Imminent death  Severe, permanent brain injury  \"In these circumstances, what matters most to you? \"  Patient will discuss with family.     Conversation Outcomes / Follow-Up Plan:   Recommended completion of Advance Directive form after review of ACP materials and conversation with prospective healthcare agent

## 2018-05-13 LAB
CYTOLOGIST CVX/VAG CYTO: NORMAL
DX ICD CODE: NORMAL
LABCORP, 190119: NORMAL
Lab: NORMAL
OTHER STN SPEC: NORMAL
PATH REPORT.FINAL DX SPEC: NORMAL
STAT OF ADQ CVX/VAG CYTO-IMP: NORMAL

## 2018-05-14 LAB
1,25(OH)2D3 SERPL-MCNC: 53 PG/ML (ref 19.9–79.3)
A VAGINAE DNA VAG QL NAA+PROBE: ABNORMAL SCORE
ALBUMIN SERPL-MCNC: 3.8 G/DL (ref 3.5–5.5)
ALBUMIN/GLOB SERPL: 1.3 {RATIO} (ref 1.2–2.2)
ALP SERPL-CCNC: 88 IU/L (ref 39–117)
ALT SERPL-CCNC: 14 IU/L (ref 0–32)
AST SERPL-CCNC: 17 IU/L (ref 0–40)
BILIRUB SERPL-MCNC: 0.3 MG/DL (ref 0–1.2)
BUN SERPL-MCNC: 6 MG/DL (ref 6–24)
BUN/CREAT SERPL: 9 (ref 9–23)
BVAB2 DNA VAG QL NAA+PROBE: ABNORMAL SCORE
C ALBICANS DNA VAG QL NAA+PROBE: POSITIVE
C GLABRATA DNA VAG QL NAA+PROBE: NEGATIVE
C TRACH RRNA SPEC QL NAA+PROBE: NEGATIVE
CALCIUM SERPL-MCNC: 9 MG/DL (ref 8.7–10.2)
CHLORIDE SERPL-SCNC: 103 MMOL/L (ref 96–106)
CHOLEST SERPL-MCNC: 171 MG/DL (ref 100–199)
CO2 SERPL-SCNC: 25 MMOL/L (ref 18–29)
CREAT SERPL-MCNC: 0.69 MG/DL (ref 0.57–1)
GFR SERPLBLD CREATININE-BSD FMLA CKD-EPI: 105 ML/MIN/1.73
GFR SERPLBLD CREATININE-BSD FMLA CKD-EPI: 122 ML/MIN/1.73
GLOBULIN SER CALC-MCNC: 3 G/DL (ref 1.5–4.5)
GLUCOSE SERPL-MCNC: 79 MG/DL (ref 65–99)
HCT VFR BLD AUTO: 36.1 % (ref 34–46.6)
HDLC SERPL-MCNC: 75 MG/DL
HGB BLD-MCNC: 12.1 G/DL (ref 11.1–15.9)
LDLC SERPL CALC-MCNC: 80 MG/DL (ref 0–99)
MEGA1 DNA VAG QL NAA+PROBE: ABNORMAL SCORE
N GONORRHOEA RRNA SPEC QL NAA+PROBE: NEGATIVE
POTASSIUM SERPL-SCNC: 4.3 MMOL/L (ref 3.5–5.2)
PROT SERPL-MCNC: 6.8 G/DL (ref 6–8.5)
SODIUM SERPL-SCNC: 141 MMOL/L (ref 134–144)
T VAGINALIS RRNA SPEC QL NAA+PROBE: NEGATIVE
TRIGL SERPL-MCNC: 79 MG/DL (ref 0–149)
TSH SERPL DL<=0.005 MIU/L-ACNC: 2.69 UIU/ML (ref 0.45–4.5)
VLDLC SERPL CALC-MCNC: 16 MG/DL (ref 5–40)

## 2018-05-14 RX ORDER — METRONIDAZOLE 500 MG/1
500 TABLET ORAL 2 TIMES DAILY
Qty: 14 TAB | Refills: 0 | Status: SHIPPED | OUTPATIENT
Start: 2018-05-14 | End: 2018-08-03 | Stop reason: SDUPTHER

## 2018-05-14 RX ORDER — FLUCONAZOLE 150 MG/1
150 TABLET ORAL DAILY
Qty: 1 TAB | Refills: 0 | Status: SHIPPED | OUTPATIENT
Start: 2018-05-14 | End: 2018-08-03 | Stop reason: SDUPTHER

## 2018-05-14 NOTE — PROGRESS NOTES
Please advise Pt that her culture showed bacterial vaginosis and yeast so I sent in Diflucan and Flagyl  There is no signs of anemia. Her vit D is in normal range. Her TSH is in normal range. Her kidney and liver functions are normal. No signs of diabetes. Her PAP is normal.  Her cholesterol numbers look great.

## 2018-05-15 ENCOUNTER — TELEPHONE (OUTPATIENT)
Dept: FAMILY MEDICINE CLINIC | Age: 48
End: 2018-05-15

## 2018-05-15 NOTE — TELEPHONE ENCOUNTER
----- Message from Braydon Mars NP sent at 5/14/2018  4:30 PM EDT -----  Please advise Pt that her culture showed bacterial vaginosis and yeast so I sent in Diflucan and Flagyl  There is no signs of anemia. Her vit D is in normal range. Her TSH is in normal range. Her kidney and liver functions are normal. No signs of diabetes. Her PAP is normal.  Her cholesterol numbers look great.

## 2018-05-16 ENCOUNTER — APPOINTMENT (OUTPATIENT)
Dept: PHYSICAL THERAPY | Age: 48
End: 2018-05-16
Payer: COMMERCIAL

## 2018-05-17 ENCOUNTER — OFFICE VISIT (OUTPATIENT)
Dept: ORTHOPEDIC SURGERY | Age: 48
End: 2018-05-17

## 2018-05-17 VITALS
HEIGHT: 62 IN | WEIGHT: 243 LBS | SYSTOLIC BLOOD PRESSURE: 121 MMHG | TEMPERATURE: 98.1 F | BODY MASS INDEX: 44.72 KG/M2 | DIASTOLIC BLOOD PRESSURE: 69 MMHG | OXYGEN SATURATION: 100 % | HEART RATE: 73 BPM

## 2018-05-17 DIAGNOSIS — M17.11 PRIMARY OSTEOARTHRITIS OF RIGHT KNEE: Primary | ICD-10-CM

## 2018-05-18 ENCOUNTER — HOSPITAL ENCOUNTER (OUTPATIENT)
Dept: PHYSICAL THERAPY | Age: 48
Discharge: HOME | End: 2018-05-18
Payer: COMMERCIAL

## 2018-05-18 PROCEDURE — 97110 THERAPEUTIC EXERCISES: CPT

## 2018-05-18 NOTE — PROGRESS NOTES
PT DAILY TREATMENT NOTE     Patient Name: Gely Quan  Date:2018  : 1970  [x]  Patient  Verified  Payor: Abigail Sky / Plan: Burton Gay / Product Type: HMO /    In time:8:00  Out time: 8:35  Total Treatment Time (min): 35  Visit #: 9 of 10    Treatment Area: Right knee pain [M25.561]  S/P knee surgery [Z98.890]    SUBJECTIVE  Pain Level (0-10 scale): 0/10  Any medication changes, allergies to medications, adverse drug reactions, diagnosis change, or new procedure performed?: [x] No    [] Yes (see summary sheet for update)  Subjective functional status/changes:   [] No changes reported  I am getting injections for the comfort of the bone on bone and my doctor I not giving me more therapy at this time.     OBJECTIVE    Modality rationale: decrease edema to improve the patients ability to    Min Type Additional Details    [] Estim:  []Unatt       []IFC  []Premod                        []Other:  []w/ice   []w/heat  Position:  Location:    [] Estim: []Att    []TENS instruct  []NMES                    []Other:  []w/US   []w/ice   []w/heat  Position:  Location:    []  Traction: [] Cervical       []Lumbar                       [] Prone          []Supine                       []Intermittent   []Continuous Lbs:  [] before manual  [] after manual    []  Ultrasound: []Continuous   [] Pulsed                           []1MHz   []3MHz W/cm2:  Location:    []  Iontophoresis with dexamethasone         Location: [] Take home patch   [] In clinic    []  Ice     []  heat  []  Ice massage  []  Laser   []  Anodyne Position:  Location:    []  Laser with stim  []  Other:  Position:  Location:    []  Vasopneumatic Device Pressure:       [] lo [] med [] hi   Temperature: [] lo [] med [] hi   [x] Skin assessment post-treatment:  [x]intact [x]redness- no adverse reaction    []redness  adverse reaction:       35 min Therapeutic Exercise:  [x] See flow sheet :   Rationale: increase ROM, increase strength, improve coordination, improve balance and increase proprioception to improve the 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 With   [x] TE   [x] TA   [] neuro   [] other: Patient Education: [x] Review HEP    [] Progressed/Changed HEP based on:   [] positioning   [] body mechanics   [] transfers   [] heat/ice application    [x] other: Positioning for comfort     Other Objective/Functional Measures: Pts goals have been checked and met most except for FOTO to be obtained on Wed visit. Pain Level (0-10 scale) post treatment: 0/10    ASSESSMENT/Changes in Function: Pt with good carryover and benefit from skilled PT sessions. She has been educated to continue her HEP 2-3 x daily with increased reps and sets after 2-3 weeks. Patient will continue to benefit from skilled PT services to modify and progress therapeutic interventions, address ROM deficits and address strength deficits to attain remaining goals. [x]  See Plan of Care  []  See progress note/recertification  []  See Discharge Summary         Progress towards goals / Updated goals:  Short Term Goals: To be accomplished in 2 weeks:   1. Pt will report compliance and independence to HEP to help the pt manage their pain and symptoms.        Eval: Established          current:  Met  4/25/18        Long Term Goals: To be accomplished in 5 weeks:  1. Pt will increase FOTO score to 62 points to improve ability to perform ADLs. Eval: 46 points   Current:  43  4/30/18, Pt to fill out last FOTO on Wed. 2. Pt will increase MMT right knee EXT, right hip flex, and B hip ABD to 4/5 to improve pain with sit to stands. Eval: right knee EXT/hip flex 3+/5, B hip ABD 4-/5   . Goal Met R knee 4+/5 and hip ext and abd 4+/5. Curent:MMT:   (R)  Knee  Ext  5 Flx 4+   (R)  Hip F     4+   (B)   Hip   ABD   4+    5/9/18  3. Pt will increase AROM right knee flex to 115 degs to improve ability to ascend/descend stairs with more ease.   Eval: 106 degs, 5/18 113 R knee flexion  4. Pt will ambulate 500 ft with no AD, on level surfaces, with no LOB, and minimal to no gait devations to improve tolerance to prolonged walking at home and at work. Eval: ambulated in the evaluation with SPC, limping on the right LE, no LOB  Current:  Ambulated  On TM  .30 miles  5/8/18 GOAL MET 5/18/18.     PLAN  [x]  Upgrade activities as tolerated     [x]  Continue plan of care  []  Update interventions per flow sheet       []  Discharge due to:_  []  Other:_      Maxi Bustillo 5/18/2018  8:13 AM    Future Appointments  Date Time Provider Yi Osborn   5/23/2018 8:00 AM Fanny Downey, PT [facility]

## 2018-05-23 ENCOUNTER — APPOINTMENT (OUTPATIENT)
Dept: PHYSICAL THERAPY | Age: 48
End: 2018-05-23
Payer: COMMERCIAL

## 2018-05-29 ENCOUNTER — HOSPITAL ENCOUNTER (OUTPATIENT)
Dept: PHYSICAL THERAPY | Age: 48
Discharge: HOME | End: 2018-05-29
Payer: COMMERCIAL

## 2018-05-29 PROCEDURE — 97110 THERAPEUTIC EXERCISES: CPT

## 2018-05-29 PROCEDURE — 97530 THERAPEUTIC ACTIVITIES: CPT

## 2018-05-29 NOTE — PROGRESS NOTES
PT DISCHARGE DAILY NOTE AND [id number]    Date:2018  Patient name: Patt Chinchilla Start of Care: 2018   Referral source: Crystal Hoover,* : 1970                         Medical Diagnosis: Right knee pain [M25.561]  S/P knee surgery [Z98.890] Onset Date: DOS 2018                         Treatment Diagnosis: right knee pain   Prior Hospitalization: see medical history Provider#: [id number]   Medications: Verified on Patient summary List    Comorbidities: arthritis   Prior Level of Function: Independent with ADLs, functional and work tasks before the initial onset about 1 year ago. Reports having pain in the right knee with functional tasks before the surgery.        Visits from Start of Care: 10    Missed Visits: 2    Reporting Period : 2018 to 18    [x]  Patient  Verified  Payor: Kevin Schofield / Plan: Pravin Willoughby / Product Type: HMO /    In time:12:59 Out time:1:46  Total Treatment Time (min): 45  Visit #: 10 of 10    SUBJECTIVE  Pain Level (0-10 scale): 0  Any medication changes, allergies to medications, adverse drug reactions, diagnosis change, or new procedure performed?: [x] No    [] Yes (see summary sheet for update)  Subjective functional status/changes:   [] No changes reported  I'm Doing good, at 95% improvement. Doing good with all tasks around the house and expect to go back to work next week. Walking 10 min at the gym on the TM.     OBJECTIVE        30 min Therapeutic Exercise:  [x] See flow sheet :   Rationale: increase ROM, increase strength and improve coordination to improve the patients ability to Return to performing normal ADL    10 min Therapeutic Activity:  [x]  See flow sheet :   Rationale: increase ROM, increase strength and improve coordination  to improve the patients ability to Return to performing normal ADL     With   [x] TE   [] TA   [] neuro   [] other: Patient Education: [x] Review HEP    [] Progressed/Changed HEP based on:   [] positioning   [] body mechanics   [] transfers   [] heat/ice application    [] other:      Other Objective/Functional Measures:   - AROM Right Knee F 132  E + 12  - MMT Hip Flx Right 5- Hip ABD (B) 5-, Right Knee F 4-4+ Ext 5  - FOTO = 69   - TM 0.40 mils in 10'24\"       Pain Level (0-10 scale) post treatment: 0    Summary of Care:  Progress towards goals / Updated goals:  Short Term Goals: To be accomplished in 2 weeks:   1. Pt will report compliance and independence to [facility] to help the pt manage their pain and symptoms.        Eval: Established            Current:  Met  4/25/18        Long Term Goals: To be accomplished in 5 weeks:  1. Pt will increase FOTO score to 62 points to improve ability to perform ADLs. Eval: 46 points     Current:  Met at 69 5/29/18  2. Pt will increase MMT right knee EXT, right hip flex, and B hip ABD to 4/5 to improve pain with sit to stands. Eval: right knee EXT/hip flex 3+/5, B hip ABD 4-/5   . Goal Met R knee 4+/5 and hip ext and abd 4+/5. Curent:MMT:  Met at MMT Hip Flx Right 5- Hip ABD (B) 5-, Right Knee F 4-4+ Ext 5 5/25/18   3. Pt will increase AROM right knee flex to 115 degs to improve ability to ascend/descend stairs with more ease. Eval: 106 degs,   Current: Met at AROM Right Knee F 132  E + 12      4. Pt will ambulate 500 ft with no AD, on level surfaces, with no LOB, and minimal to no gait devations to improve tolerance to prolonged walking at home and at work. Eval: ambulated in the evaluation with SPC, limping on the right LE, no LOB  Current:      ASSESSMENT/Changes in Function: Patient has shown good progress with this treatment program. Pain as of last visit was 0/10. Patient has shown decreased pain and increased strength and mobility. Patient reports 95% improvement with overall involvement. FOTO score is 69. All STG/LTGs achieved as identified above.       Fall Risk Assessment: Patient demonstrates no Fall Risk     Thank you for this referral! PLAN  [x]Discontinue therapy: [x]Patient has reached or is progressing toward set goals      []Patient is non-compliant or has abdicated      []Due to lack of appreciable progress towards set goals    Monse Valdez, PT 5/29/2018  1:04 PM

## 2018-06-04 ENCOUNTER — OFFICE VISIT (OUTPATIENT)
Dept: ORTHOPEDIC SURGERY | Age: 48
End: 2018-06-04

## 2018-06-04 VITALS — HEART RATE: 93 BPM | OXYGEN SATURATION: 100 % | HEIGHT: 62 IN

## 2018-06-04 DIAGNOSIS — M17.11 PRIMARY OSTEOARTHRITIS OF RIGHT KNEE: Primary | ICD-10-CM

## 2018-06-04 DIAGNOSIS — Z98.890 H/O ARTHROSCOPY OF RIGHT KNEE: ICD-10-CM

## 2018-06-04 NOTE — PROGRESS NOTES
Patient: Naomi Tracey  YOB: 1970       HISTORY:  The patient presents for reevaluation of her right knee status post arthroscopic partial medial menisectomy with grade III-IV chondromalacia on 4/6/18. Patient is improved, states pain is a 0 out of 10. She does not have pain anymore. she cont with physical therapy and feels like she is improving. Patient denies any fever, chills, chest pain, shortness of breath or calf pain. There are no new illness or injuries to report since last seen in the office. PHYSICAL EXAMINATION:    Visit Vitals    Pulse 93    Ht 5' 2\" (1.575 m)    SpO2 100%     The patient is a well-developed, well-nourished female in no acute distress. The patient is alert and oriented times three. The patient appears to be well groomed. Mood and affect are normal.   ORTHOPEDIC EXAM of Right knee: Inspection: Effusion absent, incisions well healed  TTP: none  Range of motion: 0-120 flexion  Stability: Stable  Strength: 5/5  2+ distal pulses    IMPRESSION:  Status post Right knee arthroscopic partial medial menisectomy with degenerative arthritis with joint space narrowing. PLAN:   1. Patient is doing well post operatively   2. Will cont with activities as tolerated  3.  Return to work on [address]

## 2018-06-04 NOTE — LETTER
NOTIFICATION RETURN TO WORK / SCHOOL 
 
6/4/2018 4:18 PM 
 
Ms. Catarino Cifuentes [address] To Whom It May Concern: 
 
Catarino Cifuentes is currently under the care of [address] [place] [address] Larry Atwood. She will return to work on Monday, 06/11/18. If there are questions or concerns please have the patient contact our office. Sincerely, CRISTELA Livingston

## 2018-06-26 ENCOUNTER — DOCUMENTATION ONLY (OUTPATIENT)
Dept: ORTHOPEDIC SURGERY | Age: 48
End: 2018-06-26

## 2018-06-26 NOTE — PROGRESS NOTES
Patient dropped off [employer] Disability paperwork at the [facility]  06-26-18.   Patient is aware of the 3-22 business day policy and would like a call when she is able to  at [facility] at [facility]

## 2018-08-03 ENCOUNTER — OFFICE VISIT (OUTPATIENT)
Dept: FAMILY MEDICINE CLINIC | Age: 48
End: 2018-08-03

## 2018-08-03 ENCOUNTER — HOSPITAL ENCOUNTER (OUTPATIENT)
Dept: LAB | Age: 48
Discharge: HOME | End: 2018-08-03

## 2018-08-03 VITALS
HEIGHT: 62 IN | TEMPERATURE: 97.5 F | BODY MASS INDEX: 42.69 KG/M2 | OXYGEN SATURATION: 100 % | DIASTOLIC BLOOD PRESSURE: 89 MMHG | SYSTOLIC BLOOD PRESSURE: 132 MMHG | HEART RATE: 68 BPM | WEIGHT: 232 LBS | RESPIRATION RATE: 16 BRPM

## 2018-08-03 DIAGNOSIS — B96.89 BACTERIAL VAGINOSIS: ICD-10-CM

## 2018-08-03 DIAGNOSIS — Z11.3 ROUTINE SCREENING FOR STI (SEXUALLY TRANSMITTED INFECTION): Primary | ICD-10-CM

## 2018-08-03 DIAGNOSIS — N76.0 BACTERIAL VAGINOSIS: ICD-10-CM

## 2018-08-03 DIAGNOSIS — B37.31 VAGINAL CANDIDIASIS: ICD-10-CM

## 2018-08-03 PROCEDURE — 99001 SPECIMEN HANDLING PT-LAB: CPT | Performed by: NURSE PRACTITIONER

## 2018-08-03 RX ORDER — METRONIDAZOLE 500 MG/1
500 TABLET ORAL 2 TIMES DAILY
Qty: 14 TAB | Refills: 0 | Status: SHIPPED | OUTPATIENT
Start: 2018-08-03 | End: 2018-09-12 | Stop reason: ALTCHOICE

## 2018-08-03 RX ORDER — FLUCONAZOLE 150 MG/1
150 TABLET ORAL DAILY
Qty: 1 TAB | Refills: 0 | Status: SHIPPED | OUTPATIENT
Start: 2018-08-03 | End: 2018-08-04

## 2018-08-03 NOTE — MR AVS SNAPSHOT
[facility] 
 
 
 [address] [address] [address] [facility] [address] [phone] Patient: Robina Hernandez MRN:  [id number] Visit Information Date & Time Provider Department Dept. Phone Encounter #  
 8/3/2018  7:45 AM Tete Urbano NP [address] [place] [address] [id number] Follow-up Instructions Return if symptoms worsen or fail to improve. Upcoming Health Maintenance Date Due DTaP/Tdap/Td series (1 - Tdap) 9/3/1993 Influenza Age 5 to Adult 8/1/2018 PAP AKA CERVICAL CYTOLOGY 5/9/2021 COLONOSCOPY 2/17/2026 Allergies as of 8/3/2018  Review Complete On: 8/3/2018 By: Tete Urbano NP Severity Noted Reaction Type Reactions Codeine High 02/21/2012    Nausea and Vomiting Patient states she gets jittery, has upset stomach Current Immunizations  Never Reviewed Name Date Influenza Vaccine (Quad) PF 9/18/2014 Not reviewed this visit You Were Diagnosed With   
  
 Codes Comments Routine screening for STI (sexually transmitted infection)    -  Primary ICD-10-CM: Z11.3 ICD-9-CM: V74.5 Bacterial vaginosis     ICD-10-CM: N76.0, B96.89 
ICD-9-CM: 616.10, 041.9 Vaginal candidiasis     ICD-10-CM: B37.3 ICD-9-CM: 112.1 Vitals BP Pulse Temp Resp Height(growth percentile) Weight(growth percentile) 132/89 (BP 1 Location: Left arm, BP Patient Position: Sitting) 68 97.5 °F (36.4 °C) (Oral) 16 5' 2\" (1.575 m) 232 lb (105.2 kg) LMP SpO2 BMI OB Status Smoking Status 07/18/2018 (Exact Date) 100% 42.43 kg/m2 Having regular periods Never Smoker BMI and BSA Data Body Mass Index Body Surface Area  
 42.43 kg/m 2 2.15 m 2 Preferred Pharmacy Pharmacy Name [facility] - [facility], [address] [facility] [phone] Your Updated Medication List  
  
   
This list is accurate as of 8/3/18  8:08 AM.  Always use your most recent med list.  
  
  
  
  
 ammonium lactate 12 % topical cream  
Commonly known as:  LAC-HYDRIN Apply  to affected area two (2) times a day. rub in to affected area well  
  
 diclofenac sodium 20 mg/gram /actuation(2 %) Sopm  
Commonly known as:  PENNSAID  
2 Pump(s) by Apply Externally route two (2) times a day. fluconazole 150 mg tablet Commonly known as:  DIFLUCAN Take 1 Tab by mouth daily for 1 day. FDA advises cautious prescribing of oral fluconazole in pregnancy. fluticasone 50 mcg/actuation nasal spray Commonly known as:  Daphney Reges 2 Sprays by Both Nostrils route daily. HYDROcodone-acetaminophen  mg tablet Commonly known as:  Camila Oni Take 1-2 Tabs by mouth every four (4) hours as needed for Pain. Max Daily Amount: 12 Tabs. Do not take until after surgery  
  
 ibuprofen 800 mg tablet Commonly known as:  MOTRIN Take 1 Tab by mouth every eight (8) hours as needed for Pain. metroNIDAZOLE 500 mg tablet Commonly known as:  FLAGYL Take 1 Tab by mouth two (2) times a day. nystatin topical cream  
Commonly known as:  MYCOSTATIN Apply  to affected area two (2) times a day. triamcinolone acetonide 0.1 % topical cream  
Commonly known as:  KENALOG Apply  to affected area two (2) times daily as needed for Skin Irritation. use thin layer Prescriptions Sent to Pharmacy Refills  
 metroNIDAZOLE (FLAGYL) 500 mg tablet 0 Sig: Take 1 Tab by mouth two (2) times a day. Class: Normal  
 Pharmacy: [facility] [facility] Ph #: [phone] Route: Oral  
 fluconazole (DIFLUCAN) 150 mg tablet 0 Sig: Take 1 Tab by mouth daily for 1 day. FDA advises cautious prescribing of oral fluconazole in pregnancy. Class: Normal  
 Pharmacy: [facility] [facility] Ph #: [phone] Route: Oral  
  
Follow-up Instructions Return if symptoms worsen or fail to improve. To-Do List   
 08/03/2018 Lab:  CT/NG/T.VAGINALIS AMPLIFICATION   
  
 08/03/2018 Lab:  HEPATITIS C AB   
  
 08/03/2018 Lab:  HIV 1/2 AG/AB, 4TH GENERATION,W RFLX CONFIRM Patient Instructions Safer Sex: Care Instructions Your Care Instructions Safer sex is a way to reduce your risk of getting an infection spread through sex. It can also help prevent pregnancy. Most infections that are spread through sex, also called sexually transmitted infections or STIs, can be cured. But some can decrease your chances of getting pregnant if they are not treated early. Others, such as herpes, have no cure. And some, such as HIV, can be deadly. Several products can help you practice safer sex and reduce your chance of STIs. One of the best is a condom. There are condoms for men and for women. The female condom is a tube of soft plastic with a closed end that is placed deep into the vagina. You can use a special rubber sheet (dental dam) for protection during oral sex. Latex gloves can keep your hands from touching blood, semen, or other body fluids that can carry infections. Remember that birth control methods such as diaphragms, IUDs, foams, and birth control pills do not stop you from getting STIs. Follow-up care is a key part of your treatment and safety. Be sure to make and go to all appointments, and call your doctor if you are having problems. It's also a good idea to know your test results and keep a list of the medicines you take. How can you care for yourself at home? · Think about getting shots to prevent hepatitis A and hepatitis B. These two diseases can be spread through sex. You also can get hepatitis A if you eat infected food. · Use condoms or female condoms each time and every time you have sex. · Learn the right way to use a male condom: ¨ Condoms come in several sizes. Make sure you use the right size.  A condom that is too small can break easily. A condom that is too big can slip off during sex. Use a new condom each time you have sex. ¨ Be careful not to poke a hole in the condom when you open the wrapper. ¨ Squeeze the tip of the condom to keep out air. ¨ Pull down the loose skin (foreskin) from the head of an uncircumcised penis. ¨ While squeezing the tip of the condom, unroll it all the way down to the base of the firm penis. ¨ Never use petroleum jelly (such as Vaseline), grease, hand lotion, baby oil, or anything with oil in it. These products can make holes in the condom. ¨ After sex, hold the condom on your penis as you remove your penis from your partner. This will keep semen from spilling out of the condom. · Learn to use a female condom: 
¨ You can put in a female condom up to 8 hours before sex. ¨ Squeeze the smaller ring at the closed end and insert it deep into the vagina. The larger ring at the open end should stay outside the vagina. ¨ During sex, make sure the penis goes into the condom. ¨ After the penis is removed, close the open end of the condom by twisting it. Remove the condom. · Do not use a female condom and male condom at the same time. · Do not have sex with anyone who has symptoms of an STI, such as sores on the genitals or mouth. The herpes virus that causes cold sores can spread to and from the penis and vagina. · Do not drink a lot of alcohol or use drugs before sex. This can cause you to let down your guard and not practice safer sex. · Having one sex partner (who does not have STIs and does not have sex with anyone else) is a sure way to avoid STIs. · Talk to your partner before you have sex. Find out if he or she has or is at risk for any STI. Keep in mind that a person may be able to spread an STI even if he or she does not have symptoms. You and your partner may want to get an HIV test. You should get tested again 6 months later. Where can you learn more? Go to http://elana-bela.info/. Enter C968 in the search box to learn more about \"Safer Sex: Care Instructions. \" Current as of: November 27, 2017 Content Version: 11.7 © 0775-3923 [employer]. Care instructions adapted under license by [employer] (which disclaims liability or warranty for this information). If you have questions about a medical condition or this instruction, always ask your healthcare professional. [facility] [address] [facility] any warranty or liability for your use of this information. Introducing [facility]! Dear Jailyn Charles: 
Thank you for requesting a Loopt account. Our records indicate that you already have an active Loopt account. You can access your account anytime at [email or web address] Did you know that you can access your hospital and ER discharge instructions at any time in Loopt? You can also review all of your test results from your hospital stay or ER visit. Additional Information If you have questions, please visit the Frequently Asked Questions section of the Loopt website at [email or web address] Remember, Loopt is NOT to be used for urgent needs. For medical emergencies, dial 911. Now available from your iPhone and Android! Please provide this summary of care documentation to your next provider. Your primary care clinician is listed as [address]. If you have any questions after today's visit, please call [phone].

## 2018-08-03 NOTE — PROGRESS NOTES
Patient stated she recently found out her  was having an affair, she would like to have STD testing at this time. 1. Have you been to the ER, urgent care clinic since your last visit? Hospitalized since your last visit? No    2. Have you seen or consulted any other health care providers outside of the [address] [place] [address] since your last visit? Include any pap smears or colon screening.  No

## 2018-08-03 NOTE — PATIENT INSTRUCTIONS
Safer Sex: Care Instructions  Your Care Instructions  Safer sex is a way to reduce your risk of getting an infection spread through sex. It can also help prevent pregnancy. Most infections that are spread through sex, also called sexually transmitted infections or STIs, can be cured. But some can decrease your chances of getting pregnant if they are not treated early. Others, such as herpes, have no cure. And some, such as HIV, can be deadly. Several products can help you practice safer sex and reduce your chance of STIs. One of the best is a condom. There are condoms for men and for women. The female condom is a tube of soft plastic with a closed end that is placed deep into the vagina. You can use a special rubber sheet (dental dam) for protection during oral sex. Latex gloves can keep your hands from touching blood, semen, or other body fluids that can carry infections. Remember that birth control methods such as diaphragms, IUDs, foams, and birth control pills do not stop you from getting STIs. Follow-up care is a key part of your treatment and safety. Be sure to make and go to all appointments, and call your doctor if you are having problems. It's also a good idea to know your test results and keep a list of the medicines you take. How can you care for yourself at home? · Think about getting shots to prevent hepatitis A and hepatitis B. These two diseases can be spread through sex. You also can get hepatitis A if you eat infected food. · Use condoms or female condoms each time and every time you have sex. · Learn the right way to use a male condom:  ¨ Condoms come in several sizes. Make sure you use the right size. A condom that is too small can break easily. A condom that is too big can slip off during sex. Use a new condom each time you have sex. ¨ Be careful not to poke a hole in the condom when you open the wrapper. ¨ Squeeze the tip of the condom to keep out air.   ¨ Pull down the loose skin (foreskin) from the head of an uncircumcised penis. ¨ While squeezing the tip of the condom, unroll it all the way down to the base of the firm penis. ¨ Never use petroleum jelly (such as Vaseline), grease, hand lotion, baby oil, or anything with oil in it. These products can make holes in the condom. ¨ After sex, hold the condom on your penis as you remove your penis from your partner. This will keep semen from spilling out of the condom. · Learn to use a female condom:  ¨ You can put in a female condom up to 8 hours before sex. ¨ Squeeze the smaller ring at the closed end and insert it deep into the vagina. The larger ring at the open end should stay outside the vagina. ¨ During sex, make sure the penis goes into the condom. ¨ After the penis is removed, close the open end of the condom by twisting it. Remove the condom. · Do not use a female condom and male condom at the same time. · Do not have sex with anyone who has symptoms of an STI, such as sores on the genitals or mouth. The herpes virus that causes cold sores can spread to and from the penis and vagina. · Do not drink a lot of alcohol or use drugs before sex. This can cause you to let down your guard and not practice safer sex. · Having one sex partner (who does not have STIs and does not have sex with anyone else) is a sure way to avoid STIs. · Talk to your partner before you have sex. Find out if he or she has or is at risk for any STI. Keep in mind that a person may be able to spread an STI even if he or she does not have symptoms. You and your partner may want to get an HIV test. You should get tested again 6 months later. Where can you learn more? Go to http://elana-bela.info/. Enter I719 in the search box to learn more about \"Safer Sex: Care Instructions. \"  Current as of: November 27, 2017  Content Version: 11.7  © 0034-5208 "[employer] Mobi[employer]Moto.  Care instructions adapted under license by [employer] (which disclaims liability or warranty for this information). If you have questions about a medical condition or this instruction, always ask your healthcare professional. [address] any warranty or liability for your use of this information.

## 2018-08-03 NOTE — PROGRESS NOTES
HISTORY OF PRESENT ILLNESS    Roxana Pinto is a 41y.o. year old female comes in today to be evaluated and treated for:  STD testing    Patient reports she recently discovered her  has been having an affair and would like to have testing. Denies vaginal discharge/pain. No other concerns expressed at this time. Patient reports she was not aware of the my chart message sent regarding her PAP revealing BV and yeast.  Comments although she is not having any symptoms currently she would like to have medication resent to the pharmacy. Allergies   Allergen Reactions    Codeine Nausea and Vomiting     Patient states she gets jittery, has upset stomach      Current Outpatient Prescriptions   Medication Sig Dispense Refill    ammonium lactate (LAC-HYDRIN) 12 % topical cream Apply  to affected area two (2) times a day. rub in to affected area well 280 g 0    triamcinolone acetonide (KENALOG) 0.1 % topical cream Apply  to affected area two (2) times daily as needed for Skin Irritation. use thin layer 60 g 0    metroNIDAZOLE (FLAGYL) 500 mg tablet Take 1 Tab by mouth two (2) times a day. 14 Tab 0    HYDROcodone-acetaminophen (NORCO)  mg tablet Take 1-2 Tabs by mouth every four (4) hours as needed for Pain. Max Daily Amount: 12 Tabs. Do not take until after surgery 60 Tab 0    ibuprofen (MOTRIN) 800 mg tablet Take 1 Tab by mouth every eight (8) hours as needed for Pain. 100 Tab 1    diclofenac sodium (PENNSAID) 20 mg/gram /actuation(2 %) sopm 2 Pump(s) by Apply Externally route two (2) times a day. 1 Bottle 0    nystatin (MYCOSTATIN) topical cream Apply  to affected area two (2) times a day. 30 g 0    fluticasone (FLONASE) 50 mcg/actuation nasal spray 2 Sprays by Both Nostrils route daily.  1 Bottle 3     Past Medical History:   Diagnosis Date    Left foot pain     Plantar fasciitis, left     Right shoulder pain        ROS:  Review of Systems - General ROS: negative  Gastrointestinal ROS: no abdominal pain, change in bowel habits, or black or bloody stools  Genito-Urinary ROS: negative for - pelvic pain or vulvar/vaginal symptoms        Objective:  Visit Vitals    /89 (BP 1 Location: Left arm, BP Patient Position: Sitting)    Pulse 68    Temp 97.5 °F (36.4 °C) (Oral)    Resp 16    Ht 5' 2\" (1.575 m)    Wt 232 lb (105.2 kg)    LMP 07/18/2018 (Exact Date)    SpO2 100%    BMI 42.43 kg/m2     General appearance - alert, well appearing, and in no distress  Neck - supple, no significant adenopathy  Chest - clear to auscultation, no wheezes, rales or rhonchi, symmetric air entry  Heart - normal rate, regular rhythm, normal S1, S2, no murmurs, rubs, clicks or gallops  Abdomen: soft, non-tender. Bowel sounds normal. No masses,  no organomegaly  Extremities: extremities normal, atraumatic, no cyanosis or edema          Assessment/Plan:     ICD-10-CM ICD-9-CM    1. Routine screening for STI (sexually transmitted infection) Z11.3 V74.5 HIV 1/2 AG/AB, 4TH GENERATION,W RFLX CONFIRM      HEPATITIS C AB      CT/NG/T.VAGINALIS AMPLIFICATION   2. Bacterial vaginosis N76.0 616.10 metroNIDAZOLE (FLAGYL) 500 mg tablet    B96.89 041.9    3. Vaginal candidiasis B37.3 112.1 fluconazole (DIFLUCAN) 150 mg tablet   Requesting a telephone call with lab results. I have discussed the diagnosis with the patient and the intended plan as seen in the above orders. The patient has received an after-visit summary and questions were answered concerning future plans. I have discussed medication side effects and warnings with the patient as well. Patient agreeable with above plan and verbalizes understanding. Follow-up Disposition:  Return if symptoms worsen or fail to improve.

## 2018-08-05 LAB
C TRACH RRNA SPEC QL NAA+PROBE: NEGATIVE
N GONORRHOEA RRNA SPEC QL NAA+PROBE: NEGATIVE
T VAGINALIS RRNA SPEC QL NAA+PROBE: NEGATIVE

## 2018-08-07 LAB
HCV AB S/CO SERPL IA: <0.1 S/CO RATIO (ref 0–0.9)
HIV 1+2 AB+HIV1 P24 AG SERPL QL IA: NON REACTIVE
T PALLIDUM AB SER QL IF: NON REACTIVE

## 2018-08-07 NOTE — PROGRESS NOTES
Please call patient and inform her of negative test results thus far, awaiting syphilis results.   Thanks, MISHEL MccallC

## 2018-08-08 ENCOUNTER — TELEPHONE (OUTPATIENT)
Dept: FAMILY MEDICINE CLINIC | Age: 48
End: 2018-08-08

## 2018-08-08 NOTE — TELEPHONE ENCOUNTER
Patient calling asking for a call back with her test results.  Wants to know if she can be called back today about them

## 2018-08-08 NOTE — TELEPHONE ENCOUNTER
Patient was given message as written by Janiece Cabot:    Notes Recorded by Kim Puentes NP on 8/7/2018 at 4:23 PM  Please call patient and inform her of negative test results thus far, awaiting syphilis results. MISHEL SutherlandC

## 2018-09-12 ENCOUNTER — OFFICE VISIT (OUTPATIENT)
Dept: FAMILY MEDICINE CLINIC | Age: 48
End: 2018-09-12

## 2018-09-12 VITALS
DIASTOLIC BLOOD PRESSURE: 63 MMHG | TEMPERATURE: 97.8 F | OXYGEN SATURATION: 98 % | BODY MASS INDEX: 41.41 KG/M2 | RESPIRATION RATE: 16 BRPM | HEIGHT: 62 IN | SYSTOLIC BLOOD PRESSURE: 125 MMHG | WEIGHT: 225 LBS | HEART RATE: 68 BPM

## 2018-09-12 DIAGNOSIS — N76.0 BV (BACTERIAL VAGINOSIS): ICD-10-CM

## 2018-09-12 DIAGNOSIS — B96.89 BV (BACTERIAL VAGINOSIS): ICD-10-CM

## 2018-09-12 DIAGNOSIS — B37.31 CANDIDA VAGINITIS: Primary | ICD-10-CM

## 2018-09-12 RX ORDER — FLUCONAZOLE 150 MG/1
150 TABLET ORAL DAILY
Qty: 1 TAB | Refills: 0 | Status: SHIPPED | OUTPATIENT
Start: 2018-09-12 | End: 2018-09-13

## 2018-09-12 RX ORDER — METRONIDAZOLE 7.5 MG/G
1 GEL VAGINAL
Qty: 187.5 MG | Refills: 0 | Status: SHIPPED | OUTPATIENT
Start: 2018-09-12 | End: 2018-09-17

## 2018-09-12 NOTE — PROGRESS NOTES
HISTORY OF PRESENT ILLNESS  Delbert Moreno is a 57 y.o. female. HPI  Patient is here today for evaluation and treatment of: Vaginal Itching    Vaginal Itching:  She has had a vgainal discharge and itch since yesterday; No fever; No vomiting ; No abdominal pain. States that she has had sx before and was treated with both a diflucan and a med for vaginal bacterial vaginosis. Current Outpatient Prescriptions:     triamcinolone acetonide (KENALOG) 0.1 % topical cream, Apply  to affected area two (2) times daily as needed for Skin Irritation. use thin layer, Disp: 60 g, Rfl: 0    ibuprofen (MOTRIN) 800 mg tablet, Take 1 Tab by mouth every eight (8) hours as needed for Pain., Disp: 100 Tab, Rfl: 1    diclofenac sodium (PENNSAID) 20 mg/gram /actuation(2 %) sopm, 2 Pump(s) by Apply Externally route two (2) times a day., Disp: 1 Bottle, Rfl: 0    ammonium lactate (LAC-HYDRIN) 12 % topical cream, Apply  to affected area two (2) times a day. rub in to affected area well, Disp: 280 g, Rfl: 0    nystatin (MYCOSTATIN) topical cream, Apply  to affected area two (2) times a day., Disp: 30 g, Rfl: 0    fluticasone (FLONASE) 50 mcg/actuation nasal spray, 2 Sprays by Both Nostrils route daily. , Disp: 1 Bottle, Rfl: 3    HYDROcodone-acetaminophen (NORCO)  mg tablet, Take 1-2 Tabs by mouth every four (4) hours as needed for Pain. Max Daily Amount: 12 Tabs. Do not take until after surgery, Disp: 60 Tab, Rfl: 0      PMH,  Meds, Allergies, Family History, Social history reviewed    Review of Systems   Constitutional: Negative for chills and fever. Cardiovascular: Negative for chest pain, palpitations and leg swelling. Physical Exam   Constitutional: She appears well-developed and well-nourished. No distress. Cardiovascular: Normal rate and regular rhythm. Exam reveals no gallop and no friction rub. No murmur heard. Pulmonary/Chest: Breath sounds normal. No respiratory distress.  She has no wheezes. She has no rales. Genitourinary: Vaginal discharge (in vaginal vault with thick consistency white discharge) found. Nursing note and vitals reviewed. Visit Vitals    /63 (BP 1 Location: Left arm, BP Patient Position: Sitting)    Pulse 68    Temp 97.8 °F (36.6 °C) (Oral)    Resp 16    Ht 5' 2\" (1.575 m)    Wt 225 lb (102.1 kg)    LMP 08/24/2018 (Exact Date)    SpO2 98%    BMI 41.15 kg/m2     koh + fungal elements; ? Clue cells    ASSESSMENT and PLAN    ICD-10-CM ICD-9-CM    1. Candida vaginitis B37.3 112.1 NUSWAB VAGINITIS PLUS      fluconazole (DIFLUCAN) 150 mg tablet      NUSWAB VAGINITIS PLUS   2. BV (bacterial vaginosis) N76.0 616.10 metroNIDAZOLE (METROGEL) 0.75 % vaginal gel    B96.89 041. 9        As above, new   treatment plan as listed below  Orders Placed This Encounter    NUSWAB VAGINITIS PLUS    fluconazole (DIFLUCAN) 150 mg tablet    metroNIDAZOLE (METROGEL) 0.75 % vaginal gel     Follow-up Disposition:  Return if symptoms worsen or fail to improve. An After Visit Summary was printed and given to the patient. This has been fully explained to the patient, who indicates understanding.

## 2018-09-12 NOTE — MR AVS SNAPSHOT
[address] [facility] 
 
 
 [address] [address] [address] [facility] [address] 
[phone] Patient: Mani Hassan MRN:  [id number] Visit Information Date & Time Provider Department Dept. Phone Encounter #  
 9/12/2018 11:20 AM Jim Benavides [address] [place] [address] [facility] [id number] Follow-up Instructions Return if symptoms worsen or fail to improve. Upcoming Health Maintenance Date Due DTaP/Tdap/Td series (1 - Tdap) 9/3/1993 Influenza Age 5 to Adult 8/1/2018 PAP AKA CERVICAL CYTOLOGY 5/9/2021 COLONOSCOPY 2/17/2026 Allergies as of 9/12/2018  Review Complete On: 9/12/2018 By: Mirella Davidson LPN Severity Noted Reaction Type Reactions Codeine High 02/21/2012    Nausea and Vomiting Patient states she gets jittery, has upset stomach Current Immunizations  Never Reviewed Name Date Influenza Vaccine (Quad) PF 9/18/2014 Not reviewed this visit You Were Diagnosed With   
  
 Codes Comments Candida vaginitis    -  Primary ICD-10-CM: B37.3 ICD-9-CM: 112.1 BV (bacterial vaginosis)     ICD-10-CM: N76.0, B96.89 
ICD-9-CM: 616.10, 041.9 Vitals BP Pulse Temp Resp Height(growth percentile) Weight(growth percentile) 125/63 (BP 1 Location: Left arm, BP Patient Position: Sitting) 68 97.8 °F (36.6 °C) (Oral) 16 5' 2\" (1.575 m) 225 lb (102.1 kg) LMP SpO2 BMI OB Status Smoking Status 08/24/2018 (Exact Date) 98% 41.15 kg/m2 Having regular periods Never Smoker BMI and BSA Data Body Mass Index Body Surface Area  
 41.15 kg/m 2 2.11 m 2 Preferred Pharmacy Pharmacy Name Phone [facility] [address] [facility], [facility] [phone] Your Updated Medication List  
  
   
This list is accurate as of 9/12/18 12:33 PM.  Always use your most recent med list.  
  
  
  
  
 ammonium lactate 12 % topical cream  
 Commonly known as:  LAC-HYDRIN Apply  to affected area two (2) times a day. rub in to affected area well  
  
 diclofenac sodium 20 mg/gram /actuation(2 %) Sopm  
Commonly known as:  PENNSAID  
2 Pump(s) by Apply Externally route two (2) times a day. fluconazole 150 mg tablet Commonly known as:  DIFLUCAN Take 1 Tab by mouth daily for 1 day. May repeat in 1 week for continued symptoms X 1  
  
 fluticasone 50 mcg/actuation nasal spray Commonly known as:  Cathlyn Blanca 2 Sprays by Both Nostrils route daily. HYDROcodone-acetaminophen  mg tablet Commonly known as:  Benetta Pock Take 1-2 Tabs by mouth every four (4) hours as needed for Pain. Max Daily Amount: 12 Tabs. Do not take until after surgery  
  
 ibuprofen 800 mg tablet Commonly known as:  MOTRIN Take 1 Tab by mouth every eight (8) hours as needed for Pain. metroNIDAZOLE 0.75 % vaginal gel Commonly known as:  Mellody Eng Insert 1 Applicator into vagina nightly for 5 days. nystatin topical cream  
Commonly known as:  MYCOSTATIN Apply  to affected area two (2) times a day. triamcinolone acetonide 0.1 % topical cream  
Commonly known as:  KENALOG Apply  to affected area two (2) times daily as needed for Skin Irritation. use thin layer Prescriptions Sent to Pharmacy Refills  
 fluconazole (DIFLUCAN) 150 mg tablet 0 Sig: Take 1 Tab by mouth daily for 1 day. May repeat in 1 week for continued symptoms X 1 Class: Normal  
 Pharmacy: [facility] [id number] [address] Ph #: [phone] Route: Oral  
 metroNIDAZOLE (METROGEL) 0.75 % vaginal gel 0 Sig: Insert 1 Applicator into vagina nightly for 5 days. Class: Normal  
 Pharmacy: [id number] [address] [employer] [facility], [address] [place] [address] Ph #: [phone] Route: Vaginal  
  
Follow-up Instructions Return if symptoms worsen or fail to improve. To-Do List   
 09/12/2018 Lab: [facility] VAGINITIS PLUS Patient Instructions Candidiasis: Care Instructions Your Care Instructions Candidiasis (say \"bhg-lmm-KK-uh-isabela\") is a yeast infection. Yeast normally lives in your body. But it can cause problems if your body's defenses don't work as they should. Some medicines can increase your chance of getting a yeast infection. These include antibiotics, steroids, and cancer drugs. And some diseases like AIDS and diabetes can make you more likely to get yeast infections. There are different types of yeast infections. Zach Balls is a yeast infection in the mouth. It usually occurs in people with weak immune systems. It causes white patches inside the mouth and throat. Yeast infections of the skin usually occur in skin folds where the skin stays moist. They cause red, oozing patches on your skin. Babies can get these infections under the diaper. People who often wear gloves can get them on their hands. Many women get vaginal yeast infections. They are most common when women take antibiotics. These infections can cause the vagina to itch and burn. They also cause white discharge that looks like cottage cheese. In rare cases, yeast infects the blood. This can cause serious disease. This kind of infection is treated with medicine given through a needle into a vein (IV). After you start treatment, a yeast infection usually goes away quickly. But if your immune system is weak, the infection may come back. Tell your doctor if you get yeast infections often. Follow-up care is a key part of your treatment and safety. Be sure to make and go to all appointments, and call your doctor if you are having problems. It's also a good idea to know your test results and keep a list of the medicines you take. How can you care for yourself at home? · Take your medicines exactly as prescribed. Call your doctor if you think you are having a problem with your medicine. · Use antibiotics only as directed by your doctor. · Eat yogurt with live cultures. It has bacteria called lactobacillus. It may help prevent some types of yeast infections. · Keep your skin clean and dry. Put powder on moist places. · If you are using a cream or suppository to treat a vaginal yeast infection, don't use condoms or a diaphragm. Use a different type of birth control. · Eat a healthy diet and get regular exercise. This will help keep your immune system strong. When should you call for help? Watch closely for changes in your health, and be sure to contact your doctor if: 
  · You do not get better as expected. Where can you learn more? Go to http://elana-bela.info/. Enter A533 in the search box to learn more about \"Candidiasis: Care Instructions. \" Current as of: October 6, 2017 Content Version: 11.7 © 0898-9657 [employer] Fidelity. Care instructions adapted under license by Hyper Wear (which disclaims liability or warranty for this information). If you have questions about a medical condition or this instruction, always ask your healthcare professional. [address] any warranty or liability for your use of this information. Introducing [facility]! Dear Consuelo Alvarado: 
Thank you for requesting a Wenjuan.[email or web address] account. Our records indicate that you already have an active [email or web address] account. You can access your account anytime at [email or web address] Did you know that you can access your hospital and ER discharge instructions at any time in [email or web address]? You can also review all of your test results from your hospital stay or ER visit. Additional Information If you have questions, please visit the Frequently Asked Questions section of the [email or web address] website at [email or web address] Remember, [email or web address] is NOT to be used for urgent needs. For medical emergencies, dial 911. Now available from your iPhone and Android! Please provide this summary of care documentation to your next provider. Your primary care clinician is listed as [address]. If you have any questions after today's visit, please call [phone].

## 2018-09-12 NOTE — PATIENT INSTRUCTIONS
Candidiasis: Care Instructions  Your Care Instructions  Candidiasis (say \"ytv-tlt-EM-uh-isabela\") is a yeast infection. Yeast normally lives in your body. But it can cause problems if your body's defenses don't work as they should. Some medicines can increase your chance of getting a yeast infection. These include antibiotics, steroids, and cancer drugs. And some diseases like AIDS and diabetes can make you more likely to get yeast infections. There are different types of yeast infections. Blase Press is a yeast infection in the mouth. It usually occurs in people with weak immune systems. It causes white patches inside the mouth and throat. Yeast infections of the skin usually occur in skin folds where the skin stays moist. They cause red, oozing patches on your skin. Babies can get these infections under the diaper. People who often wear gloves can get them on their hands. Many women get vaginal yeast infections. They are most common when women take antibiotics. These infections can cause the vagina to itch and burn. They also cause white discharge that looks like cottage cheese. In rare cases, yeast infects the blood. This can cause serious disease. This kind of infection is treated with medicine given through a needle into a vein (IV). After you start treatment, a yeast infection usually goes away quickly. But if your immune system is weak, the infection may come back. Tell your doctor if you get yeast infections often. Follow-up care is a key part of your treatment and safety. Be sure to make and go to all appointments, and call your doctor if you are having problems. It's also a good idea to know your test results and keep a list of the medicines you take. How can you care for yourself at home? · Take your medicines exactly as prescribed. Call your doctor if you think you are having a problem with your medicine. · Use antibiotics only as directed by your doctor. · Eat yogurt with live cultures.  It has bacteria called lactobacillus. It may help prevent some types of yeast infections. · Keep your skin clean and dry. Put powder on moist places. · If you are using a cream or suppository to treat a vaginal yeast infection, don't use condoms or a diaphragm. Use a different type of birth control. · Eat a healthy diet and get regular exercise. This will help keep your immune system strong. When should you call for help? Watch closely for changes in your health, and be sure to contact your doctor if:    · You do not get better as expected. Where can you learn more? Go to http://elana-bela.info/. Enter Z593 in the search box to learn more about \"Candidiasis: Care Instructions. \"  Current as of: October 6, 2017  Content Version: 11.7  © 1125-3173 Frontier Silicon. Care instructions adapted under license by Morizon (which disclaims liability or warranty for this information). If you have questions about a medical condition or this instruction, always ask your healthcare professional. [address] any warranty or liability for your use of this information.

## 2018-09-12 NOTE — PROGRESS NOTES
1. Have you been to the ER, urgent care clinic since your last visit? Hospitalized since your last visit? No    2. Have you seen or consulted any other health care providers outside of the [address] [place] [address] since your last visit? Include any pap smears or colon screening.  No

## 2018-09-26 ENCOUNTER — OFFICE VISIT (OUTPATIENT)
Dept: FAMILY MEDICINE CLINIC | Age: 48
End: 2018-09-26

## 2018-09-26 VITALS
HEART RATE: 81 BPM | SYSTOLIC BLOOD PRESSURE: 133 MMHG | RESPIRATION RATE: 20 BRPM | BODY MASS INDEX: 41.22 KG/M2 | DIASTOLIC BLOOD PRESSURE: 85 MMHG | HEIGHT: 62 IN | WEIGHT: 224 LBS | TEMPERATURE: 98 F | OXYGEN SATURATION: 98 %

## 2018-09-26 DIAGNOSIS — N76.1 CHRONIC VAGINITIS: Primary | ICD-10-CM

## 2018-09-26 DIAGNOSIS — Z22.39 CARRIER OF UREAPLASMA UREALYTICUM: ICD-10-CM

## 2018-09-26 RX ORDER — DOXYCYCLINE 100 MG/1
100 TABLET ORAL 2 TIMES DAILY
Qty: 20 TAB | Refills: 0 | Status: SHIPPED | OUTPATIENT
Start: 2018-09-26 | End: 2018-10-06

## 2018-09-26 NOTE — PROGRESS NOTES
Patient is in the office today for yeast infection follow up. 1. Have you been to the ER, urgent care clinic since your last visit? Hospitalized since your last visit? No    2. Have you seen or consulted any other health care providers outside of the [address] [place] [address] since your last visit? Include any pap smears or colon screening.  No

## 2018-09-26 NOTE — MR AVS SNAPSHOT
[address] [facility] 
 
 
 [address] [address] 
[phone] Patient: Arianne Nguyen MRN:  [id number] Visit Information Date & Time Provider Department Dept. Phone Encounter #  
 9/26/2018  2:00 PM Spenser Ellis [address] [id number] Upcoming Health Maintenance Date Due DTaP/Tdap/Td series (1 - Tdap) 9/12/1993 Influenza Age 5 to Adult 8/1/2018 PAP AKA CERVICAL CYTOLOGY 5/9/2021 COLONOSCOPY 2/17/2026 Allergies as of 9/26/2018  Review Complete On: 9/26/2018 By: Mary Alejandro LPN Severity Noted Reaction Type Reactions Codeine High 02/21/2012    Nausea and Vomiting Patient states she gets jittery, has upset stomach Current Immunizations  Never Reviewed Name Date Influenza Vaccine (Quad) PF 9/18/2014 Not reviewed this visit You Were Diagnosed With   
  
 Codes Comments Chronic vaginitis    -  Primary ICD-10-CM: N76.1 ICD-9-CM: 616.10 Carrier of ureaplasma urealyticum     ICD-10-CM: Z22.39 ICD-9-CM: V02.59 Vitals BP Pulse Temp Resp Height(growth percentile) Weight(growth percentile) 133/85 (BP 1 Location: Left arm, BP Patient Position: Sitting) 81 98 °F (36.7 °C) (Oral) 20 5' 2\" (1.575 m) 224 lb (101.6 kg) LMP SpO2 BMI OB Status Smoking Status (LMP Unknown) 98% 40.97 kg/m2 Having regular periods Never Smoker BMI and BSA Data Body Mass Index Body Surface Area 40.97 kg/m 2 2.11 m 2 Preferred Pharmacy Pharmacy Name Phone [facility] [address] [facility], 9 [facility] [phone] Your Updated Medication List  
  
   
This list is accurate as of 9/26/18  3:03 PM.  Always use your most recent med list.  
  
  
  
  
 ammonium lactate 12 % topical cream  
Commonly known as:  LAC-HYDRIN Apply  to affected area two (2) times a day. rub in to affected area well diclofenac sodium 20 mg/gram /actuation(2 %) Sopm  
Commonly known as:  PENNSAID  
2 Pump(s) by Apply Externally route two (2) times a day. doxycycline 100 mg tablet Commonly known as:  ADOXA Take 1 Tab by mouth two (2) times a day for 10 days. Indications: vaginitis, ureaplasma positive in 2016/no prior treatment  
  
 fluticasone 50 mcg/actuation nasal spray Commonly known as:  Qi Quick 2 Sprays by Both Nostrils route daily. HYDROcodone-acetaminophen  mg tablet Commonly known as:  [place] Take 1-2 Tabs by mouth every four (4) hours as needed for Pain. Max Daily Amount: 12 Tabs. Do not take until after surgery  
  
 ibuprofen 800 mg tablet Commonly known as:  MOTRIN Take 1 Tab by mouth every eight (8) hours as needed for Pain. nystatin topical cream  
Commonly known as:  MYCOSTATIN Apply  to affected area two (2) times a day. triamcinolone acetonide 0.1 % topical cream  
Commonly known as:  KENALOG Apply  to affected area two (2) times daily as needed for Skin Irritation. use thin layer Prescriptions Printed Refills  
 doxycycline (ADOXA) 100 mg tablet 0 Sig: Take 1 Tab by mouth two (2) times a day for 10 days. Indications: vaginitis, ureaplasma positive in 2016/no prior treatment Class: Print Route: Oral  
  
Patient Instructions 1. Will treat for ureaplasma which she was not treated for in 2016 (positive test). Please do not have intercourse within 24 hours of taking last dose; also recommend partner be treated 2. Follow up prn 3. Must take probiotic when taking doxycycline. 4.   [facility] and they were supposed to fax results which never came through-we got a verbal regarding results of tests which were all negative (negative for GC/Chlamydia/TRich/BV/candidiasis/HIV, syphilis) Vaginitis: Care Instructions Your Care Instructions Vaginitis is soreness or infection of the vagina.  This common problem can cause itching and burning. And it can cause a change in vaginal discharge. Sometimes it can cause pain during sex. Vaginitis may be caused by bacteria, yeast, or other germs. Some infections that cause it are caught from a sexual partner. Bath products, spermicides, and douches can irritate the vagina too. Some women have this problem during and after menopause. A drop in estrogen levels during this time can cause dryness, soreness, and pain during sex. Your doctor can give you medicine to treat an infection. And home care may help you feel better. For certain types of infections, your sex partner must be treated too. Follow-up care is a key part of your treatment and safety. Be sure to make and go to all appointments, and call your doctor if you are having problems. It's also a good idea to know your test results and keep a list of the medicines you take. How can you care for yourself at home? · If your doctor prescribed antibiotics, take them as directed. Do not stop taking them just because you feel better. You need to take the full course of antibiotics. · Take your medicines exactly as prescribed. Call your doctor if you think you are having a problem with your medicine. · Do not eat or drink anything that has alcohol if you are taking metronidazole (Flagyl). · If you have a yeast infection, use over-the-counter products as your doctor tells you to. Or take medicine your doctor prescribes exactly as directed. · Wash your vaginal area daily with water. You also can use a mild, unscented soap if you want. · Do not use scented bath products. And do not use vaginal sprays or douches. · Put a washcloth soaked in cool water on the area to relieve itching. Or you can take cool baths. · If you have dryness because of menopause, use estrogen cream or pills that your doctor prescribes. · Ask your doctor about when it is okay to have sex. · Use a personal lubricant before sex if you have dryness. Examples are Astroglide, K-Y Jelly, and Wet Lubricant Gel. · Ask your doctor if your sex partner also needs treatment. When should you call for help? Call your doctor now or seek immediate medical care if: 
  · You have a fever and pelvic pain.  
 Watch closely for changes in your health, and be sure to contact your doctor if: 
  · You have bleeding other than your period.  
  · You do not get better as expected. Where can you learn more? Go to http://elana-bela.info/. Enter I798 in the search box to learn more about \"Vaginitis: Care Instructions. \" Current as of: October 6, 2017 Content Version: 11.7 © 2897-7781 Voxbone. Care instructions adapted under license by Chumby (which disclaims liability or warranty for this information). If you have questions about a medical condition or this instruction, always ask your healthcare professional. [address] [address] any warranty or liability for your use of this information. Introducing [facility]! Dear Davidson Benz: 
Thank you for requesting a NatureBridge account. Our records indicate that you already have an active NatureBridge account. You can access your account anytime at [email or web address] Solution Did you know that you can access your hospital and ER discharge instructions at any time in NatureBridge? You can also review all of your test results from your hospital stay or ER visit. Additional Information If you have questions, please visit the Frequently Asked Questions section of the NatureBridge website at [email or web address] Remember, NatureBridge is NOT to be used for urgent needs. For medical emergencies, dial 911. Now available from your iPhone and Android! Please provide this summary of care documentation to your next provider. Your primary care clinician is listed as [facility]. If you have any questions after today's visit, please call [phone].

## 2018-09-26 NOTE — PATIENT INSTRUCTIONS
1.   Will treat for ureaplasma which she was not treated for in 2016 (positive test). Please do not have intercourse within 24 hours of taking last dose; also recommend partner be treated  2. Follow up prn  3. Must take probiotic when taking doxycycline. 4.   [facility] and they were supposed to fax results which never came through-we got a verbal regarding results of tests which were all negative (negative for GC/Chlamydia/TRich/BV/candidiasis/HIV, syphilis)     Vaginitis: Care Instructions  Your Care Instructions    Vaginitis is soreness or infection of the vagina. This common problem can cause itching and burning. And it can cause a change in vaginal discharge. Sometimes it can cause pain during sex. Vaginitis may be caused by bacteria, yeast, or other germs. Some infections that cause it are caught from a sexual partner. Bath products, spermicides, and douches can irritate the vagina too. Some women have this problem during and after menopause. A drop in estrogen levels during this time can cause dryness, soreness, and pain during sex. Your doctor can give you medicine to treat an infection. And home care may help you feel better. For certain types of infections, your sex partner must be treated too. Follow-up care is a key part of your treatment and safety. Be sure to make and go to all appointments, and call your doctor if you are having problems. It's also a good idea to know your test results and keep a list of the medicines you take. How can you care for yourself at home? · If your doctor prescribed antibiotics, take them as directed. Do not stop taking them just because you feel better. You need to take the full course of antibiotics. · Take your medicines exactly as prescribed. Call your doctor if you think you are having a problem with your medicine. · Do not eat or drink anything that has alcohol if you are taking metronidazole (Flagyl).   · If you have a yeast infection, use over-the-counter products as your doctor tells you to. Or take medicine your doctor prescribes exactly as directed. · Wash your vaginal area daily with water. You also can use a mild, unscented soap if you want. · Do not use scented bath products. And do not use vaginal sprays or douches. · Put a washcloth soaked in cool water on the area to relieve itching. Or you can take cool baths. · If you have dryness because of menopause, use estrogen cream or pills that your doctor prescribes. · Ask your doctor about when it is okay to have sex. · Use a personal lubricant before sex if you have dryness. Examples are Astroglide, K-Y Jelly, and Wet Lubricant Gel. · Ask your doctor if your sex partner also needs treatment. When should you call for help? Call your doctor now or seek immediate medical care if:    · You have a fever and pelvic pain.    Watch closely for changes in your health, and be sure to contact your doctor if:    · You have bleeding other than your period.     · You do not get better as expected. Where can you learn more? Go to http://elana-bela.info/. Enter F873 in the search box to learn more about \"Vaginitis: Care Instructions. \"  Current as of: October 6, 2017  Content Version: 11.7  © 6684-1318 Scout Labs. Care instructions adapted under license by Bravo Wellness (which disclaims liability or warranty for this information). If you have questions about a medical condition or this instruction, always ask your healthcare professional. [address] [address] any warranty or liability for your use of this information.

## 2018-09-26 NOTE — PROGRESS NOTES
Chief Complaint   Patient presents with    Yeast Infection         HPI:    Sangeeta Soriano is a 57 y.o.  female and presents with acute symptoms complaining of yeast infection. Vaginitis  Patient complains of an abnormal vaginal discharge for a number of days. She was seen at [facility] and tested patient for STI, yeast, BV. Prior to [facility] she saw her pcp and was treated for yeast infection. See below-labs from [facility] were not faxed, but did receive verbal results (listened to by this provider from nurse at [facility]). Vaginal symptoms include local irritation and vulvar itching. STI Risk: Very low risk of STD exposureDischarge described as: white, yellow and thick. Other associated symptoms: none. Contraception: tubal ligation    H/O BTL    Patient denies cp, sob, palpitations, fever, nausea, vomiting, diarrhea. Patient denies vision changes, radicular pain, paresthesias, difficulty walking. Past Medical History:   Diagnosis Date    Left foot pain     Plantar fasciitis, left     Right shoulder pain      Past Surgical History:   Procedure Laterality Date    HX TUBAL LIGATION  2009    Tubal ligation     Allergies   Allergen Reactions    Codeine Nausea and Vomiting     Patient states she gets jittery, has upset stomach      Current Outpatient Prescriptions   Medication Sig Dispense Refill    triamcinolone acetonide (KENALOG) 0.1 % topical cream Apply  to affected area two (2) times daily as needed for Skin Irritation. use thin layer 60 g 0    HYDROcodone-acetaminophen (NORCO)  mg tablet Take 1-2 Tabs by mouth every four (4) hours as needed for Pain. Max Daily Amount: 12 Tabs. Do not take until after surgery 60 Tab 0    ibuprofen (MOTRIN) 800 mg tablet Take 1 Tab by mouth every eight (8) hours as needed for Pain. 100 Tab 1    diclofenac sodium (PENNSAID) 20 mg/gram /actuation(2 %) sopm 2 Pump(s) by Apply Externally route two (2) times a day.  1 Bottle 0    ammonium lactate (LAC-HYDRIN) 12 % topical cream Apply  to affected area two (2) times a day. rub in to affected area well 280 g 0    nystatin (MYCOSTATIN) topical cream Apply  to affected area two (2) times a day. 30 g 0    fluticasone (FLONASE) 50 mcg/actuation nasal spray 2 Sprays by Both Nostrils route daily. 1 Bottle 3     Family History   Problem Relation Age of Onset    Hypertension Mother     Hypertension Father     Arthritis-osteo Other      Social History     Social History    Marital status:      Spouse name: N/A    Number of children: N/A    Years of education: N/A     Occupational History    [employer]     Social History Main Topics    Smoking status: Never Smoker    Smokeless tobacco: Never Used    Alcohol use No    Drug use: No    Sexual activity: Yes     Partners: Male     Other Topics Concern    None     Social History Narrative          Visit Vitals    /85 (BP 1 Location: Left arm, BP Patient Position: Sitting)    Pulse 81    Temp 98 °F (36.7 °C) (Oral)    Resp 20    Ht 5' 2\" (1.575 m)    Wt 224 lb (101.6 kg)    SpO2 98%    BMI 40.97 kg/m2        ROS   Pertinent in HPI  Physical Exam   Constitutional: She is well-developed, well-nourished, and in no distress. No distress. Cardiovascular: Normal rate. Pulmonary/Chest: No respiratory distress. Abdominal: Soft. Genitourinary: Cervix exhibits no tenderness. Vulva exhibits erythema (mild). Thin  odorless  white and vaginal discharge (scant) found. Neurological: She is alert. Skin: Skin is warm and dry.       Nursing notes and vitals reviewed    LABS   Results for orders placed or performed in visit on 09/12/18   NUAB VAGINITIS PLUS   Result Value Ref Range    Atopobium vaginae Low - 0 Score    BVAB 2 Low - 0 Score    Megasphaera 1 Low - 0 Score    C. albicans, NATI Positive (A) Negative    C. glabrata, NATI Negative Negative    T. vaginalis, NATI Negative Negative    C. trachomatis, NATI Negative Negative    N. gonorrhoeae, NATI Negative Negative           Assessment/Plan:      ICD-10-CM ICD-9-CM    1. Chronic vaginitis N76.1 616.10 doxycycline (ADOXA) 100 mg tablet   2. Carrier of ureaplasma urealyticum Z22.39 V02.59 doxycycline (ADOXA) 100 mg tablet       1. Will treat for ureaplasma which she was not treated for in 2016 (positive test). Please do not have intercourse within 24 hours of taking last dose; also recommend partner be treated  2. Follow up prn  3. Must take probiotic when taking doxycycline. 4.   [facility] was called and they were supposed to fax results which never came through-we got a verbal regarding results of tests which were all negative (negative for GC/Chlamydia/TRich/BV/candidiasis/HIV, syphilis)      I have discussed the diagnosis with the patient and the intended plan as seen in the above orders. The patient has received an after-visit summary and questions were answered concerning future plans. I have discussed medication side effects and warnings with the patient as well. I have reviewed the plan of care with the patient, accepted their input and they are in agreement with the treatment goals. Follow-up Disposition: prn      Ms. Jamil Rodriguez has a reminder for a \"due or due soon\" health maintenance. I have asked that she contact her primary care provider for follow-up on this health maintenance. CRISTELA Goodwin PA-C  [facility]        I reviewed the patient's medical history, the physician assistant's findings on physical examination, the patient's diagnoses, and treatment plan as documented in the progress note. I concur with the treatment plan as documented. There are no additional recommendations at this time.     Kirsten Beltran MD

## 2018-10-08 DIAGNOSIS — M25.511 CHRONIC RIGHT SHOULDER PAIN: ICD-10-CM

## 2018-10-08 DIAGNOSIS — M25.561 PAIN IN BOTH KNEES, UNSPECIFIED CHRONICITY: ICD-10-CM

## 2018-10-08 DIAGNOSIS — M54.50 CHRONIC BILATERAL LOW BACK PAIN WITHOUT SCIATICA: ICD-10-CM

## 2018-10-08 DIAGNOSIS — G89.29 CHRONIC RIGHT SHOULDER PAIN: ICD-10-CM

## 2018-10-08 DIAGNOSIS — G89.29 CHRONIC BILATERAL LOW BACK PAIN WITHOUT SCIATICA: ICD-10-CM

## 2018-10-08 DIAGNOSIS — M25.562 PAIN IN BOTH KNEES, UNSPECIFIED CHRONICITY: ICD-10-CM

## 2018-10-08 NOTE — TELEPHONE ENCOUNTER
Requested Prescriptions     Pending Prescriptions Disp Refills    ibuprofen (MOTRIN) 800 mg tablet 100 Tab 1     Sig: Take 1 Tab by mouth every eight (8) hours as needed for Pain.

## 2018-10-10 RX ORDER — IBUPROFEN 800 MG/1
800 TABLET ORAL
Qty: 100 TAB | Refills: 1 | OUTPATIENT
Start: 2018-10-10

## 2018-10-18 NOTE — TELEPHONE ENCOUNTER
Patient says she does not want to schedule an appointment. She says she will try to get rx from ortho provider.

## 2018-10-19 ENCOUNTER — TELEPHONE (OUTPATIENT)
Dept: ORTHOPEDIC SURGERY | Age: 48
End: 2018-10-19

## 2018-10-19 RX ORDER — MELOXICAM 15 MG/1
15 TABLET ORAL
Qty: 30 TAB | Refills: 2 | Status: SHIPPED | OUTPATIENT
Start: 2018-10-19 | End: 2018-12-13 | Stop reason: ALTCHOICE

## 2018-10-19 NOTE — TELEPHONE ENCOUNTER
Patient called in states that she called Tuesday 10/16/18 to the refill line but has not heard anything back yet. Patient is requesting a refill on her Rx ibuprofen (MOTRIN) 800 mg tablet . Patient states she is out of them and her knee is acting up. Patient is requesting it be sent to [facility] [address] and she can be reached at [phone].

## 2018-12-07 DIAGNOSIS — Z12.31 ENCOUNTER FOR SCREENING MAMMOGRAM FOR BREAST CANCER: ICD-10-CM

## 2018-12-13 ENCOUNTER — OFFICE VISIT (OUTPATIENT)
Dept: FAMILY MEDICINE CLINIC | Age: 48
End: 2018-12-13

## 2018-12-13 VITALS
HEIGHT: 62 IN | WEIGHT: 215.6 LBS | SYSTOLIC BLOOD PRESSURE: 129 MMHG | RESPIRATION RATE: 16 BRPM | OXYGEN SATURATION: 100 % | HEART RATE: 77 BPM | TEMPERATURE: 98 F | BODY MASS INDEX: 39.67 KG/M2 | DIASTOLIC BLOOD PRESSURE: 78 MMHG

## 2018-12-13 DIAGNOSIS — R03.0 ELEVATED BLOOD PRESSURE READING: Primary | ICD-10-CM

## 2018-12-13 NOTE — PATIENT INSTRUCTIONS
How to Read a Food Label to Limit Sodium: Care Instructions  Your Care Instructions  Sodium causes your body to hold on to extra water. This can raise your blood pressure and force your heart and kidneys to work harder. In very serious cases, this could cause you to be put in the hospital. It might even be life-threatening. By limiting sodium, you will feel better and lower your risk of serious problems. Processed foods, fast food, and restaurant foods are the major sources of dietary sodium. The most common name for sodium is salt. Try to limit how much sodium you eat to less than 2,300 milligrams (mg) a day. If you limit your sodium to 1,500 mg a day, you can lower your blood pressure even more. This limit counts all the salt that you eat in foods you cook or in packaged foods. Keep a list of everything you eat and drink. Follow-up care is a key part of your treatment and safety. Be sure to make and go to all appointments, and call your doctor if you are having problems. It's also a good idea to know your test results and keep a list of the medicines you take. How can you care for yourself at home? Read ingredient lists on food labels  · Read the list of ingredients on food labels to help you find how much sodium is in a food. The label lists the ingredients in a food in descending order (from the most to the least). If salt or sodium is high on the list, there may be a lot of sodium in the food. · Know that sodium has different names. Sodium is also called monosodium glutamate (MSG, common in [facility] food), sodium citrate, sodium alginate, sodium hydroxide, and sodium phosphate. Read Nutrition Facts labels  · On most foods, there is a Nutrition Facts label. This will tell you how much sodium is in one serving of food. Look at both the serving size and the sodium amount. The serving size is located at the top of the label, usually right under the \"Nutrition Facts\" title.  The amount of sodium is given in the list under the title. It is given in milligrams (mg). ? Check the serving size carefully. A single serving is often very small, and you may eat more than one serving. If this is the case, you will eat more sodium than listed on the label. For example, if the serving size for a canned soup is 1 cup and the sodium amount is 470 mg, if you have 2 cups you will eat 940 mg of sodium. · The nutrition facts for fresh fruits and vegetables are not listed on the food. They may be listed somewhere in the store. These foods usually have no sodium or low sodium. · The Nutrition Facts label also gives you the Percent Daily Value for sodium. This is how much of the recommended amount of sodium a serving contains. The daily value for sodium is less than 2,300 mg. So if the Percent Daily Value says 50%, this means one serving is giving you half of this, or 1,150 mg. Buy low-sodium foods  · Look for foods that are made with less sodium. Watch for the following words on the label. ? \"Unsalted\" means there is no sodium added to the food. But there may be sodium already in the food naturally. ? \"Sodium-free\" means a serving has less than 5 mg of sodium. ? \"Very low sodium\" means a serving has 35 mg or less of sodium. ? \"Low-sodium\" means a serving has 140 mg or less of sodium. · \"Reduced-sodium\" means that there is 25% less sodium than what the food normally has. This is still usually too much sodium. Try not to buy foods with this on the label. · Buy fresh vegetables, or frozen vegetables without added sauces. Buy low-sodium versions of canned vegetables, soups, and other canned goods. Where can you learn more? Go to http://elana-bela.info/. Enter 26 [id number] in the search box to learn more about \"How to Read a Food Label to Limit Sodium: Care Instructions. \"  Current as of: March 29, 2018  Content Version: 11.8  © 8246-6014 [employer].  Care instructions adapted under license by [facility] (which disclaims liability or warranty for this information). If you have questions about a medical condition or this instruction, always ask your healthcare professional. [address] any warranty or liability for your use of this information. Low Sodium Diet (2,000 Milligram): Care Instructions  Your Care Instructions    Too much sodium causes your body to hold on to extra water. This can raise your blood pressure and force your heart and kidneys to work harder. In very serious cases, this could cause you to be put in the hospital. It might even be life-threatening. By limiting sodium, you will feel better and lower your risk of serious problems. The most common source of sodium is salt. People get most of the salt in their diet from canned, prepared, and packaged foods. Fast food and restaurant meals also are very high in sodium. Your doctor will probably limit your sodium to less than 2,000 milligrams (mg) a day. This limit counts all the sodium in prepared and packaged foods and any salt you add to your food. Follow-up care is a key part of your treatment and safety. Be sure to make and go to all appointments, and call your doctor if you are having problems. It's also a good idea to know your test results and keep a list of the medicines you take. How can you care for yourself at home? Read food labels  · Read labels on cans and food packages. The labels tell you how much sodium is in each serving. Make sure that you look at the serving size. If you eat more than the serving size, you have eaten more sodium. · Food labels also tell you the Percent Daily Value for sodium. Choose products with low Percent Daily Values for sodium. · Be aware that sodium can come in forms other than salt, including monosodium glutamate (MSG), sodium citrate, and sodium bicarbonate (baking soda). MSG is often added to Asian food.  When you eat out, you can sometimes ask for food without MSG or added salt. Buy low-sodium foods  · Buy foods that are labeled \"unsalted\" (no salt added), \"sodium-free\" (less than 5 mg of sodium per serving), or \"low-sodium\" (less than 140 mg of sodium per serving). Foods labeled \"reduced-sodium\" and \"light sodium\" may still have too much sodium. Be sure to read the label to see how much sodium you are getting. · Buy fresh vegetables, or frozen vegetables without added sauces. Buy low-sodium versions of canned vegetables, soups, and other canned goods. Prepare low-sodium meals  · Cut back on the amount of salt you use in cooking. This will help you adjust to the taste. Do not add salt after cooking. One teaspoon of salt has about 2,300 mg of sodium. · Take the salt shaker off the table. · Flavor your food with garlic, lemon juice, onion, vinegar, herbs, and spices. Do not use soy sauce, lite soy sauce, steak sauce, onion salt, garlic salt, celery salt, mustard, or ketchup on your food. · Use low-sodium salad dressings, sauces, and ketchup. Or make your own salad dressings and sauces without adding salt. · Use less salt (or none) when recipes call for it. You can often use half the salt a recipe calls for without losing flavor. Other foods such as rice, pasta, and grains do not need added salt. · Rinse canned vegetables, and cook them in fresh water. This removes some--but not all--of the salt. · Avoid water that is naturally high in sodium or that has been treated with water softeners, which add sodium. Call your local water company to find out the sodium content of your water supply. If you buy bottled water, read the label and choose a sodium-free brand. Avoid high-sodium foods  · Avoid eating:  ? Smoked, cured, salted, and canned meat, fish, and poultry. ? Ham, cary, hot dogs, and luncheon meats. ? Regular, hard, and processed cheese and regular peanut butter.   ? Crackers with salted tops, and other salted snack foods such as pretzels, chips, and salted popcorn. ? Frozen prepared meals, unless labeled low-sodium. ? Canned and dried soups, broths, and bouillon, unless labeled sodium-free or low-sodium. ? Canned vegetables, unless labeled sodium-free or low-sodium. ? [employer] fries, pizza, tacos, and other fast foods. ? Pickles, olives, ketchup, and other condiments, especially soy sauce, unless labeled sodium-free or low-sodium. Where can you learn more? Go to http://elana-bela.info/. Enter U991 in the search box to learn more about \"Low Sodium Diet (2,000 Milligram): Care Instructions. \"  Current as of: March 29, 2018  Content Version: 11.8  © 0478-1942 [employer]. Care instructions adapted under license by "[employer] (which disclaims liability or warranty for this information). If you have questions about a medical condition or this instruction, always ask your healthcare professional. [place] [address] any warranty or liability for your use of this information.

## 2018-12-13 NOTE — PROGRESS NOTES
HISTORY OF PRESENT ILLNESS  Rafiq Montes is a 57 y.o. female. Patient presents today for further evaluation of single episode of elevated blood pressure. 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 States her coworker noticed the right side of her face twitching although she didn't feel it. Patient states her blood pressure 144/96. Reports later on she checked her blood pressure at [facility] and it was 142/96. Patient states she has been under increased stress as of late. Denies feeling stressed/anxious yesterday. Positive FH of HTN in mother. Allergies   Allergen Reactions    Codeine Nausea and Vomiting     Patient states she gets jittery, has upset stomach      Current Outpatient Medications   Medication Sig Dispense Refill    Lisdexamfetamine (VYVANSE) 40 mg capsule Take by mouth daily.  ibuprofen (MOTRIN) 800 mg tablet Take 1 Tab by mouth every eight (8) hours as needed for Pain. 100 Tab 1    diclofenac sodium (PENNSAID) 20 mg/gram /actuation(2 %) sopm 2 Pump(s) by Apply Externally route two (2) times a day. 1 Bottle 0    ammonium lactate (LAC-HYDRIN) 12 % topical cream Apply  to affected area two (2) times a day. rub in to affected area well 280 g 0    meloxicam (MOBIC) 15 mg tablet Take 1 Tab by mouth daily (with breakfast). 30 Tab 2    triamcinolone acetonide (KENALOG) 0.1 % topical cream Apply  to affected area two (2) times daily as needed for Skin Irritation. use thin layer 60 g 0    HYDROcodone-acetaminophen (NORCO)  mg tablet Take 1-2 Tabs by mouth every four (4) hours as needed for Pain. Max Daily Amount: 12 Tabs. Do not take until after surgery 60 Tab 0    nystatin (MYCOSTATIN) topical cream Apply  to affected area two (2) times a day. 30 g 0    fluticasone (FLONASE) 50 mcg/actuation nasal spray 2 Sprays by Both Nostrils route daily.  1 Bottle 3     Past Medical History:   Diagnosis Date    ADD (attention deficit disorder)     Depression     Left foot pain     Plantar fasciitis, left     Right shoulder pain      Social History     Socioeconomic History    Marital status:      Spouse name: Not on file    Number of children: Not on file    Years of education: Not on file    Highest education level: Not on file   Social Needs    Financial resource strain: Not on file    Food insecurity - worry: Not on file    Food insecurity - inability: Not on file    Transportation needs - medical: Not on file   [employer] needs - non-medical: Not on file   Occupational History    Occupation: intermediate     Employer: [employer]   Tobacco Use    Smoking status: Never Smoker    Smokeless tobacco: Never Used   Substance and Sexual Activity    Alcohol use: No    Drug use: No    Sexual activity: Yes     Partners: Male   Other Topics Concern    Not on file   Social History Narrative    Not on file     Review of Systems   Constitutional: Negative for chills and fever. Eyes: Negative for blurred vision. Respiratory: Negative for shortness of breath. Cardiovascular: Negative for chest pain and palpitations. Neurological: Negative for dizziness and headaches. /78 (BP 1 Location: Left arm)   Pulse 77   Temp 98 °F (36.7 °C) (Oral)   Resp 16   Ht 5' 2\" (1.575 m)   Wt 215 lb 9.6 oz (97.8 kg)   LMP 12/03/2018   SpO2 100%   BMI 39.43 kg/m²   Physical Exam   Constitutional: She appears well-developed and well-nourished. No distress. HENT:   Head: Normocephalic and atraumatic. Neck: Normal range of motion. Neck supple. Cardiovascular: Normal rate, regular rhythm, S1 normal and S2 normal. Exam reveals no gallop and no friction rub. Murmur heard. Systolic murmur is present with a grade of 1/6. Pulmonary/Chest: Effort normal and breath sounds normal. She has no wheezes. She has no rhonchi. She has no rales. Musculoskeletal: She exhibits no edema.    Lymphadenopathy:     She has no cervical adenopathy. Skin: Skin is warm and dry. ASSESSMENT and PLAN    ICD-10-CM ICD-9-CM    1. Elevated blood pressure reading R03.0 796.2      Orders Placed This Encounter    Lisdexamfetamine (VYVANSE) 40 mg capsule     I have discussed the diagnosis with the patient and the intended plan as seen in the above orders. The patient has received an after-visit summary and questions were answered concerning future plans. I have discussed medication side effects and warnings with the patient as well. Patient agreeable with above plan and verbalizes understanding. Follow-up Disposition:  Return in about 2 months (around 2/13/2019), or if symptoms worsen or fail to improve, for elevated blood pressure .

## 2018-12-13 NOTE — PROGRESS NOTES
Pt is here for a reading for 144/96 yesterday. 1. Have you been to the ER, urgent care clinic since your last visit? Hospitalized since your last visit? No    2. Have you seen or consulted any other health care providers outside of the [address] [place] [address] since your last visit? Include any pap smears or colon screening.  Yes NP Vasile Mclean, psych 10.18

## 2018-12-24 ENCOUNTER — OFFICE VISIT (OUTPATIENT)
Dept: INTERNAL MEDICINE CLINIC | Age: 48
End: 2018-12-24

## 2018-12-24 VITALS
DIASTOLIC BLOOD PRESSURE: 88 MMHG | RESPIRATION RATE: 14 BRPM | OXYGEN SATURATION: 98 % | HEIGHT: 62 IN | HEART RATE: 72 BPM | TEMPERATURE: 98.1 F | WEIGHT: 213 LBS | SYSTOLIC BLOOD PRESSURE: 132 MMHG | BODY MASS INDEX: 39.2 KG/M2

## 2018-12-24 DIAGNOSIS — R03.0 ELEVATED BLOOD PRESSURE READING: Primary | ICD-10-CM

## 2018-12-24 NOTE — PROGRESS NOTES
1. Have you been to the ER, urgent care clinic or hospitalized since your last visit? NO.     2. Have you seen or consulted any other health care providers outside of the [address] [place] [address] since your last visit (Include any pap smears or colon screening)? Yes Dr. Perkins Factor you have an Advanced Directive? NO    Would you like information on Advanced Directives?  NO

## 2018-12-24 NOTE — PROGRESS NOTES
HPI/History  Toby Andrew is a 57 y.o.  female who presents for evaluation. Pt of Dr. Christine Almaraz. She saw NP Norma Singletary on 12/13 for a few elevated BP readings. Had reported 144/96 with school nurse and at some point later was 142/96. BP was acceptable (129/78) at her visit. Currently, reports she had a BP of 141/90 yesterday. Questionable technique as it sounds that her readings outside of clinic may not have involved her being seated for any length of time before taking her pressures. She denies any HAs, issues with vision, neurological, or cardiopulmonary sxs. She has had increased stress recently. Salt intake may be an issue. Pt obese but reports success with intentional weight loss. No other complaints. Reports she is to have a routine appt with Dr. Christine Almaraz in February.     Patient Active Problem List   Diagnosis Code    Iron deficiency anemia D50.9    Obesity, morbid ([facility] [address] E66.01    Cyst of ovary N83.209    Dysmenorrhea N94.6    Fatigue R53.83    Hypertrophy of uterus N85.2    Uterine leiomyoma D25.9     Past Medical History:   Diagnosis Date    ADD (attention deficit disorder)     Depression     Left foot pain     Plantar fasciitis, left     Right shoulder pain      Past Surgical History:   Procedure Laterality Date    HX TUBAL LIGATION  2009    Tubal ligation     Social History     Socioeconomic History    Marital status:      Spouse name: Not on file    Number of children: Not on file    Years of education: Not on file    Highest education level: Not on file   Social Needs    Financial resource strain: Not on file    Food insecurity - worry: Not on file    Food insecurity - inability: Not on file   [employer] needs - medical: Not on file   [employer] needs - non-medical: Not on file   Occupational History    Occupation: [employer]     Employer: [email or web address]   Tobacco Use    Smoking status: Never Smoker    Smokeless tobacco: Never Used   Substance and Sexual Activity    Alcohol use: No    Drug use: No    Sexual activity: Yes     Partners: Male   Other Topics Concern    Not on file   Social History Narrative    Not on file     Family History   Problem Relation Age of Onset    Hypertension Mother     Hypertension Father     Arthritis-osteo Other      Current Outpatient Medications   Medication Sig    ibuprofen (MOTRIN) 800 mg tablet Take 1 Tab by mouth every eight (8) hours as needed for Pain.  diclofenac sodium (PENNSAID) 20 mg/gram /actuation(2 %) sopm 2 Pump(s) by Apply Externally route two (2) times a day.  ammonium lactate (LAC-HYDRIN) 12 % topical cream Apply  to affected area two (2) times a day. rub in to affected area well    Lisdexamfetamine (VYVANSE) 40 mg capsule Take by mouth daily. No current facility-administered medications for this visit. Allergies   Allergen Reactions    Codeine Nausea and Vomiting     Patient states she gets jittery, has upset stomach        Review of Systems  Aside from those included in HPI, remainder of ROS negative. Physical Examination  Visit Vitals  /88 (BP 1 Location: Right arm, BP Patient Position: Sitting)   Pulse 72   Temp 98.1 °F (36.7 °C) (Oral)   Resp 14   Ht 5' 2\" (1.575 m)   Wt 213 lb (96.6 kg)   LMP 12/03/2018   SpO2 98%   BMI 38.96 kg/m²       General - Alert and in no acute distress. Pt appears well, comfortable, and in good spirits. Pleasant, engaging. Nontoxic. Not anxious, non-diaphoretic. Mental status - Appropriate mood, behavior, speech content, dress, and thought processes. Eyes - Pupils equal and reactive, extraocular movements intact. No erythema or discharge. Pulm - No tachypnea, retractions, or cyanosis. Good respiratory effort. Clear to auscultation bilat. Cardiovascular - Normal rate, regular rhythm. No appreciable murmurs or gallops currently. Assessment and Plan  1.  Few elevated BP readings recently which have been borderline hypertensive but some question to technique. Pt asymptomatic per report. Recommended monitoring a little longer and keep BP log. Discussed TLC, including decreased sodium intake and weight control. She will keep appt with PCP in February but contact sooner if abnormal readings as discussed, concerns, or if she develops any sxs. Further planning as warranted. Pt happily agrees with plan. PLEASE NOTE:   This document has been produced using voice recognition software. Unrecognized errors in transcription may be present.     [employer] [address] [place] [address]  [phone]  12/24/2018

## 2019-01-10 ENCOUNTER — OFFICE VISIT (OUTPATIENT)
Dept: ORTHOPEDIC SURGERY | Age: 49
End: 2019-01-10

## 2019-01-10 VITALS
HEIGHT: 62 IN | BODY MASS INDEX: 40.27 KG/M2 | SYSTOLIC BLOOD PRESSURE: 129 MMHG | RESPIRATION RATE: 16 BRPM | WEIGHT: 218.8 LBS | TEMPERATURE: 98.3 F | DIASTOLIC BLOOD PRESSURE: 84 MMHG | OXYGEN SATURATION: 100 % | HEART RATE: 74 BPM

## 2019-01-10 DIAGNOSIS — M25.562 LEFT KNEE PAIN, UNSPECIFIED CHRONICITY: ICD-10-CM

## 2019-01-10 DIAGNOSIS — M17.12 PRIMARY OSTEOARTHRITIS OF LEFT KNEE: Primary | ICD-10-CM

## 2019-01-10 RX ORDER — TRIAMCINOLONE ACETONIDE 40 MG/ML
40 INJECTION, SUSPENSION INTRA-ARTICULAR; INTRAMUSCULAR ONCE
Qty: 1 ML | Refills: 0
Start: 2019-01-10 | End: 2019-01-10

## 2019-01-10 RX ORDER — CELECOXIB 200 MG/1
200 CAPSULE ORAL 2 TIMES DAILY WITH MEALS
Qty: 60 CAP | Refills: 0 | Status: SHIPPED | OUTPATIENT
Start: 2019-01-10 | End: 2019-02-28

## 2019-01-10 NOTE — PROGRESS NOTES
Abigail Mcdaniels 1970 Chief Complaint Patient presents with  Knee Pain  
  left knee pain x 2 months HISTORY OF PRESENT ILLNESS Abigail Mcdaniels is a 57 y.o. female who presents today for reevaluation of left knee pain. Patient rates pain as 10/10 today. The pain has been present for 2 months. Pain with with prolonged walking and standing, stairs. She works as a  and walks frequently. She reports night pain. She states the pain started suddenly 2 months ago, but denies specific injury. Patient denies any fever, chills, chest pain, shortness of breath or calf pain. The remainder of the review of systems is negative. There are no new illness or injuries to report since last seen in the office. There are no changes to medications, allergies, family or social history. PHYSICAL EXAM:  
Visit Vitals /84 Pulse 74 Temp 98.3 °F (36.8 °C) (Oral) Resp 16 Ht 5' 2\" (1.575 m) Wt 218 lb 12.8 oz (99.2 kg) SpO2 100% BMI 40.02 kg/m² The patient is a well-developed, well-nourished female   in no acute distress. The patient is alert and oriented times three. The patient is alert and oriented times three. Mood and affect are normal. 
LYMPHATIC: lymph nodes are not enlarged and are within normal limits SKIN: normal in color and non tender to palpation. There are no bruises or abrasions noted. NEUROLOGICAL: Motor sensory exam is within normal limits. Reflexes are equal bilaterally. There is normal sensation to pinprick and light touch MUSCULOSKELETAL: 
Examination Left knee Skin Intact Range of motion 0-130 Effusion + Medial joint line tenderness + Lateral joint line tenderness + Tenderness Pes Bursa - Tenderness insertion MCL - Tenderness insertion LCL - Razias -  
Patella crepitus + Patella grind - Lachman - Pivot shift - Anterior drawer - Posterior drawer -  
Varus stress -  
Valgus stress - Neurovascular Intact Calf Swelling and Tenderness to Palpation -  
Re's Test -  
Hamstring Cord Tightness -  
 
PROCEDURE: Left Knee Injection with Ultrasound Guidance Indication:Left Knee pain/swelling After sterile prep, 4 cc of Xylocaine and 1 cc of Kenalog were injected into the left knee. Ultrasound images captured using [address] [place] [address] Loop Ultrasound machine and scanned into patient's chart. [address] [facility] VIEWOFFICE PROCEDURE PROGRESS NOTE Chart reviewed for the following: 
Margot Fernandez M.D, have reviewed the History, Physical and updated the Allergic reactions for Lynsamara Barragan TIME OUT performed immediately prior to start of procedure: 
Margot Fernandez M.D, have performed the following reviews on Lynsamara Lik prior to the start of the procedure: 
         
* Patient was identified by name and date of birth * Agreement on procedure being performed was verified * Risks and Benefits explained to the patient * Procedure site verified and marked as necessary * Patient was positioned for comfort * Consent was signed and verified Time: 8:51 AM  
 
Date of procedure: 1/10/2019 Procedure performed by:  Duyen Richards M.D Provider assisted by: (see medication administration) How tolerated by patient: tolerated the procedure well with no complications Comments: none IMAGING: XR of the left knee dated 1/10/19 was reviewed and read: decreased joint space on the medial side, left worst than right IMPRESSION:   
  ICD-10-CM ICD-9-CM 1. Primary osteoarthritis of left knee M17.12 715.16 TRIAMCINOLONE ACETONIDE INJ  
   triamcinolone acetonide (KENALOG) 40 mg/mL injection US GUIDE INJ/ASP/ARTHRO LG JNT/BURSA 2. Left knee pain, unspecified chronicity M25.562 719.46 AMB POC XRAY, KNEE; 1/2 VIEWS  
   TRIAMCINOLONE ACETONIDE INJ  
   triamcinolone acetonide (KENALOG) 40 mg/mL injection US GUIDE INJ/ASP/ARTHRO LG JNT/BURSA PLAN:  
1. The patient presents today with left knee pain due to osteoarthritis and I would like to try a cortisone injection today. Risk factors include: n/a 2. No ultrasound exam indicated today 3. Yes cortisone injection indicated today L KNEE US 
4. No Physical/Occupational Therapy indicated today 5. No diagnostic test indicated today: 6. No durable medical equipment indicated today 7. No referral indicated today 8. No medications indicated today: 9. No Narcotic indicated today RTC 3 weeks Follow-up Disposition: Not on File Scribed by Michelle Benedict ([address] [place] [address] [address]) as dictated by Justin Concepcion MD 
 
I, Dr. Justin Concepcion, confirm that all documentation is accurate.  
 
Justin Concepcion M.D.  
[facility]

## 2019-01-31 ENCOUNTER — OFFICE VISIT (OUTPATIENT)
Dept: ORTHOPEDIC SURGERY | Age: 49
End: 2019-01-31

## 2019-01-31 VITALS
BODY MASS INDEX: 39.01 KG/M2 | WEIGHT: 212 LBS | RESPIRATION RATE: 16 BRPM | TEMPERATURE: 97.8 F | SYSTOLIC BLOOD PRESSURE: 124 MMHG | OXYGEN SATURATION: 100 % | HEIGHT: 62 IN | HEART RATE: 79 BPM | DIASTOLIC BLOOD PRESSURE: 80 MMHG

## 2019-01-31 DIAGNOSIS — M17.12 PRIMARY OSTEOARTHRITIS OF LEFT KNEE: Primary | ICD-10-CM

## 2019-01-31 DIAGNOSIS — M25.511 RIGHT SHOULDER PAIN, UNSPECIFIED CHRONICITY: ICD-10-CM

## 2019-01-31 DIAGNOSIS — M19.011 GLENOHUMERAL ARTHRITIS, RIGHT: ICD-10-CM

## 2019-01-31 NOTE — PROGRESS NOTES
Makenna Tolbert 1970 Chief Complaint Patient presents with  Knee Pain Left knee follow up HISTORY OF PRESENT ILLNESS Makenna Tolbert is a 57 y.o. female who presents today for reevaluation of left knee pain. Patient rates pain as 2/10 today. The pain has been present for 2 months. At last OV, patient had a cortisone injection which provided some relief, but still pain with prolonged walking and standing, stairs. She works as a  and walks frequently. She reports night pain. She states the pain started suddenly 2 months ago, but denies specific injury. She has an additional complaint today of right shoulder pain. She states the pain has been gradually getting worse in the last two years, notes the shoulder locks up. Patient denies any fever, chills, chest pain, shortness of breath or calf pain. The remainder of the review of systems is negative. There are no new illness or injuries to report since last seen in the office. There are no changes to medications, allergies, family or social history. PHYSICAL EXAM:  
Visit Vitals /80 Pulse 79 Temp 97.8 °F (36.6 °C) (Oral) Resp 16 Ht 5' 2\" (1.575 m) Wt 212 lb (96.2 kg) SpO2 100% BMI 38.78 kg/m² The patient is a well-developed, well-nourished female   in no acute distress. The patient is alert and oriented times three. The patient is alert and oriented times three. Mood and affect are normal. 
LYMPHATIC: lymph nodes are not enlarged and are within normal limits SKIN: normal in color and non tender to palpation. There are no bruises or abrasions noted. NEUROLOGICAL: Motor sensory exam is within normal limits. Reflexes are equal bilaterally. There is normal sensation to pinprick and light touch MUSCULOSKELETAL: 
Examination Left knee Skin Intact Range of motion 0-130 Effusion + Medial joint line tenderness + Lateral joint line tenderness + Tenderness Pes Bursa -  
 Tenderness insertion MCL - Tenderness insertion LCL - Razias -  
Patella crepitus + Patella grind - Lachman - Pivot shift - Anterior drawer - Posterior drawer -  
Varus stress -  
Valgus stress - Neurovascular Intact Calf Swelling and Tenderness to Palpation -  
Re's Test -  
Hamstring Cord Tightness - Examination Right shoulder Skin Intact AC joint tenderness - Biceps tenderness - Forward flexion/Elevation  Active abduction  Glenohumeral abduction 45 External rotation ROM 30 Internal rotation ROM 30 Apprehension -  
French Relocation -  
Jerk - Load and Shift -  
Susen Mast - Speeds - Impingement sign - Supraspinatus/Empty Can -, 5/5 External Rotation Strength -, 5/5 Lift Off/Belly Press -, 5/5 Neurovascular Intact IMAGING: XR of right shoulder dated 1/21/19 was reviewed and read: marked degenerative changes in the glenohumeral joint with multiple loose bodies in bicipital groove. XR of the left knee dated 1/10/19 was reviewed and read: decreased joint space on the medial side, left worst than right IMPRESSION:   
  ICD-10-CM ICD-9-CM 1. Primary osteoarthritis of left knee M17.12 715.16 MRI KNEE LT WO CONT 2. Right shoulder pain, unspecified chronicity M25.511 719.41 AMB POC XRAY, SHOULDER; COMPLETE, 2+  
3. Glenohumeral arthritis, right M19.011 715.91   
  
 
PLAN:  
1. The patient presents today with left knee pain due to osteoarthritis not helped by the cortisone injection and I would like her to get an MRI. I discussed the risks and benefits and potential adverse outcomes of both operative vs non operative treatment of right glenohumeral arthritis with the patient. Patient wishes to proceed with arthroscopic right glenoid resurfacing, lysis of adhesions, and removal of loose bodies in the bicipital groove.  
 
Risks of operative intervention include but not limited to bleeding, infection, deep vein thrombosis, pulmonary embolism, death, limb length discrepancy, reflexive sympathetic dystrophy, fat embolism syndrome,damage to blood vessels and nerves, malunion, non-union, delayed union, failure of hardware, post traumatic arthritis, stroke, heart attack, and death. Patient understands that infection may arise and may require numerous surgeries. History and physical exam scheduled for a later date. Risk factors include: n/a 2. No ultrasound exam indicated today 3. No cortisone injection indicated today 4. No Physical/Occupational Therapy indicated today 5. Yes diagnostic test indicated today: MRI L KNEE 6. No durable medical equipment indicated today 7. No referral indicated today 8. No medications indicated today: 9. No Narcotic indicated today RTC H&P and following MRI Follow-up Disposition: Not on File Scribed by Renata Culver ([address] [facility] [address] [facility] [address]) as dictated by Rhonda Ignacio MD 
 
I, Dr. Rhonda Ignacio, confirm that all documentation is accurate.  
 
Rhonda Ignacio M.D.  
[facility]

## 2019-02-11 ENCOUNTER — TELEPHONE (OUTPATIENT)
Dept: ORTHOPEDIC SURGERY | Age: 49
End: 2019-02-11

## 2019-02-11 DIAGNOSIS — S83.242A ACUTE MEDIAL MENISCUS TEAR, LEFT, INITIAL ENCOUNTER: Primary | ICD-10-CM

## 2019-02-11 NOTE — TELEPHONE ENCOUNTER
Our request for a MRI of the Left Knee without contrast is going to peer to peer review based on the diagnosis code. There is a comment stating r/o of meniscus tear. Would Dr. Sharath Ward considering changing the diagnosis code to match the rule out? Unfortunately, the MRI is scheduled for tomorrow and pre-authorization still needs to be obtained so I am making an URGENT request. Thank you.

## 2019-02-12 ENCOUNTER — HOSPITAL ENCOUNTER (OUTPATIENT)
Age: 49
Discharge: HOME | End: 2019-02-12
Attending: ORTHOPAEDIC SURGERY
Payer: COMMERCIAL

## 2019-02-12 DIAGNOSIS — S83.242A ACUTE MEDIAL MENISCUS TEAR, LEFT, INITIAL ENCOUNTER: ICD-10-CM

## 2019-02-12 PROCEDURE — 73721 MRI JNT OF LWR EXTRE W/O DYE: CPT

## 2019-02-18 ENCOUNTER — OFFICE VISIT (OUTPATIENT)
Dept: ORTHOPEDIC SURGERY | Age: 49
End: 2019-02-18

## 2019-02-18 VITALS
TEMPERATURE: 98.1 F | DIASTOLIC BLOOD PRESSURE: 84 MMHG | RESPIRATION RATE: 16 BRPM | BODY MASS INDEX: 38.57 KG/M2 | SYSTOLIC BLOOD PRESSURE: 130 MMHG | HEIGHT: 62 IN | HEART RATE: 77 BPM | WEIGHT: 209.6 LBS | OXYGEN SATURATION: 100 %

## 2019-02-18 DIAGNOSIS — S83.242D ACUTE MEDIAL MENISCUS TEAR, LEFT, SUBSEQUENT ENCOUNTER: Primary | ICD-10-CM

## 2019-02-18 DIAGNOSIS — M17.12 PRIMARY OSTEOARTHRITIS OF LEFT KNEE: ICD-10-CM

## 2019-02-18 NOTE — PROGRESS NOTES
Patt Chinchilla 1970 Chief Complaint Patient presents with  Knee Pain Left knee MRI follow up HISTORY OF PRESENT ILLNESS Patt Chinchilla is a 57 y.o. female who presents today for reevaluation of left knee pain and to review MRI. Patient rates pain as 0/10 today. The pain has been present for 2 months. The patient has had a cortisone injection which provided some relief, but still pain with prolonged walking and standing, stairs. She works as a  and walks frequently. She reports night pain. She states the pain started suddenly 2 months ago, but denies specific injury. She would like to schedule the knee surgery the same week as the shoulder surgery. Patient denies any fever, chills, chest pain, shortness of breath or calf pain. The remainder of the review of systems is negative. There are no new illness or injuries to report since last seen in the office. There are no changes to medications, allergies, family or social history. PHYSICAL EXAM:  
Visit Vitals /84 Pulse 77 Temp 98.1 °F (36.7 °C) (Oral) Resp 16 Ht 5' 2\" (1.575 m) Wt 209 lb 9.6 oz (95.1 kg) SpO2 100% BMI 38.34 kg/m² The patient is a well-developed, well-nourished female   in no acute distress. The patient is alert and oriented times three. The patient is alert and oriented times three. Mood and affect are normal. 
LYMPHATIC: lymph nodes are not enlarged and are within normal limits SKIN: normal in color and non tender to palpation. There are no bruises or abrasions noted. NEUROLOGICAL: Motor sensory exam is within normal limits. Reflexes are equal bilaterally. There is normal sensation to pinprick and light touch MUSCULOSKELETAL: 
Examination Left knee Skin Intact Range of motion 0-130 Effusion + Medial joint line tenderness + Lateral joint line tenderness + Tenderness Pes Bursa - Tenderness insertion MCL - Tenderness insertion LCL - Razias -  
 Patella crepitus + Patella grind - Lachman - Pivot shift - Anterior drawer - Posterior drawer -  
Varus stress -  
Valgus stress - Neurovascular Intact Calf Swelling and Tenderness to Palpation -  
Re's Test -  
Hamstring Cord Tightness - IMAGING: MRI of left knee dated 2/12/19 was reviewed and read: IMPRESSION:  
Medial meniscus posterior horn through body segment complex degeneration/tearing with partial extrusion of the body segment. 
-In the outer medial compartment there is notable subchondral marrow edema, possibly degenerative or a subtle subchondral fracture difficult to exclude.  
-Patchy high-grade to full-thickness cartilage loss throughout the medial compartment. 
-These findings of clearly worsened since 2011. Patellofemoral compartment with patchy full-thickness cartilage loss. In the lateral compartment there is a single high-grade to full-thickness fissure in the posterior weightbearing condyle. -These findings have clearly worsened since 2011. Moderate joint effusion. Mild Hoffa's fat pad edema. XR of right shoulder dated 1/21/19 was reviewed and read: marked degenerative changes in the glenohumeral joint with multiple loose bodies in bicipital groove. XR of the left knee dated 1/10/19 was reviewed and read: decreased joint space on the medial side, left worst than right IMPRESSION:   
  ICD-10-CM ICD-9-CM 1. Acute medial meniscus tear, left, subsequent encounter S83.242D V58.89   
  836.0 2. Primary osteoarthritis of left knee M17.12 715.16 PLAN:  
1. I discussed the risks and benefits and potential adverse outcomes of both operative vs non operative treatment of left medial mensicus tear with the patient. Patient wishes to proceed with arthroscopic left partial medial meniscectomy.   
 
Risks of operative intervention include but not limited to bleeding, infection, deep vein thrombosis, pulmonary embolism, death, limb length discrepancy, reflexive sympathetic dystrophy, fat embolism syndrome,damage to blood vessels and nerves, malunion, non-union, delayed union, failure of hardware, post traumatic arthritis, stroke, heart attack, and death. Patient understands that infection may arise and may require numerous surgeries. History and physical exam scheduled for a later date. Risk factors include: n/a 2. No ultrasound exam indicated today 3. No cortisone injection indicated today 4. No Physical/Occupational Therapy indicated today 5. No diagnostic test indicated today: 6. No durable medical equipment indicated today 7. No referral indicated today 8. No medications indicated today: 9. No Narcotic indicated today RTC H&P Follow-up Disposition: Not on File Scribed by Francesca Yu ([address] [place] [address] [address]) as dictated by Neha Camacho MD 
 
I, Dr. Neha Camacho, confirm that all documentation is accurate.  
 
Neha Camacho M.D.  
[facility]

## 2019-02-18 NOTE — PROGRESS NOTES
1. Have you been to the ER, urgent care clinic since your last visit? Hospitalized since your last visit? No 
 
2. Have you seen or consulted any other health care providers outside of the [address] [place] [address] since your last visit? Include any pap smears or colon screening.  No

## 2019-02-18 NOTE — PATIENT INSTRUCTIONS
Dr. Lawrence Abernathy What is the surgery? - This is an outpatient procedure at either [facility] or [facility] [address] will be completely asleep for procedure. Dr. Joselyn Vang will make 2 small incisions in your knee. He will take a tour of your knee with the camera and then address the meniscal tear(s). We will be able to evaluate if any arthritis in your knee but this surgery is not to treat your arthritis. - Total surgery takes about 25-30 mins What can you expect after surgery? - You will have a bulky dressing on your knee that you can remove 2 days after surgery. You will be able to shower 2 days after surgery but no soaking in a bath, hot tub, ocean or pool x 2 weeks to allow for full wound healing. No special brace is needed. - You will be on crutches or a walker when you leave the hospital. You can place weight on your leg as tolerated starting immediately. You are usually on crutches or your walker for about 4-5 days.  
- Even though you can place weight on your leg, we recommend no walking or standing longer than 10mins for the first week. We will gradually increase your activities after that point.   
- Dr. Joselyn Vang will start physical therapy for you when you return for your 1 week post op apt - It will take your 6-8 weeks to fully recover from your surgery. When can I return to work? - Most patients return to desk work only after 1-2 weeks. We recommend no prolonged walking or standing, climbing, kneeling, or crawling x 6-8 weeks. Not all knee arthroscopies are the same. The specifics of your individual case will be discussed at length with you by Dr. Joselyn Vang and his Physician Assistant.

## 2019-02-28 ENCOUNTER — HOSPITAL ENCOUNTER (EMERGENCY)
Age: 49
Discharge: HOME | End: 2019-02-28
Attending: EMERGENCY MEDICINE
Payer: COMMERCIAL

## 2019-02-28 VITALS
RESPIRATION RATE: 18 BRPM | HEART RATE: 80 BPM | SYSTOLIC BLOOD PRESSURE: 137 MMHG | HEIGHT: 62 IN | DIASTOLIC BLOOD PRESSURE: 88 MMHG | TEMPERATURE: 98.5 F | WEIGHT: 212 LBS | BODY MASS INDEX: 39.01 KG/M2 | OXYGEN SATURATION: 99 %

## 2019-02-28 DIAGNOSIS — S16.1XXA STRAIN OF NECK MUSCLE, INITIAL ENCOUNTER: Primary | ICD-10-CM

## 2019-02-28 PROCEDURE — 99282 EMERGENCY DEPT VISIT SF MDM: CPT

## 2019-02-28 RX ORDER — CYCLOBENZAPRINE HCL 5 MG
5 TABLET ORAL
Qty: 20 TAB | Refills: 0 | Status: SHIPPED | OUTPATIENT
Start: 2019-02-28 | End: 2019-03-24

## 2019-02-28 NOTE — ED TRIAGE NOTES
Patient states \"pulling a muscle in my neck two weeks ago\". States pain continuing to left neck. States that she was lifting a heavy tire prior to onset of pain.

## 2019-02-28 NOTE — ED PROVIDER NOTES
EMERGENCY DEPARTMENT HISTORY AND PHYSICAL EXAM 
 
2:06 PM 
 
 
Date: 2/28/2019 Patient Name: Matthew Bob History of Presenting Illness Chief Complaint Patient presents with  Neck Pain History Provided By: Patient Additional History (Context): Matthew Bob is a 57 y.o. female with a PMHX ADD, Depression, and Plantar Fasciitis arrived to ER c/o left side neck pain. Patient states onset of symptom started 12 days ago while lifting a tire at home. Patient also reports she lifts heavy objects at work. Patient reports she has been taking OTC Motrin with no relief. Denies pain radiating down arm, back, or chest.  Denies neck stiffness. Denies fever, chills, headache, dizziness, Cp, SOB, abdominal pain, N/V/D, or any other concerns. PCP: Whitley Love MD 
 
Chief Complaint:left side neck pain Duration:  Days Timing:  Intermittent Location: left side neck pain Quality: Lila Ronquillo Severity: 5 out of 10 Modifying Factors: OTC motrin no relief. Associated Symptoms: denies any other associated signs or symptoms Current Outpatient Medications Medication Sig Dispense Refill  cyclobenzaprine (FLEXERIL) 5 mg tablet Take 1 Tab by mouth three (3) times daily as needed for Muscle Spasm(s). 20 Tab 0  
 Lisdexamfetamine (VYVANSE) 40 mg capsule Take by mouth daily.  ibuprofen (MOTRIN) 800 mg tablet Take 1 Tab by mouth every eight (8) hours as needed for Pain. 100 Tab 1  
 ammonium lactate (LAC-HYDRIN) 12 % topical cream Apply  to affected area two (2) times a day. rub in to affected area well 280 g 0 Past History Past Medical History: 
Past Medical History:  
Diagnosis Date  ADD (attention deficit disorder)  Depression  Left foot pain  Plantar fasciitis, left  Right shoulder pain Past Surgical History: 
Past Surgical History:  
Procedure Laterality Date  HX TUBAL LIGATION  2009 Tubal ligation Family History: Family History Problem Relation Age of Onset  Hypertension Mother  Hypertension Father  Arthritis-osteo Other Social History: 
Social History Tobacco Use  Smoking status: Never Smoker  Smokeless tobacco: Never Used Substance Use Topics  Alcohol use: No  
 Drug use: No  
 
 
Allergies: Allergies Allergen Reactions  Codeine Nausea and Vomiting Patient states she gets jittery, has upset stomach Review of Systems Review of Systems Constitutional: Negative for activity change, appetite change, chills, fatigue and fever. Respiratory: Negative for cough and shortness of breath. Cardiovascular: Negative. Negative for chest pain, palpitations and leg swelling. Gastrointestinal: Negative for abdominal distention, abdominal pain, blood in stool, diarrhea, nausea and vomiting. Musculoskeletal: Positive for neck pain. Negative for back pain and neck stiffness. Neurological: Negative for dizziness, weakness, light-headedness, numbness and headaches. Hematological: Negative. Negative for adenopathy. Does not bruise/bleed easily. Psychiatric/Behavioral: Negative for sleep disturbance and suicidal ideas. All other systems reviewed and are negative. Physical Exam  
 
Visit Vitals /88 (BP 1 Location: Left arm, BP Patient Position: At rest) Pulse 80 Temp 98.5 °F (36.9 °C) Resp 18 Ht 5' 2\" (1.575 m) Wt 96.2 kg (212 lb) LMP 02/24/2019 SpO2 99% BMI 38.78 kg/m² Physical Exam  
Constitutional: She is oriented to person, place, and time. She appears well-developed and well-nourished. No distress. Neck: Normal range of motion. Neck supple. Muscular tenderness present. No spinous process tenderness present. No neck rigidity. No edema, no erythema and normal range of motion present. Cardiovascular: Normal rate, regular rhythm and normal heart sounds. Exam reveals no gallop and no friction rub. No murmur heard. Pulmonary/Chest: Effort normal and breath sounds normal. No respiratory distress. She has no wheezes. She has no rales. She exhibits no tenderness. Abdominal: Soft. Bowel sounds are normal. She exhibits no distension and no mass. There is no tenderness. There is no rebound and no guarding. Musculoskeletal: Normal range of motion. Neurological: She is alert and oriented to person, place, and time. Skin: Skin is warm and dry. She is not diaphoretic. Psychiatric: She has a normal mood and affect. Her speech is normal and behavior is normal. Judgment and thought content normal. Cognition and memory are normal.  
Nursing note and vitals reviewed. Diagnostic Study Results Labs -none No results found for this or any previous visit (from the past 12 hour(s)). Radiologic Studies - none No orders to display Medical Decision Making It should be noted that Cyril Lozada MD will be the provider of record for this patient. I reviewed the vital signs, available nursing notes, past medical history, past surgical history, family history and social history. Vital Signs-Reviewed the patient's vital signs. Records Reviewed: Old Medical Records (Time of Review: 2:06 PM) 
 
ED Course: Progress Notes, Reevaluation, and Consults: 
 
Provider Notes (Medical Decision Making): DDX: 
Cervical strain Cervical trauma Cervical radiculopathy Clinical Impression/Plan:  Patient stable condition. No midline cervical tenderness on palpation. No x-ray required during this ER visit. Will prescribe flexeril. Follow up with PCP in 2-4 days. If symptoms worsen or have any other concerns, return to ER. Patient verbalizes d/c instructions. Diagnosis Clinical Impression: 1. Strain of neck muscle, initial encounter Disposition: Home Follow-up Information Follow up With Specialties Details Why Contact Info Beatriz Cannon MD Family Practice Schedule an appointment as soon as possible for a visit in 2 days  [facility] [address] [address] 
[phone] Return to ER immediately for any worsening symptoms or concerns. Medication List  
  
START taking these medications   
cyclobenzaprine 5 mg tablet Commonly known as:  FLEXERIL Take 1 Tab by mouth three (3) times daily as needed for Muscle Spasm(s). ASK your doctor about these medications   
ammonium lactate 12 % topical cream 
Commonly known as:  LAC-HYDRIN Apply  to affected area two (2) times a day. rub in to affected area well 
  
ibuprofen 800 mg tablet Commonly known as:  MOTRIN Take 1 Tab by mouth every eight (8) hours as needed for Pain. VYVANSE 40 mg capsule Generic drug:  Lisdexamfetamine Where to Get Your Medications Information about where to get these medications is not yet available Ask your nurse or doctor about these medications · cyclobenzaprine 5 mg tablet 
  
 
_______________________________ Scribe Attestation Aj Marie NP acting as a scribe for and in the presence of Umm Kidd MD     
February 28, 2019 at 2:39 PM 
    
Provider Attestation:     
I personally performed the services described in the documentation, reviewed the documentation, as recorded by the scribe in my presence, and it accurately and completely records my words and actions. February 28, 2019 at 2:39 PM - Umm Kidd MD   
 
 
_______________________________

## 2019-02-28 NOTE — DISCHARGE INSTRUCTIONS
Patient Education        Neck Strain: Care Instructions  Your Care Instructions    You have strained the muscles and ligaments in your neck. A sudden, awkward movement can strain the neck. This often occurs with falls or car accidents or during certain sports. Everyday activities like working on a computer or sleeping can also cause neck strain if they force you to hold your neck in an awkward position for a long time. It is common for neck pain to get worse for a day or two after an injury, but it should start to feel better after that. You may have more pain and stiffness for several days before it gets better. This is expected. It may take a few weeks or longer for it to heal completely. Good home treatment can help you get better faster and avoid future neck problems. Follow-up care is a key part of your treatment and safety. Be sure to make and go to all appointments, and call your doctor if you are having problems. It's also a good idea to know your test results and keep a list of the medicines you take. How can you care for yourself at home? · If you were given a neck brace (cervical collar) to limit neck motion, wear it as instructed for as many days as your doctor tells you to. Do not wear it longer than you were told to. Wearing a brace for too long can make neck stiffness worse and weaken the neck muscles. · You can try using heat or ice to see if it helps. ? Try using a heating pad on a low or medium setting for 15 to 20 minutes every 2 to 3 hours. Try a warm shower in place of one session with the heating pad. You can also buy single-use heat wraps that last up to 8 hours. ? You can also try an ice pack for 10 to 15 minutes every 2 to 3 hours. · Take pain medicines exactly as directed. ? If the doctor gave you a prescription medicine for pain, take it as prescribed. ? If you are not taking a prescription pain medicine, ask your doctor if you can take an over-the-counter medicine.   · Gently rub the area to relieve pain and help with blood flow. Do not massage the area if it hurts to do so. · Do not do anything that makes the pain worse. Take it easy for a couple of days. You can do your usual activities if they do not hurt your neck or put it at risk for more stress or injury. · Try sleeping on a special neck pillow. Place it under your neck, not under your head. Placing a tightly rolled-up towel under your neck while you sleep will also work. If you use a neck pillow or rolled towel, do not use your regular pillow at the same time. · To prevent future neck pain, do exercises to stretch and strengthen your neck and back. Learn how to use good posture, safe lifting techniques, and proper body mechanics. When should you call for help? Call 911 anytime you think you may need emergency care. For example, call if:    · You are unable to move an arm or a leg at all.   [facility] your doctor now or seek immediate medical care if:    · You have new or worse symptoms in your arms, legs, chest, belly, or buttocks. Symptoms may include:  ? Numbness or tingling. ? Weakness. ? Pain.     · You lose bladder or bowel control.    Watch closely for changes in your health, and be sure to contact your doctor if:    · You are not getting better as expected. Where can you learn more? Go to http://elana-bela.info/. Enter M253 in the search box to learn more about \"Neck Strain: Care Instructions. \"  Current as of: September 20, 2018  Content Version: 11.9  © 8441-0548 Healthwise, [employer]. Care instructions adapted under license by [employer] (which disclaims liability or warranty for this information). If you have questions about a medical condition or this instruction, always ask your healthcare professional. [place] [address] any warranty or liability for your use of this information.          Patient Education        Neck Strain or Sprain: Rehab Exercises  Your Care Instructions  Here are some examples of typical rehabilitation exercises for your condition. Start each exercise slowly. Ease off the exercise if you start to have pain. Your doctor or physical therapist will tell you when you can start these exercises and which ones will work best for you. How to do the exercises  Neck rotation    1. Sit in a firm chair, or stand up straight. 2. Keeping your chin level, turn your head to the right, and hold for 15 to 30 seconds. 3. Turn your head to the left and hold for 15 to 30 seconds. 4. Repeat 2 to 4 times to each side. Neck stretches    1. Look straight ahead, and tip your right ear to your right shoulder. Do not let your left shoulder rise up as you tip your head to the right. 2. Hold for 15 to 30 seconds. 3. Tilt your head to the left. Do not let your right shoulder rise up as you tip your head to the left. 4. Hold for 15 to 30 seconds. 5. Repeat 2 to 4 times to each side. Forward neck flexion    1. Sit in a firm chair, or stand up straight. 2. Bend your head forward. 3. Hold for 15 to 30 seconds. 4. Repeat 2 to 4 times. Lateral (side) bend strengthening    1. With your right hand, place your first two fingers on your right temple. 2. Start to bend your head to the side while using gentle pressure from your fingers to keep your head from bending. 3. Hold for about 6 seconds. 4. Repeat 8 to 12 times. 5. Switch hands and repeat the same exercise on your left side. Forward bend strengthening    1. Place your first two fingers of either hand on your forehead. 2. Start to bend your head forward while using gentle pressure from your fingers to keep your head from bending. 3. Hold for about 6 seconds. 4. Repeat 8 to 12 times. Neutral position strengthening    1. Using one hand, place your fingertips on the back of your head at the top of your neck.   2. Start to bend your head backward while using gentle pressure from your fingers to keep your head from bending. 3. Hold for about 6 seconds. 4. Repeat 8 to 12 times. Chin tuck    1. Lie on the floor with a rolled-up towel under your neck. Your head should be touching the floor. 2. Slowly bring your chin toward your chest.  3. Hold for a count of 6, and then relax for up to 10 seconds. 4. Repeat 8 to 12 times. Follow-up care is a key part of your treatment and safety. Be sure to make and go to all appointments, and call your doctor if you are having problems. It's also a good idea to know your test results and keep a list of the medicines you take. Where can you learn more? Go to http://elana-bela.info/. Enter M679 in the search box to learn more about \"Neck Strain or Sprain: Rehab Exercises. \"  Current as of: September 20, 2018  Content Version: 11.9  © 0253-9934 [employer]. Care instructions adapted under license by [employer] (which disclaims liability or warranty for this information). If you have questions about a medical condition or this instruction, always ask your healthcare professional. [place] [address] any warranty or liability for your use of this information. [email or web address] Activation    Thank you for requesting access to [email or web address]. Please follow the instructions below to securely access and download your online medical record. [email or web address] allows you to send messages to your doctor, view your test results, renew your prescriptions, schedule appointments, and more. How Do I Sign Up? 1. In your internet browser, go to [email or web address]  2. Click on the First Time User? Click Here link in the Sign In box. You will be redirect to the New Member Sign Up page. 3. Enter your [email or web address] Access Code exactly as it appears below. You will not need to use this code after youve completed the sign-up process. If you do not sign up before the expiration date, you must request a new code. [email or web address] Access Code:  Activation code not generated  Current [email or web address] Status: Active (This is the date your [email or web address] access code will )    4. Enter the last four digits of your Social Security Number (xxxx) and Date of Birth (mm/dd/yyyy) as indicated and click Submit. You will be taken to the next sign-up page. 5. Create a [email or web address] ID. This will be your [email or web address] login ID and cannot be changed, so think of one that is secure and easy to remember. 6. Create a [email or web address] password. You can change your password at any time. 7. Enter your Password Reset Question and Answer. This can be used at a later time if you forget your password. 8. Enter your e-mail address. You will receive e-mail notification when new information is available in [address] 9. Click Sign Up. You can now view and download portions of your medical record. 10. Click the Download Summary menu link to download a portable copy of your medical information. Additional Information    If you have questions, please visit the Frequently Asked Questions section of the [email or web address] website at [email or web address] Remember, [email or web address] is NOT to be used for urgent needs. For medical emergencies, dial 911.

## 2019-02-28 NOTE — LETTER
[address] [place] [address] [facility] 
[address] [facility] [id number] [phone] Work/School Note Date: 2/28/2019 To Whom It May concern: 
 
Kenji Trinh was seen and treated today in the emergency room by the following provider(s): 
Attending Provider: Leti Shah MD 
Nurse Practitioner: Lilibeth French NP. Kenji Trinh may return to work on 03/02/2019. Avoid lifting anything over 20 pounds for one week. Sincerely, Jorge Patel NP

## 2019-03-01 ENCOUNTER — OFFICE VISIT (OUTPATIENT)
Dept: FAMILY MEDICINE CLINIC | Age: 49
End: 2019-03-01

## 2019-03-01 ENCOUNTER — TELEPHONE (OUTPATIENT)
Dept: FAMILY MEDICINE CLINIC | Age: 49
End: 2019-03-01

## 2019-03-01 VITALS
SYSTOLIC BLOOD PRESSURE: 137 MMHG | RESPIRATION RATE: 20 BRPM | HEART RATE: 83 BPM | DIASTOLIC BLOOD PRESSURE: 89 MMHG | HEIGHT: 62 IN | WEIGHT: 210 LBS | TEMPERATURE: 98 F | BODY MASS INDEX: 38.64 KG/M2 | OXYGEN SATURATION: 98 %

## 2019-03-01 DIAGNOSIS — R22.0 LIP SWELLING: ICD-10-CM

## 2019-03-01 DIAGNOSIS — J34.0 NASAL ULCER: Primary | ICD-10-CM

## 2019-03-01 DIAGNOSIS — R20.0 PERIORAL NUMBNESS: ICD-10-CM

## 2019-03-01 RX ORDER — ACYCLOVIR 50 MG/G
CREAM TOPICAL
Qty: 5 G | Refills: 1 | Status: SHIPPED | OUTPATIENT
Start: 2019-03-01 | End: 2019-03-04

## 2019-03-01 NOTE — TELEPHONE ENCOUNTER
Jolie from [facility], the acyclovir cream requires a prior auth and the pharmacy does not carry the medication, the pt is okay with getting the ointment her insurance covers that and the pharmacy carries it. Please advise.       [phone]

## 2019-03-01 NOTE — PROGRESS NOTES
Dl Mcdonald is a 57 y.o.  female and presents with Lip Swelling (2/18 went to [facility]) and Numbness (perioral for 2-3 days )      SUBJECTIVE:  Pt on 2/18 woke up with irritation of both lips with some small bumps on upper lip. [facility] and [facility] Lac Hydrin which improve the lip lesions but in the last 3 days she has had some perioral numbness. She has a h/o HSV-1 and currently has some ulcers in her nose which she has had for the last few weeks. However she has never had lip lesions like these before. She has never had lip lesions like these before and the numbness was also new. Pt has had normal calcium levels in the past.     Respiratory ROS: negative for - shortness of breath  Cardiovascular ROS: negative for - chest pain    Current Outpatient Medications   Medication Sig    acyclovir (ZOVIRAX) 5 % topical cream Apply  to affected area five (5) times daily.  cyclobenzaprine (FLEXERIL) 5 mg tablet Take 1 Tab by mouth three (3) times daily as needed for Muscle Spasm(s).  Lisdexamfetamine (VYVANSE) 40 mg capsule Take by mouth daily.  ibuprofen (MOTRIN) 800 mg tablet Take 1 Tab by mouth every eight (8) hours as needed for Pain.  ammonium lactate (LAC-HYDRIN) 12 % topical cream Apply  to affected area two (2) times a day. rub in to affected area well     No current facility-administered medications for this visit. OBJECTIVE:  alert, well appearing, and in no distress  Visit Vitals  /89 (BP 1 Location: Left arm, BP Patient Position: Sitting)   Pulse 83   Temp 98 °F (36.7 °C) (Oral)   Resp 20   Ht 5' 2\" (1.575 m)   Wt 210 lb (95.3 kg)   LMP 02/24/2019   SpO2 98%   BMI 38.41 kg/m²      well developed and well nourished  Skin - small bumps in upper lip and some irritation of lower lip. Minimal swelling in both lips.          Assessment/Plan      ICD-10-CM ICD-9-CM    1. Nasal ulcer J34.0 478.19 Will treat with acyclovir (ZOVIRAX) 5 % topical cream if lipid lesions are improving    2. Lip swelling R22.0 784.2 No clear etiology except for HSV-1. Pt to f/u if it is worsening or does not resolve. 3. Perioral numbness R20.0 782.0 May be due to using LacHydrin. Pt to stop for 2 days and if it does not improve try Zovirax crm     Follow-up Disposition:  Return if symptoms worsen or fail to improve. Reviewed plan of care. Patient has provided input and agrees with goals.

## 2019-03-04 RX ORDER — ACYCLOVIR 50 MG/G
OINTMENT TOPICAL
Qty: 5 G | Refills: 1 | Status: SHIPPED | OUTPATIENT
Start: 2019-03-04 | End: 2021-03-12

## 2019-03-24 ENCOUNTER — HOSPITAL ENCOUNTER (EMERGENCY)
Age: 49
Discharge: HOME | End: 2019-03-24
Attending: EMERGENCY MEDICINE
Payer: COMMERCIAL

## 2019-03-24 ENCOUNTER — APPOINTMENT (OUTPATIENT)
Dept: GENERAL RADIOLOGY | Age: 49
End: 2019-03-24
Attending: EMERGENCY MEDICINE
Payer: COMMERCIAL

## 2019-03-24 VITALS
WEIGHT: 211 LBS | HEART RATE: 76 BPM | HEIGHT: 62 IN | OXYGEN SATURATION: 100 % | BODY MASS INDEX: 38.83 KG/M2 | RESPIRATION RATE: 20 BRPM | SYSTOLIC BLOOD PRESSURE: 139 MMHG | DIASTOLIC BLOOD PRESSURE: 86 MMHG | TEMPERATURE: 97.6 F

## 2019-03-24 DIAGNOSIS — R68.84 JAW PAIN: Primary | ICD-10-CM

## 2019-03-24 DIAGNOSIS — M26.622 ARTHRALGIA OF LEFT TEMPOROMANDIBULAR JOINT: ICD-10-CM

## 2019-03-24 PROCEDURE — 74011250636 HC RX REV CODE- 250/636: Performed by: EMERGENCY MEDICINE

## 2019-03-24 PROCEDURE — 96372 THER/PROPH/DIAG INJ SC/IM: CPT

## 2019-03-24 PROCEDURE — 70110 X-RAY EXAM OF JAW 4/> VIEWS: CPT

## 2019-03-24 PROCEDURE — 99282 EMERGENCY DEPT VISIT SF MDM: CPT

## 2019-03-24 RX ORDER — NAPROXEN 500 MG/1
500 TABLET ORAL 2 TIMES DAILY WITH MEALS
Qty: 20 TAB | Refills: 0 | Status: SHIPPED | OUTPATIENT
Start: 2019-03-24 | End: 2019-06-10

## 2019-03-24 RX ORDER — KETOROLAC TROMETHAMINE 30 MG/ML
60 INJECTION, SOLUTION INTRAMUSCULAR; INTRAVENOUS
Status: COMPLETED | OUTPATIENT
Start: 2019-03-24 | End: 2019-03-24

## 2019-03-24 RX ADMIN — KETOROLAC TROMETHAMINE 60 MG: 30 INJECTION, SOLUTION INTRAMUSCULAR; INTRAVENOUS at 11:40

## 2019-03-24 NOTE — ED NOTES
Current Discharge Medication List  
  
START taking these medications Details  
naproxen (NAPROSYN) 500 mg tablet Take 1 Tab by mouth two (2) times daily (with meals). Qty: 20 Tab, Refills: 0 Patient armband removed and shredded. Prescription given and reviewed with patient.

## 2019-03-24 NOTE — ED PROVIDER NOTES
EMERGENCY DEPARTMENT HISTORY AND PHYSICAL EXAM 
 
11:18 AM 
 
 
Date: 3/24/2019 Patient Name: Eugenio Dalal History of Presenting Illness Chief Complaint Patient presents with  Jaw Swelling History Provided By: Patient Additional History (Context): Eugenio Dalal is a 57 y.o. female with No significant past medical history who presents with jaw pain that she has had for 2 days. She denies any trauma. States as some problems opening her mouth thoroughly. No injuries. Patient denies any smoking alcohol or recreational drug use. PCP: Davidson Reddy MD 
 
 
 
 
Current Outpatient Medications Medication Sig Dispense Refill  naproxen (NAPROSYN) 500 mg tablet Take 1 Tab by mouth two (2) times daily (with meals). 20 Tab 0  
 Lisdexamfetamine (VYVANSE) 40 mg capsule Take by mouth daily.  acyclovir (ZOVIRAX) 5 % ointment Apply every 3 hrs up to 5x/day 5 g 1 Past History Past Medical History: 
Past Medical History:  
Diagnosis Date  ADD (attention deficit disorder)  Depression  Left foot pain  Plantar fasciitis, left  Right shoulder pain Past Surgical History: 
Past Surgical History:  
Procedure Laterality Date  HX TUBAL LIGATION  2009 Tubal ligation Family History: 
Family History Problem Relation Age of Onset  Hypertension Mother  Hypertension Father  Arthritis-osteo Other Social History: 
Social History Tobacco Use  Smoking status: Never Smoker  Smokeless tobacco: Never Used Substance Use Topics  Alcohol use: No  
 Drug use: No  
 
 
Allergies: Allergies Allergen Reactions  Codeine Nausea and Vomiting Patient states she gets jittery, has upset stomach Review of Systems Review of Systems Constitutional: Negative. Negative for chills, diaphoresis and fever. HENT: Negative. Negative for congestion, rhinorrhea and sore throat. Eyes: Negative. Negative for pain, discharge and redness. Respiratory: Negative. Negative for cough, chest tightness, shortness of breath and wheezing. Cardiovascular: Negative. Negative for chest pain. Gastrointestinal: Negative. Negative for abdominal pain, constipation, diarrhea, nausea and vomiting. Genitourinary: Negative. Negative for dysuria, flank pain, frequency, hematuria and urgency. Musculoskeletal: Negative. Negative for back pain and neck pain. Skin: Negative. Negative for rash. Neurological: Negative. Negative for syncope, weakness, numbness and headaches. Psychiatric/Behavioral: Negative. All other systems reviewed and are negative. Physical Exam  
 
Visit Vitals /86 (BP 1 Location: Left arm) Pulse 76 Temp 97.6 °F (36.4 °C) Resp 20 Ht 5' 2\" (1.575 m) Wt 95.7 kg (211 lb) LMP 03/15/2019 SpO2 100% BMI 38.59 kg/m² Physical Exam  
Constitutional: She appears well-developed and well-nourished. Non-toxic appearance. She does not have a sickly appearance. She does not appear ill. No distress. HENT:  
Head: Normocephalic and atraumatic. Mouth/Throat: Oropharynx is clear and moist. No oropharyngeal exudate. Eyes: Pupils are equal, round, and reactive to light. Conjunctivae and EOM are normal. No scleral icterus. Neck: Normal range of motion. Neck supple. No hepatojugular reflux and no JVD present. No tracheal deviation present. No thyromegaly present. Cardiovascular: Normal rate, regular rhythm, S1 normal, S2 normal, normal heart sounds, intact distal pulses and normal pulses. Exam reveals no gallop, no S3 and no S4. No murmur heard. Pulses: 
     Radial pulses are 2+ on the right side, and 2+ on the left side. Dorsalis pedis pulses are 2+ on the right side, and 2+ on the left side.   
Pulmonary/Chest: Effort normal and breath sounds normal. No respiratory distress. She has no decreased breath sounds. She has no wheezes. She has no rhonchi. She has no rales. Abdominal: Soft. Normal appearance and bowel sounds are normal. She exhibits no distension and no mass. There is no hepatosplenomegaly. There is no tenderness. There is no rigidity, no rebound, no guarding, no CVA tenderness, no tenderness at McBurney's point and negative Harry's sign. Musculoskeletal: Normal range of motion. Strength 5 out of 5 throughout Lymphadenopathy:  
     Head (right side): No submental, no submandibular, no preauricular and no occipital adenopathy present. Head (left side): No submental, no submandibular, no preauricular and no occipital adenopathy present. She has no cervical adenopathy. Right: No supraclavicular adenopathy present. Left: No supraclavicular adenopathy present. Neurological: She is alert. She has normal strength and normal reflexes. She is not disoriented. No cranial nerve deficit or sensory deficit. Coordination and gait normal. GCS eye subscore is 4. GCS verbal subscore is 5. GCS motor subscore is 6. Grossly intact Skin: Skin is warm, dry and intact. No rash noted. She is not diaphoretic. Psychiatric: She has a normal mood and affect. Her speech is normal and behavior is normal. Judgment and thought content normal. Cognition and memory are normal.  
Nursing note and vitals reviewed. Diagnostic Study Results Labs - No results found for this or any previous visit (from the past 12 hour(s)). Radiologic Studies -  
XR MANDIBLE MIN 4 V Final Result IMPRESSION:  
  
Radiographic appearance of the mandible within normal limits. Medical Decision Making Provider Notes (Medical Decision Making): MDM Number of Diagnoses or Management Options Arthralgia of left temporomandibular joint:  
Jaw pain: I am the first provider for this patient. I reviewed the vital signs, available nursing notes, past medical history, past surgical history, family history and social history. Vital Signs-Reviewed the patient's vital signs. Records Reviewed: Nursing Notes (Time of Review: 11:18 AM) ED Course: Progress Notes, Reevaluation, and Consults: 
Jaw xray showed no acute process. 11:18 AM 3/24/2019 Diagnosis I have assessed the patient. Patient is feeling well. Patient will be prescribed Naproxen. Patient was discharged in stable condition. Patient is to return to emergency department if any new or worsening condition. Clinical Impression: 1. Jaw pain 2. Arthralgia of left temporomandibular joint Disposition: Discharged home Follow-up Information Follow up With Specialties Details Why Contact Info Peter Miranda MD Family Practice In 2 days  [address] [place] [address] [phone] [place] [phone] 
[phone] 
  
  
  
 
_______________________________ Attestations: 
Scribe Attestation [address] Silverio Luna DO acting as a scribe for and in the presence of No att. providers found March 24, 2019 at 2:43 PM 
    
Provider Attestation:     
I personally performed the services described in the documentation, reviewed the documentation, as recorded by the scribe in my presence, and it accurately and completely records my words and actions. March 24, 2019 at 2:43 PM - No att. providers found   
_______________________________

## 2019-03-24 NOTE — ED TRIAGE NOTES
Pt c/o left jaw swelling for 2 weeks. Denies pain or injury. Denies dental problems. States it is difficult to open her mouth

## 2019-03-24 NOTE — DISCHARGE INSTRUCTIONS
Patient Education        Temporomandibular Disorder: Care Instructions  Your Care Instructions    Temporomandibular (TM) disorders are a problem with the muscles and joints that connect your jaw to your skull. They cause pain when you open your mouth, chew, or yawn. You may feel this pain on one or both sides. TM disorders are often caused by tight jaw muscles. The tightness can be caused by clenching or grinding your teeth. This may happen when you have a lot of stress in your life. If you lower your stress, you may be able to stop clenching or grinding your teeth. This will help relax your jaw and reduce your pain. You may also be able to do some things at home to feel better. But if none of this works, your doctor may prescribe medicine to help relax your muscles and control the pain. Follow-up care is a key part of your treatment and safety. Be sure to make and go to all appointments, and call your doctor if you are having problems. It's also a good idea to know your test results and keep a list of the medicines you take. How can you care for yourself at home? · Put a warm, moist cloth or heating pad set on low on your jaw. Do this for 10 to 20 minutes at a time. Put a thin cloth between the heating pad and your skin. · Avoid hard or chewy foods that cause your jaws to work very hard. Examples include popcorn, jerky, tough meats, chewy breads, gum, and raw apples and carrots. · Choose softer foods that are easy to chew. These include eggs, yogurt, and soup. · Cut your food into small pieces. Chew slowly. · If your jaw gets too painful to chew, or if it locks, you may need to puree your food for a few days or weeks. · To relax your jaw, repeat this exercise for a few minutes every morning and evening. Watch yourself in a mirror. Gently open and close your mouth. Move your jaw straight up and down. But don't do this if it makes your pain worse.   · Get at least 30 minutes of exercise on most days of the week to relieve stress. Walking is a good choice. You also may want to do other activities, such as running, swimming, cycling, or playing tennis or team sports. · Do not:  ? Hold a phone between your shoulder and your jaw. ? Open your mouth all the way, like when you sing loudly or yawn. ? Clench or grind your teeth, bite your lips, or chew your fingernails. ? Clench things such as pens, pipes, or cigars between your teeth. When should you call for help? Call your doctor now or seek immediate medical care if:    · Your jaw is locked open or shut or it is hard to move your jaw.    Watch closely for changes in your health, and be sure to contact your doctor if:    · Your jaw pain gets worse.     · Your face is swollen.     · You do not get better as expected. Where can you learn more? Go to http://elana-bela.info/. Enter I563 in the search box to learn more about \"Temporomandibular Disorder: Care Instructions. \"  Current as of: March 27, 2018  Content Version: 11.9  © 6788-8442 [employer]. Care instructions adapted under license by [employer] (which disclaims liability or warranty for this information). If you have questions about a medical condition or this instruction, always ask your healthcare professional. [address] any warranty or liability for your use of this information.

## 2019-05-07 ENCOUNTER — DOCUMENTATION ONLY (OUTPATIENT)
Dept: ORTHOPEDIC SURGERY | Age: 49
End: 2019-05-07

## 2019-05-07 NOTE — PROGRESS NOTES
Patient came to [facility] location and dropped the following forms off to be completed:     -[employer]- Attending Physician Statement/1-Page  -[place] [employer]- FMLA/4-Pages    Please complete and contact patient at [phone] when ready to be picked up from [facility] location.

## 2019-05-23 ENCOUNTER — TELEPHONE (OUTPATIENT)
Dept: ORTHOPEDIC SURGERY | Age: 49
End: 2019-05-23

## 2019-05-23 NOTE — TELEPHONE ENCOUNTER
Patient is checking on status of employer paperwork left at [facility] office 5/7 for completion. Please advise her at [phone].

## 2019-05-23 NOTE — TELEPHONE ENCOUNTER
Spoke with Lorna Willis, surgery date needed for form. Surgery scheduled for glenoid resurfacing on 6/14/19. Patient will be notified once completed.

## 2019-05-24 NOTE — TELEPHONE ENCOUNTER
Forms completed. Copy placed in scanning. Spoke with patient and informed her that her forms are ready to be picked up at the [facility] location.

## 2019-05-29 DIAGNOSIS — M19.011 GLENOHUMERAL ARTHRITIS, RIGHT: Primary | ICD-10-CM

## 2019-05-30 ENCOUNTER — OFFICE VISIT (OUTPATIENT)
Dept: ORTHOPEDIC SURGERY | Age: 49
End: 2019-05-30

## 2019-05-30 VITALS
TEMPERATURE: 97.7 F | HEIGHT: 62 IN | WEIGHT: 215 LBS | DIASTOLIC BLOOD PRESSURE: 80 MMHG | SYSTOLIC BLOOD PRESSURE: 147 MMHG | HEART RATE: 76 BPM | RESPIRATION RATE: 14 BRPM | OXYGEN SATURATION: 100 % | BODY MASS INDEX: 39.56 KG/M2

## 2019-05-30 DIAGNOSIS — M19.011 GLENOHUMERAL ARTHRITIS, RIGHT: Primary | ICD-10-CM

## 2019-05-30 RX ORDER — OXYCODONE HYDROCHLORIDE 5 MG/1
5 TABLET ORAL
Qty: 40 TAB | Refills: 0 | Status: SHIPPED | OUTPATIENT
Start: 2019-05-30 | End: 2019-06-06

## 2019-05-30 RX ORDER — GABAPENTIN 300 MG/1
300 CAPSULE ORAL
Qty: 5 CAP | Refills: 0 | Status: SHIPPED | OUTPATIENT
Start: 2019-05-30 | End: 2019-06-04

## 2019-05-30 RX ORDER — ACETAMINOPHEN 325 MG/1
1000 TABLET ORAL ONCE
Status: CANCELLED | OUTPATIENT
Start: 2019-05-30 | End: 2019-05-30

## 2019-05-30 RX ORDER — CELECOXIB 100 MG/1
400 CAPSULE ORAL ONCE
Status: CANCELLED | OUTPATIENT
Start: 2019-05-30 | End: 2019-05-30

## 2019-05-30 RX ORDER — GABAPENTIN 100 MG/1
400 CAPSULE ORAL ONCE
Status: CANCELLED | OUTPATIENT
Start: 2019-05-30 | End: 2019-05-30

## 2019-05-30 NOTE — H&P
HISTORY AND PHYSICAL          Patient: Sherwin Pinto                MRN: [id number]       SSN: [id number]  YOB: 1970          AGE: 57 y.o. SEX: female      Patient scheduled for:  Right shoulder arthroscopic glenoid resurfacing    Surgeon: Valerio Mccullough MD    ANESTHESIA TYPE:  General    HISTORY:     The patient was seen in the office today for a preoperative history and physical for an upcoming above listed surgery. The patient is a pleasant 57 y.o. female who has a history of right shoulder pain. She states the pain has been gradually getting worse in the last two years, notes the shoulder locks up. she has difficulty with activities of daily living. Pain at night. Dull constant ache. failed cortisone injections. Pain level is a 0/10. Due to the current findings, affected activity of daily living and continued pain and discomfort, surgical intervention is indicated. The alternatives, risks, and complications, including but not limited to infection, blood loss, need for blood transfusion, neurovascular damage, manuela-incisional numbness, subcutaneous hematoma, bone fracture, anesthetic complications, DVT, PE, death, RSD, postoperative stiffness and pain, possible surgical scar, delayed healing and nonhealing, reflexive sympathetic dystrophy, damage to blood vessels and nerves, need for more surgery, MI, and stroke,  failure of hardware, gait disturbances,have been discussed. The patient understands and wishes to proceed with surgery. PAST MEDICAL HISTORY:     Past Medical History:   Diagnosis Date    ADD (attention deficit disorder)     Depression     Left foot pain     Plantar fasciitis, left     Right shoulder pain        CURRENT MEDICATIONS:     Current Outpatient Medications   Medication Sig Dispense Refill    gabapentin (NEURONTIN) 300 mg capsule Take 1 Cap by mouth nightly for 5 days.  START NIGHT OF SURGERY 5 Cap 0    oxyCODONE IR (ROXICODONE) 5 mg immediate release tablet Take 1 Tab by mouth every four (4) hours as needed for Pain for up to 7 days. Max Daily Amount: 30 mg. DO NOT TAKE UNTIL AFTER SURGERY (for acute post operative pain) 40 Tab 0    naproxen (NAPROSYN) 500 mg tablet Take 1 Tab by mouth two (2) times daily (with meals). 20 Tab 0    acyclovir (ZOVIRAX) 5 % ointment Apply every 3 hrs up to 5x/day 5 g 1    Lisdexamfetamine (VYVANSE) 40 mg capsule Take by mouth daily. ALLERGIES:     Allergies   Allergen Reactions    Codeine Nausea and Vomiting     Patient states she gets jittery, has upset stomach          SURGICAL HISTORY:     Past Surgical History:   Procedure Laterality Date    HX TUBAL LIGATION  2009    Tubal ligation       SOCIAL HISTORY:     Social History     Socioeconomic History    Marital status:      Spouse name: Not on file    Number of children: Not on file    Years of education: Not on file    Highest education level: Not on file   Occupational History    Occupation: detention     Employer: [email or web address]   Tobacco Use    Smoking status: Never Smoker    Smokeless tobacco: Never Used   Substance and Sexual Activity    Alcohol use: No    Drug use: No    Sexual activity: Yes     Partners: Male       FAMILY HISTORY:     Family History   Problem Relation Age of Onset    Hypertension Mother     Hypertension Father     Arthritis-osteo Other        REVIEW OF SYSTEMS:     Negative for fevers, chills, chest pain, shortness of breath, weight loss, recent illness     General: Negative for fever and chills. No unexpected change in weight. Denies fatigue. No change in appetite. Skin: Negative for rash or itching. HEENT: Negative for congestion, sore throat, neck pain and neck stiffness. No change in vision or hearing. Hasn't noted any enlarged lymph nodes in the neck. Cardiovascular:  Negative for chest pain and palpitations. Has not noted pedal edema.   Respiratory: Negative for cough, colds, sinus, hemoptysis, shortness of breath and wheezing. Gastrointestinal: Negative for nausea and vomiting, rectal bleeding, coffee ground emesis, abdominal pain, diarrhea and constipation. Genitourinary: Negative for dysuria, frequency urgency, or burning on micturition. No flank pain, no foul smelling urine, no difficulty with initiating urination. Hematological: Negative for bleeding or easy bruising. Musculoskeletal: Negative  for arthralgias, back pain or neck pain. Neurological: Negative for dizziness, seizures or syncopal episodes. Denies headaches. Endocrine: Denies excessive thirst.  No heat/cold intolerance. Psychiatric: Negative for depression or insomnia. PHYSICAL EXAMINATION:     VITALS:   Visit Vitals  /80   Pulse 76   Temp 97.7 °F (36.5 °C) (Oral)   Resp 14   Ht 5' 2\" (1.575 m)   Wt 215 lb (97.5 kg)   SpO2 100%   BMI 39.32 kg/m²     GEN:  Well developed, well nourished 57 y.o. female in no acute distress. HEENT: Normocephalic and atraumatic. Eyes: Conjunctivae and EOM are normal.Pupils are equal, round, and reactive to light. External ear normal appearance, external nose normal appearing. Mouth/Throat: Oropharynx is clear and moist, able to handle oral secretions w/out difficulty, airway patent  NECK: Supple. Normal ROM, No lymphadenopathy. Trachea is midline. No bruising, swelling or deformity  RESP: Clear to auscultation bilaterally. No wheezes, rales, rhonchi. Normal effort and breath sounds. No respiratory distress  CARDIO:  Normal rate, regular rhythm and normal heart sounds. No MGR. ABDOMEN: Soft, non-tender, non-distended, normoactive bowel sounds in all four quadrants. There is no tenderness. There is no rebound and no guarding.    BACK: No CVA or spinal tenderness  BREAST:  Deferred  PELVIC:    Deferred   RECTAL:  Deferred   :           Deferred  EXTREMITIES: EXAMINATION OF:   Examination Right shoulder   Skin Intact   AC joint tenderness -   Biceps tenderness -   Forward flexion/Elevation    Active abduction    Glenohumeral abduction 45   External rotation ROM 30   Internal rotation ROM 30   Apprehension -   Rachells Relocation -   Jerk -   Load and Shift -   Obriens -   Speeds -   Impingement sign -   Supraspinatus/Empty Can -, 5/5   External Rotation Strength -, 5/5   Lift Off/Belly Press -, 5/5   Neurovascular Intact       NEUROVASCULAR: Sensation intact to light touch and strength grossly intact and symmetrical. No nystagmus. Positive distal pulses and capillary refill. DVT ASSESSMENT:  There is not  calf tenderness. No evidence of DVT seen on physical exam.  MOTOR: In tact  PSYCH: Alert an oriented to person, place and time. Mood, memory, affect, behavior and judgment normal       RADIOGRAPHS & DIAGNOSTIC STUDIES:     MRI/xray reveals : IMAGING: XR of right shoulder dated 1/21/19 was reviewed and read: marked degenerative changes in the glenohumeral joint with multiple loose bodies in bicipital groove. MRI 2017 shoulder: 1. Advanced arthritis of the shoulder joint proper with extensive cartilage  loss. The cartilage loss is the presumed source of the handful of loose bodies  within the biceps sulcus. 2. Rotator cuff tendinosis without discrete tear. No tendon retraction or muscle  atrophy. 3. Moderate AC joint arthritis. LABS:     None needed    ASSESSMENT:       Encounter Diagnosis   Name Primary?  Glenohumeral arthritis, right Yes       PLAN:     Again, the alternatives, risks, and complications, as well as expected outcome were discussed. The patient understands and agrees to proceed with right shoulder arthroscopic glenoid resurfacing.  Patient given orders listed below:    Orders Placed This Encounter    gabapentin (NEURONTIN) 300 mg capsule    oxyCODONE IR (ROXICODONE) 5 mg immediate release tablet         J Luis Upton PA-C  5/30/2019  4:24 PM

## 2019-05-30 NOTE — PROGRESS NOTES
1. Have you been to the ER, urgent care clinic since your last visit? Hospitalized since your last visit? No    2. Have you seen or consulted any other health care providers outside of the [address] [place] [address] since your last visit? Include any pap smears or colon screening.  No

## 2019-06-10 RX ORDER — IBUPROFEN 800 MG/1
800 TABLET ORAL
COMMUNITY
End: 2019-08-08 | Stop reason: SDUPTHER

## 2019-06-13 ENCOUNTER — ANESTHESIA EVENT (OUTPATIENT)
Dept: SURGERY | Age: 49
End: 2019-06-13
Payer: COMMERCIAL

## 2019-06-14 ENCOUNTER — HOSPITAL ENCOUNTER (OUTPATIENT)
Age: 49
Setting detail: OUTPATIENT SURGERY
Discharge: HOME | End: 2019-06-14
Attending: ORTHOPAEDIC SURGERY | Admitting: ORTHOPAEDIC SURGERY
Payer: COMMERCIAL

## 2019-06-14 ENCOUNTER — ANESTHESIA (OUTPATIENT)
Dept: SURGERY | Age: 49
End: 2019-06-14
Payer: COMMERCIAL

## 2019-06-14 VITALS
OXYGEN SATURATION: 97 % | SYSTOLIC BLOOD PRESSURE: 151 MMHG | DIASTOLIC BLOOD PRESSURE: 92 MMHG | WEIGHT: 205 LBS | HEIGHT: 62 IN | BODY MASS INDEX: 37.73 KG/M2 | TEMPERATURE: 96.3 F | RESPIRATION RATE: 20 BRPM | HEART RATE: 108 BPM

## 2019-06-14 LAB — HCG UR QL: NEGATIVE

## 2019-06-14 PROCEDURE — 77030013079 HC BLNKT BAIR HGGR 3M -A: Performed by: ANESTHESIOLOGY

## 2019-06-14 PROCEDURE — 74011250636 HC RX REV CODE- 250/636: Performed by: ORTHOPAEDIC SURGERY

## 2019-06-14 PROCEDURE — 74011000258 HC RX REV CODE- 258: Performed by: ORTHOPAEDIC SURGERY

## 2019-06-14 PROCEDURE — 77030008683 HC TU ET CUF COVD -A: Performed by: ANESTHESIOLOGY

## 2019-06-14 PROCEDURE — 77030031139 HC SUT VCRL2 J&J -A: Performed by: ORTHOPAEDIC SURGERY

## 2019-06-14 PROCEDURE — 77030006891 HC BLD SHV RESECT STRY -B: Performed by: ORTHOPAEDIC SURGERY

## 2019-06-14 PROCEDURE — 76010000133 HC OR TIME 3 TO 3.5 HR: Performed by: ORTHOPAEDIC SURGERY

## 2019-06-14 PROCEDURE — C1713 ANCHOR/SCREW BN/BN,TIS/BN: HCPCS | Performed by: ORTHOPAEDIC SURGERY

## 2019-06-14 PROCEDURE — 74011250637 HC RX REV CODE- 250/637: Performed by: NURSE ANESTHETIST, CERTIFIED REGISTERED

## 2019-06-14 PROCEDURE — L3650 SO 8 ABD RESTRAINT PRE OTS: HCPCS | Performed by: ORTHOPAEDIC SURGERY

## 2019-06-14 PROCEDURE — 77030032490 HC SLV COMPR SCD KNE COVD -B: Performed by: ORTHOPAEDIC SURGERY

## 2019-06-14 PROCEDURE — 77030008574 HC TBNG SUC IRR STRY -B: Performed by: ORTHOPAEDIC SURGERY

## 2019-06-14 PROCEDURE — 74011250636 HC RX REV CODE- 250/636

## 2019-06-14 PROCEDURE — 76060000037 HC ANESTHESIA 3 TO 3.5 HR: Performed by: ORTHOPAEDIC SURGERY

## 2019-06-14 PROCEDURE — 77030003598 HC NDL MULT/FIRE ARTH -C: Performed by: ORTHOPAEDIC SURGERY

## 2019-06-14 PROCEDURE — 77030010816: Performed by: ORTHOPAEDIC SURGERY

## 2019-06-14 PROCEDURE — 74011250636 HC RX REV CODE- 250/636: Performed by: NURSE ANESTHETIST, CERTIFIED REGISTERED

## 2019-06-14 PROCEDURE — 77030018836 HC SOL IRR NACL ICUM -A: Performed by: ORTHOPAEDIC SURGERY

## 2019-06-14 PROCEDURE — 77030017964 HC PRB COAG4 STRY -C: Performed by: ORTHOPAEDIC SURGERY

## 2019-06-14 PROCEDURE — 74011250636 HC RX REV CODE- 250/636: Performed by: PHYSICIAN ASSISTANT

## 2019-06-14 PROCEDURE — 76210000026 HC REC RM PH II 1 TO 1.5 HR: Performed by: ORTHOPAEDIC SURGERY

## 2019-06-14 PROCEDURE — 81025 URINE PREGNANCY TEST: CPT

## 2019-06-14 PROCEDURE — 77030028520: Performed by: ORTHOPAEDIC SURGERY

## 2019-06-14 PROCEDURE — 76942 ECHO GUIDE FOR BIOPSY: CPT | Performed by: ANESTHESIOLOGY

## 2019-06-14 PROCEDURE — 77030010427: Performed by: ORTHOPAEDIC SURGERY

## 2019-06-14 PROCEDURE — 77030013079 HC BLNKT BAIR HGGR 3M -A: Performed by: ORTHOPAEDIC SURGERY

## 2019-06-14 PROCEDURE — 77030002919 HC SUT FBRTAPE ARTH -B: Performed by: ORTHOPAEDIC SURGERY

## 2019-06-14 PROCEDURE — 77030002933 HC SUT MCRYL J&J -A: Performed by: ORTHOPAEDIC SURGERY

## 2019-06-14 PROCEDURE — 77030020268 HC MISC GENERAL SUPPLY: Performed by: ORTHOPAEDIC SURGERY

## 2019-06-14 PROCEDURE — 77030002922 HC SUT FBRWRE ARTH -B: Performed by: ORTHOPAEDIC SURGERY

## 2019-06-14 PROCEDURE — 76210000000 HC OR PH I REC 2 TO 2.5 HR: Performed by: ORTHOPAEDIC SURGERY

## 2019-06-14 PROCEDURE — 74011250637 HC RX REV CODE- 250/637: Performed by: PHYSICIAN ASSISTANT

## 2019-06-14 PROCEDURE — 77030019605: Performed by: ORTHOPAEDIC SURGERY

## 2019-06-14 PROCEDURE — 77030039267 HC ADH SKN EXOFIN S2SG -B: Performed by: ORTHOPAEDIC SURGERY

## 2019-06-14 PROCEDURE — 77030038968: Performed by: ORTHOPAEDIC SURGERY

## 2019-06-14 PROCEDURE — 77030003666 HC NDL SPINAL BD -A: Performed by: ORTHOPAEDIC SURGERY

## 2019-06-14 PROCEDURE — 64520 N BLOCK LUMBAR/THORACIC: CPT | Performed by: ANESTHESIOLOGY

## 2019-06-14 PROCEDURE — 77030002966 HC SUT PDS J&J -A: Performed by: ORTHOPAEDIC SURGERY

## 2019-06-14 PROCEDURE — 77030004453 HC BUR SHV STRY -B: Performed by: ORTHOPAEDIC SURGERY

## 2019-06-14 DEVICE — ANCHOR SUT L15.5MM DIA2.9MM HIP BIOCOMPOSITE KNOTLESS: Type: IMPLANTABLE DEVICE | Site: SHOULDER | Status: FUNCTIONAL

## 2019-06-14 DEVICE — GRAFT DERMIS DECEL 40X70MM -- ARTHROFLEX: Type: IMPLANTABLE DEVICE | Site: SHOULDER | Status: FUNCTIONAL

## 2019-06-14 DEVICE — ANCHOR SUT L14.5MM DIA3MM BIOCOMPOSITE W/ TWO SZ 2: Type: IMPLANTABLE DEVICE | Site: SHOULDER | Status: FUNCTIONAL

## 2019-06-14 RX ORDER — ROPIVACAINE HYDROCHLORIDE 5 MG/ML
30 INJECTION, SOLUTION EPIDURAL; INFILTRATION; PERINEURAL
Status: COMPLETED | OUTPATIENT
Start: 2019-06-14 | End: 2019-06-14

## 2019-06-14 RX ORDER — SUCCINYLCHOLINE CHLORIDE 20 MG/ML
INJECTION INTRAMUSCULAR; INTRAVENOUS AS NEEDED
Status: DISCONTINUED | OUTPATIENT
Start: 2019-06-14 | End: 2019-06-14 | Stop reason: HOSPADM

## 2019-06-14 RX ORDER — HYDROMORPHONE HYDROCHLORIDE 2 MG/ML
INJECTION, SOLUTION INTRAMUSCULAR; INTRAVENOUS; SUBCUTANEOUS AS NEEDED
Status: DISCONTINUED | OUTPATIENT
Start: 2019-06-14 | End: 2019-06-14 | Stop reason: HOSPADM

## 2019-06-14 RX ORDER — ROPIVACAINE HYDROCHLORIDE 5 MG/ML
INJECTION, SOLUTION EPIDURAL; INFILTRATION; PERINEURAL
Status: COMPLETED | OUTPATIENT
Start: 2019-06-14 | End: 2019-06-14

## 2019-06-14 RX ORDER — FENTANYL CITRATE 50 UG/ML
50 INJECTION, SOLUTION INTRAMUSCULAR; INTRAVENOUS AS NEEDED
Status: CANCELLED | OUTPATIENT
Start: 2019-06-14

## 2019-06-14 RX ORDER — FAMOTIDINE 20 MG/1
20 TABLET, FILM COATED ORAL ONCE
Status: COMPLETED | OUTPATIENT
Start: 2019-06-14 | End: 2019-06-14

## 2019-06-14 RX ORDER — SODIUM CHLORIDE 0.9 % (FLUSH) 0.9 %
5-40 SYRINGE (ML) INJECTION EVERY 8 HOURS
Status: DISCONTINUED | OUTPATIENT
Start: 2019-06-14 | End: 2019-06-14 | Stop reason: HOSPADM

## 2019-06-14 RX ORDER — DEXAMETHASONE SODIUM PHOSPHATE 4 MG/ML
INJECTION, SOLUTION INTRA-ARTICULAR; INTRALESIONAL; INTRAMUSCULAR; INTRAVENOUS; SOFT TISSUE AS NEEDED
Status: DISCONTINUED | OUTPATIENT
Start: 2019-06-14 | End: 2019-06-14 | Stop reason: HOSPADM

## 2019-06-14 RX ORDER — ACETAMINOPHEN 500 MG
1000 TABLET ORAL ONCE
Status: COMPLETED | OUTPATIENT
Start: 2019-06-14 | End: 2019-06-14

## 2019-06-14 RX ORDER — ONDANSETRON 2 MG/ML
INJECTION INTRAMUSCULAR; INTRAVENOUS AS NEEDED
Status: DISCONTINUED | OUTPATIENT
Start: 2019-06-14 | End: 2019-06-14 | Stop reason: HOSPADM

## 2019-06-14 RX ORDER — SODIUM CHLORIDE 0.9 % (FLUSH) 0.9 %
5-40 SYRINGE (ML) INJECTION EVERY 8 HOURS
Status: CANCELLED | OUTPATIENT
Start: 2019-06-14

## 2019-06-14 RX ORDER — FENTANYL CITRATE 50 UG/ML
INJECTION, SOLUTION INTRAMUSCULAR; INTRAVENOUS
Status: COMPLETED | OUTPATIENT
Start: 2019-06-14 | End: 2019-06-14

## 2019-06-14 RX ORDER — LIDOCAINE HYDROCHLORIDE 10 MG/ML
3 INJECTION, SOLUTION EPIDURAL; INFILTRATION; INTRACAUDAL; PERINEURAL ONCE
Status: COMPLETED | OUTPATIENT
Start: 2019-06-14 | End: 2019-06-14

## 2019-06-14 RX ORDER — FENTANYL CITRATE 50 UG/ML
INJECTION, SOLUTION INTRAMUSCULAR; INTRAVENOUS AS NEEDED
Status: DISCONTINUED | OUTPATIENT
Start: 2019-06-14 | End: 2019-06-14 | Stop reason: HOSPADM

## 2019-06-14 RX ORDER — HYDROMORPHONE HYDROCHLORIDE 2 MG/ML
0.5 INJECTION, SOLUTION INTRAMUSCULAR; INTRAVENOUS; SUBCUTANEOUS
Status: CANCELLED | OUTPATIENT
Start: 2019-06-14

## 2019-06-14 RX ORDER — SODIUM CHLORIDE 0.9 % (FLUSH) 0.9 %
5-40 SYRINGE (ML) INJECTION AS NEEDED
Status: CANCELLED | OUTPATIENT
Start: 2019-06-14

## 2019-06-14 RX ORDER — PROPOFOL 10 MG/ML
INJECTION, EMULSION INTRAVENOUS AS NEEDED
Status: DISCONTINUED | OUTPATIENT
Start: 2019-06-14 | End: 2019-06-14 | Stop reason: HOSPADM

## 2019-06-14 RX ORDER — MIDAZOLAM HYDROCHLORIDE 1 MG/ML
2 INJECTION, SOLUTION INTRAMUSCULAR; INTRAVENOUS ONCE
Status: COMPLETED | OUTPATIENT
Start: 2019-06-14 | End: 2019-06-14

## 2019-06-14 RX ORDER — PHENYLEPHRINE HCL IN 0.9% NACL 1 MG/10 ML
SYRINGE (ML) INTRAVENOUS AS NEEDED
Status: DISCONTINUED | OUTPATIENT
Start: 2019-06-14 | End: 2019-06-14 | Stop reason: HOSPADM

## 2019-06-14 RX ORDER — ONDANSETRON 4 MG/1
4 TABLET, ORALLY DISINTEGRATING ORAL ONCE
Status: DISCONTINUED | OUTPATIENT
Start: 2019-06-14 | End: 2019-06-14 | Stop reason: HOSPADM

## 2019-06-14 RX ORDER — CEFAZOLIN SODIUM 2 G/50ML
2 SOLUTION INTRAVENOUS ONCE
Status: COMPLETED | OUTPATIENT
Start: 2019-06-14 | End: 2019-06-14

## 2019-06-14 RX ORDER — SODIUM CHLORIDE 0.9 % (FLUSH) 0.9 %
5-40 SYRINGE (ML) INJECTION AS NEEDED
Status: DISCONTINUED | OUTPATIENT
Start: 2019-06-14 | End: 2019-06-14 | Stop reason: HOSPADM

## 2019-06-14 RX ORDER — GABAPENTIN 400 MG/1
400 CAPSULE ORAL ONCE
Status: COMPLETED | OUTPATIENT
Start: 2019-06-14 | End: 2019-06-14

## 2019-06-14 RX ORDER — MIDAZOLAM HYDROCHLORIDE 1 MG/ML
INJECTION, SOLUTION INTRAMUSCULAR; INTRAVENOUS
Status: COMPLETED | OUTPATIENT
Start: 2019-06-14 | End: 2019-06-14

## 2019-06-14 RX ORDER — LIDOCAINE HYDROCHLORIDE 20 MG/ML
INJECTION, SOLUTION EPIDURAL; INFILTRATION; INTRACAUDAL; PERINEURAL AS NEEDED
Status: DISCONTINUED | OUTPATIENT
Start: 2019-06-14 | End: 2019-06-14 | Stop reason: HOSPADM

## 2019-06-14 RX ORDER — LABETALOL HYDROCHLORIDE 5 MG/ML
INJECTION, SOLUTION INTRAVENOUS AS NEEDED
Status: DISCONTINUED | OUTPATIENT
Start: 2019-06-14 | End: 2019-06-14 | Stop reason: HOSPADM

## 2019-06-14 RX ORDER — SODIUM CHLORIDE, SODIUM LACTATE, POTASSIUM CHLORIDE, CALCIUM CHLORIDE 600; 310; 30; 20 MG/100ML; MG/100ML; MG/100ML; MG/100ML
75 INJECTION, SOLUTION INTRAVENOUS CONTINUOUS
Status: DISCONTINUED | OUTPATIENT
Start: 2019-06-14 | End: 2019-06-14 | Stop reason: HOSPADM

## 2019-06-14 RX ORDER — CELECOXIB 400 MG/1
400 CAPSULE ORAL ONCE
Status: COMPLETED | OUTPATIENT
Start: 2019-06-14 | End: 2019-06-14

## 2019-06-14 RX ORDER — ONDANSETRON 2 MG/ML
4 INJECTION INTRAMUSCULAR; INTRAVENOUS ONCE
Status: CANCELLED | OUTPATIENT
Start: 2019-06-14 | End: 2019-06-14

## 2019-06-14 RX ORDER — FENTANYL CITRATE 50 UG/ML
100 INJECTION, SOLUTION INTRAMUSCULAR; INTRAVENOUS ONCE
Status: COMPLETED | OUTPATIENT
Start: 2019-06-14 | End: 2019-06-14

## 2019-06-14 RX ADMIN — SODIUM CHLORIDE, SODIUM LACTATE, POTASSIUM CHLORIDE, AND CALCIUM CHLORIDE 75 ML/HR: 600; 310; 30; 20 INJECTION, SOLUTION INTRAVENOUS at 06:29

## 2019-06-14 RX ADMIN — FENTANYL CITRATE 100 MCG: 50 INJECTION INTRAMUSCULAR; INTRAVENOUS at 07:02

## 2019-06-14 RX ADMIN — DEXAMETHASONE SODIUM PHOSPHATE 8 MG: 4 INJECTION, SOLUTION INTRA-ARTICULAR; INTRALESIONAL; INTRAMUSCULAR; INTRAVENOUS; SOFT TISSUE at 07:45

## 2019-06-14 RX ADMIN — FAMOTIDINE 20 MG: 20 TABLET, FILM COATED ORAL at 06:28

## 2019-06-14 RX ADMIN — ACETAMINOPHEN 1000 MG: 500 TABLET ORAL at 06:28

## 2019-06-14 RX ADMIN — ONDANSETRON 4 MG: 2 INJECTION INTRAMUSCULAR; INTRAVENOUS at 07:45

## 2019-06-14 RX ADMIN — CELECOXIB 400 MG: 400 CAPSULE ORAL at 06:28

## 2019-06-14 RX ADMIN — GABAPENTIN 400 MG: 400 CAPSULE ORAL at 06:28

## 2019-06-14 RX ADMIN — SODIUM CHLORIDE, SODIUM LACTATE, POTASSIUM CHLORIDE, AND CALCIUM CHLORIDE: 600; 310; 30; 20 INJECTION, SOLUTION INTRAVENOUS at 09:17

## 2019-06-14 RX ADMIN — FENTANYL CITRATE 100 MCG: 50 INJECTION, SOLUTION INTRAMUSCULAR; INTRAVENOUS at 07:09

## 2019-06-14 RX ADMIN — LIDOCAINE HYDROCHLORIDE 80 MG: 20 INJECTION, SOLUTION EPIDURAL; INFILTRATION; INTRACAUDAL; PERINEURAL at 07:36

## 2019-06-14 RX ADMIN — LIDOCAINE HYDROCHLORIDE 2 ML: 10 INJECTION, SOLUTION EPIDURAL; INFILTRATION; INTRACAUDAL; PERINEURAL at 07:04

## 2019-06-14 RX ADMIN — ROPIVACAINE HYDROCHLORIDE 20 ML: 5 INJECTION, SOLUTION EPIDURAL; INFILTRATION; PERINEURAL at 07:09

## 2019-06-14 RX ADMIN — PROPOFOL 150 MG: 10 INJECTION, EMULSION INTRAVENOUS at 07:36

## 2019-06-14 RX ADMIN — HYDROMORPHONE HYDROCHLORIDE 0.5 MG: 2 INJECTION, SOLUTION INTRAMUSCULAR; INTRAVENOUS; SUBCUTANEOUS at 08:36

## 2019-06-14 RX ADMIN — SUCCINYLCHOLINE CHLORIDE 120 MG: 20 INJECTION INTRAMUSCULAR; INTRAVENOUS at 07:37

## 2019-06-14 RX ADMIN — LABETALOL HYDROCHLORIDE 5 MG: 5 INJECTION, SOLUTION INTRAVENOUS at 09:16

## 2019-06-14 RX ADMIN — FENTANYL CITRATE 100 MCG: 50 INJECTION, SOLUTION INTRAMUSCULAR; INTRAVENOUS at 07:36

## 2019-06-14 RX ADMIN — MIDAZOLAM HYDROCHLORIDE 2 MG: 1 INJECTION, SOLUTION INTRAMUSCULAR; INTRAVENOUS at 07:09

## 2019-06-14 RX ADMIN — CEFAZOLIN 2 G: 10 INJECTION, POWDER, FOR SOLUTION INTRAVENOUS at 07:30

## 2019-06-14 RX ADMIN — MIDAZOLAM HYDROCHLORIDE 2 MG: 2 INJECTION, SOLUTION INTRAMUSCULAR; INTRAVENOUS at 07:02

## 2019-06-14 RX ADMIN — HYDROMORPHONE HYDROCHLORIDE 0.5 MG: 2 INJECTION, SOLUTION INTRAMUSCULAR; INTRAVENOUS; SUBCUTANEOUS at 08:17

## 2019-06-14 RX ADMIN — ROPIVACAINE HYDROCHLORIDE 150 MG: 5 INJECTION, SOLUTION EPIDURAL; INFILTRATION; PERINEURAL at 07:06

## 2019-06-14 RX ADMIN — Medication 100 MCG: at 07:42

## 2019-06-14 RX ADMIN — HYDROMORPHONE HYDROCHLORIDE 0.5 MG: 2 INJECTION, SOLUTION INTRAMUSCULAR; INTRAVENOUS; SUBCUTANEOUS at 10:16

## 2019-06-14 RX ADMIN — Medication 100 MCG: at 07:57

## 2019-06-14 NOTE — PROGRESS NOTES
Date of Surgery Update:  Vielka Dugan was seen and examined. History and physical has been reviewed. The patient has been examined.  There have been no significant clinical changes since the completion of the originally dated History and Physical.    Signed By: Андрей Talbert MD     June 14, 2019 7:26 AM

## 2019-06-14 NOTE — BRIEF OP NOTE
BRIEF OPERATIVE NOTE    Date of Procedure: 6/14/2019   Preoperative Diagnosis  Glenohumeral arthritis, right [M19.011]  Postoperative Diagnosis:   Glenohumeral arthritis, right [M19.011]    Procedure(s):  RIGHT SHOULDER ARTHROSCOPIC GLENOID RESURFACING/  Surgeon(s) and Role:     Marc Solorzano MD - Primary         Surgical Assistant: Rosa Gamble    Surgical Staff:  Circ-1: Tiana Leblanc RN  Circ-Relief: Susie Dale  Physician Assistant: CRISTELA Huber  Scrub Tech-1: Lula Severin  Surg Asst-1: Luiz Daily  Event Time In Time Out   Incision Start 3596    Incision Close 1047      Anesthesia: General   Estimated Blood Loss: 10mL  Specimens: * No specimens in log *   Findings: severe glenohumeral arthritis   Complications: none patient to recovery room stable  Implants:   Implant Name Type Inv.  Item Serial No.  Lot No. LRB No. Used Action   GRAFT DERMIS DECEL [id number] -- ARTHROFLEX - [id number]  GRAFT DERMIS DECEL [id number] -- ARTHROFLEX [id number] [facility] TISSUE BANK  Right 1 Implanted   ANCHOR SUT BIOCOMP 3X14.5MM --  - [id number]  ANCHOR SUT BIOCOMP 3X14.5MM --   [employer] [id number] Right 2 Implanted   ANCHOR SUT 2.9X15.5MM BIOCOMP -- [place] - [id number]  ANCHOR SUT 2.9X15.5MM BIOCOMP -- [place]  [employer] [id number] Right 1 Implanted   ANCHOR SUT 2.9X15.5MM BIOCOMP -- [place] - [id number]  ANCHOR SUT 2.9X15.5MM BIOCOMP -- [employer]  [employer] [id number] Right 1 Implanted   ANCHOR SUT 2.9X15.5MM Ana Lilia Salguero - [id number]  ANCHOR SUT 2.9X15.5MM BIOCOMP -- [employer]  [employer] [id number] Right 1 Implanted

## 2019-06-14 NOTE — DISCHARGE INSTRUCTIONS
Dr. [facility] Shoulder Arthroscopy  Postoperative Information    How to manage your pain after your Surgery:  After your surgery it is expected that you will have some pain. In efforts to reduce the amounts of side effects from pain medications we would like you to follow the below medication regimen after surgery:  1. Take 1000mg of Tylenol by mouth every 8 hours x 5 days. This is 4 tabs of regular strength Tylenol or 2 tabs of Extra Strength Tylenol  2. 300mg of Gabapentin every night x 5 days starting the evening you get home from the hospital  DO NOT TAKE THIS IF YOU ALREADY TAKE LYRICA OR GABAPENTIN (TAKE YOUR NORMAL DOSE) OR HAVE ALLERGY TO GABAPENTIN  3. We would like you to take a NSAID medication for the first 3 days following surgery. In efforts to avoid additional prescription costs we recommend one of the following over the counter medications. 600mg of Ibuprofen every 8 hours x 3 days    OR   500mg of Naproxen (Aleve) every 12 hours x 3 days  If you have a prescription for Meloxicam or Celebrex already you may resume this as previously prescribed instead of the Over the Counter Medications. DO NOT TAKE ANY OF THESE IF YOU HAVE CHRONIC RENAL FAILURE, ARE TAKING A BLOOD THINNER, HAVE A HISTORY OF A GASTRIC BLEED OR ARE ALLERGIC TO ASPIRIN. IT IS OK TO TAKE IF YOU HAVE HISTORY OF GASTRIC BYPASS SURGERY. 4. Then ONLY IF YOUR PAIN IS UNCONTROLLED you may take your Narcotic Pain medication as prescribed. THIS IS THE PRESCRIPTION THAT WAS GIVEN TO YOU IN THE OFFICE. You should place an ice bag over your shoulder to help with pain and reduce swelling. A soft bandage was placed on your shoulder. You may take the bandage off two days after surgery and clean the incision sites with peroxide. Band-aids should be placed over each incision site for at least four days. There are 2 or 3 small incisions in your shoulder and they may be sore and develop bruising over the next several days.   The bruising should resolve and no special care will be needed. It is safe to take a shower or bathe two days after surgery. You will be given a sling to use. Continue to wear this at all times, unless you are given different instructions. You will be shown specific instructions when you see your physical therapist. Gaxiola Graft should start PT in 1 week. Even though your incisions are small, there has been an operation inside and around the shoulder joint. Complete healing may take weeks or several months. If you have a high temperature, unexpected pain, redness or swelling in your shoulder please contact my office immediately. Dr. Jacob Grimaldo office number [phone]    DISCHARGE SUMMARY from Nurse    PATIENT INSTRUCTIONS:    After general anesthesia or intravenous sedation, for 24 hours or while taking prescription Narcotics:  · Limit your activities  · Do not drive and operate hazardous machinery  · Do not make important personal or business decisions  · Do  not drink alcoholic beverages  · If you have not urinated within 8 hours after discharge, please contact your surgeon on call. Report the following to your surgeon:  · Excessive pain, swelling, redness or odor of or around the surgical area  · Temperature over 100.5  · Nausea and vomiting lasting longer than 4 hours or if unable to take medications  · Any signs of decreased circulation or nerve impairment to extremity: change in color, persistent  numbness, tingling, coldness or increase pain  · Any questions    *  Please give a list of your current medications to your Primary Care Provider. *  Please update this list whenever your medications are discontinued, doses are      changed, or new medications (including over-the-counter products) are added. *  Please carry medication information at all times in case of emergency situations.     These are general instructions for a healthy lifestyle:    No smoking/ No tobacco products/ Avoid exposure to second hand smoke  Surgeon General's Warning:  Quitting smoking now greatly reduces serious risk to your health. Obesity, smoking, and sedentary lifestyle greatly increases your risk for illness    A healthy diet, regular physical exercise & weight monitoring are important for maintaining a healthy lifestyle    You may be retaining fluid if you have a history of heart failure or if you experience any of the following symptoms:  Weight gain of 3 pounds or more overnight or 5 pounds in a week, increased swelling in our hands or feet or shortness of breath while lying flat in bed. Please call your doctor as soon as you notice any of these symptoms; do not wait until your next office visit. Recognize signs and symptoms of STROKE:    F-face looks uneven    A-arms unable to move or move unevenly    S-speech slurred or non-existent    T-time-call 911 as soon as signs and symptoms begin-DO NOT go       Back to bed or wait to see if you get better-TIME IS BRAIN. Warning Signs of HEART ATTACK     Call 911 if you have these symptoms:   Chest discomfort. Most heart attacks involve discomfort in the center of the chest that lasts more than a few minutes, or that goes away and comes back. It can feel like uncomfortable pressure, squeezing, fullness, or pain.  Discomfort in other areas of the upper body. Symptoms can include pain or discomfort in one or both arms, the back, neck, jaw, or stomach.  Shortness of breath with or without chest discomfort.  Other signs may include breaking out in a cold sweat, nausea, or lightheadedness. Don't wait more than five minutes to call 911 - MINUTES MATTER! Fast action can save your life. Calling 911 is almost always the fastest way to get lifesaving treatment. Emergency Medical Services staff can begin treatment when they arrive -- up to an hour sooner than if someone gets to the hospital by car. The discharge information has been reviewed with the patient.   The patient verbalized understanding. Discharge medications reviewed with the patient and appropriate educational materials and side effects teaching were provided.   ___________________________________________________________________________________________________________________________________

## 2019-06-14 NOTE — ANESTHESIA PREPROCEDURE EVALUATION
Relevant Problems   No relevant active problems       Anesthetic History   No history of anesthetic complications            Review of Systems / Medical History  Patient summary reviewed    Pulmonary  Within defined limits                 Neuro/Psych         Psychiatric history (Depression)     Cardiovascular                  Exercise tolerance: >4 METS     GI/Hepatic/Renal  Within defined limits              Endo/Other        Morbid obesity     Other Findings              Physical Exam    Airway  Mallampati: II  TM Distance: 4 - 6 cm  Neck ROM: normal range of motion   Mouth opening: Normal     Cardiovascular  Regular rate and rhythm,  S1 and S2 normal,  no murmur, click, rub, or gallop             Dental  No notable dental hx       Pulmonary  Breath sounds clear to auscultation               Abdominal  GI exam deferred       Other Findings            Anesthetic Plan    ASA: 2  Anesthesia type: general          Induction: Intravenous  Anesthetic plan and risks discussed with: Patient

## 2019-06-14 NOTE — PROGRESS NOTES
conducted a pre-surgery visit with Dmitry Adame, who is a 57 y.o.,female. The  provided the following Interventions:  Initiated a relationship of care and support. Plan:  Chaplains will continue to follow and will provide pastoral care on an as needed/requested basis.  recommends bedside caregivers page  on duty if patient shows signs of acute spiritual or emotional distress.     [address] [facility]   Board Certified [address] [place] [address]   [phone]

## 2019-06-14 NOTE — ANESTHESIA POSTPROCEDURE EVALUATION
Procedure(s):  RIGHT SHOULDER ARTHROSCOPIC GLENOID RESURFACING/ARTHREX.    general    Anesthesia Post Evaluation      Multimodal analgesia: multimodal analgesia used between 6 hours prior to anesthesia start to PACU discharge  Patient location during evaluation: bedside  Patient participation: complete - patient participated  Level of consciousness: awake  Pain score: 0  Pain management: adequate  Airway patency: patent  Anesthetic complications: no  Cardiovascular status: stable  Respiratory status: acceptable  Hydration status: acceptable  Post anesthesia nausea and vomiting:  controlled      Vitals Value Taken Time   /82 6/14/2019  1:18 PM   Temp 36.4 °C (97.5 °F) 6/14/2019  1:03 PM   Pulse 73 6/14/2019  1:19 PM   Resp 11 6/14/2019  1:19 PM   SpO2 96 % 6/14/2019  1:19 PM   Vitals shown include unvalidated device data.

## 2019-06-14 NOTE — ANESTHESIA PROCEDURE NOTES
Peripheral Block    Start time: 6/14/2019 7:01 AM  End time: 6/14/2019 7:13 AM  Performed by: Sylvester Pitts MD  Authorized by: Sylvester Pitts MD       Pre-procedure: Indications: at surgeon's request and post-op pain management    Preanesthetic Checklist: patient identified, risks and benefits discussed, site marked, timeout performed, anesthesia consent given and patient being monitored    Timeout Time: 07:03          Block Type:   Block Type:   Interscalene  Laterality:  Right  Monitoring:  Standard ASA monitoring, responsive to questions, oxygen, continuous pulse ox, frequent vital sign checks and heart rate  Injection Technique:  Single shot  Procedures: ultrasound guided    Patient Position: supine  Prep: chlorhexidine    Location:  Interscalene  Needle Type:  Ultraplex  Needle Gauge:  22 G  Needle Localization:  Ultrasound guidance    Assessment:  Number of attempts:  1  Injection Assessment:  Incremental injection every 5 mL, negative aspiration for CSF, local visualized surrounding nerve on ultrasound, negative aspiration for blood, no intravascular symptoms, low pressure verified by pressure monitor, ultrasound image on chart and no paresthesia  Patient tolerance:  Patient tolerated the procedure well with no immediate complications

## 2019-06-15 NOTE — OP NOTES
[facility]  OPERATIVE REPORT    Name:  Rachna Mojica  MR#:   [id number]  :  1970  ACCOUNT #:  [de-identified]  DATE OF SERVICE:  2019    PREOPERATIVE DIAGNOSIS:  Degenerative arthritis, right shoulder. POSTOPERATIVE DIAGNOSIS:  Degenerative arthritis, right shoulder. PROCEDURE PERFORMED:  Arthroscopic partial synovectomy; arthroscopic lysis of adhesions; arthroscopic capsular release; arthroscopic glenoid resurfacing, right shoulder. SURGEON:  Christianne Finnegan MD    ASSISTANT:  Carlean Sinning, PA-C. Leopold Bigger was instrumental throughout the procedure managing the arthroscope while soft tissue and bony work was done. ANESTHESIA:  General.    COMPLICATIONS:  None. SPECIMENS REMOVED:  None. IMPLANTS:  Per brief OP note. ESTIMATED BLOOD LOSS:  Minimal.    FINDINGS:  As above. BRIEF HISTORY:  The patient has had significant amount of problems with the right shoulder not responding to conservative treatment, was consented for surgery after having discussed at length possible risks and complications of surgery including infection, bleeding, and recurrence of pain among other possible problems. PROCEDURE:  The patient was taken to the operating room, placed under general endotracheal anesthesia by the anesthesia staff, placed in the lateral decubitus position. The right arm prepped with ChloraPrep solution and draped as a free sterile field. The patient with a very large BMI, which rendered the procedure more difficult and longer in the amount of time. The right arm was prepped with ChloraPrep solution and draped as a free sterile field. A posterior portal was arthroscopy portal.  Portals were made with 11-blade followed by blunt trocars. Once the arthroscope was in place, the shoulder was inspected.   There was significant degenerative arthritis in the glenohumeral joint with grade IV changes both in the glenoid and in the humeral head with inferior spur.  An anteroinferior and anterosuperior portals were placed, and cannulas were placed in all three portals. Starting with the posterior aspect, posterior capsule was released followed by inferior capsule 5 cm lateral to the anterior glenoid rim to protect the axillary nerve all the way through the capsule. Then rotator interval was opened with cautery unit as well as the anterior capsule. The glenoid was then measured, and the measurements of the glenoid from anterosuperior, inferosuperior, anteroinferior was traced into a dermal graft which was shaped in the back table. Stratford knots were placed in the middle of the graft and mattress sutures on the top. SutureTak anchors were then placed in the inferior aspect of the glenoid anteriorly and posteriorly, and one end was passed through the graft with a mulberry knot at the end. This passing suture was passed through the anterosuperior portal through the cannula. The SutureTak sutures were placed to shuttle the graft into the joint, which was done. Stratford knots inferiorly were then retrieved and the sutures tied. Stratford knots in the middle were then retrieved and placed on PushLock, which were drilled and pushed into it, and the superior two sutures were placed anteriorly with a PushLock and posteriorly through labral tissue effecting a stable repair. 360 degrees circumferential fixation had been achieved. Graft was well placed. Subcutaneous tissues closed with 3-0 Vicryl and the skin with 4-0 Monocryl. Sterile dressings were applied. The patient tolerated the procedure well and was taken to the recovery room without problems.       Dave Kwon MD      [id number]  D:  06/14/2019 11:59  T:  06/14/2019 14:13  JOB #:  [id number]

## 2019-06-20 ENCOUNTER — OFFICE VISIT (OUTPATIENT)
Dept: ORTHOPEDIC SURGERY | Age: 49
End: 2019-06-20

## 2019-06-20 VITALS
TEMPERATURE: 98.5 F | OXYGEN SATURATION: 98 % | BODY MASS INDEX: 37.73 KG/M2 | HEIGHT: 62 IN | SYSTOLIC BLOOD PRESSURE: 138 MMHG | WEIGHT: 205 LBS | HEART RATE: 74 BPM | RESPIRATION RATE: 12 BRPM | DIASTOLIC BLOOD PRESSURE: 74 MMHG

## 2019-06-20 DIAGNOSIS — M19.011 GLENOHUMERAL ARTHRITIS, RIGHT: Primary | ICD-10-CM

## 2019-06-20 NOTE — PROGRESS NOTES
1. Have you been to the ER, urgent care clinic since your last visit? Hospitalized since your last visit? No    2. Have you seen or consulted any other health care providers outside of the [address] [place] [address] since your last visit? Include any pap smears or colon screening.  No

## 2019-06-20 NOTE — PROGRESS NOTES
Digna Bence  1970     HISTORY OF PRESENT ILLNESS  Digna Bence is a 57 y.o. female who presents today for evaluation s/p Right shoulder arthroscopic glenoid resurfacing on 6/14/19. Patient has not been going to PT. Her first session is tomorrow. Describes pain as a 0/10. Has been taking nothing for pain. Still has night pain. Patient denies any fever, chills, chest pain, shortness of breath or calf pain. The remainder of the review of systems is negative. There are no new illness or injuries to report since last seen in the office. No changes in medications, allergies, social or family history. PHYSICAL EXAM:   Visit Vitals  /74   Pulse 74   Temp 98.5 °F (36.9 °C) (Oral)   Resp 12   Ht 5' 2\" (1.575 m)   Wt 205 lb (93 kg)   LMP 06/05/2019 (Exact Date)   SpO2 98%   BMI 37.49 kg/m²      The patient is a well-developed, well-nourished female in no acute distress. The patient is alert and oriented times three. The patient appears to be well groomed. Mood and affect are normal.  ORTHOPEDIC EXAM of right shoulder:  Inspection: swelling present,  Bruising not present  Incision, clean, dry, intact, sutures in place  Passive glenohumeral abduction 0-40 degrees  Stability: Stable  Strength: n/a  2+ distal pulses    IMPRESSION:  S/P Right shoulder arthroscopic glenoid resurfacing    PLAN:   Incisions cleaned. Surgery was discussed at length today. Patient to start PT tomorrow. Patient to continue with PROM and PT. Continue wearing sling. Stressed to patient that nothing causes an increase in pain.   RTC 3 weeks  Patient seen and evaluated by Dr. Ama Driscoll today who agrees with treatment plan     [facility]

## 2019-06-21 ENCOUNTER — HOSPITAL ENCOUNTER (OUTPATIENT)
Dept: PHYSICAL THERAPY | Age: 49
Discharge: HOME | End: 2019-06-21
Payer: COMMERCIAL

## 2019-06-21 PROCEDURE — 97110 THERAPEUTIC EXERCISES: CPT

## 2019-06-21 PROCEDURE — 97140 MANUAL THERAPY 1/> REGIONS: CPT

## 2019-06-21 PROCEDURE — 97162 PT EVAL MOD COMPLEX 30 MIN: CPT

## 2019-06-21 NOTE — PROGRESS NOTES
PT DAILY TREATMENT NOTE 10-18    Patient Name: Alex Orozco  Date:2019  : 1970  [x]  Patient  Verified  Payor: BLUE CROSS MEDICAID / Plan: [facility] [employer] / Product Type: Managed Care Medicaid /    In time:10:24  Out time:10:58  Total Treatment Time (min): 34  Visit #: 1 of 12    Medicare/BCBS Only   Total Timed Codes (min):  24 1:1 Treatment Time:  34       Treatment Area: Primary osteoarthritis, right shoulder [M19.011]    SUBJECTIVE  Pain Level (0-10 scale): 0  Any medication changes, allergies to medications, adverse drug reactions, diagnosis change, or new procedure performed?: [x] No    [] Yes (see summary sheet for update)  Subjective functional status/changes:   [] No changes reported  Patient is a 57 y.o female presenting s/p Right shoulder arthroscopic glenoid resurfacing on 19. Patient reports since surgery her pain has been well managed and presents today void of any pain without pain medication. Patient presents with right shoulder sling and abduction pillow donned correctly. Incisions appear to be healing well. OBJECTIVE    10 min [x]Eval                  []Re-Eval       16 min Therapeutic Exercise:  [x] See flow sheet : HEP exercises    Rationale: increase ROM and increase strength to improve the patients ability to perform future ADLs with improved ease. 8 min Manual Therapy:  Gentle right shoulder PROM   Rationale: increase ROM and increase tissue extensibility to improve patients functional mobility. With   [] TE   [] TA   [] neuro   [] other: Patient Education: [x] Review HEP    [] Progressed/Changed HEP based on:   [] positioning   [] body mechanics   [] transfers   [] heat/ice application    [] other:      Other Objective/Functional Measures: See IE     Pain Level (0-10 scale) post treatment: 0    ASSESSMENT/Changes in Function: Patient presents with impairments consistent with surgical procedure performed.  Patient is aware of post op precautions and demonstrates good tolerance with HEP. Patient will benefit from skilled physical therapy in order to improve patients functional mobility and ease of future functional task. Patient will continue to benefit from skilled PT services to modify and progress therapeutic interventions, address functional mobility deficits, address ROM deficits, address strength deficits, analyze and address soft tissue restrictions, analyze and cue movement patterns, analyze and modify body mechanics/ergonomics and assess and modify postural abnormalities to attain remaining goals. [x]  See Plan of Care  []  See progress note/recertification  []  See Discharge Summary         Progress towards goals / Updated goals:  Short Term Goals: To be accomplished in 2 weeks:               1. Patient will be compliant with HEP in order to maximize therapeutic benefit. 2. Patient will be able to improve right shoulder flexion PROM to 100 degrees in order to improve functional mobility. Long Term Goals: To be accomplished in 6 weeks:               1. Patient will be able to improve right shoulder flexion PROM to 130 degrees in order to improve ease of future ADLs. 2. Patient will be able to improve right shoulder abduction to 100 degrees in order to improve ease of future ADLs. 3. Patient will improve FOTO score to 66 in order to demonstrate improvements in functional independence. 4. Patient will be able to begin AAROM phase void of pain in order to improve activity tolerance.      PLAN  []  Upgrade activities as tolerated     [x]  Continue plan of care  []  Update interventions per flow sheet       []  Discharge due to:_  []  Other:_      Jessy Alvarez, PT 6/21/2019  10:42 AM    Future Appointments   Date Time Provider Yi Osborn   7/11/2019 11:00 AM CRISTELA Dubois [address] 71

## 2019-06-21 NOTE — PROGRESS NOTES
[facility]  [address]  [place], [address]  [phone] fax    Plan of Care/ Statement of Necessity for Physical Therapy Services    Patient name: Vielka Dugan Start of Care: 2019   Referral source: Julia Askew,* : 1970    Medical Diagnosis: Primary osteoarthritis, right shoulder [M19.011]  Payor: BLUE CROSS MEDICAID / Plan: Michaela Copeland / Product Type: Managed Care Medicaid /  Onset Date: DOS: 19    Treatment Diagnosis: Right shoulder pain   Prior Hospitalization: see medical history Provider#: [id number]   Medications: Verified on Patient summary List    Comorbidities: arthritis   Prior Level of Function: (I) with all ADLs and employed as a  for the school system. The Plan of Care and following information is based on the information from the initial evaluation. Assessment/ key information: Patient is a 57 y.o female presenting s/p Right shoulder arthroscopic glenoid resurfacing on 19. Patient reports since surgery her pain has been well managed and presents today void of any pain without pain medication. Patient presents with right shoulder sling and abduction pillow donned correctly. Incisions appear to be healing well. Patient presents with impairments consistent with surgical procedure performed. Patient is aware of post op precautions and demonstrates good tolerance with HEP. Patient will benefit from skilled physical therapy in order to improve patients functional mobility and ease of future functional task. Evaluation Complexity History MEDIUM  Complexity : 1-2 comorbidities / personal factors will impact the outcome/ POC ; Examination MEDIUM Complexity : 3 Standardized tests and measures addressing body structure, function, activity limitation and / or participation in recreation  ;Presentation MEDIUM Complexity : Evolving with changing characteristics  ; Clinical Decision Making MEDIUM Complexity : FOTO score of 26-74  Overall Complexity Rating: MEDIUM  Problem List: pain affecting function, decrease ROM, decrease strength, edema affecting function, decrease ADL/ functional abilitiies, decrease activity tolerance and decrease flexibility/ joint mobility   Treatment Plan may include any combination of the following: Therapeutic exercise, Therapeutic activities, Neuromuscular re-education, Physical agent/modality, Manual therapy, Patient education and Functional mobility training  Patient / Family readiness to learn indicated by: asking questions, trying to perform skills and interest  Persons(s) to be included in education: patient (P)  Barriers to Learning/Limitations: None  Patient Goal (s): To move my arm again  Patient Self Reported Health Status: good  Rehabilitation Potential: good    Short Term Goals: To be accomplished in 2 weeks:   1. Patient will be compliant with HEP in order to maximize therapeutic benefit. 2. Patient will be able to improve right shoulder flexion PROM to 100 degrees in order to improve functional mobility. Long Term Goals: To be accomplished in 6 weeks:   1. Patient will be able to improve right shoulder flexion PROM to 130 degrees in order to improve ease of future ADLs. 2. Patient will be able to improve right shoulder abduction to 100 degrees in order to improve ease of future ADLs. 3. Patient will improve FOTO score to 66 in order to demonstrate improvements in functional independence. 4. Patient will be able to begin AAROM phase void of pain in order to improve activity tolerance. Frequency / Duration: Patient to be seen 2 times per week for 6 weeks.     Patient/ Caregiver education and instruction: Diagnosis, prognosis, activity modification and exercises   [x]  Plan of care has been reviewed with LUCRECIA Bullock, PT 6/21/2019 10:42 AM    ________________________________________________________________________    I certify that the above Therapy Services are being furnished while the patient is under my care. I agree with the treatment plan and certify that this therapy is necessary.     Physician's Signature:____________Date:_________TIME:________    ** Signature, Date and Time must be completed for valid certification **    Please sign and return to [facility] [address]  [place], [address]  [phone] fax

## 2019-06-25 ENCOUNTER — HOSPITAL ENCOUNTER (OUTPATIENT)
Dept: PHYSICAL THERAPY | Age: 49
Discharge: HOME | End: 2019-06-25
Payer: COMMERCIAL

## 2019-06-25 PROCEDURE — 97110 THERAPEUTIC EXERCISES: CPT

## 2019-06-25 PROCEDURE — 97140 MANUAL THERAPY 1/> REGIONS: CPT

## 2019-06-25 PROCEDURE — 97016 VASOPNEUMATIC DEVICE THERAPY: CPT

## 2019-06-25 NOTE — PROGRESS NOTES
PT DAILY TREATMENT NOTE 10-18    Patient Name: Neha Prince  Date:2019  : 1970  [x]  Patient  Verified  Payor: [employer] / Plan: [facility] [employer] Magali Heidi / Product Type: Managed Care Medicaid /    In time:11:30  Out time:12:05  Total Treatment Time (min): 35  Visit #: 2 of 12    Medicare/BCBS Only   Total Timed Codes (min):  25 1:1 Treatment Time:  25       Treatment Area: Primary osteoarthritis, right shoulder [M19.011]    SUBJECTIVE  Pain Level (0-10 scale): 0  Any medication changes, allergies to medications, adverse drug reactions, diagnosis change, or new procedure performed?: [x] No    [] Yes (see summary sheet for update)  Subjective functional status/changes:   [] No changes reported  Pt states incision sites are itchy and sore    OBJECTIVE    Modality rationale: decrease inflammation and decrease pain to improve the patients ability to perform daily tasks   Min Type Additional Details    [] Estim:  []Unatt       []IFC  []Premod                        []Other:  []w/ice   []w/heat  Position:  Location:    [] Estim: []Att    []TENS instruct  []NMES                    []Other:  []w/US   []w/ice   []w/heat  Position:  Location:    []  Traction: [] Cervical       []Lumbar                       [] Prone          []Supine                       []Intermittent   []Continuous Lbs:  [] before manual  [] after manual    []  Ultrasound: []Continuous   [] Pulsed                           []1MHz   []3MHz W/cm2:  Location:    []  Iontophoresis with dexamethasone         Location: [] Take home patch   [] In clinic    []  Ice     []  heat  []  Ice massage  []  Laser   []  Anodyne Position:  Location:    []  Laser with stim  []  Other:  Position:  Location:   10 [x]  Vasopneumatic Device Pressure:       [x] lo [] med [] hi   Temperature: [x] lo [] med [] hi   [] Skin assessment post-treatment:  []intact []redness- no adverse reaction    []redness  adverse reaction:     15 min Therapeutic Exercise:  [x] See flow sheet :   Rationale: increase ROM and increase strength to improve the patients ability to perform ADLs    10 min Manual Therapy:  PROM to right shoulder with gentle oscillations to decr ms guarding and pain   Rationale: decrease pain, increase ROM and increase tissue extensibility to improve functional mobility            With   [] TE   [] TA   [] neuro   [] other: Patient Education: [x] Review HEP    [] Progressed/Changed HEP based on:   [] positioning   [] body mechanics   [] transfers   [] heat/ice application    [] other:      Other Objective/Functional Measures:   First f/u after Eval     Pain Level (0-10 scale) post treatment: 0    ASSESSMENT/Changes in Function: Initiated treatment program per flow sheet. Reviewed HEP for understanding and compliance. Ms guarding throughout manual therapy. Noted min incr'd pain at end range PROM flex, otherwise pain free. Patient will continue to benefit from skilled PT services to modify and progress therapeutic interventions, address functional mobility deficits, address ROM deficits, address strength deficits, analyze and address soft tissue restrictions, analyze and cue movement patterns, analyze and modify body mechanics/ergonomics and assess and modify postural abnormalities to attain remaining goals. []  See Plan of Care  []  See progress note/recertification  []  See Discharge Summary         Progress towards goals / Updated goals:  Short Term Goals: To be accomplished in 2 weeks:               1. Patient will be compliant with HEP in order to maximize therapeutic benefit. 2. Patient will be able to improve right shoulder flexion PROM to 100 degrees in order to improve functional mobility. Long Term Goals: To be accomplished in 6 weeks:               1. Patient will be able to improve right shoulder flexion PROM to 130 degrees in order to improve ease of future ADLs.                 2. Patient will be able to improve right shoulder abduction to 100 degrees in order to improve ease of future ADLs. 3. Patient will improve FOTO score to 66 in order to demonstrate improvements in functional independence. 4. Patient will be able to begin AAROM phase void of pain in order to improve activity tolerance.        PLAN  []  Upgrade activities as tolerated     [x]  Continue plan of care  []  Update interventions per flow sheet       []  Discharge due to:_  []  Other:_      Joan Signs LUCRECIA Shipman 6/25/2019  11:37 AM    Future Appointments   Date Time Provider Yi Osborn   6/27/2019  9:00 AM Jessica Mcknight PTA [facility]   7/2/2019 10:00 AM Dede Shipman, LUCRECIA [facility]   7/5/2019 10:30 AM Angela Davalos [facility]   7/9/2019  9:00 AM Chuckey Fill, PT [facility]   7/11/2019  9:30 [facility]   7/11/2019 11:00 AM CRISTELA [facility]   7/16/2019 10:00 AM Roldan Fill, PT [facility]   7/18/2019 10:00 AM [facility]

## 2019-06-27 ENCOUNTER — HOSPITAL ENCOUNTER (OUTPATIENT)
Dept: PHYSICAL THERAPY | Age: 49
Discharge: HOME | End: 2019-06-27
Payer: COMMERCIAL

## 2019-06-27 PROCEDURE — 97110 THERAPEUTIC EXERCISES: CPT

## 2019-06-27 PROCEDURE — 97016 VASOPNEUMATIC DEVICE THERAPY: CPT

## 2019-06-27 PROCEDURE — 97112 NEUROMUSCULAR REEDUCATION: CPT

## 2019-06-27 NOTE — PROGRESS NOTES
PT DAILY TREATMENT NOTE 10-18    Patient Name: Giorgi Tolbert  Date:2019  : 1970  [x]  Patient  Verified  Payor: Carina Ray / Plan: Leonardo Hamman / Product Type: HMO /    In time:9:05  Out time:9:40  Total Treatment Time (min): 35  Visit #: 3 of 12    Medicare/BCBS Only   Total Timed Codes (min):  25 1:1 Treatment Time:  25       Treatment Area: Primary osteoarthritis, right shoulder [M19.011]    SUBJECTIVE  Pain Level (0-10 scale): 1  Any medication changes, allergies to medications, adverse drug reactions, diagnosis change, or new procedure performed?: [x] No    [] Yes (see summary sheet for update)  Subjective functional status/changes:   [] No changes reported  Pt reports feeling good after last visit    OBJECTIVE    Modality rationale: decrease inflammation and decrease pain to improve the patients ability to perform daily tasks   Min Type Additional Details    [] Estim:  []Unatt       []IFC  []Premod                        []Other:  []w/ice   []w/heat  Position:  Location:    [] Estim: []Att    []TENS instruct  []NMES                    []Other:  []w/US   []w/ice   []w/heat  Position:  Location:    []  Traction: [] Cervical       []Lumbar                       [] Prone          []Supine                       []Intermittent   []Continuous Lbs:  [] before manual  [] after manual    []  Ultrasound: []Continuous   [] Pulsed                           []1MHz   []3MHz W/cm2:  Location:    []  Iontophoresis with dexamethasone         Location: [] Take home patch   [] In clinic    []  Ice     []  heat  []  Ice massage  []  Laser   []  Anodyne Position:  Location:    []  Laser with stim  []  Other:  Position:  Location:   10 [x]  Vasopneumatic Device Pressure:       [x] lo [] med [] hi   Temperature: [x] lo [] med [] hi   [] Skin assessment post-treatment:  []intact []redness- no adverse reaction    []redness  adverse reaction:       15 min Therapeutic Exercise:  [x] See flow sheet : Rationale: increase ROM and increase strength to improve the patients ability to perform ADLs    10 min Manual Therapy:  PROM to right shoulder with STM to UT/LS    Rationale: decrease pain, increase ROM and increase tissue extensibility to improve functional mobility            With   [] TE   [] TA   [] neuro   [] other: Patient Education: [x] Review HEP    [] Progressed/Changed HEP based on:   [] positioning   [] body mechanics   [] transfers   [] heat/ice application    [] other:      Other Objective/Functional Measures:   No pain with therex     Pain Level (0-10 scale) post treatment: 0    ASSESSMENT/Changes in Function: Pt performs therex void of pain. Noted improved PROM flex past 90* today but no formally assessed. Patient will continue to benefit from skilled PT services to modify and progress therapeutic interventions, address functional mobility deficits, address ROM deficits, address strength deficits, analyze and address soft tissue restrictions, analyze and cue movement patterns and analyze and modify body mechanics/ergonomics to attain remaining goals. []  See Plan of Care  []  See progress note/recertification  []  See Discharge Summary         Progress towards goals / Updated goals:  Short Term Goals: To be accomplished in 2 weeks:               8. VRXABQH will be compliant with HEP in order to maximize therapeutic benefit. Met 6/27/19               2. Patient will be able to improve right shoulder flexion PROM to 100 degrees in order to improve functional mobility. Long Term Goals: To be accomplished in 6 weeks:               1. Patient will be able to improve right shoulder flexion PROM to 130 degrees in order to improve ease of future ADLs.              2. Patient will be able to improve right shoulder abduction to 100 degrees in order to improve ease of future ADLs.                 3. Patient will improve FOTO score to 66 in order to demonstrate improvements in functional independence.                4. Patient will be able to begin AAROM phase void of pain in order to improve activity tolerance.        PLAN  []  Upgrade activities as tolerated     [x]  Continue plan of care  []  Update interventions per flow sheet       []  Discharge due to:_  []  Other:_      Nadeen Shipman, PTA 6/27/2019  9:13 AM    Future Appointments   Date Time Provider Yi Osborn   7/2/2019 10:00 AM Winifred Jha PTA [facility]   7/5/2019 10:30 [facility]   7/9/2019  9:00 AM Amanda Reil, PT [facility]   7/11/2019  9:30 AM [facility]   7/11/2019 11:00 AM CRISTELA [facility]   7/16/2019 10:00 AM [facility]   7/18/2019 10:00 AM [facility]

## 2019-07-02 ENCOUNTER — HOSPITAL ENCOUNTER (OUTPATIENT)
Dept: PHYSICAL THERAPY | Age: 49
Discharge: HOME | End: 2019-07-02
Payer: COMMERCIAL

## 2019-07-02 PROCEDURE — 97016 VASOPNEUMATIC DEVICE THERAPY: CPT

## 2019-07-02 PROCEDURE — 97140 MANUAL THERAPY 1/> REGIONS: CPT

## 2019-07-02 PROCEDURE — 97110 THERAPEUTIC EXERCISES: CPT

## 2019-07-02 NOTE — PROGRESS NOTES
PT DAILY TREATMENT NOTE 10-18    Patient Name: Lord Gambino  Date:2019  : 1970  [x]  Patient  Verified  Payor: Girish John / Plan: Leanne Mcguire / Product Type: HMO /    In time: 10:03  Out time:10:38  Total Treatment Time (min): 35  Visit #: 4 of 12    Medicare/BCBS Only   Total Timed Codes (min):  35 1:1 Treatment Time:  25       Treatment Area: Primary osteoarthritis, right shoulder [M19.011]    SUBJECTIVE  Pain Level (0-10 scale): 0  Any medication changes, allergies to medications, adverse drug reactions, diagnosis change, or new procedure performed?: [x] No    [] Yes (see summary sheet for update)  Subjective functional status/changes:   [] No changes reported  Pt reports feeling sore at night and still has difficulty sleeping    OBJECTIVE    Modality rationale: decrease inflammation and decrease pain to improve the patients ability to perform daily tasks   Min Type Additional Details    [] Estim:  []Unatt       []IFC  []Premod                        []Other:  []w/ice   []w/heat  Position:  Location:    [] Estim: []Att    []TENS instruct  []NMES                    []Other:  []w/US   []w/ice   []w/heat  Position:  Location:    []  Traction: [] Cervical       []Lumbar                       [] Prone          []Supine                       []Intermittent   []Continuous Lbs:  [] before manual  [] after manual    []  Ultrasound: []Continuous   [] Pulsed                           []1MHz   []3MHz W/cm2:  Location:    []  Iontophoresis with dexamethasone         Location: [] Take home patch   [] In clinic    []  Ice     []  heat  []  Ice massage  []  Laser   []  Anodyne Position:  Location:    []  Laser with stim  []  Other:  Position:  Location:   10 [x]  Vasopneumatic Device Pressure:       [x] lo [] med [] hi   Temperature: [x] lo [] med [] hi   [] Skin assessment post-treatment:  []intact []redness- no adverse reaction    []redness  adverse reaction:       15 min Therapeutic Exercise: [x] See flow sheet :   Rationale: increase ROM and increase strength to improve the patients ability to perform ADLs      10 min Manual Therapy:  PROM to right shoulder, scap mobs, STM to right UT/LS   Rationale: decrease pain, increase ROM and increase tissue extensibility to improve functional mobility              With   [] TE   [] TA   [] neuro   [] other: Patient Education: [x] Review HEP    [] Progressed/Changed HEP based on:   [] positioning   [] body mechanics   [] transfers   [] heat/ice application    [] other:      Other Objective/Functional Measures:   Right shoulder PROM flex 105*    Pain Level (0-10 scale) post treatment: 0    ASSESSMENT/Changes in Function: Pt performed well with incr'd reps today. Noted TTP @ proximal tricep, near post incision site which appears to be healing well with so signs of infection. Gradually progressing PROM with slightly improved tolerance to manual therapy. Patient will continue to benefit from skilled PT services to modify and progress therapeutic interventions, address functional mobility deficits, address ROM deficits, address strength deficits, analyze and address soft tissue restrictions, analyze and cue movement patterns, analyze and modify body mechanics/ergonomics and assess and modify postural abnormalities to attain remaining goals. []  See Plan of Care  []  See progress note/recertification  []  See Discharge Summary         Progress towards goals / Updated goals:  Short Term Goals: To be accomplished in 2 weeks:               4. SZOEYEX will be compliant with HEP in order to maximize therapeutic benefit. Met 6/27/19               2. Patient will be able to improve right shoulder flexion PROM to 100 degrees in order to improve functional mobility. Met- PROM right shoulder flex 105* 7/2/19  Long Term Goals: To be accomplished in 6 weeks:               1.  Patient will be able to improve right shoulder flexion PROM to 130 degrees in order to improve ease of future ADLs.              2. Patient will be able to improve right shoulder abduction to 100 degrees in order to improve ease of future ADLs.                 3. Patient will improve FOTO score to 66 in order to demonstrate improvements in functional independence.                4. Patient will be able to begin AAROM phase void of pain in order to improve activity tolerance.       PLAN  []  Upgrade activities as tolerated     [x]  Continue plan of care  []  Update interventions per flow sheet       []  Discharge due to:_  []  Other:_      Elva Shipman PTA 7/2/2019  10:02 AM    Future Appointments   Date Time Provider Yi Osborn   7/5/2019 10:30 AM Colon Artemio [facility]   7/9/2019  9:00 AM Krishnamurthy Bowels, PT [facility]   7/11/2019  9:30 AM Krishnamurthy Bowels, PT [facility]   7/11/2019 11:00 AM CRISTELA [address]   7/16/2019 10:00 AM Krishnamurthy Bowels, PT [facility]   7/18/2019 10:00 AM Krishnamurthy Bowels, PT [facility]

## 2019-07-05 ENCOUNTER — HOSPITAL ENCOUNTER (OUTPATIENT)
Dept: PHYSICAL THERAPY | Age: 49
Discharge: HOME | End: 2019-07-05
Payer: COMMERCIAL

## 2019-07-05 PROCEDURE — 97140 MANUAL THERAPY 1/> REGIONS: CPT

## 2019-07-05 PROCEDURE — 97110 THERAPEUTIC EXERCISES: CPT

## 2019-07-05 PROCEDURE — 97016 VASOPNEUMATIC DEVICE THERAPY: CPT

## 2019-07-05 NOTE — PROGRESS NOTES
PT DAILY TREATMENT NOTE - [facility]     Patient Name: Dianne Fatima  Date:2019  : 1970  [x]  Patient  Verified  Payor: Kaden Mercer / Plan: Dominic Mitchell / Product Type: HMO /    In time:10:30am  Out time:11:06am  Total Treatment Time (min): 36  Total Timed Codes (min): 26  1:1 Treatment Time ( only): 26   Visit #: 5 of 12    Treatment Area: Primary osteoarthritis, right shoulder [M19.011]    SUBJECTIVE  Pain Level (0-10 scale): 0  Any medication changes, allergies to medications, adverse drug reactions, diagnosis change, or new procedure performed?: [x] No    [] Yes (see summary sheet for update)  Subjective functional status/changes:   [x] No changes reported    OBJECTIVE    18 min Therapeutic Exercise:  [x] See flow sheet :   Rationale: increase ROM and increase strength to improve the patients ability to improve ease of daily activity. 8 min Manual Therapy:  PROM right shoulder with gentle distraction, STJ mobility   Rationale: decrease pain, increase ROM and increase tissue extensibility to improve ease of daily activity and reduce risk of contracture. Modality rationale: decrease pain to improve the patients ability to improve ease of daily activity.    Min Type Additional Details    [] Estim:  []Unatt       []IFC  []Premod                        []Other:  []w/ice   []w/heat  Position:  Location:    [] Estim: []Att    []TENS instruct  []NMES                    []Other:  []w/US   []w/ice   []w/heat  Position:  Location:    []  Traction: [] Cervical       []Lumbar                       [] Prone          []Supine                       []Intermittent   []Continuous Lbs:  [] before manual  [] after manual    []  Ultrasound: []Continuous   [] Pulsed                           []1MHz   []3MHz Location:  W/cm2:    []  Iontophoresis with dexamethasone         Location: [] Take home patch   [] In clinic    []  Ice     []  heat  []  Ice massage  []  Laser   []  Anodyne Position:  Location: []  Laser with stim  []  Other: Position:  Location:   10 [x]  Vasopneumatic Device Pressure:       [x] lo [] med [] hi   Temperature: [x] lo [] med [] hi   [] Skin assessment post-treatment:  []intact []redness- no adverse reaction    []redness  adverse reaction:       With   [] TE   [] TA   [] neuro   [] other: Patient Education: [x] Review HEP    [] Progressed/Changed HEP based on:   [] positioning   [] body mechanics   [] transfers   [] heat/ice application    [] other:      Other Objective/Functional Measures: added tableslides/scoots using stool without complaints    Pain with end range shoulder PROM flexion @~110, localized to posterior shoulder, empty end feel 2/2 guarding     Pain Level (0-10 scale) post treatment: 4    ASSESSMENT/Changes in Function: Pt demonstrates gradaully improving PROM, however, she is pain limited and guarded with shoulder PROM as above. Assess pt response NV. If able to tolerate, advise regarding updated HEP NV to include shoulder PROM. Patient will continue to benefit from skilled PT services to modify and progress therapeutic interventions, address functional mobility deficits, address ROM deficits, address strength deficits, analyze and address soft tissue restrictions, analyze and cue movement patterns and instruct in home and community integration to attain remaining goals. []  See Plan of Care  []  See progress note/recertification  []  See Discharge Summary         Progress towards goals / Updated goals:  Short Term Goals: To be accomplished in 2 weeks:               4. UEIPKVE will be compliant with HEP in order to maximize therapeutic benefit. Met 6/27/19               2. Patient will be able to improve right shoulder flexion PROM to 100 degrees in order to improve functional mobility. Met- PROM right shoulder flex 105* 7/2/19  Long Term Goals: To be accomplished in 6 weeks:               1.  Patient will be able to improve right shoulder flexion PROM to 130 degrees in order to improve ease of future ADLs.              2. Patient will be able to improve right shoulder abduction to 100 degrees in order to improve ease of future ADLs.                  3. Patient will improve FOTO score to 66 in order to demonstrate improvements in functional independence.                4. Patient will be able to begin AAROM phase void of pain in order to improve activity tolerance.     PLAN  [x]  Upgrade activities as tolerated     [x]  Continue plan of care  []  Update interventions per flow sheet       []  Discharge due to:_  []  Other:_      Catrina Heath PT, DPT, ATC 7/5/2019  10:41 AM    Future Appointments   Date Time Provider Yi Osborn   7/9/2019  9:00 AM [facility]   7/11/2019  9:30 AM [facility]   7/11/2019 11:00 AM CRISTELA [address]   7/16/2019 10:00 AM [facility]   7/18/2019 10:00 AM Zaki Carrasco PT [facility]

## 2019-07-09 ENCOUNTER — HOSPITAL ENCOUNTER (OUTPATIENT)
Dept: PHYSICAL THERAPY | Age: 49
Discharge: HOME | End: 2019-07-09
Payer: COMMERCIAL

## 2019-07-09 PROCEDURE — 97110 THERAPEUTIC EXERCISES: CPT

## 2019-07-09 PROCEDURE — 97140 MANUAL THERAPY 1/> REGIONS: CPT

## 2019-07-09 NOTE — PROGRESS NOTES
PT DAILY TREATMENT NOTE 10-18    Patient Name: Nadya Dunn  Date:2019  : 1970  [x]  Patient  Verified  Payor: Yang Talbert / Plan: Stephen Yates / Product Type: HMO /    In time:8:58  Out time:9:32  Total Treatment Time (min): 34  Visit #: 6 of 12    Medicare/BCBS Only   Total Timed Codes (min):  24 1:1 Treatment Time:  24       Treatment Area: Primary osteoarthritis, right shoulder [M19.011]    SUBJECTIVE  Pain Level (0-10 scale): 4  Any medication changes, allergies to medications, adverse drug reactions, diagnosis change, or new procedure performed?: [x] No    [] Yes (see summary sheet for update)  Subjective functional status/changes:   [] No changes reported  \"I don't know what happened last night, but my shoulder just started aching so bad\". OBJECTIVE    Modality rationale: decrease edema, decrease inflammation and decrease pain to improve the patients ability to perform future ADLs.     Min Type Additional Details    [] Estim:  []Unatt       []IFC  []Premod                        []Other:  []w/ice   []w/heat  Position:  Location:    [] Estim: []Att    []TENS instruct  []NMES                    []Other:  []w/US   []w/ice   []w/heat  Position:  Location:    []  Traction: [] Cervical       []Lumbar                       [] Prone          []Supine                       []Intermittent   []Continuous Lbs:  [] before manual  [] after manual    []  Ultrasound: []Continuous   [] Pulsed                           []1MHz   []3MHz W/cm2:  Location:    []  Iontophoresis with dexamethasone         Location: [] Take home patch   [] In clinic   10 [x]  Ice     []  heat  []  Ice massage  []  Laser   []  Anodyne Position: seated  Location: right shoulder    []  Laser with stim  []  Other:  Position:  Location:    []  Vasopneumatic Device Pressure:       [] lo [] med [] hi   Temperature: [] lo [] med [] hi   [] Skin assessment post-treatment:  []intact []redness- no adverse reaction    []redness  adverse reaction:       14 min Therapeutic Exercise:  [x] See flow sheet :   Rationale: increase ROM and increase strength to improve the patients ability to perform future ADLs. 10 min Manual Therapy:  Right shoulder PROM   Rationale: increase ROM and increase tissue extensibility to improve patients ease of future ADL task. With   [] TE   [] TA   [] neuro   [] other: Patient Education: [x] Review HEP    [] Progressed/Changed HEP based on:   [] positioning   [] body mechanics   [] transfers   [] heat/ice application    [] other:      Other Objective/Functional Measures:      Pain Level (0-10 scale) post treatment: 2    ASSESSMENT/Changes in Function: Patient presents with increased pain levels today compared to normal, stating she is not sure what happened last night, but it just started hurting. No abnormal edema, redness, warmth, or bruising noted upon visual inspection. Patient tolerated all exercises well and able to tolerate PROM with minimal guarding noted. Patient states she has MD follow up on Thursday. Patient will continue to benefit from skilled PT services to modify and progress therapeutic interventions, address functional mobility deficits, address ROM deficits, address strength deficits, analyze and address soft tissue restrictions, analyze and cue movement patterns and analyze and modify body mechanics/ergonomics to attain remaining goals. [x]  See Plan of Care  []  See progress note/recertification  []  See Discharge Summary         Progress towards goals / Updated goals:  Short Term Goals: To be accomplished in 2 weeks:               7. NZMGIWR will be compliant with HEP in order to maximize therapeutic benefit. Met 6/27/19               2. Patient will be able to improve right shoulder flexion PROM to 100 degrees in order to improve functional mobility.    Met- PROM right shoulder flex 105* 7/2/19  Long Term Goals: To be accomplished in 6 weeks:               1. Patient will be able to improve right shoulder flexion PROM to 130 degrees in order to improve ease of future ADLs.              2. Patient will be able to improve right shoulder abduction to 100 degrees in order to improve ease of future ADLs.                  3. Patient will improve FOTO score to 66 in order to demonstrate improvements in functional independence.                4. Patient will be able to begin AAROM phase void of pain in order to improve activity tolerance.     PLAN  []  Upgrade activities as tolerated     [x]  Continue plan of care  []  Update interventions per flow sheet       []  Discharge due to:_  []  Other:_      [facility], PT 7/9/2019  9:00 AM    Future Appointments   Date Time Provider Yi Osborn   7/11/2019  9:30 AM Nitish Richter PT [facility]   7/11/2019 11:00 AM CRISTELA Mahoney 69   7/17/2019  9:00 AM Eliana Caldwell PTA [facility]   7/18/2019 10:00 AM Nitish Richter PT [facility]

## 2019-07-11 ENCOUNTER — HOSPITAL ENCOUNTER (OUTPATIENT)
Dept: PHYSICAL THERAPY | Age: 49
Discharge: HOME | End: 2019-07-11
Payer: COMMERCIAL

## 2019-07-11 ENCOUNTER — OFFICE VISIT (OUTPATIENT)
Dept: ORTHOPEDIC SURGERY | Age: 49
End: 2019-07-11

## 2019-07-11 VITALS
HEART RATE: 75 BPM | OXYGEN SATURATION: 100 % | HEIGHT: 62 IN | RESPIRATION RATE: 16 BRPM | BODY MASS INDEX: 38.83 KG/M2 | SYSTOLIC BLOOD PRESSURE: 123 MMHG | DIASTOLIC BLOOD PRESSURE: 80 MMHG | WEIGHT: 211 LBS | TEMPERATURE: 97.9 F

## 2019-07-11 DIAGNOSIS — M19.011 GLENOHUMERAL ARTHRITIS, RIGHT: Primary | ICD-10-CM

## 2019-07-11 PROCEDURE — 97016 VASOPNEUMATIC DEVICE THERAPY: CPT

## 2019-07-11 PROCEDURE — 97140 MANUAL THERAPY 1/> REGIONS: CPT

## 2019-07-11 PROCEDURE — 97110 THERAPEUTIC EXERCISES: CPT

## 2019-07-11 NOTE — PROGRESS NOTES
PT DAILY TREATMENT NOTE 10-18    Patient Name: Leonel Calderon  Date:2019  : 1970  [x]  Patient  Verified  Payor: Kiesha Daniels / Plan: Julius Simpler / Product Type: HMO /    In time:9:30  Out time:10:07  Total Treatment Time (min): 37  Visit #: 7 of 12    Medicare/BCBS Only   Total Timed Codes (min):  27 1:1 Treatment Time:  24       Treatment Area: Primary osteoarthritis, right shoulder [M19.011]    SUBJECTIVE  Pain Level (0-10 scale): 0  Any medication changes, allergies to medications, adverse drug reactions, diagnosis change, or new procedure performed?: [x] No    [] Yes (see summary sheet for update)  Subjective functional status/changes:   [] No changes reported  \"I am dong much better today than I was last visit\".      OBJECTIVE    Modality rationale: decrease edema and decrease inflammation to improve the patients ability to tolerate future ADL task    Min Type Additional Details    [] Estim:  []Unatt       []IFC  []Premod                        []Other:  []w/ice   []w/heat  Position:  Location:    [] Estim: []Att    []TENS instruct  []NMES                    []Other:  []w/US   []w/ice   []w/heat  Position:  Location:    []  Traction: [] Cervical       []Lumbar                       [] Prone          []Supine                       []Intermittent   []Continuous Lbs:  [] before manual  [] after manual    []  Ultrasound: []Continuous   [] Pulsed                           []1MHz   []3MHz W/cm2:  Location:    []  Iontophoresis with dexamethasone         Location: [] Take home patch   [] In clinic    []  Ice     []  heat  []  Ice massage  []  Laser   []  Anodyne Position:  Location:    []  Laser with stim  []  Other:  Position:  Location:   10 [x]  Vasopneumatic Device Pressure:       [x] lo [] med [] hi   Temperature: [x] lo [] med [] hi   [] Skin assessment post-treatment:  []intact []redness- no adverse reaction    []redness  adverse reaction:       17 min Therapeutic Exercise:  [x] See flow sheet :   Rationale: increase ROM and increase strength to improve the patients ability to perform ADLs. 10 min Manual Therapy:  Right shoulder PROM, STJ mobility   Rationale: increase ROM and increase tissue extensibility to improve patients functional mobility. With   [] TE   [] TA   [] neuro   [] other: Patient Education: [x] Review HEP    [] Progressed/Changed HEP based on:   [] positioning   [] body mechanics   [] transfers   [] heat/ice application    [] other:      Other Objective/Functional Measures: Right shoulder PROM flexion - 110 degrees     Pain Level (0-10 scale) post treatment: 0    ASSESSMENT/Changes in Function: Patient reports she has her MD follow up today after PT session. Patient is progressing well, small progress being made in regards to shoulder PROM. Patient continues to have increased limitations passively with right shoulder abduction. Plan to continue progressing per protocol. Patient will continue to benefit from skilled PT services to modify and progress therapeutic interventions, address functional mobility deficits, address ROM deficits, address strength deficits, analyze and address soft tissue restrictions, analyze and cue movement patterns, analyze and modify body mechanics/ergonomics and assess and modify postural abnormalities to attain remaining goals. [x]  See Plan of Care  []  See progress note/recertification  []  See Discharge Summary         Progress towards goals / Updated goals:  Short Term Goals: To be accomplished in 2 weeks:               3. KDAGCEZ will be compliant with HEP in order to maximize therapeutic benefit. Met 6/27/19               2. Patient will be able to improve right shoulder flexion PROM to 100 degrees in order to improve functional mobility. Met- PROM right shoulder flex 105* 7/2/19  Long Term Goals: To be accomplished in 6 weeks:               1.  Patient will be able to improve right shoulder flexion PROM to 130 degrees in order to improve ease of future ADLs. Progressing - Right shoulder PROM flexion - 110 degrees (7/11/19)                2. Patient will be able to improve right shoulder abduction to 100 degrees in order to improve ease of future ADLs.                  3. Patient will improve FOTO score to 66 in order to demonstrate improvements in functional independence.                4. Patient will be able to begin AAROM phase void of pain in order to improve activity tolerance.     PLAN  []  Upgrade activities as tolerated     [x]  Continue plan of care  []  Update interventions per flow sheet       []  Discharge due to:_  []  Other:_      Cara Robles, PT 7/11/2019  9:38 AM    Future Appointments   Date Time Provider Yi Osborn   7/11/2019 11:00 AM CRISTELA Suggs 69   7/17/2019  9:00 [facility]   7/18/2019 10:00 AM Jeri Jovel, PT [facility]

## 2019-07-11 NOTE — LETTER
NOTIFICATION RETURN TO WORK / SCHOOL 
 
7/11/2019 11:14 AM 
 
Ms. Mack Dougherty [address] To Whom It May Concern: 
 
Mack Dougherty is currently under the care of [address] [place] [address] Larry Atwood. She will return to work on: October 7, 2019. If there are questions or concerns please have the patient contact our office. Sincerely, CRISTELA Wheat

## 2019-07-11 NOTE — PROGRESS NOTES
Syed Samson  1970     HISTORY OF PRESENT ILLNESS  Syed Samson is a 57 y.o. female who presents today for evaluation s/p Right shoulder arthroscopic glenoid resurfacing on 6/14/19. Patient has been going to PT. Describes pain as a 0/10. Has been taking nothing for pain. Still has night pain. Has on average 2 days a week where her shoulder feels achy or sore. Patient denies any fever, chills, chest pain, shortness of breath or calf pain. The remainder of the review of systems is negative. There are no new illness or injuries to report since last seen in the office. No changes in medications, allergies, social or family history. PHYSICAL EXAM:   Visit Vitals  /80 (BP 1 Location: Left arm, BP Patient Position: Sitting)   Pulse 75   Temp 97.9 °F (36.6 °C) (Oral)   Resp 16   Ht 5' 2\" (1.575 m)   Wt 211 lb (95.7 kg)   SpO2 100%   BMI 38.59 kg/m²      The patient is a well-developed, well-nourished female in no acute distress. The patient is alert and oriented times three. The patient appears to be well groomed. Mood and affect are normal.  ORTHOPEDIC EXAM of right shoulder:  Inspection: swelling present,  Bruising not present  Incisions well healed  Passive glenohumeral abduction 0-70 degrees  Stability: Stable  Strength: n/a  2+ distal pulses    IMPRESSION:  S/P Right shoulder arthroscopic glenoid resurfacing    PLAN:   1. Patient doing well post operatively  2. Will cont with PT. Will start active assist in 1 week. arom in 2 weeks. D/c sling in 2 weeks  3. Stressed no increases in pain. No lifting pushing or pulling. No duty status until Oct 7, 2019.     RTC 3 weeks      MARILEE [facility] Specialist

## 2019-07-11 NOTE — PROGRESS NOTES
1. Have you been to the ER, urgent care clinic since your last visit? Hospitalized since your last visit? NO    2. Have you seen or consulted any other health care providers outside of the [address] [place] [address] since your last visit? Include any pap smears or colon screening.  NO

## 2019-07-16 ENCOUNTER — APPOINTMENT (OUTPATIENT)
Dept: PHYSICAL THERAPY | Age: 49
End: 2019-07-16
Payer: COMMERCIAL

## 2019-07-17 ENCOUNTER — HOSPITAL ENCOUNTER (OUTPATIENT)
Dept: PHYSICAL THERAPY | Age: 49
Discharge: HOME | End: 2019-07-17
Payer: COMMERCIAL

## 2019-07-17 PROCEDURE — 97140 MANUAL THERAPY 1/> REGIONS: CPT

## 2019-07-17 PROCEDURE — 97110 THERAPEUTIC EXERCISES: CPT

## 2019-07-17 PROCEDURE — 97016 VASOPNEUMATIC DEVICE THERAPY: CPT

## 2019-07-17 NOTE — PROGRESS NOTES
PT DAILY TREATMENT NOTE 10-18    Patient Name: Pura Speaker  Date:2019  : 1970  [x]  Patient  Verified  Payor: Emily Noble / Plan: Rob Beard / Product Type: HMO /    In time:903  Out time:938  Total Treatment Time (min): 35  Visit #: 8 of 12    Medicare/BCBS Only   Total Timed Codes (min):  25 1:1 Treatment Time:  25       Treatment Area: Primary osteoarthritis, right shoulder [M19.011]    SUBJECTIVE  Pain Level (0-10 scale):  2  Any medication changes, allergies to medications, adverse drug reactions, diagnosis change, or new procedure performed?: [x] No    [] Yes (see summary sheet for update)  Subjective functional status/changes:   [] No changes reported  Patient reported difficulty sleeping last night because of increased pain in B UE    OBJECTIVE    Modality rationale: decrease inflammation and decrease pain to improve the patients ability to perform ADLs   Min Type Additional Details    [] Estim:  []Unatt       []IFC  []Premod                        []Other:  []w/ice   []w/heat  Position:  Location:    [] Estim: []Att    []TENS instruct  []NMES                    []Other:  []w/US   []w/ice   []w/heat  Position:  Location:    []  Traction: [] Cervical       []Lumbar                       [] Prone          []Supine                       []Intermittent   []Continuous Lbs:  [] before manual  [] after manual    []  Ultrasound: []Continuous   [] Pulsed                           []1MHz   []3MHz W/cm2:  Location:    []  Iontophoresis with dexamethasone         Location: [] Take home patch   [] In clinic    []  Ice     []  heat  []  Ice massage  []  Laser   []  Anodyne Position:  Location:    []  Laser with stim  []  Other:  Position:  Location:   10 [x]  Vasopneumatic Device Pressure:       [] lo [] med [] hi   Temperature: [] lo [] med [] hi   [] Skin assessment post-treatment:  []intact []redness- no adverse reaction    []redness  adverse reaction:         15 min Therapeutic Exercise: [] See flow sheet :   Rationale: increase ROM and increase strength to improve the patients ability to perform ADLs      10 min Manual Therapy:  PROM, ST/GH mobs right shoulder   Rationale: decrease pain, increase ROM and increase tissue extensibility to perform ADLs              With   [] TE   [] TA   [] neuro   [] other: Patient Education: [x] Review HEP    [] Progressed/Changed HEP based on:   [] positioning   [] body mechanics   [] transfers   [] heat/ice application    [] other:      Other Objective/Functional Measures: increased reps per flow sheet    Increased tension and decreased scapular mobility     Pain Level (0-10 scale) post treatment: 0    ASSESSMENT/Changes in Function: patient presented with decreased scapular mobility and increased pain. Patient tolerated treatment well and reported decreased pain post treatment. MD PN also received and patient is to begin AAROM 7/22 and AROM 7/29. Patient will continue to benefit from skilled PT services to modify and progress therapeutic interventions, address functional mobility deficits, address ROM deficits, address strength deficits, analyze and address soft tissue restrictions and analyze and cue movement patterns to attain remaining goals. []  See Plan of Care  []  See progress note/recertification  []  See Discharge Summary         Progress towards goals / Updated goals:  Short Term Goals: To be accomplished in 2 weeks:               1. TBZBEUF will be compliant with HEP in order to maximize therapeutic benefit. Met 6/27/19               2. Patient will be able to improve right shoulder flexion PROM to 100 degrees in order to improve functional mobility. Met- PROM right shoulder flex 105* 7/2/19  Long Term Goals: To be accomplished in 6 weeks:               1. Patient will be able to improve right shoulder flexion PROM to 130 degrees in order to improve ease of future ADLs.  Progressing - Right shoulder PROM flexion - 110 degrees (7/11/19)              2. Patient will be able to improve right shoulder abduction to 100 degrees in order to improve ease of future ADLs.                  3. Patient will improve FOTO score to 66 in order to demonstrate improvements in functional independence.                4. Patient will be able to begin AAROM phase void of pain in order to improve activity tolerance.     PLAN  []  Upgrade activities as tolerated     []  Continue plan of care  []  Update interventions per flow sheet       []  Discharge due to:_  []  Other:_      Hermina Schaumann, PTA 7/17/2019  9:05 AM    Future Appointments   Date Time Provider Yi Osborn   7/18/2019 10:00 AM Naya Arrieta, PT [id number]   8/8/2019 10:30 AM CRISTELA Fry [address] [place] [address]

## 2019-07-18 ENCOUNTER — HOSPITAL ENCOUNTER (OUTPATIENT)
Dept: PHYSICAL THERAPY | Age: 49
Discharge: HOME | End: 2019-07-18
Payer: COMMERCIAL

## 2019-07-18 ENCOUNTER — DOCUMENTATION ONLY (OUTPATIENT)
Dept: ORTHOPEDIC SURGERY | Age: 49
End: 2019-07-18

## 2019-07-18 PROCEDURE — 97140 MANUAL THERAPY 1/> REGIONS: CPT

## 2019-07-18 PROCEDURE — 97110 THERAPEUTIC EXERCISES: CPT

## 2019-07-18 PROCEDURE — 97016 VASOPNEUMATIC DEVICE THERAPY: CPT

## 2019-07-18 NOTE — PROGRESS NOTES
[facility]  [address]  [place], [address]  [phone] fax    Physical Therapy Progress Note  Patient name: Pura Speaker Start of Care: 2019   Referral source: Melquiades Machado,* : 1970                Medical Diagnosis: Primary osteoarthritis, right shoulder [M19.011]  Payor: [employer] / Plan: [employer] / Product Type: Managed Care Medicaid /  Onset Date: DOS: 19                Treatment Diagnosis: Right shoulder pain   Prior Hospitalization: see medical history Provider#: [id number]   Medications: Verified on Patient summary List    Comorbidities: arthritis   Prior Level of Function: (I) with all ADLs and employed as a  for the school system. Visits from Start of Care: 9    Missed Visits: 0      Key Functional Changes:     Short Term Goals: To be accomplished in 2 weeks:               4. DWHVECH will be compliant with HEP in order to maximize therapeutic benefit. Met 19               2. Patient will be able to improve right shoulder flexion PROM to 100 degrees in order to improve functional mobility. Met- PROM right shoulder flex 105* 19  Long Term Goals: To be accomplished in 6 weeks:               1. Patient will be able to improve right shoulder flexion PROM to 130 degrees in order to improve ease of future ADLs. Progressing - Right shoulder PROM flexion - 110 degrees (19). Progressing - right shoulder PROM flexion - 112 degrees (19).                 2. Patient will be able to improve right shoulder abduction to 100 degrees in order to improve ease of future ADLs.  Progressing - right shoulder abduction PROM - 90 degrees (19).              3. Patient will improve FOTO score to 66 in order to demonstrate improvements in functional independence.  FOTO score - 38 (19)               4. Patient will be able to begin AAROM phase void of pain in order to improve activity tolerance. Progressing - patient began AAROM today (7/18/19). Updated Goals: to be achieved in 4 weeks:   1. Patient will be able to improve right shoulder flexion PROM to 130 degrees in order to improve ease of future ADLs. Progressing - Right shoulder PROM flexion - 110 degrees (7/11/19). Progressing - right shoulder PROM flexion - 112 degrees (7/18/19).     2. Patient will be able to improve right shoulder abduction to 100 degrees in order to improve ease of future ADLs.  Progressing - right shoulder abduction PROM - 90 degrees (7/18/19). 3. Patient will improve FOTO score to 66 in order to demonstrate improvements in functional independence. FOTO score - 38 (7/18/19)   4. Patient will be able to begin AAROM phase void of pain in order to improve activity tolerance. Progressing - patient began AAROM today (7/18/19). 5. Patient will be able to improve right shoulder AAROM flexion to 130 degrees in order to improve ease of ADLs. ASSESSMENT/RECOMMENDATIONS: Patient demonstrates poor tolerance to PROM with increased guarding despite maximal cueing. Passive shoulder mobility is limited in all directions. Patient able to begin AAROM today per script from MD. Patient initiated pulleys and wall slides today reporting no increase in pain. Plan to continue progressing per protocol.      [x]Continue therapy per initial plan/protocol at a frequency of  2 x per week for 4 weeks  []Continue therapy with the following recommended changes:_____________________      _____________________________________________________________________  []Discontinue therapy progressing towards or have reached established goals  []Discontinue therapy due to lack of appreciable progress towards goals  []Discontinue therapy due to lack of attendance or compliance  []Await Physician's recommendations/decisions regarding therapy  []Other:________________________________________________________________    Thank you for this referral.    Luis Hernandez, PT 7/18/2019 10:14 AM  NOTE TO PHYSICIAN:  PLEASE COMPLETE THE ORDERS BELOW AND   FAX TO [facility]: [phone]  If you are unable to process this request in 24 hours please contact our office: [phone]    ? I have read the above report and request that my patient continue as recommended. ? I have read the above report and request that my patient continue therapy with the following changes/special instructions:__________________________________________________________  ? I have read the above report and request that my patient be discharged from therapy.     Physicians signature: ______________________________Date: ______Time:______

## 2019-07-18 NOTE — PROGRESS NOTES
Patient dropped off at [facility] forms from [employer]. Patient will  paperwork when completed at the [facility] location. Patient is aware of the 7-16 business day policy. Patient can be reached at [phone].

## 2019-07-18 NOTE — PROGRESS NOTES
PT DAILY TREATMENT NOTE 10-18    Patient Name: Forrestine Romberg  Date:2019  : 1970  [x]  Patient  Verified  Payor: Jori Capone / Plan: Yuki Oliveros / Product Type: HMO /    In time:10:00  Out time:10:40  Total Treatment Time (min): 40  Visit #: 9 of 12    Medicare/BCBS Only   Total Timed Codes (min):  30 1:1 Treatment Time:  23       Treatment Area: Primary osteoarthritis, right shoulder [M19.011]    SUBJECTIVE  Pain Level (0-10 scale): 2  Any medication changes, allergies to medications, adverse drug reactions, diagnosis change, or new procedure performed?: [x] No    [] Yes (see summary sheet for update)  Subjective functional status/changes:   [] No changes reported  'Overall I am feeling good, I am just ready to get out of this sling\". OBJECTIVE    Modality rationale: decrease edema and decrease inflammation to improve the patients ability to perform ADLs.     Min Type Additional Details    [] Estim:  []Unatt       []IFC  []Premod                        []Other:  []w/ice   []w/heat  Position:  Location:    [] Estim: []Att    []TENS instruct  []NMES                    []Other:  []w/US   []w/ice   []w/heat  Position:  Location:    []  Traction: [] Cervical       []Lumbar                       [] Prone          []Supine                       []Intermittent   []Continuous Lbs:  [] before manual  [] after manual    []  Ultrasound: []Continuous   [] Pulsed                           []1MHz   []3MHz W/cm2:  Location:    []  Iontophoresis with dexamethasone         Location: [] Take home patch   [] In clinic    []  Ice     []  heat  []  Ice massage  []  Laser   []  Anodyne Position:  Location:    []  Laser with stim  []  Other:  Position:  Location:   10 [x]  Vasopneumatic Device Pressure:       [x] lo [] med [] hi   Temperature: [x] lo [] med [] hi   [] Skin assessment post-treatment:  []intact []redness- no adverse reaction    []redness  adverse reaction:         20 min Therapeutic Exercise: [x] See flow sheet :   Rationale: increase ROM and increase strength to improve the patients ability to perform future ADLs. 10 min Manual Therapy: Right shoulder PROM, ST/GH mobs    Rationale: increase ROM and increase tissue extensibility to improve patients functional mobility. With   [] TE   [] TA   [] neuro   [] other: Patient Education: [x] Review HEP    [] Progressed/Changed HEP based on:   [] positioning   [] body mechanics   [] transfers   [] heat/ice application    [] other:      Other Objective/Functional Measures:  Right shoulder PROM flexion - 112 degrees. FOTO score - 38%. Right shoulder abduction PROM - 90 degrees      Pain Level (0-10 scale) post treatment: 2    ASSESSMENT/Changes in Function: Patient demonstrates poor tolerance to PROM with increased guarding despite maximal cueing. Passive shoulder mobility is limited in all directions. Patient able to begin AAROM today per script from MD. Patient initiated pulleys and wall slides today reporting no increase in pain. Plan to continue progressing per protocol. Patient will continue to benefit from skilled PT services to modify and progress therapeutic interventions, address functional mobility deficits, address ROM deficits, address strength deficits, analyze and address soft tissue restrictions, analyze and cue movement patterns, analyze and modify body mechanics/ergonomics and assess and modify postural abnormalities to attain remaining goals. [x]  See Plan of Care  []  See progress note/recertification  []  See Discharge Summary         Progress towards goals / Updated goals:  Short Term Goals: To be accomplished in 2 weeks:               2. QDEYEOD will be compliant with HEP in order to maximize therapeutic benefit. Met 6/27/19               2. Patient will be able to improve right shoulder flexion PROM to 100 degrees in order to improve functional mobility.    Met- PROM right shoulder flex 105* 7/2/19  Long Term Goals: To be accomplished in 6 weeks:               1. Patient will be able to improve right shoulder flexion PROM to 130 degrees in order to improve ease of future ADLs. Progressing - Right shoulder PROM flexion - 110 degrees (7/11/19). Progressing - right shoulder PROM flexion - 112 degrees (7/18/19).                 2. Patient will be able to improve right shoulder abduction to 100 degrees in order to improve ease of future ADLs.  Progressing - right shoulder abduction PROM - 90 degrees (7/18/19).              3. Patient will improve FOTO score to 66 in order to demonstrate improvements in functional independence. FOTO score - 38 (7/18/19)               4. Patient will be able to begin AAROM phase void of pain in order to improve activity tolerance. Progressing - patient began AAROM today (7/18/19).     PLAN  []  Upgrade activities as tolerated     [x]  Continue plan of care  []  Update interventions per flow sheet       []  Discharge due to:_  []  Other:_      Mel Bowen, PT 7/18/2019  10:06 AM    Future Appointments   Date Time Provider Yi Osborn   8/8/2019 10:30 AM CRISTELA Bedoya Karson 71

## 2019-07-23 ENCOUNTER — HOSPITAL ENCOUNTER (OUTPATIENT)
Dept: PHYSICAL THERAPY | Age: 49
Discharge: HOME | End: 2019-07-23
Payer: COMMERCIAL

## 2019-07-23 PROCEDURE — 97016 VASOPNEUMATIC DEVICE THERAPY: CPT

## 2019-07-23 PROCEDURE — 97110 THERAPEUTIC EXERCISES: CPT

## 2019-07-23 PROCEDURE — 97140 MANUAL THERAPY 1/> REGIONS: CPT

## 2019-07-23 NOTE — PROGRESS NOTES
PT DAILY TREATMENT NOTE 10-18    Patient Name: Vargas Nice  Date:2019  : 1970  [x]  Patient  Verified  Payor: Kyung Rodriguez / Plan: David Xavier / Product Type: HMO /    In time: 11:30 Out time:12:20  Total Treatment Time (min): 50  Visit #: 1 of 8    Medicare/BCBS Only   Total Timed Codes (min):  40 1:1 Treatment Time:  25       Treatment Area: Primary osteoarthritis, right shoulder [M19.011]    SUBJECTIVE  Pain Level (0-10 scale): 0/10  Any medication changes, allergies to medications, adverse drug reactions, diagnosis change, or new procedure performed?: [x] No    [] Yes (see summary sheet for update)  Subjective functional status/changes:   [] No changes reported  Patient stated that her shoulder is doing pretty good. Patient stated that when she lays on her left side, if her shoulder rolls forward it bothers her and she can feel the shoulder slide forward, but once she moves her shoulder back then it feels better. Patient denies any pain associated with it and no clunking or popping. OBJECTIVE    Modality rationale: decrease edema, decrease inflammation and decrease pain to improve the patients ability to perform ADLs.     Min Type Additional Details    [] Estim:  []Unatt       []IFC  []Premod                        []Other:  []w/ice   []w/heat  Position:  Location:    [] Estim: []Att    []TENS instruct  []NMES                    []Other:  []w/US   []w/ice   []w/heat  Position:  Location:    []  Traction: [] Cervical       []Lumbar                       [] Prone          []Supine                       []Intermittent   []Continuous Lbs:  [] before manual  [] after manual    []  Ultrasound: []Continuous   [] Pulsed                           []1MHz   []3MHz W/cm2:  Location:    []  Iontophoresis with dexamethasone         Location: [] Take home patch   [] In clinic    []  Ice     []  heat  []  Ice massage  []  Laser   []  Anodyne Position:  Location:    []  Laser with stim  []  Other: Position:  Location:   10 [x]  Vasopneumatic Device Pressure:       [x] lo [] med [] hi   Temperature: [x] lo [] med [] hi   [] Skin assessment post-treatment:  []intact []redness- no adverse reaction    []redness  adverse reaction:     *30 min Therapeutic Exercise:  [x] See flow sheet :   Rationale: increase ROM to improve the patients ability to perform ADLs. 10 min Manual Therapy:  STM to right UT/levator scap/along vertebral border of scap; PROM    Rationale: decrease pain, increase ROM and increase tissue extensibility to increase ease with ADLs. With   [] TE   [] TA   [] neuro   [] other: Patient Education: [x] Review HEP    [] Progressed/Changed HEP based on:   [] positioning   [] body mechanics   [] transfers   [] heat/ice application    [] other:      Other Objective/Functional Measures:      Pain Level (0-10 scale) post treatment: 2/10     ASSESSMENT/Changes in Function: Therapist maintained PROM within patient's tolerance and did not apply over pressure. Patient continues to make steady progress in PT with improved ROM. Patient will continue to benefit from skilled PT services to modify and progress therapeutic interventions, address functional mobility deficits, address ROM deficits, address strength deficits, analyze and address soft tissue restrictions, analyze and cue movement patterns and assess and modify postural abnormalities to attain remaining goals. []  See Plan of Care  []  See progress note/recertification  []  See Discharge Summary         Progress towards goals / Updated goals:  Short Term Goals: To be accomplished in 2 weeks:               0. GRFZSHD will be compliant with HEP in order to maximize therapeutic benefit. Met 6/27/19               2. Patient will be able to improve right shoulder flexion PROM to 100 degrees in order to improve functional mobility.    Met- PROM right shoulder flex 105* 7/2/19  Long Term Goals: To be accomplished in 6 weeks:               1. Patient will be able to improve right shoulder flexion PROM to 130 degrees in order to improve ease of future ADLs. Progressing - Right shoulder PROM flexion - 110 degrees (7/11/19). Progressing - right shoulder PROM flexion - 112 degrees (7/18/19).                 2. Patient will be able to improve right shoulder abduction to 100 degrees in order to improve ease of future ADLs.  Progressing - right shoulder abduction PROM - 90 degrees (7/18/19).              3. Patient will improve FOTO score to 66 in order to demonstrate improvements in functional independence.  FOTO score - 38 (7/18/19)               4. Patient will be able to begin AAROM phase void of pain in order to improve activity tolerance. Progressing - patient began AAROM today (7/18/19).     PLAN  []  Upgrade activities as tolerated     [x]  Continue plan of care  []  Update interventions per flow sheet       []  Discharge due to:_  []  Other:_      Leatha Sharma, PT 7/23/2019  12:12 PM    Future Appointments   Date Time Provider Yi Osborn   7/25/2019  8:00 AM Naye Damon PTA [facility]   7/30/2019 11:30 AM Mina Waite, ANSHUL [facility]   8/2/2019  9:00 AM Burak Mccoy PTA [facility]   8/6/2019  9:30 AM Burak Mccoy PTA [facility]   8/8/2019 10:30 AM CRISTELA Morales   8/9/2019  9:30 AM Burak Mccoy PTA [facility]   8/13/2019  9:30 AM Burak Mccoy PTA [facility]   8/15/2019  9:30 AM Luis Agarwal, ANSHUL [facility]

## 2019-07-25 ENCOUNTER — HOSPITAL ENCOUNTER (OUTPATIENT)
Dept: PHYSICAL THERAPY | Age: 49
Discharge: HOME | End: 2019-07-25
Payer: COMMERCIAL

## 2019-07-25 ENCOUNTER — DOCUMENTATION ONLY (OUTPATIENT)
Dept: ORTHOPEDIC SURGERY | Age: 49
End: 2019-07-25

## 2019-07-25 PROCEDURE — 97110 THERAPEUTIC EXERCISES: CPT

## 2019-07-25 PROCEDURE — 97016 VASOPNEUMATIC DEVICE THERAPY: CPT

## 2019-07-25 PROCEDURE — 97140 MANUAL THERAPY 1/> REGIONS: CPT

## 2019-07-25 NOTE — PROGRESS NOTES
PT DAILY TREATMENT NOTE 10-18    Patient Name: Jong Iraheta  Date:2019  : 1970  [x]  Patient  Verified  Payor: Luiz Stager / Plan: Duarte Sinclair / Product Type: HMO /    In time:800  Out time:845  Total Treatment Time (min): 45  Visit #: 2 of 8    Medicare/BCBS Only   Total Timed Codes (min):  45 1:1 Treatment Time:  25       Treatment Area: Primary osteoarthritis, right shoulder [M19.011]    SUBJECTIVE  Pain Level (0-10 scale): 0  Any medication changes, allergies to medications, adverse drug reactions, diagnosis change, or new procedure performed?: [x] No    [] Yes (see summary sheet for update)  Subjective functional status/changes:   [] No changes reported  Patient denied pain upon arrival     OBJECTIVE    Modality rationale: decrease inflammation and decrease pain to improve the patients ability to perform ADLs   Min Type Additional Details    [] Estim:  []Unatt       []IFC  []Premod                        []Other:  []w/ice   []w/heat  Position:  Location:    [] Estim: []Att    []TENS instruct  []NMES                    []Other:  []w/US   []w/ice   []w/heat  Position:  Location:    []  Traction: [] Cervical       []Lumbar                       [] Prone          []Supine                       []Intermittent   []Continuous Lbs:  [] before manual  [] after manual    []  Ultrasound: []Continuous   [] Pulsed                           []1MHz   []3MHz W/cm2:  Location:    []  Iontophoresis with dexamethasone         Location: [] Take home patch   [] In clinic    []  Ice     []  heat  []  Ice massage  []  Laser   []  Anodyne Position:  Location:    []  Laser with stim  []  Other:  Position:  Location:   10 [x]  Vasopneumatic Device Pressure:       [] lo [] med [] hi   Temperature: [] lo [] med [] hi   [] Skin assessment post-treatment:  []intact []redness- no adverse reaction    []redness  adverse reaction:       27 min Therapeutic Exercise:  [] See flow sheet :   Rationale: increase ROM and increase strength to improve the patients ability to perform ADLs    8 min Manual Therapy:  STM to right UT/levator scap/along vertebral border of scap; PROM    Rationale: decrease pain, increase ROM and increase tissue extensibility to perform ADLs              With   [] TE   [] TA   [] neuro   [] other: Patient Education: [x] Review HEP    [] Progressed/Changed HEP based on:   [] positioning   [] body mechanics   [] transfers   [] heat/ice application    [] other:      Other Objective/Functional Measures:      Pain Level (0-10 scale) post treatment: 0    ASSESSMENT/Changes in Function: Patient is tolerating AAROM well and reporting no pain with wall slides but some discomfort at end range stretching with table slides today. Patient will continue to benefit from skilled PT services to modify and progress therapeutic interventions, address functional mobility deficits, address ROM deficits, address strength deficits, analyze and address soft tissue restrictions and analyze and cue movement patterns to attain remaining goals. []  See Plan of Care  []  See progress note/recertification  []  See Discharge Summary         Progress towards goals / Updated goals:  Short Term Goals: To be accomplished in 2 weeks:               4. VFXYUVK will be compliant with HEP in order to maximize therapeutic benefit. Met 6/27/19               2. Patient will be able to improve right shoulder flexion PROM to 100 degrees in order to improve functional mobility. Met- PROM right shoulder flex 105* 7/2/19  Long Term Goals: To be accomplished in 6 weeks:               1. Patient will be able to improve right shoulder flexion PROM to 130 degrees in order to improve ease of future ADLs. Progressing - Right shoulder PROM flexion - 110 degrees (7/11/19). Progressing - right shoulder PROM flexion - 112 degrees (7/18/19).                  2. Patient will be able to improve right shoulder abduction to 100 degrees in order to improve ease of future ADLs.  Progressing - right shoulder abduction PROM - 90 degrees (7/18/19).              3. Patient will improve FOTO score to 66 in order to demonstrate improvements in functional independence.  FOTO score - 38 (7/18/19)               4. Patient will be able to begin AAROM phase void of pain in order to improve activity tolerance. Progressing - patient began AAROM today (7/18/19).       PLAN  []  Upgrade activities as tolerated     []  Continue plan of care  []  Update interventions per flow sheet       []  Discharge due to:_  []  Other:_      Misael An PTA 7/25/2019  8:21 AM    Future Appointments   Date Time Provider Yi Osborn   7/30/2019 11:30 AM Luis SANDERS, PT [facility]   8/2/2019  9:00 AM Refugio Espinoza, LUCRECIA [facility]   8/6/2019  9:30 AM Refugio Espinoza, LUCRECIA [facility]   8/8/2019 10:30 AM CRISTELA Srinivasan [facility]   8/9/2019  9:30 AM Refugio Espinoza, LUCRECIA [facility]   8/13/2019  9:30 AM Refugio Espinoza, PTA [facility]   8/15/2019  9:30 AM Felix Olmstead, PT [facility]

## 2019-07-25 NOTE — PROGRESS NOTES
Patient dropped off at [facility] sick leave bank req forms from [employer]. Patient will  paperwork when completed at the [facility] location. Patient is aware of the 5-26 business day policy. Patient can be reached at [phone].

## 2019-07-26 ENCOUNTER — DOCUMENTATION ONLY (OUTPATIENT)
Dept: ORTHOPEDIC SURGERY | Age: 49
End: 2019-07-26

## 2019-07-26 NOTE — PROGRESS NOTES
Spoke with patient and informed her that her [employer] form is ready to be picked up at the [facility] location.

## 2019-07-26 NOTE — PROGRESS NOTES
[employer] sick leave bank request release form complete. Patient notified to  at [facility] .

## 2019-07-30 ENCOUNTER — HOSPITAL ENCOUNTER (OUTPATIENT)
Dept: PHYSICAL THERAPY | Age: 49
Discharge: HOME | End: 2019-07-30
Payer: COMMERCIAL

## 2019-07-30 PROCEDURE — 97110 THERAPEUTIC EXERCISES: CPT

## 2019-07-30 PROCEDURE — 97140 MANUAL THERAPY 1/> REGIONS: CPT

## 2019-07-30 PROCEDURE — 97016 VASOPNEUMATIC DEVICE THERAPY: CPT

## 2019-07-30 NOTE — PROGRESS NOTES
PT DAILY TREATMENT NOTE 10-18    Patient Name: Pura Speaker  Date:2019  : 1970  [x]  Patient  Verified  Payor: Emily Noble / Plan: Rob Beard / Product Type: HMO /    In time:11:29  Out time:12:15  Total Treatment Time (min): 55  Visit #: 3 of 8    Medicare/BCBS Only   Total Timed Codes (min):  36 1:1 Treatment Time:  30       Treatment Area: Primary osteoarthritis, right shoulder [M19.011]    SUBJECTIVE  Pain Level (0-10 scale): 3  Any medication changes, allergies to medications, adverse drug reactions, diagnosis change, or new procedure performed?: [x] No    [] Yes (see summary sheet for update)  Subjective functional status/changes:   [] No changes reported  \"I got this pain last night along my right shoulder blade\". OBJECTIVE    Modality rationale: decrease edema, decrease inflammation and decrease pain to improve the patients ability to tolerate ADLs.     Min Type Additional Details    [] Estim:  []Unatt       []IFC  []Premod                        []Other:  []w/ice   []w/heat  Position:  Location:    [] Estim: []Att    []TENS instruct  []NMES                    []Other:  []w/US   []w/ice   []w/heat  Position:  Location:    []  Traction: [] Cervical       []Lumbar                       [] Prone          []Supine                       []Intermittent   []Continuous Lbs:  [] before manual  [] after manual    []  Ultrasound: []Continuous   [] Pulsed                           []1MHz   []3MHz W/cm2:  Location:    []  Iontophoresis with dexamethasone         Location: [] Take home patch   [] In clinic    []  Ice     []  heat  []  Ice massage  []  Laser   []  Anodyne Position:  Location:    []  Laser with stim  []  Other:  Position:  Location:   10 [x]  Vasopneumatic Device Pressure:       [x] lo [] med [] hi   Temperature: [x] lo [] med [] hi   [] Skin assessment post-treatment:  []intact []redness- no adverse reaction    []redness  adverse reaction:       28 min Therapeutic Exercise:  [x] See flow sheet :   Rationale: increase ROM and increase strength to improve the patients ability to perform ADLs. 8 min Manual Therapy:  TPR right medial border to scapula, right shoulder PROM, ST/GHJ mobs   Rationale: increase ROM and increase tissue extensibility to improve patients ease of ADLs. With   [] TE   [] TA   [] neuro   [] other: Patient Education: [x] Review HEP    [] Progressed/Changed HEP based on:   [] positioning   [] body mechanics   [] transfers   [] heat/ice application    [] other:      Other Objective/Functional Measures: Added supine wand exercises (flexion, scaption, ER). Pain Level (0-10 scale) post treatment: 2    ASSESSMENT/Changes in Function: Slight decrease in pain post session. Patient states she has been having increased pain along medial border of scapula and increased stiffness noted in her shoulder. Patient was very guarded during PROM despite maximal effort from therapist to relax patient, limited PROM achieved. Patient is tolerating AAROM well. Patient will continue to benefit from skilled PT services to modify and progress therapeutic interventions, address functional mobility deficits, address ROM deficits, address strength deficits, analyze and address soft tissue restrictions, analyze and cue movement patterns, analyze and modify body mechanics/ergonomics and assess and modify postural abnormalities to attain remaining goals. [x]  See Plan of Care  []  See progress note/recertification  []  See Discharge Summary         Progress towards goals / Updated goals:   1. Patient will be able to improve right shoulder flexion PROM to 130 degrees in order to improve ease of future ADLs. Progressing - Right shoulder PROM flexion - 110 degrees (7/11/19). Progressing - right shoulder PROM flexion - 112 degrees (7/18/19).     2. Patient will be able to improve right shoulder abduction to 100 degrees in order to improve ease of future ADLs.  Progressing - right shoulder abduction PROM - 90 degrees (7/18/19). 3. Patient will improve FOTO score to 66 in order to demonstrate improvements in functional independence. FOTO score - 38 (7/18/19)   4. Patient will be able to begin AAROM phase void of pain in order to improve activity tolerance. Progressing - patient began AAROM today (7/18/19). Patient reports 2/10 pain with AAROM exercises (7/30/19). 5. Patient will be able to improve right shoulder AAROM flexion to 130 degrees in order to improve ease of ADLs.      PLAN  []  Upgrade activities as tolerated     [x]  Continue plan of care  []  Update interventions per flow sheet       []  Discharge due to:_  []  Other:_      Mague Sales, ANSHUL 7/30/2019  11:33 AM    Future Appointments   Date Time Provider Yi Osborn   8/2/2019  9:00 AM [facility]   8/6/2019  9:30 AM Corita Leather, PTA [facility]   8/8/2019 10:30 AM Mortimer Ginsberg, PA [facility]   8/9/2019  9:30 AM Corita Leather, PTA [facility]   8/13/2019  9:30 AM Corita Leather, PTA [facility]   8/15/2019  9:30 AM Jose Maradiaga PT [facility]

## 2019-08-02 ENCOUNTER — HOSPITAL ENCOUNTER (OUTPATIENT)
Dept: PHYSICAL THERAPY | Age: 49
Discharge: HOME | End: 2019-08-02
Payer: COMMERCIAL

## 2019-08-02 PROCEDURE — 97016 VASOPNEUMATIC DEVICE THERAPY: CPT

## 2019-08-02 PROCEDURE — 97110 THERAPEUTIC EXERCISES: CPT

## 2019-08-02 PROCEDURE — 97140 MANUAL THERAPY 1/> REGIONS: CPT

## 2019-08-02 NOTE — PROGRESS NOTES
PT DAILY TREATMENT NOTE 10-18    Patient Name: Rodger Bone  Date:2019  : 1970  [x]  Patient  Verified  Payor: Lexie Santoro / Plan: Fidel Martinez / Product Type: HMO /    In time:8:59  Out time:9:47  Total Treatment Time (min): 48  Visit #: 4 of 8    Medicare/BCBS Only   Total Timed Codes (min):  38 1:1 Treatment Time:  28       Treatment Area: Primary osteoarthritis, right shoulder [M19.011]    SUBJECTIVE  Pain Level (0-10 scale): 1/10  Any medication changes, allergies to medications, adverse drug reactions, diagnosis change, or new procedure performed?: [x] No    [] Yes (see summary sheet for update)  Subjective functional status/changes:   [] No changes reported  Pt reports minimal complaints of pain currently. OBJECTIVE    Modality rationale: decrease inflammation and decrease pain to improve the patients ability to tolerate ADLs.     Min Type Additional Details    [] Estim:  []Unatt       []IFC  []Premod                        []Other:  []w/ice   []w/heat  Position:  Location:    [] Estim: []Att    []TENS instruct  []NMES                    []Other:  []w/US   []w/ice   []w/heat  Position:  Location:    []  Traction: [] Cervical       []Lumbar                       [] Prone          []Supine                       []Intermittent   []Continuous Lbs:  [] before manual  [] after manual    []  Ultrasound: []Continuous   [] Pulsed                           []1MHz   []3MHz W/cm2:  Location:    []  Iontophoresis with dexamethasone         Location: [] Take home patch   [] In clinic    []  Ice     []  heat  []  Ice massage  []  Laser   []  Anodyne Position:  Location:    []  Laser with stim  []  Other:  Position:  Location:   10 [x]  Vasopneumatic Device Pressure:       [x] lo [] med [] hi   Temperature: [x] lo [] med [] hi   [] Skin assessment post-treatment:  []intact []redness- no adverse reaction    []redness  adverse reaction:       30 min Therapeutic Exercise:  [x] See flow sheet : Rationale: increase ROM and increase strength to improve the patients ability to tolerate ADLs. 8 min Manual Therapy:  PROM to right shoulder   Rationale: decrease pain, increase ROM and increase tissue extensibility to improve functional mobility. With   [] TE   [] TA   [] neuro   [] other: Patient Education: [x] Review HEP    [] Progressed/Changed HEP based on:   [] positioning   [] body mechanics   [] transfers   [] heat/ice application    [] other:      Other Objective/Functional Measures: Added exercises as per flow sheet. Pain Level (0-10 scale) post treatment: 4/10    ASSESSMENT/Changes in Function: educated patient about scar massage to incision sites. Pt has pain with AROM shoulder abduction in sidelying    Patient will continue to benefit from skilled PT services to modify and progress therapeutic interventions, address functional mobility deficits, address ROM deficits, address strength deficits, analyze and address soft tissue restrictions, analyze and cue movement patterns and analyze and modify body mechanics/ergonomics to attain remaining goals. []  See Plan of Care  []  See progress note/recertification  []  See Discharge Summary         Progress towards goals / Updated goals:  1. Patient will be able to improve right shoulder flexion PROM to 130 degrees in order to improve ease of future ADLs. Progressing - Right shoulder PROM flexion - 110 degrees (7/11/19). Progressing - right shoulder PROM flexion - 112 degrees (7/18/19).     2. Patient will be able to improve right shoulder abduction to 100 degrees in order to improve ease of future ADLs.  Progressing - right shoulder abduction PROM - 90 degrees (7/18/19). 3. Patient will improve FOTO score to 66 in order to demonstrate improvements in functional independence.  FOTO score - 38 (7/18/19)   4. Patient will be able to begin AAROM phase void of pain in order to improve activity tolerance. Progressing - patient began Cinda John today (7/18/19). Patient reports 2/10 pain with AAROM exercises (7/30/19). 5. Patient will be able to improve right shoulder AAROM flexion to 130 degrees in order to improve ease of ADLs.      PLAN  []  Upgrade activities as tolerated     []  Continue plan of care  []  Update interventions per flow sheet       []  Discharge due to:_  []  Other:_      Kong Auguste PTA 8/2/2019  9:11 AM    Future Appointments   Date Time Provider Yi Osborn   8/6/2019  9:30 AM Parker Smith PTA [facility]   8/8/2019 10:30 AM Charolotte Koyanagi, PA [facility]   8/9/2019  9:30 AM [facility]   8/13/2019  9:30 AM [facility]   8/15/2019  9:30 AM Pillo Conner, PT [facility]

## 2019-08-06 ENCOUNTER — HOSPITAL ENCOUNTER (OUTPATIENT)
Dept: PHYSICAL THERAPY | Age: 49
Discharge: HOME | End: 2019-08-06
Payer: COMMERCIAL

## 2019-08-06 PROCEDURE — 97140 MANUAL THERAPY 1/> REGIONS: CPT

## 2019-08-06 PROCEDURE — 97110 THERAPEUTIC EXERCISES: CPT

## 2019-08-06 NOTE — PROGRESS NOTES
PT DAILY TREATMENT NOTE 10-18    Patient Name: Tim Bravo  Date:2019  : 1970  [x]  Patient  Verified  Payor: Cindy Cho / Plan: Chu Tesfaye / Product Type: HMO /    In time:9:32  Out time:10:11  Total Treatment Time (min): 39  Visit #: 5 of 8    Medicare/BCBS Only   Total Timed Codes (min):  29 1:1 Treatment Time:  28       Treatment Area: Primary osteoarthritis, right shoulder [M19.011]    SUBJECTIVE  Pain Level (0-10 scale): 0/10  Any medication changes, allergies to medications, adverse drug reactions, diagnosis change, or new procedure performed?: [x] No    [] Yes (see summary sheet for update)  Subjective functional status/changes:   [] No changes reported  Pt reports her shoulder still feels tight. OBJECTIVE    Modality rationale: decrease pain to improve the patients ability to tolerate ADLs.     Min Type Additional Details    [] Estim:  []Unatt       []IFC  []Premod                        []Other:  []w/ice   []w/heat  Position:  Location:    [] Estim: []Att    []TENS instruct  []NMES                    []Other:  []w/US   []w/ice   []w/heat  Position:  Location:    []  Traction: [] Cervical       []Lumbar                       [] Prone          []Supine                       []Intermittent   []Continuous Lbs:  [] before manual  [] after manual    []  Ultrasound: []Continuous   [] Pulsed                           []1MHz   []3MHz W/cm2:  Location:    []  Iontophoresis with dexamethasone         Location: [] Take home patch   [] In clinic   10 []  Ice     [x]  heat  []  Ice massage  []  Laser   []  Anodyne Position:sitting  Location:right shoulder    []  Laser with stim  []  Other:  Position:  Location:    []  Vasopneumatic Device Pressure:       [] lo [] med [] hi   Temperature: [] lo [] med [] hi   [] Skin assessment post-treatment:  []intact []redness- no adverse reaction    []redness  adverse reaction:     21 min Therapeutic Exercise:  [x] See flow sheet :   Rationale: increase ROM and increase strength to improve the patients ability to tolerate ADLs. 8 min Manual Therapy:  PROM to right shoulder   Rationale: decrease pain, increase ROM and increase tissue extensibility to improve tolerance to ADLs. With   [] TE   [] TA   [] neuro   [] other: Patient Education: [x] Review HEP    [] Progressed/Changed HEP based on:   [] positioning   [] body mechanics   [] transfers   [] heat/ice application    [] other:      Other Objective/Functional Measures: PROM limited by pain and capsular tightness. Pain Level (0-10 scale) post treatment: 0/10    ASSESSMENT/Changes in Function: Pt continues to have limited available shoulder P/AROM. Patient will continue to benefit from skilled PT services to modify and progress therapeutic interventions, address functional mobility deficits, address ROM deficits, address strength deficits, analyze and address soft tissue restrictions and analyze and cue movement patterns to attain remaining goals. []  See Plan of Care  []  See progress note/recertification  []  See Discharge Summary         Progress towards goals / Updated goals:  1. Patient will be able to improve right shoulder flexion PROM to 130 degrees in order to improve ease of future ADLs. Progressing - Right shoulder PROM flexion - 110 degrees (7/11/19). Progressing - right shoulder PROM flexion - 112 degrees (7/18/19).     2. Patient will be able to improve right shoulder abduction to 100 degrees in order to improve ease of future ADLs.  Progressing - right shoulder abduction PROM - 90 degrees (7/18/19). 3. Patient will improve FOTO score to 66 in order to demonstrate improvements in functional independence. FOTO score - 38 (7/18/19)   4. Patient will be able to begin AAROM phase void of pain in order to improve activity tolerance. Progressing - patient began AAROM today (7/18/19).  Patient reports 2/10 pain with AAROM exercises (7/30/19).   5. Patient will be able to improve right shoulder AAROM flexion to 130 degrees in order to improve ease of ADLs.      PLAN  []  Upgrade activities as tolerated     [x]  Continue plan of care  []  Update interventions per flow sheet       []  Discharge due to:_  []  Other:_      Hortencia Lopez PTA 8/6/2019  10:08 AM    Future Appointments   Date Time Provider Yi Osborn   8/8/2019 10:30 AM CRISTELA Locke   8/9/2019  9:30 AM [facility]   8/13/2019  9:30 AM [facility]   8/15/2019  9:30 AM [facility] Bailey[facility]

## 2019-08-08 ENCOUNTER — OFFICE VISIT (OUTPATIENT)
Dept: ORTHOPEDIC SURGERY | Age: 49
End: 2019-08-08

## 2019-08-08 VITALS
TEMPERATURE: 97.2 F | HEART RATE: 77 BPM | OXYGEN SATURATION: 98 % | RESPIRATION RATE: 15 BRPM | SYSTOLIC BLOOD PRESSURE: 147 MMHG | HEIGHT: 62 IN | BODY MASS INDEX: 38.83 KG/M2 | WEIGHT: 211 LBS | DIASTOLIC BLOOD PRESSURE: 90 MMHG

## 2019-08-08 DIAGNOSIS — M19.011 GLENOHUMERAL ARTHRITIS, RIGHT: Primary | ICD-10-CM

## 2019-08-08 RX ORDER — IBUPROFEN 800 MG/1
800 TABLET ORAL
Qty: 60 TAB | Refills: 2 | Status: SHIPPED | OUTPATIENT
Start: 2019-08-08 | End: 2020-06-08 | Stop reason: SDUPTHER

## 2019-08-08 NOTE — PROGRESS NOTES
Vonda Joe  1970     HISTORY OF PRESENT ILLNESS  Vonda Joe is a 57 y.o. female who presents today for evaluation s/p Right shoulder arthroscopic glenoid resurfacing on 6/14/19. Patient has been going to PT. Describes pain as a 0/10. Has been taking nothing for pain. She sometimes feels some warmth in her arm. Frustrated with her lack of strength. Patient denies any fever, chills, chest pain, shortness of breath or calf pain. The remainder of the review of systems is negative. There are no new illness or injuries to report since last seen in the office. No changes in medications, allergies, social or family history. PHYSICAL EXAM:   Visit Vitals  /90   Pulse 77   Temp 97.2 °F (36.2 °C) (Oral)   Resp 15   Ht 5' 2\" (1.575 m)   Wt 211 lb (95.7 kg)   SpO2 98%   BMI 38.59 kg/m²      The patient is a well-developed, well-nourished female in no acute distress. The patient is alert and oriented times three. The patient appears to be well groomed. Mood and affect are normal.  ORTHOPEDIC EXAM of right shoulder:  Inspection: swelling present,  Bruising not present  Incisions well healed  Passive glenohumeral abduction 0-80 degrees, able to reach to side of her face  Stability: Stable  Strength: 3/5  2+ distal pulses    IMPRESSION:  S/P Right shoulder arthroscopic glenoid resurfacing    PLAN:   1. Patient doing well post operatively  2. Will cont with PT. Ok to work on gentle strengthening   3. Stressed no increases in pain. No lifting pushing or pulling greater than 5lbs  4. rx for motrin given    Patient seen and evaluated by Dr. Douglas Santos today who agrees with treatment plan     No duty status until Oct 7, 2019.     RTC 4 weeks      Emigdio Skelton PA-C  [facility]

## 2019-08-08 NOTE — PROGRESS NOTES
1. Have you been to the ER, urgent care clinic since your last visit? Hospitalized since your last visit? No    2. Have you seen or consulted any other health care providers outside of the [address] [place] [address] since your last visit? Include any pap smears or colon screening.  No

## 2019-08-09 ENCOUNTER — HOSPITAL ENCOUNTER (OUTPATIENT)
Dept: PHYSICAL THERAPY | Age: 49
Discharge: HOME | End: 2019-08-09
Payer: COMMERCIAL

## 2019-08-09 PROCEDURE — 97110 THERAPEUTIC EXERCISES: CPT

## 2019-08-09 PROCEDURE — 97140 MANUAL THERAPY 1/> REGIONS: CPT

## 2019-08-09 NOTE — PROGRESS NOTES
PT DAILY TREATMENT NOTE 10-18    Patient Name: Milly Samson  Date:2019  : 1970  [x]  Patient  Verified  Payor: Luis Armando Points / Plan: Darron Celeste / Product Type: HMO /    In time:9:30  Out time:10:09  Total Treatment Time (min): 39  Visit #: 6 of 8    Medicare/BCBS Only   Total Timed Codes (min):  29 1:1 Treatment Time:  29       Treatment Area: Primary osteoarthritis, right shoulder [M19.011]    SUBJECTIVE  Pain Level (0-10 scale): 1/10  Any medication changes, allergies to medications, adverse drug reactions, diagnosis change, or new procedure performed?: [x] No    [] Yes (see summary sheet for update)  Subjective functional status/changes:   [] No changes reported  Pt reports no new complaints of pain. Pt reports she hasving a better day today. OBJECTIVE    Modality rationale: decrease inflammation and decrease pain to improve the patients ability to tolerate ADLs.     Min Type Additional Details    [] Estim:  []Unatt       []IFC  []Premod                        []Other:  []w/ice   []w/heat  Position:  Location:    [] Estim: []Att    []TENS instruct  []NMES                    []Other:  []w/US   []w/ice   []w/heat  Position:  Location:    []  Traction: [] Cervical       []Lumbar                       [] Prone          []Supine                       []Intermittent   []Continuous Lbs:  [] before manual  [] after manual    []  Ultrasound: []Continuous   [] Pulsed                           []1MHz   []3MHz W/cm2:  Location:    []  Iontophoresis with dexamethasone         Location: [] Take home patch   [] In clinic   10 []  Ice     [x]  heat  []  Ice massage  []  Laser   []  Anodyne Position:sitting  Location:right shoulder    []  Laser with stim  []  Other:  Position:  Location:    []  Vasopneumatic Device Pressure:       [] lo [] med [] hi   Temperature: [] lo [] med [] hi   [] Skin assessment post-treatment:  []intact []redness- no adverse reaction    []redness  adverse reaction:      min Therapeutic Exercise:  [x] See flow sheet :   Rationale: increase ROM and increase strength to improve the patients ability to tolerate ADLs. 8 min Manual Therapy:  PROM to right shoulder   Rationale: decrease pain, increase ROM and increase tissue extensibility to tolerate ADLs. With   [] TE   [] TA   [] neuro   [] other: Patient Education: [x] Review HEP    [] Progressed/Changed HEP based on:   [] positioning   [] body mechanics   [] transfers   [] heat/ice application    [] other:      Other Objective/Functional Measures: Decreased guarding noted with PROM today. Pt has increased available PROM in all direction. Pain Level (0-10 scale) post treatment: 0/10    ASSESSMENT/Changes in Function: Pt continues to demonstrate improved right shoulder mobility with decreased pain and increased PROM. Patient will continue to benefit from skilled PT services to modify and progress therapeutic interventions, address functional mobility deficits, address ROM deficits, address strength deficits, analyze and address soft tissue restrictions, analyze and cue movement patterns and analyze and modify body mechanics/ergonomics to attain remaining goals. []  See Plan of Care  []  See progress note/recertification  []  See Discharge Summary         Progress towards goals / Updated goals:  1. Patient will be able to improve right shoulder flexion PROM to 130 degrees in order to improve ease of future ADLs. Progressing - Right shoulder PROM flexion - 110 degrees (7/11/19). Progressing - right shoulder PROM flexion - 112 degrees (7/18/19).     2. Patient will be able to improve right shoulder abduction to 100 degrees in order to improve ease of future ADLs.  Progressing - right shoulder abduction PROM - 90 degrees (7/18/19). 3. Patient will improve FOTO score to 66 in order to demonstrate improvements in functional independence.  FOTO score - 38 (7/18/19)   4. Patient will be able to begin AAROM phase void of pain in order to improve activity tolerance. Progressing - patient began AAROM today (7/18/19).  Patient reports 2/10 pain with AAROM exercises (7/30/19).   5. Patient will be able to improve right shoulder AAROM flexion to 130 degrees in order to improve ease of ADLs.      PLAN  []  Upgrade activities as tolerated     [x]  Continue plan of care  []  Update interventions per flow sheet       []  Discharge due to:_  []  Other:_      Tano Silva PTA 8/9/2019  11:07 AM    Future Appointments   Date Time Provider Yi Osborn   8/13/2019  9:30 AM [facility]   8/15/2019  9:30 AM Chema Noel PT [facility]   9/5/2019 11:00 AM CRISTELA Resendiz 71

## 2019-08-13 ENCOUNTER — HOSPITAL ENCOUNTER (OUTPATIENT)
Dept: PHYSICAL THERAPY | Age: 49
Discharge: HOME | End: 2019-08-13
Payer: COMMERCIAL

## 2019-08-13 PROCEDURE — 97110 THERAPEUTIC EXERCISES: CPT

## 2019-08-13 PROCEDURE — 97140 MANUAL THERAPY 1/> REGIONS: CPT

## 2019-08-13 NOTE — PROGRESS NOTES
PT DAILY TREATMENT NOTE 10-18    Patient Name: Darin Davis  Date:2019  : 1970  [x]  Patient  Verified  Payor: Pheobe Cabot / Plan: Caleb Perla / Product Type: HMO /    In time:9:30  Out time:10:15  Total Treatment Time (min): 45  Visit #: 7 of 8    Medicare/BCBS Only   Total Timed Codes (min):  35 1:1 Treatment Time:  35       Treatment Area: Primary osteoarthritis, right shoulder [M19.011]    SUBJECTIVE  Pain Level (0-10 scale): 0/10  Any medication changes, allergies to medications, adverse drug reactions, diagnosis change, or new procedure performed?: [x] No    [] Yes (see summary sheet for update)  Subjective functional status/changes:   [] No changes reported  Pt reports no new complaints of pain. Pt reports compliance with HEP. OBJECTIVE    Modality rationale: decrease pain and increase tissue extensibility to improve the patients ability to tolerate ADLs.     Min Type Additional Details    [] Estim:  []Unatt       []IFC  []Premod                        []Other:  []w/ice   []w/heat  Position:  Location:    [] Estim: []Att    []TENS instruct  []NMES                    []Other:  []w/US   []w/ice   []w/heat  Position:  Location:    []  Traction: [] Cervical       []Lumbar                       [] Prone          []Supine                       []Intermittent   []Continuous Lbs:  [] before manual  [] after manual    []  Ultrasound: []Continuous   [] Pulsed                           []1MHz   []3MHz W/cm2:  Location:    []  Iontophoresis with dexamethasone         Location: [] Take home patch   [] In clinic   10 []  Ice     []  heat  []  Ice massage  []  Laser   []  Anodyne Position:sitting  Location:right shoulder    []  Laser with stim  []  Other:  Position:  Location:    []  Vasopneumatic Device Pressure:       [] lo [] med [] hi   Temperature: [] lo [] med [] hi   [] Skin assessment post-treatment:  []intact []redness- no adverse reaction    []redness  adverse reaction:       27 min Therapeutic Exercise:  [x] See flow sheet :   Rationale: increase ROM and increase strength to improve the patients ability to tolerate ADLs. 8 min Manual Therapy:  PROM to right shoulder   Rationale: decrease pain, increase ROM and increase tissue extensibility to tolerate ADLs. With   [] TE   [] TA   [] neuro   [] other: Patient Education: [x] Review HEP    [] Progressed/Changed HEP based on:   [] positioning   [] body mechanics   [] transfers   [] heat/ice application    [] other:      Other Objective/Functional Measures: Continued pain with PROM. Pt demonstrates improved right shoulder AROM abduction AG. Pain Level (0-10 scale) post treatment: 0/10    ASSESSMENT/Changes in Function: Pt continues to demonstrate improved right shoulder mobility in all directions. Pt has no adverse reaction to PT. Patient will continue to benefit from skilled PT services to modify and progress therapeutic interventions, address functional mobility deficits, address ROM deficits, address strength deficits and analyze and address soft tissue restrictions to attain remaining goals. []  See Plan of Care  []  See progress note/recertification  []  See Discharge Summary         Progress towards goals / Updated goals:  1. Patient will be able to improve right shoulder flexion PROM to 130 degrees in order to improve ease of future ADLs. Progressing - Right shoulder PROM flexion - 110 degrees (7/11/19). Progressing - right shoulder PROM flexion - 112 degrees (7/18/19).     2. Patient will be able to improve right shoulder abduction to 100 degrees in order to improve ease of future ADLs.  Progressing - right shoulder abduction PROM - 90 degrees (7/18/19). 3. Patient will improve FOTO score to 66 in order to demonstrate improvements in functional independence.  FOTO score - 38 (7/18/19)   4. Patient will be able to begin AAROM phase void of pain in order to improve activity tolerance. Progressing - patient began AAROM today (7/18/19).  Patient reports 2/10 pain with AAROM exercises (7/30/19).   5. Patient will be able to improve right shoulder AAROM flexion to 130 degrees in order to improve ease of ADLs.      PLAN  []  Upgrade activities as tolerated     [x]  Continue plan of care  []  Update interventions per flow sheet       []  Discharge due to:_  []  Other:_      Ivan Green PTA 8/13/2019  9:38 AM    Future Appointments   Date Time Provider Yi Osborn   8/15/2019  9:30 AM Maine Orozco PT [facility]   9/5/2019 11:00 AM Ken Valerio, [facility] Bach

## 2019-08-15 ENCOUNTER — HOSPITAL ENCOUNTER (OUTPATIENT)
Dept: PHYSICAL THERAPY | Age: 49
Discharge: HOME | End: 2019-08-15
Payer: COMMERCIAL

## 2019-08-15 PROCEDURE — 97110 THERAPEUTIC EXERCISES: CPT

## 2019-08-15 NOTE — PROGRESS NOTES
PT DAILY TREATMENT NOTE 10-18    Patient Name: Rodger Bone  Date:8/15/2019  : 1970  [x]  Patient  Verified  Payor: Lexie Santoro / Plan: Fidel Martinez / Product Type: HMO /    In time:9:30  Out time:10:15  Total Treatment Time (min): 45  Visit #: 8 of 8    Medicare/BCBS Only   Total Timed Codes (min):  35 1:1 Treatment Time:  30       Treatment Area: Primary osteoarthritis, right shoulder [M19.011]    SUBJECTIVE  Pain Level (0-10 scale): 0/10  Any medication changes, allergies to medications, adverse drug reactions, diagnosis change, or new procedure performed?: [x] No    [] Yes (see summary sheet for update)  Subjective functional status/changes:   [x] No changes reported  . OBJECTIVE    Modality rationale: decrease pain and increase tissue extensibility to improve the patients ability to tolerate ADLs.     Min Type Additional Details    [] Estim:  []Unatt       []IFC  []Premod                        []Other:  []w/ice   []w/heat  Position:  Location:    [] Estim: []Att    []TENS instruct  []NMES                    []Other:  []w/US   []w/ice   []w/heat  Position:  Location:    []  Traction: [] Cervical       []Lumbar                       [] Prone          []Supine                       []Intermittent   []Continuous Lbs:  [] before manual  [] after manual    []  Ultrasound: []Continuous   [] Pulsed                           []1MHz   []3MHz W/cm2:  Location:    []  Iontophoresis with dexamethasone         Location: [] Take home patch   [] In clinic   10 []  Ice     [x]  heat  []  Ice massage  []  Laser   []  Anodyne Position:sitting  Location:right shoulder    []  Laser with stim  []  Other:  Position:  Location:    []  Vasopneumatic Device Pressure:       [] lo [] med [] hi   Temperature: [] lo [] med [] hi   [] Skin assessment post-treatment:  []intact []redness- no adverse reaction    []redness  adverse reaction:       35 min Therapeutic Exercise:  [x] See flow sheet : + PROM   Rationale: increase ROM and increase strength to improve the patients ability to tolerate ADLs. With   [] TE   [] TA   [] neuro   [] other: Patient Education: [x] Review HEP    [] Progressed/Changed HEP based on:   [] positioning   [] body mechanics   [] transfers   [] heat/ice application    [] other:      Other Objective/Functional Measures: PROM: flexion: 118 degrees, abduction:90 degrees, ER: 50 degrees, IR: 35 degrees     Pain Level (0-10 scale) post treatment: 0/10    ASSESSMENT/Changes in Function: pt with slow progress with PT. She demonstrates + guarding with PROM. She reports HEP compliance. The patient will benefit from continuation of PT services to further ROM and progress as tolerated. Patient will continue to benefit from skilled PT services to modify and progress therapeutic interventions, address functional mobility deficits, address ROM deficits, address strength deficits and analyze and address soft tissue restrictions to attain remaining goals. []  See Plan of Care  [x]  See progress note/recertification  []  See Discharge Summary         Progress towards goals / Updated goals:  1. Patient will be able to improve right shoulder flexion PROM to 130 degrees in order to improve ease of future ADLs. Progressing - Right shoulder PROM flexion - 110 degrees (7/11/19). Progressing - right shoulder PROM flexion - 118 degrees (8/15/19).     2. Patient will be able to improve right shoulder abduction to 100 degrees in order to improve ease of future ADLs.  Progressing - right shoulder abduction PROM - 90 degrees (8/15/19). 3. Patient will improve FOTO score to 66 in order to demonstrate improvements in functional independence. FOTO score - 38 (7/18/19)   4. Patient will be able to begin AAROM phase void of pain in order to improve activity tolerance. Progressing - pain noted 8-15-19   5. Patient will be able to improve right shoulder AAROM flexion to 130 degrees in order to improve ease of ADLs.  - slow progress 8-15-19    PLAN  []  Upgrade activities as tolerated     [x]  Continue plan of care  []  Update interventions per flow sheet       []  Discharge due to:_  []  Other:_      Ras Llamas, PT 8/15/2019  9:38 AM    Future Appointments   Date Time Provider Yi Osborn   9/5/2019 11:00 AM [address], CRISTELA Quiles

## 2019-08-15 NOTE — PROGRESS NOTES
[facility]  [address]  [place], [address]  [phone] fax    Physical Therapy Progress Note  Patient name: Margarita Mart Start of Care: 6/21/2019   Referral Lonny Pereira,* JLS: 5/01/7600                Medical Diagnosis: Primary osteoarthritis, right shoulder [M19.011]  Payor: [employer] / Plan: [address] [place] [address] / Product Type: Managed Care Medicaid /  Onset Date: DOS: 6/14/19                Treatment Diagnosis: Right shoulder pain   Prior Hospitalization: see medical history Provider#: [id number]   Medications: Verified on Patient summary List    Comorbidities: arthritis   Prior Level of Function: (I) with all ADLs and employed as a  for the school system.     Visits from Start of Care: 17    Missed Visits: -      Key Functional Changes:    pt with slow progress with PT. She demonstrates + guarding with PROM. She reports HEP compliance. The patient will benefit from continuation of PT services to further ROM and progress as tolerated. Patient will continue to benefit from skilled PT services to modify and progress therapeutic interventions, address functional mobility deficits, address ROM deficits, address strength deficits and analyze and address soft tissue restrictions to attain remaining goals. Progress towards goals:  1. Patient will be able to improve right shoulder flexion PROM to 130 degrees in order to improve ease of future ADLs. Progressing - Right shoulder PROM flexion - 110 degrees (7/11/19). Progressing - right shoulder PROM flexion - 118 degrees (8/15/19).     2. Patient will be able to improve right shoulder abduction to 100 degrees in order to improve ease of future ADLs.  Progressing - right shoulder abduction PROM - 90 degrees (8/15/19). 3. Patient will improve FOTO score to 66 in order to demonstrate improvements in functional independence.  FOTO score - 38 (7/18/19)   4. Patient will be able to begin AAROM phase void of pain in order to improve activity tolerance. Progressing - pain noted 8-15-19   5. Patient will be able to improve right shoulder AAROM flexion to 130 degrees in order to improve ease of ADLs.  - slow progress 8-15-19    Updated Goals: to be achieved in 4 weeks:   Continue with unmet goals    ASSESSMENT/RECOMMENDATIONS:  [x]Continue therapy per initial plan/protocol at a frequency of  2 x per week for 4 weeks  []Continue therapy with the following recommended changes:_____________________      _____________________________________________________________________  []Discontinue therapy progressing towards or have reached established goals  []Discontinue therapy due to lack of appreciable progress towards goals  []Discontinue therapy due to lack of attendance or compliance  []Await Physician's recommendations/decisions regarding therapy  []Other:________________________________________________________________    Thank you for this referral.    Radha Schwartz, PT 8/15/2019 10:09 AM  NOTE TO PHYSICIAN:  [address]   FAX TO [facility]: [phone]  If you are unable to process this request in 24 hours please contact our office: [phone]    ? I have read the above report and request that my patient continue as recommended. ? I have read the above report and request that my patient continue therapy with the following changes/special instructions:__________________________________________________________  ? I have read the above report and request that my patient be discharged from therapy.     Physicians signature: ______________________________Date: ______Time:______

## 2019-08-20 ENCOUNTER — HOSPITAL ENCOUNTER (OUTPATIENT)
Dept: PHYSICAL THERAPY | Age: 49
Discharge: HOME | End: 2019-08-20
Payer: COMMERCIAL

## 2019-08-20 PROCEDURE — 97110 THERAPEUTIC EXERCISES: CPT

## 2019-08-20 PROCEDURE — 97140 MANUAL THERAPY 1/> REGIONS: CPT

## 2019-08-20 NOTE — PROGRESS NOTES
PT DAILY TREATMENT NOTE     Patient Name: Yesenia Cifuentes  Date:2019  : 1970  [x]  Patient  Verified  Payor: Kezia Richardson / Plan: Sima Carry / Product Type: HMO /    In time: 8:31am  Out time:9:19am  Total Treatment Time (min): 48  Total Timed Codes: 38  1:1 time: 45  Visit #: 1 of 8    Treatment Area: Primary osteoarthritis, right shoulder [M19.011]    SUBJECTIVE  Pain Level (0-10 scale): 2/10  Any medication changes, allergies to medications, adverse drug reactions, diagnosis change, or new procedure performed?: [x] No    [] Yes (see summary sheet for update)  Subjective functional status/changes:   [] No changes reported  Pt reports continued pain and tightness with shoulder elevation and reaching activities. OBJECTIVE  28 min Therapeutic Exercise:  [x] See flow sheet :   Rationale: increase ROM and increase strength to improve the patients ability to improve ease of daily activity. 10 min Manual Therapy:  Right shoulder PROM, grade 1 inf and post GJ mobilizations, gentle capsular stretch into scaption and IR   Rationale: decrease pain, increase ROM and increase tissue extensibility to improve ease of daily activity. Modality rationale: decrease pain to improve the patients ability to improve ease of daily activity.    Min Type Additional Details    [] Estim:  []Unatt       []IFC  []Premod                        []Other:  []w/ice   []w/heat  Position:  Location:    [] Estim: []Att    []TENS instruct  []NMES                    []Other:  []w/US   []w/ice   []w/heat  Position:  Location:    []  Traction: [] Cervical       []Lumbar                       [] Prone          []Supine                       []Intermittent   []Continuous Lbs:  [] before manual  [] after manual    []  Ultrasound: []Continuous   [] Pulsed                           []1MHz   []3MHz Location:  W/cm2:    []  Iontophoresis with dexamethasone         Location: [] Take home patch   [] In clinic   10 []  Ice [x]  heat  []  Ice massage  []  Laser   []  Anodyne Position: seated  Location: shoulder    []  Laser with stim  []  Other: Position:  Location:    []  Vasopneumatic Device Pressure:       [] lo [] med [] hi   Temperature: [] lo [] med [] hi   [] Skin assessment post-treatment:  []intact []redness- no adverse reaction    []redness  adverse reaction:           With   [] TE   [] TA   [] neuro   [] other: Patient Education: [x] Review HEP    [] Progressed/Changed HEP based on:   [] positioning   [] body mechanics   [] transfers   [] heat/ice application    [] other:      Other Objective/Functional Measures: mild posterior capsular limitations in GHJ mobility, limited 2/2 guarding as well    Limited active shoulder elevation with compensatory scapular hike     Pain Level (0-10 scale) post treatment: 4/10    ASSESSMENT/Changes in Function: Pt continues to demonstrate slow progress with shoulder AROM and PROM, limited 2/2 guarding and possible post capsular limitations, as well as weakness. Continue progressing as tolerated. Patient will continue to benefit from skilled PT services to modify and progress therapeutic interventions, address functional mobility deficits, address ROM deficits, address strength deficits and analyze and address soft tissue restrictions to attain remaining goals. []  See Plan of Care  []  See progress note/recertification  []  See Discharge Summary         Progress towards goals:  1. Patient will be able to improve right shoulder flexion PROM to 130 degrees in order to improve ease of future ADLs. Progressing - Right shoulder PROM flexion - 110 degrees (7/11/19). Progressing - right shoulder PROM flexion - 118 degrees (8/15/19).     2. Patient will be able to improve right shoulder abduction to 100 degrees in order to improve ease of future ADLs.  Progressing - right shoulder abduction PROM - 90 degrees (8/15/19).   3. Patient will improve FOTO score to 66 in order to demonstrate improvements in functional independence. FOTO score - 38 (7/18/19)   4. Patient will be able to begin AAROM phase void of pain in order to improve activity tolerance. Progressing - pain noted 8-15-19   5. Patient will be able to improve right shoulder AAROM flexion to 130 degrees in order to improve ease of ADLs.  - slow progress 8-15-19    PLAN  []  Upgrade activities as tolerated     [x]  Continue plan of care  []  Update interventions per flow sheet       []  Discharge due to:_  []  Other:_      Tami Norman, PT, DPT, ATC 8/20/2019  8:37 AM    Future Appointments   Date Time Provider Yi Osborn   8/22/2019 10:30 AM Ute Vallejo [facility]   8/26/2019  8:00 AM Torsten Ledezma, PT [facility]   8/29/2019  8:30 AM Torsten Ledezma, PT [facility]   9/3/2019  8:30 AM Ute Vallejo [facility]   9/5/2019 11:00 AM CRISTELA Goel [facility]   9/5/2019 12:00 PM Elaine Awad [facility]   9/10/2019  8:30 AM Torsten Ledezma PT [facility]   9/12/2019  8:30 AM Elaine Awad [facility]

## 2019-08-22 ENCOUNTER — HOSPITAL ENCOUNTER (OUTPATIENT)
Dept: PHYSICAL THERAPY | Age: 49
Discharge: HOME | End: 2019-08-22
Payer: COMMERCIAL

## 2019-08-22 PROCEDURE — 97140 MANUAL THERAPY 1/> REGIONS: CPT

## 2019-08-22 PROCEDURE — 97110 THERAPEUTIC EXERCISES: CPT

## 2019-08-22 NOTE — PROGRESS NOTES
PT DAILY TREATMENT NOTE     Patient Name: Radha Campbell  Date:2019  : 1970  [x]  Patient  Verified  Payor: Harpreet Reed / Plan: [employer] / Product Type: HMO /    In time:10:30am  Out time: 11:17am  Total Treatment Time (min): 52  Visit #: 2 of 8    Treatment Area: Primary osteoarthritis, right shoulder [M19.011]    SUBJECTIVE  Pain Level (0-10 scale): 1  Any medication changes, allergies to medications, adverse drug reactions, diagnosis change, or new procedure performed?: [x] No    [] Yes (see summary sheet for update)  Subjective functional status/changes:   [] No changes reported  Pt reports she fell out of bed dreaming the night before last and landed with her shoulder elevated. She reports increased pain and tingling down towards her hand that lasted ~15 minutes, then gradually subsided. She reports pain is doing ok today, but was still bothersome yesterday. OBJECTIVE    27 min Therapeutic Exercise:  [x] See flow sheet :   Rationale: increase ROM and increase strength to improve the patients ability to improve ease of daily activity. 10 min Manual Therapy:  PROM right shoulder all planes, grade 1 inf and post GHJ mobs, gentle capsular stretch   Rationale: decrease pain, increase ROM and increase tissue extensibility to improve ease of daily activity. Modality rationale: decrease pain to improve the patients ability to improve ease of daily activity.    Min Type Additional Details    [] Estim:  []Unatt       []IFC  []Premod                        []Other:  []w/ice   []w/heat  Position:  Location:    [] Estim: []Att    []TENS instruct  []NMES                    []Other:  []w/US   []w/ice   []w/heat  Position:  Location:    []  Traction: [] Cervical       []Lumbar                       [] Prone          []Supine                       []Intermittent   []Continuous Lbs:  [] before manual  [] after manual    []  Ultrasound: []Continuous   [] Pulsed []1MHz   []3MHz Location:  W/cm2:    []  Iontophoresis with dexamethasone         Location: [] Take home patch   [] In clinic   10 []  Ice     [x]  heat  []  Ice massage  []  Laser   []  Anodyne Position: seated  Location: right shoulder     []  Laser with stim  []  Other: Position:  Location:    []  Vasopneumatic Device Pressure:       [] lo [] med [] hi   Temperature: [] lo [] med [] hi   [] Skin assessment post-treatment:  []intact []redness- no adverse reaction    []redness  adverse reaction:           With   [] TE   [] TA   [] neuro   [] other: Patient Education: [x] Review HEP    [] Progressed/Changed HEP based on:   [] positioning   [] body mechanics   [] transfers   [] heat/ice application    [] other:      Other Objective/Functional Measures: added UBE with no adverse response, pt reports shoulder felt looser afterwards    Pain Level (0-10 scale) post treatment: 4    ASSESSMENT/Changes in Function: Pt continues to demonstrate limited shoulder PROM 2/2 guarding and possible capsular restrictions. She is gradually progressing with AAROM, though remains limited 2/2 weakness. Continue per POC. Patient will continue to benefit from skilled PT services to modify and progress therapeutic interventions, address functional mobility deficits, address ROM deficits, address strength deficits, analyze and address soft tissue restrictions, analyze and cue movement patterns and instruct in home and community integration to attain remaining goals. []  See Plan of Care  []  See progress note/recertification  []  See Discharge Summary         Progress towards goals:  1. Patient will be able to improve right shoulder flexion PROM to 130 degrees in order to improve ease of future ADLs. Progressing - Right shoulder PROM flexion - 110 degrees (7/11/19).  Progressing - right shoulder PROM flexion - 118 degrees (8/15/19).     2. Patient will be able to improve right shoulder abduction to 100 degrees in order to improve ease of future ADLs.  Progressing - right shoulder abduction PROM - 90 degrees (8/15/19). 3. Patient will improve FOTO score to 66 in order to demonstrate improvements in functional independence. FOTO score - 38 (7/18/19)   4. Patient will be able to begin AAROM phase void of pain in order to improve activity tolerance. Progressing - pain noted 8-15-19   5. Patient will be able to improve right shoulder AAROM flexion to 130 degrees in order to improve ease of ADLs.  - slow progress 8-15-19     PLAN  []  Upgrade activities as tolerated     [x]  Continue plan of care  []  Update interventions per flow sheet       []  Discharge due to:_  []  Other:_      Chato Robert, PT, DPT, ATC 8/22/2019  10:42 AM    Future Appointments   Date Time Provider Yi Osborn   8/26/2019  8:00 AM Katie Benavides PT [facility]   8/29/2019  8:30 AM Katie Benavides PT [facility]   9/3/2019  8:30 AM Merlin Head [facility]   9/5/2019 11:00 AM CRISTELA Wilson [facility]   9/5/2019 12:00 PM Javier Ronquillo [facility]   9/10/2019  8:30 AM Katie Benavides PT [facility]   9/12/2019  8:30 AM Javier Ronquillo [facility]

## 2019-08-26 ENCOUNTER — HOSPITAL ENCOUNTER (OUTPATIENT)
Dept: PHYSICAL THERAPY | Age: 49
Discharge: HOME | End: 2019-08-26
Payer: COMMERCIAL

## 2019-08-26 PROCEDURE — 97016 VASOPNEUMATIC DEVICE THERAPY: CPT

## 2019-08-26 PROCEDURE — 97110 THERAPEUTIC EXERCISES: CPT

## 2019-08-26 PROCEDURE — 97140 MANUAL THERAPY 1/> REGIONS: CPT

## 2019-08-26 NOTE — PROGRESS NOTES
PT DAILY TREATMENT NOTE 10-18    Patient Name: Karan Mendez  Date:2019  : 1970  [x]  Patient  Verified  Payor: Frank Lancaster / Plan: [facility] / Product Type: HMO /    In time:0800 Out [id number]  Total Treatment Time (min): 48  Visit #: 3 of 8    Medicare/BCBS Only   Total Timed Codes (min):38  1:1 Treatment Time:  48       Treatment Area: Primary osteoarthritis, right shoulder [M19.011]    SUBJECTIVE  Pain Level (0-10 scale): 2/10  Any medication changes, allergies to medications, adverse drug reactions, diagnosis change, or new procedure performed?: [x] No    [] Yes (see summary sheet for update)  Subjective functional status/changes:   [x] No changes reported  . OBJECTIVE    Modality rationale: decrease pain and increase tissue extensibility to improve the patients ability to tolerate ADLs.     Min Type Additional Details    [] Estim:  []Unatt       []IFC  []Premod                        []Other:  []w/ice   []w/heat  Position:  Location:    [] Estim: []Att    []TENS instruct  []NMES                    []Other:  []w/US   []w/ice   []w/heat  Position:  Location:    []  Traction: [] Cervical       []Lumbar                       [] Prone          []Supine                       []Intermittent   []Continuous Lbs:  [] before manual  [] after manual    []  Ultrasound: []Continuous   [] Pulsed                           []1MHz   []3MHz W/cm2:  Location:    []  Iontophoresis with dexamethasone         Location: [] Take home patch   [] In clinic   10 []  Ice     [x]  heat  []  Ice massage  []  Laser   []  Anodyne Position:sitting  Location:right shoulder    []  Laser with stim  []  Other:  Position:  Location:    []  Vasopneumatic Device Pressure:       [] lo [] med [] hi   Temperature: [] lo [] med [] hi   [] Skin assessment post-treatment:  []intact []redness- no adverse reaction    []redness  adverse reaction:       28 min Therapeutic Exercise:  [x] See flow sheet :    Rationale: increase ROM and increase strength to improve the patients ability to tolerate ADLs. 10 min Manual Therapy:  GHJ grade 1-2 mobs, PROM of the right shoulder   Rationale: decrease pain, increase ROM and increase tissue extensibility to perform functional tasks            With   [] TE   [] TA   [] neuro   [] other: Patient Education: [x] Review HEP    [] Progressed/Changed HEP based on:   [] positioning   [] body mechanics   [] transfers   [] heat/ice application    [] other:      Other Objective/Functional Measures: no changes with therex. Pain Level (0-10 scale) post treatment: 3/10    ASSESSMENT/Changes in Function:  Pt seemed to demonstrate improved PROM flexion today but was not formally measured. + guarding does appear to be limiting progression rate with PROM. Pt with limited ROM with wall slides. She will benefit from continued PT to restore ROM. Patient will continue to benefit from skilled PT services to modify and progress therapeutic interventions, address functional mobility deficits, address ROM deficits, address strength deficits and analyze and address soft tissue restrictions to attain remaining goals. []  See Plan of Care  []  See progress note/recertification  []  See Discharge Summary         Progress towards goals / Updated goals:    1. Patient will be able to improve right shoulder flexion PROM to 130 degrees in order to improve ease of future ADLs. Progressing - Right shoulder PROM flexion - 110 degrees (7/11/19). Progressing - right shoulder PROM flexion - 118 degrees (8/15/19).     2. Patient will be able to improve right shoulder abduction to 100 degrees in order to improve ease of future ADLs.  Progressing - right shoulder abduction PROM - 90 degrees (8/15/19). 3. Patient will improve FOTO score to 66 in order to demonstrate improvements in functional independence.  FOTO score - 38 (7/18/19)   4. Patient will be able to begin AAROM phase void of pain in order to improve activity tolerance. Progressing - pain noted 8-15-19   5. Patient will be able to improve right shoulder AAROM flexion to 130 degrees in order to improve ease of ADLs.  - slow progress 8-15-19    PLAN  []  Upgrade activities as tolerated     [x]  Continue plan of care  []  Update interventions per flow sheet       []  Discharge due to:_  []  Other:_      Alex Royal PT 8/26/2019  8:04 AM    Future Appointments   Date Time Provider Yi Osborn   8/29/2019  8:30 AM Sergey Gore PT [facility]   9/3/2019  8:30 [facility]   9/5/2019 11:00 AM CRISTELA Iraheta [id number] [facility]   9/5/2019 12:00 PM Steve Martinez [facility]   9/10/2019  8:30 AM [facility]   9/12/2019  8:30 AM Steve Martinez [facility]

## 2019-08-29 ENCOUNTER — APPOINTMENT (OUTPATIENT)
Dept: PHYSICAL THERAPY | Age: 49
End: 2019-08-29
Payer: COMMERCIAL

## 2019-09-03 ENCOUNTER — HOSPITAL ENCOUNTER (OUTPATIENT)
Dept: PHYSICAL THERAPY | Age: 49
Discharge: HOME | End: 2019-09-03
Payer: COMMERCIAL

## 2019-09-03 PROCEDURE — 97140 MANUAL THERAPY 1/> REGIONS: CPT

## 2019-09-03 PROCEDURE — 97110 THERAPEUTIC EXERCISES: CPT

## 2019-09-03 NOTE — PROGRESS NOTES
PT DAILY TREATMENT NOTE - [facility]     Patient Name: Lord Gambino  Date:9/3/2019  : 1970  [x]  Patient  Verified  Payor: Girish John / Plan: Leanne Mcguire / Product Type: HMO /    In time: 8:32am  Out time:9:33am  Total Treatment Time (min): 61  Total Timed Codes (min): 61  1:1 Treatment Time ([address] only): 30   Visit #: 4 of 8    Treatment Area: Primary osteoarthritis, right shoulder [M19.011]    SUBJECTIVE  Pain Level (0-10 scale): 0  Any medication changes, allergies to medications, adverse drug reactions, diagnosis change, or new procedure performed?: [x] No    [] Yes (see summary sheet for update)  Subjective functional status/changes:   [] No changes reported  Pt reports shoulder has been hurting whenever she moves it around, with a deep aching pain noted in her shoulder. OBJECTIVE  51 min Therapeutic Exercise:  [x] See flow sheet :   Rationale: increase ROM and increase strength to improve the patients ability to improve ease of daily activity. 10 min Manual Therapy:  PROM right shoulder, grade 2-3 inf and post GJ mobilizations, STJ mobility   Rationale: decrease pain, increase ROM and increase tissue extensibility to improve ease of daily activity and reduce shoulder pain. With   [] TE   [] TA   [] neuro   [] other: Patient Education: [x] Review HEP    [] Progressed/Changed HEP based on:   [] positioning   [] body mechanics   [] transfers   [] heat/ice application    [] other:      Other Objective/Functional Measures: increased shoulder pain immediately with attempted movement with interventions reported today    Right shoulder PROM: flexion 124, ABD 80, ER @ side: 27 degrees empty vs beginning of firm end feels, possible (+) capsular limitations in mobility     Pain Level (0-10 scale) post treatment: 3/10    ASSESSMENT/Changes in Function: Pt continues to be guarded limiting PROM, though she does appear to be improving.  She reports increased pain with most exercises whether passive ROM or AAROM, but appears to demonstrate some potential capsular limitations at end range limiting her motion. Continue with emphasis on shoulder ROM. Patient will continue to benefit from skilled PT services to modify and progress therapeutic interventions, address functional mobility deficits, address ROM deficits, address strength deficits, analyze and address soft tissue restrictions, analyze and cue movement patterns and instruct in home and community integration to attain remaining goals. []  See Plan of Care  []  See progress note/recertification  []  See Discharge Summary         Progress towards goals / Updated goals:     1. Patient will be able to improve right shoulder flexion PROM to 130 degrees in order to improve ease of future ADLs. Progressing - Right shoulder PROM flexion - 110 degrees (7/11/19). Progressing - right shoulder PROM flexion - 118 degrees (8/15/19).  124, near met 9/3/2019   2. Patient will be able to improve right shoulder abduction to 100 degrees in order to improve ease of future ADLs.  Progressing - right shoulder abduction PROM - 90 degrees (8/15/19). 80, slow progress 9/3/2019  3. Patient will improve FOTO score to 66 in order to demonstrate improvements in functional independence. FOTO score - 38 (7/18/19)   4. Patient will be able to begin AAROM phase void of pain in order to improve activity tolerance. Progressing - pain noted 8-15-19   5. Patient will be able to improve right shoulder AAROM flexion to 130 degrees in order to improve ease of ADLs.  - slow progress 8-15-19     PLAN  []  Upgrade activities as tolerated     [x]  Continue plan of care  []  Update interventions per flow sheet       []  Discharge due to:_  []  Other:_      Kristi Ronquillo, PT, DPT, ATC 9/3/2019  8:45 AM    Future Appointments   Date Time Provider Yi Osborn   9/5/2019 11:00 AM CRISTELA Ghotra [address]   9/5/2019 12:00 PM James, [address] [facility]   9/10/2019  8:30 AM Eagle Peralta, PT [facility]   9/12/2019  8:30 [facility]

## 2019-09-05 ENCOUNTER — HOSPITAL ENCOUNTER (OUTPATIENT)
Dept: PHYSICAL THERAPY | Age: 49
Discharge: HOME | End: 2019-09-05
Payer: COMMERCIAL

## 2019-09-05 ENCOUNTER — OFFICE VISIT (OUTPATIENT)
Dept: ORTHOPEDIC SURGERY | Age: 49
End: 2019-09-05

## 2019-09-05 VITALS
HEART RATE: 86 BPM | BODY MASS INDEX: 39.38 KG/M2 | RESPIRATION RATE: 16 BRPM | OXYGEN SATURATION: 100 % | SYSTOLIC BLOOD PRESSURE: 151 MMHG | WEIGHT: 214 LBS | HEIGHT: 62 IN | TEMPERATURE: 98.2 F | DIASTOLIC BLOOD PRESSURE: 89 MMHG

## 2019-09-05 DIAGNOSIS — M19.011 GLENOHUMERAL ARTHRITIS, RIGHT: Primary | ICD-10-CM

## 2019-09-05 PROCEDURE — 97140 MANUAL THERAPY 1/> REGIONS: CPT

## 2019-09-05 PROCEDURE — 97016 VASOPNEUMATIC DEVICE THERAPY: CPT

## 2019-09-05 PROCEDURE — 97110 THERAPEUTIC EXERCISES: CPT

## 2019-09-05 NOTE — LETTER
NOTIFICATION RETURN TO WORK / SCHOOL 
 
9/5/2019 11:26 AM 
 
Ms. Soledad Damon [address] To Whom It May Concern: 
 
Soledad Damon is currently under the care of [address] [place] [address] Larry Atwood. She will remain on no duty status until Dec 4, 2019. If there are questions or concerns please have the patient contact our office. Sincerely, CRISTELA Ramirez

## 2019-09-05 NOTE — PROGRESS NOTES
PT DAILY TREATMENT NOTE 10-18    Patient Name: Lord Gambino  Date:2019  : 1970  [x]  Patient  Verified  Payor: Girish John / Plan: Leanne Mcguire / Product Type: HMO /    In time:11:55  Out time:12:44  Total Treatment Time (min): 49  Visit #: 5 of 8    Medicare/BCBS Only   Total Timed Codes (min):  39 1:1 Treatment Time:  39       Treatment Area: Primary osteoarthritis, right shoulder [M19.011]    SUBJECTIVE  Pain Level (0-10 scale): 0/10  Any medication changes, allergies to medications, adverse drug reactions, diagnosis change, or new procedure performed?: [x] No    [] Yes (see summary sheet for update)  Subjective functional status/changes:   [] No changes reported  Pt reports no new complaints of pain. Pt reports compliance with HEP. OBJECTIVE    Modality rationale: decrease pain and increase tissue extensibility to improve the patients ability to tolerate ADLs.    Min Type Additional Details    [] Estim:  []Unatt       []IFC  []Premod                        []Other:  []w/ice   []w/heat  Position:  Location:    [] Estim: []Att    []TENS instruct  []NMES                    []Other:  []w/US   []w/ice   []w/heat  Position:  Location:    []  Traction: [] Cervical       []Lumbar                       [] Prone          []Supine                       []Intermittent   []Continuous Lbs:  [] before manual  [] after manual    []  Ultrasound: []Continuous   [] Pulsed                           []1MHz   []3MHz W/cm2:  Location:    []  Iontophoresis with dexamethasone         Location: [] Take home patch   [] In clinic   10 []  Ice     [x]  heat  []  Ice massage  []  Laser   []  Anodyne Position:sitting  Location:right shoulder    []  Laser with stim  []  Other:  Position:  Location:    []  Vasopneumatic Device Pressure:       [] lo [] med [] hi   Temperature: [] lo [] med [] hi   [] Skin assessment post-treatment:  []intact []redness- no adverse reaction    []redness  adverse reaction:     31 min Therapeutic Exercise:  [x] See flow sheet :   Rationale: increase ROM and increase strength to improve the patients ability to tolerate ADLs. 8 min Manual Therapy:  PROM to right shoulder   Rationale: decrease pain, increase ROM and increase tissue extensibility to improve functional mobility. With   [] TE   [] TA   [] neuro   [] other: Patient Education: [x] Review HEP    [] Progressed/Changed HEP based on:   [] positioning   [] body mechanics   [] transfers   [] heat/ice application    [] other:      Other Objective/Functional Measures: continued guarding with PROM shoulder flexion, abduction and external rotation. Pain Level (0-10 scale) post treatment: 1/10    ASSESSMENT/Changes in Function: Pt has no adverse reaction to treatment. Difficulty assessing patients available PROM in all directions due to continued guarding. Patient will continue to benefit from skilled PT services to modify and progress therapeutic interventions, address functional mobility deficits, address ROM deficits, address strength deficits, analyze and address soft tissue restrictions, analyze and cue movement patterns and analyze and modify body mechanics/ergonomics to attain remaining goals. []  See Plan of Care  []  See progress note/recertification  []  See Discharge Summary         Progress towards goals / Updated goals:  1. Patient will be able to improve right shoulder flexion PROM to 130 degrees in order to improve ease of future ADLs. Progressing - Right shoulder PROM flexion - 110 degrees (7/11/19). Progressing - right shoulder PROM flexion - 118 degrees (8/15/19).  124, near met 9/3/2019   2. Patient will be able to improve right shoulder abduction to 100 degrees in order to improve ease of future ADLs.  Progressing - right shoulder abduction PROM - 90 degrees (8/15/19). 80, slow progress 9/3/2019  3. Patient will improve FOTO score to 66 in order to demonstrate improvements in functional independence. FOTO score - 38 (7/18/19)   4. Patient will be able to begin AAROM phase void of pain in order to improve activity tolerance. Progressing - pain noted 8-15-19   5. Patient will be able to improve right shoulder AAROM flexion to 130 degrees in order to improve ease of ADLs.  - slow progress 8-15-19     PLAN  []  Upgrade activities as tolerated     [x]  Continue plan of care  []  Update interventions per flow sheet       []  Discharge due to:_  []  Other:_      Ariana Montana PTA 9/5/2019  12:07 PM    Future Appointments   Date Time Provider Yi Osborn   9/10/2019  8:30 AM Felix Olmstead PT [facility]   9/12/2019  8:30 AM Coleen Lacey [facility]   11/20/2019 11:00 AM Dilma Kruse PA [address]

## 2019-09-05 NOTE — PROGRESS NOTES
Pura Speaker  1970     HISTORY OF PRESENT ILLNESS  Pura Rivas is a 57 y.o. female who presents today for evaluation s/p Right shoulder arthroscopic glenoid resurfacing on 6/14/19. Patient has been going to PT. Describes pain as a 0/10. She notes improvement. Just notes pain when she tries to push her arm a little further. Patient denies any fever, chills, chest pain, shortness of breath or calf pain. The remainder of the review of systems is negative. There are no new illness or injuries to report since last seen in the office. No changes in medications, allergies, social or family history. PHYSICAL EXAM:   Visit Vitals  /89 (BP 1 Location: Left arm, BP Patient Position: Sitting)   Pulse 86   Temp 98.2 °F (36.8 °C) (Oral)   Resp 16   Ht 5' 2\" (1.575 m)   Wt 214 lb (97.1 kg)   SpO2 100%   BMI 39.14 kg/m²      The patient is a well-developed, well-nourished female in no acute distress. The patient is alert and oriented times three. The patient appears to be well groomed. Mood and affect are normal.  ORTHOPEDIC EXAM of right shoulder:  Inspection: swelling present,  Bruising not present  Incisions well healed  Passive glenohumeral abduction 0-80 degrees, able to reach to side of her face  Stability: Stable  Strength: 3/5  2+ distal pulses    IMPRESSION:  S/P Right shoulder arthroscopic glenoid resurfacing    PLAN:   1. Patient doing well post operatively  2. Will cont with PT. She is making progress    No duty status until dec 4, 2019.     RTC 6 weeks      MARILEE [facility] Specialist

## 2019-09-05 NOTE — PROGRESS NOTES
1. Have you been to the ER, urgent care clinic since your last visit? Hospitalized since your last visit? NO    2. Have you seen or consulted any other health care providers outside of the [address] [place] [address] since your last visit? Include any pap smears or colon screening.   NO

## 2019-09-10 ENCOUNTER — HOSPITAL ENCOUNTER (OUTPATIENT)
Dept: PHYSICAL THERAPY | Age: 49
Discharge: HOME | End: 2019-09-10
Payer: COMMERCIAL

## 2019-09-10 ENCOUNTER — DOCUMENTATION ONLY (OUTPATIENT)
Dept: ORTHOPEDIC SURGERY | Age: 49
End: 2019-09-10

## 2019-09-10 PROCEDURE — 97140 MANUAL THERAPY 1/> REGIONS: CPT

## 2019-09-10 PROCEDURE — 97110 THERAPEUTIC EXERCISES: CPT

## 2019-09-10 NOTE — PROGRESS NOTES
PT DAILY TREATMENT NOTE 10-18    Patient Name: Brendan Conner  Date:9/10/2019  : 1970  [x]  Patient  Verified  Payor: Louis Vanessa / Plan: Geovanny Braxton / Product Type: HMO /    In time:   Out time:931  Total Treatment Time (min): 62   Visit #: 6 of 8    Medicare/BCBS Only   Total Timed Codes (min):  48 1:1 Treatment Time:  27       Treatment Area: Primary osteoarthritis, right shoulder [M19.011]    SUBJECTIVE  Pain Level (0-10 scale): 0/10  Any medication changes, allergies to medications, adverse drug reactions, diagnosis change, or new procedure performed?: [x] No    [] Yes (see summary sheet for update)  Subjective functional status/changes:   [x] No changes reported      OBJECTIVE    Modality rationale: decrease pain and increase tissue extensibility to improve the patients ability to tolerate ADLs.    Min Type Additional Details    [] Estim:  []Unatt       []IFC  []Premod                        []Other:  []w/ice   []w/heat  Position:  Location:    [] Estim: []Att    []TENS instruct  []NMES                    []Other:  []w/US   []w/ice   []w/heat  Position:  Location:    []  Traction: [] Cervical       []Lumbar                       [] Prone          []Supine                       []Intermittent   []Continuous Lbs:  [] before manual  [] after manual    []  Ultrasound: []Continuous   [] Pulsed                           []1MHz   []3MHz W/cm2:  Location:    []  Iontophoresis with dexamethasone         Location: [] Take home patch   [] In clinic   10 []  Ice     [x]  heat  []  Ice massage  []  Laser   []  Anodyne Position:sitting  Location:right shoulder    []  Laser with stim  []  Other:  Position:  Location:    []  Vasopneumatic Device Pressure:       [] lo [] med [] hi   Temperature: [] lo [] med [] hi   [] Skin assessment post-treatment:  []intact []redness- no adverse reaction    []redness  adverse reaction:     38 min Therapeutic Exercise:  [x] See flow sheet :   Rationale: increase ROM and increase strength to improve the patients ability to tolerate ADLs. 10 min Manual Therapy:  PROM to right shoulder, grade 2 mobs   Rationale: decrease pain, increase ROM and increase tissue extensibility to improve functional mobility. With   [] TE   [] TA   [] neuro   [] other: Patient Education: [x] Review HEP    [] Progressed/Changed HEP based on:   [] positioning   [] body mechanics   [] transfers   [] heat/ice application    [] other:      Other Objective/Functional Measures: added tband rows  Pain Level (0-10 scale) post treatment: 6/10    ASSESSMENT/Changes in Function:  Pt tolerated therex well, challenged with wall slides. She reported increase in pain level following treatment. Patient will continue to benefit from skilled PT services to modify and progress therapeutic interventions, address functional mobility deficits, address ROM deficits, address strength deficits, analyze and address soft tissue restrictions, analyze and cue movement patterns and analyze and modify body mechanics/ergonomics to attain remaining goals. []  See Plan of Care  []  See progress note/recertification  []  See Discharge Summary         Progress towards goals / Updated goals:  1. Patient will be able to improve right shoulder flexion PROM to 130 degrees in order to improve ease of future ADLs. Progressing - Right shoulder PROM flexion - 110 degrees (7/11/19). Progressing - right shoulder PROM flexion - 118 degrees (8/15/19).  124, near met 9/3/2019   2. Patient will be able to improve right shoulder abduction to 100 degrees in order to improve ease of future ADLs.  Progressing - right shoulder abduction PROM - 90 degrees (8/15/19). 80, slow progress 9/3/2019  3. Patient will improve FOTO score to 66 in order to demonstrate improvements in functional independence.  FOTO score - 38 (7/18/19)   4. Patient will be able to begin AAROM phase void of pain in order to improve activity tolerance. Progressing - pain noted 8-15-19   5. Patient will be able to improve right shoulder AAROM flexion to 130 degrees in order to improve ease of ADLs.  - slow progress 8-15-19, slow progress 9-10-19     PLAN  []  Upgrade activities as tolerated     [x]  Continue plan of care  []  Update interventions per flow sheet       []  Discharge due to:_  []  Other:_      Erwin Espitia, PT 9/10/2019  9:01 AM    Future Appointments   Date Time Provider Yi Osborn   9/12/2019  8:30 AM Simon Bui [facility]   11/20/2019 11:00 AM Gunnar Kruse PA [address]

## 2019-09-10 NOTE — PROGRESS NOTES
Pt dropped off Attending Physician Statement to be completed and faxed on her behalf. Please call pt for pickup once processed.

## 2019-09-12 ENCOUNTER — HOSPITAL ENCOUNTER (OUTPATIENT)
Dept: PHYSICAL THERAPY | Age: 49
Discharge: HOME | End: 2019-09-12
Payer: COMMERCIAL

## 2019-09-12 PROCEDURE — 97110 THERAPEUTIC EXERCISES: CPT

## 2019-09-12 PROCEDURE — 97140 MANUAL THERAPY 1/> REGIONS: CPT

## 2019-09-12 NOTE — PROGRESS NOTES
PT DAILY TREATMENT NOTE 10-18    Patient Name: Mack Dougherty  Date:2019  : 1970  [x]  Patient  Verified  Payor: Ron Parra / Plan: Wellington Oas / Product Type: HMO /    In time:8:30  Out time:9:26  Total Treatment Time (min): 64  Visit #: 7 of 8    Medicare/BCBS Only   Total Timed Codes (min):  46 1:1 Treatment Time:  45       Treatment Area: Primary osteoarthritis, right shoulder [M19.011]    SUBJECTIVE  Pain Level (0-10 scale): 0  Any medication changes, allergies to medications, adverse drug reactions, diagnosis change, or new procedure performed?: [x] No    [] Yes (see summary sheet for update)  Subjective functional status/changes:   [] No changes reported  Pt reports that her shoulder is \"coming along slowly\"    OBJECTIVE    Modality rationale: decrease inflammation and decrease pain to improve the patients ability to perform ADLs with less post needling soreness   Min Type Additional Details    [] Estim:  []Unatt       []IFC  []Premod                        []Other:  []w/ice   []w/heat  Position:  Location:    [] Estim: []Att    []TENS instruct  []NMES                    []Other:  []w/US   []w/ice   []w/heat  Position:  Location:    []  Traction: [] Cervical       []Lumbar                       [] Prone          []Supine                       []Intermittent   []Continuous Lbs:  [] before manual  [] after manual    []  Ultrasound: []Continuous   [] Pulsed                           []1MHz   []3MHz W/cm2:  Location:    []  Iontophoresis with dexamethasone         Location: [] Take home patch   [] In clinic   10 [x]  Ice     []  heat  []  Ice massage  []  Laser   []  Anodyne Position: seated  Location: right shoulder    []  Laser with stim  []  Other:  Position:  Location:    []  Vasopneumatic Device Pressure:       [] lo [] med [] hi   Temperature: [] lo [] med [] hi   [] Skin assessment post-treatment:  []intact []redness- no adverse reaction    []redness  adverse reaction:     34 min Therapeutic Exercise:  [] See flow sheet : including DN education and pt discussion   Rationale: increase ROM and increase strength to improve the patients ability to perform ADLs with increased ease    12 min Manual Therapy:  DN palpation, setup and hemostasis, PROM of right shoulder   Rationale: increase ROM and increase tissue extensibility to improve ease of self care    Dry Needling Procedure Note    Procedure: An intramuscular manual therapy (dry needling) and a neuro-muscular re-education treatment was done to deactivate myofascial trigger points with a 30 gauge filament needle under aseptic technique. Indications:  [] Myofascial pain and dysfunction [] Muscled spasms  [x] Myalgia/myositis   [] Muscle cramps  [x] Muscle imbalances  [] Temporomandibular Dysfunction  [] Other:    Chart reviewed for the following:  Nancie OAKES, have reviewed the medical history, summary list and precautions/contraindications for [employer].   TIME OUT performed immediately prior to start of procedure:  Nancie OAKES, have performed the following reviews on [employer] prior to the start of the session:      [x] Verified patient identification by name and date of birth    [x] Agreement on all muscles being treated was verified   [x] Purpose of dry needling, side effects, possible complications, risks and benefits were explained to the patient   [x] Procedure site(s) verified  [x] Patient was positioned for comfort and draped for privacy  [x] Informed Consent was signed (initial visit) and verified verbally (subsequent visits)  [x] Patient was instructed on the need to report the use of blood thinners and/or immunosuppressant medications  [x] How to respond to possible adverse effects of treatment  [x] Self treatment of post needling soreness: ice, heat (moist heat, heat wraps) and stretching  [x] Opportunity was given to ask any questions, all questions were answered            Time: 8:55  Date of procedure: 9/12/2019  Treatment: The following muscles were treated today with intramuscular dry needling  [] Left [] Right Masster  [] Left [] Right Temporalis  [] Left [] Right Zygomaticus Major / Minor  [] Left [] Right Lateral Pterygoid  [] Left [] Right Medial Pterygoid  [] Left [] Right Digastric Post / Anterior Belly  [] Left [] Right Sternocleidomastoid  [] Left [] Right Scalene Anterior / Medial / Posterior  [] Left [] Right Extra Laryngeal Muscles  [] Left [] Right Upper Trapezius  [] Left [] Right Middle Trapezius  [] Left [] Right Lower Trapezius  [] Left [] Right Oblique Capitis Inferior  [] Left [] Right Splenius Capitis / Cervicis  [] Left [] Right Semispinalis: Capitis / Cervicis  [] Left [] Right Multifidi / Rotatores Cervicis / Thoracic  [] Left [] Right Longissimus Thoracis / Illiocostalis  [] Left [] Right Levator Scapulae  [] Left [x] Right Supraspinatus / Infraspinatus  [] Left [x] Right Teres Major / Minor  [] Left [] Right Rhomboids / Serratus posterior superior  [] Left [] Right Pectoralis Major / Minor  [] Left [] Right Serratus Anterior  [] Left [] Right Latissimus Dorsi  [] Left [] Right Subscapularis  [] Left [] Right Coracobrachialis  [] Left [] Right Biceps Brachii  [] Left [] Right Deltoid: Anterior / Medial / Posterior  [] Left [] Right Brachialis  [] Left [] Right Triceps  [] Left [] Right Brachioradialis  [] Left [] Right Extensor Carpi Radialis Brevis / Extensor Carpi Radialis Longus    [] Left [] Right  Extensor digitorum  [] Left [] Right Supinator / Pronator Teres  [] Left [] Right Flexor Carpi Radialis/ Flexor Carpi Ulnaris   [] Left [] Right  Flexor Digitorum Superficialis/ Flexor Digitorum Profundus  [] Left [] Right Flexor Pollicis Longus / Flexor Pollicis Brevis / Palmaris Longus  [] Left [] Right Abductor Pollicis Longus / Abductor Pollicis Brevis  [] Left [] Right Opponens Pollicis / Adductor Pollicis  [] Left [] Right Dorsal / Palmar Interossei / Lumbricalis  [] Left [] Right Abductor Digiti Minimi / Opponens Digiti Minimi    Patient's response to today's treatment:  [x] Latent Twitch Response     [] Muscle relaxation [] Pain Relief  [x] Post needling soreness    [x] without complications  [x] Increased Range of Motion   [] Decreased headaches    [] Decreased Tinnitus  [] Other:     Performed by: Landen Montanez DPT, CMTPT         With   [] TE   [] TA   [] neuro   [] other: Patient Education: [x] Review HEP    [] Progressed/Changed HEP based on:   [] positioning   [] body mechanics   [] transfers   [] heat/ice application    [] other:      Other Objective/Functional Measures: limited shoulder strength affecting AROM      Pain Level (0-10 scale) post treatment: 8    ASSESSMENT/Changes in Function: Pt with good tolerance to initiation of DN today. She demonstrates improved IR/ER mobility post needling as well as increase in ABD PROM. Continue PT to progress with end range mobility and shoulder strength. Patient will continue to benefit from skilled PT services to modify and progress therapeutic interventions, address functional mobility deficits, address ROM deficits, address strength deficits, analyze and address soft tissue restrictions, analyze and cue movement patterns and analyze and modify body mechanics/ergonomics to attain remaining goals. []  See Plan of Care  [x]  See progress note/recertification  []  See Discharge Summary         Progress towards goals / Updated goals:  1. Patient will be able to improve right shoulder flexion PROM to 130 degrees in order to improve ease of future ADLs. Progressing - Right shoulder PROM flexion - 110 degrees (7/11/19). Progressing - right shoulder PROM flexion - 118 degrees (8/15/19).  124, near met 9/3/2019   2. Patient will be able to improve right shoulder abduction to 100 degrees in order to improve ease of future ADLs.   Progressing - right shoulder abduction PROM - 90 degrees (8/15/19). 80, slow progress 9/3/2019 near met 98 deg 9/12/19  3. Patient will improve FOTO score to 66 in order to demonstrate improvements in functional independence. FOTO score - 38 (7/18/19) progressing- 57% 9/12/19   4. Patient will be able to begin AAROM phase void of pain in order to improve activity tolerance. Progressing - pain noted 8-15-19   5. Patient will be able to improve right shoulder AAROM flexion to 130 degrees in order to improve ease of ADLs.  - slow progress 8-15-19, slow progress 9-10-19     PLAN  [x]  Upgrade activities as tolerated     []  Continue plan of care  []  Update interventions per flow sheet       []  Discharge due to:_  []  Other:_      [place] Scriver DPT, MTPT 9/12/2019  8:38 AM    Future Appointments   Date Time Provider Yi Osborn   11/20/2019 11:00 AM [address] Valerio, CRISTELA Bach [address]

## 2019-09-12 NOTE — PROGRESS NOTES
In [address]  [address]  [place], [address]  [phone] fax    Physical Therapy Progress Note  Patient name: Margarita Esquivel Start of Care: 6/21/2019   Referral Chidi Clark,* [facility]: 7/72/8978                Medical Diagnosis: Primary osteoarthritis, right shoulder [M19.011]  Payor: [employer] / Plan: [address] [place] [address] / Product Type: Managed Care Medicaid /  Onset Date: DOS: 6/14/19                Treatment Diagnosis: Right shoulder pain   Prior Hospitalization: see medical history Provider#: [id number]   Medications: Verified on Patient summary List    Comorbidities: arthritis   Prior Level of Function: (I) with all ADLs and employed as a  for the school system.   Visits from Start of Care: 24    Missed Visits: 1      Established Goals:        Excellent         Good         Limited            None  [x] Increased ROM   []  []  [x]  []  [x] Increased Strength  []  []  [x]  []  [] Increased Mobility  []  []  []  []   [] Decreased Pain   []  []  []  []  [] Decreased Swelling  []  []  []  []    Key Functional Changes: progressing PROM and joint mobility, limited by muscle strength  Progress towards goals / Updated goals:  1. Patient will be able to improve right shoulder flexion PROM to 130 degrees in order to improve ease of future ADLs. Progressing - Right shoulder PROM flexion - 110 degrees (7/11/19). Progressing - right shoulder PROM flexion - 118 degrees (8/15/19).  124, near met 9/3/2019   2. Patient will be able to improve right shoulder abduction to 100 degrees in order to improve ease of future ADLs.  Progressing - right shoulder abduction PROM - 90 degrees (8/15/19). 80, slow progress 9/3/2019 near met 98 deg 9/12/19  3. Patient will improve FOTO score to 66 in order to demonstrate improvements in functional independence.  FOTO score - 38 (7/18/19) progressing- 57% 9/12/19   4. Patient will be able to begin AAROM phase void of pain in order to improve activity tolerance. Progressing - pain noted 8-15-19   5. Patient will be able to improve right shoulder AAROM flexion to 130 degrees in order to improve ease of ADLs.  - slow progress 8-15-19, slow progress 9-10-19     Updated Goals: to be achieved in 3 weeks:  1. Patient will improve FOTO score to 66 in order to demonstrate improvements in functional independence. FOTO score - 38 (7/18/19) progressing- 57% 9/12/19  2. Patient will be able to improve right shoulder AAROM flexion to 130 degrees in order to improve ease of ADLs  3. Pt will improve right shoulder AROM flexion and abduction to 110 deg for improved ease of self care and grooming. ASSESSMENT/RECOMMENDATIONS: Pt with good tolerance to initiation of DN today. She demonstrates improved IR/ER mobility post needling as well as increase in ABD PROM. Continue PT to progress with end range mobility and shoulder strength. [x]Continue therapy per initial plan/protocol at a frequency of  2 x per week for 3 weeks  []Continue therapy with the following recommended changes:_____________________      _____________________________________________________________________  []Discontinue therapy progressing towards or have reached established goals  []Discontinue therapy due to lack of appreciable progress towards goals  []Discontinue therapy due to lack of attendance or compliance  []Await Physician's recommendations/decisions regarding therapy  []Other:________________________________________________________________    Thank you for this referral.    Nancie Mtz DPT, CMTPT 9/12/2019 1:12 PM  NOTE TO PHYSICIAN:  PLEASE COMPLETE THE ORDERS BELOW AND   FAX TO [facility] [place] [facility]: [phone]  If you are unable to process this request in 24 hours please contact our office: [phone]    ? I have read the above report and request that my patient continue as recommended.   ? I have read the above report and request that my patient continue therapy with the following changes/special instructions:__________________________________________________________  ? I have read the above report and request that my patient be discharged from therapy.     Physicians signature: ______________________________Date: ______Time:______

## 2019-09-16 NOTE — PROGRESS NOTES
Form completed and faxed to [employer]. Copy was placed in scanning bin. Spoke with patient, informed her that this was done and that her copy is ready to be picked up at the [facility] location.

## 2019-09-17 ENCOUNTER — HOSPITAL ENCOUNTER (OUTPATIENT)
Dept: PHYSICAL THERAPY | Age: 49
Discharge: HOME | End: 2019-09-17
Payer: COMMERCIAL

## 2019-09-17 PROCEDURE — 97140 MANUAL THERAPY 1/> REGIONS: CPT

## 2019-09-17 PROCEDURE — 97110 THERAPEUTIC EXERCISES: CPT

## 2019-09-17 NOTE — PROGRESS NOTES
PT DAILY TREATMENT NOTE - [facility]     Patient Name: Raymundo Catalan  Date:2019  : 1970  [x]  Patient  Verified  Payor: Kunal Ge / Plan: Kira Dodd / Product Type: HMO /    In time:9:33am  Out time: 10:25am  Total Treatment Time (min): 52  Total Timed Codes (min): 42  1:1 Treatment Time ( only): 32   Visit #: 1 of 8    Treatment Area: Primary osteoarthritis, right shoulder [M19.011]    SUBJECTIVE  Pain Level (0-10 scale): 0/10  Any medication changes, allergies to medications, adverse drug reactions, diagnosis change, or new procedure performed?: [x] No    [] Yes (see summary sheet for update)  Subjective functional status/changes:   [] No changes reported  Pt reports no pain in her shoulder at rest, but reports continued pain with elevation of her shoulder. OBJECTIVE     32 min Therapeutic Exercise:  [x] See flow sheet :   Rationale: increase ROM and increase strength to improve the patients ability to improve ease of daily activity. 10 min Manual Therapy:  Right shoulder PROM, inf & post GHJ mobilizations grade 2-3, gentle capsular stretch into IR and ER    Rationale: decrease pain, increase ROM and increase tissue extensibility to improve shoulder mobility          Modality rationale: decrease pain and increase tissue extensibility to improve the patients ability to improve ease of daily activity.    Min Type Additional Details    [] Estim:  []Unatt       []IFC  []Premod                        []Other:  []w/ice   []w/heat  Position:  Location:    [] Estim: []Att    []TENS instruct  []NMES                    []Other:  []w/US   []w/ice   []w/heat  Position:  Location:    []  Traction: [] Cervical       []Lumbar                       [] Prone          []Supine                       []Intermittent   []Continuous Lbs:  [] before manual  [] after manual    []  Ultrasound: []Continuous   [] Pulsed                           []1MHz   []3MHz Location:  W/cm2:    []  Iontophoresis with dexamethasone         Location: [] Take home patch   [] In clinic   10 []  Ice     [x]  heat  []  Ice massage  []  Laser   []  Anodyne Position: right shoulder  Location: seated    []  Laser with stim  []  Other: Position:  Location:    []  Vasopneumatic Device Pressure:       [] lo [] med [] hi   Temperature: [] lo [] med [] hi   [] Skin assessment post-treatment:  []intact []redness- no adverse reaction    []redness  adverse reaction:     With   [] TE   [] TA   [] neuro   [] other: Patient Education: [x] Review HEP    [] Progressed/Changed HEP based on:   [] positioning   [] body mechanics   [] transfers   [] heat/ice application    [] other:      Other Objective/Functional Measures: PROM right shoulder: flexion 138 firm, ABD 92 soft, ER 46 firm, IR 34 degrees firm     Pain Level (0-10 scale) post treatment: 0    ASSESSMENT/Changes in Function: Pt is gradually progressing, but appears limited 2/2 right shoulder ROM limitations with potential capsular pattern limited mobility. She is somewhat limited 2/2 guarding d/t pain with end range shoulder ROM. Continue gradual progression with emphasis on shoulder ROM as tolerated. Patient will continue to benefit from skilled PT services to modify and progress therapeutic interventions, address functional mobility deficits, address ROM deficits, address strength deficits, analyze and address soft tissue restrictions, analyze and cue movement patterns and instruct in home and community integration to attain remaining goals. []  See Plan of Care  []  See progress note/recertification  []  See Discharge Summary         Updated Goals: to be achieved in 3 weeks:  1. Patient will improve FOTO score to 66 in order to demonstrate improvements in functional independence. FOTO score - 38 (7/18/19) progressing- 57% 9/12/19  2. Patient will be able to improve right shoulder AAROM flexion to 130 degrees in order to improve ease of ADLs  3.  Pt will improve right shoulder AROM flexion and abduction to 110 deg for improved ease of self care and grooming.     PLAN  []  Upgrade activities as tolerated     [x]  Continue plan of care  []  Update interventions per flow sheet       []  Discharge due to:_  []  Other:_      Osman Donahue, PT, DPT, ATC 9/17/2019  9:42 AM    Future Appointments   Date Time Provider Yi Irena   9/19/2019  9:30 AM James, [address] [facility]   9/24/2019  9:30 AM Jt Cook PTA [facility]   9/26/2019  8:30 AM Steve Martinez [facility]   10/1/2019  9:30 AM Jt Cook PTA [facility]   10/3/2019  8:30 AM Steve Martinez [facility]   11/20/2019 11:00 AM Kne Valerio, Terrance Ingram, CRISTELA Quiles

## 2019-09-19 ENCOUNTER — HOSPITAL ENCOUNTER (OUTPATIENT)
Dept: PHYSICAL THERAPY | Age: 49
Discharge: HOME | End: 2019-09-19
Payer: COMMERCIAL

## 2019-09-19 PROCEDURE — 97110 THERAPEUTIC EXERCISES: CPT

## 2019-09-19 PROCEDURE — 97140 MANUAL THERAPY 1/> REGIONS: CPT

## 2019-09-19 NOTE — PROGRESS NOTES
PT DAILY TREATMENT NOTE 10-18    Patient Name: Milly Samson  Date:2019  : 1970  [x]  Patient  Verified  Payor: Luis Armando Points / Plan: Darron Lancaster / Product Type: HMO /    In time:9:30  Out time:10:16  Total Treatment Time (min): 55  Visit #: 2 of 6    Medicare/BCBS Only   Total Timed Codes (min):  36 1:1 Treatment Time:  36       Treatment Area: Primary osteoarthritis, right shoulder [M19.011]    SUBJECTIVE  Pain Level (0-10 scale): 0  Any medication changes, allergies to medications, adverse drug reactions, diagnosis change, or new procedure performed?: [x] No    [] Yes (see summary sheet for update)  Subjective functional status/changes:   [] No changes reported  Pt reports that she did feel needling helped her mobility    OBJECTIVE    Modality rationale: decrease inflammation and decrease pain to improve the patients ability to perform daily tasks with less post needling soreness   Min Type Additional Details    [] Estim:  []Unatt       []IFC  []Premod                        []Other:  []w/ice   []w/heat  Position:  Location:    [] Estim: []Att    []TENS instruct  []NMES                    []Other:  []w/US   []w/ice   []w/heat  Position:  Location:    []  Traction: [] Cervical       []Lumbar                       [] Prone          []Supine                       []Intermittent   []Continuous Lbs:  [] before manual  [] after manual    []  Ultrasound: []Continuous   [] Pulsed                           []1MHz   []3MHz W/cm2:  Location:    []  Iontophoresis with dexamethasone         Location: [] Take home patch   [] In clinic    []  Ice     []  heat  []  Ice massage  []  Laser   []  Anodyne Position:  Location:    []  Laser with stim  []  Other:  Position:  Location:    []  Vasopneumatic Device Pressure:       [] lo [] med [] hi   Temperature: [] lo [] med [] hi   [] Skin assessment post-treatment:  []intact []redness- no adverse reaction    []redness  adverse reaction:         21 min Therapeutic Exercise:  [] See flow sheet :   Rationale: increase ROM and increase strength to improve the patients ability to perform daily tasks and self care    15 min Manual Therapy:  DN palpation, setup and hemostasis, PROM right shoulder ABD/ER&IR    Rationale: increase ROM and increase tissue extensibility to improve ease of ADLs    Dry Needling Procedure Note    Procedure: An intramuscular manual therapy (dry needling) and a neuro-muscular re-education treatment was done to deactivate myofascial trigger points with a 30 gauge filament needle under aseptic technique. Indications:  [x] Myofascial pain and dysfunction [] Muscled spasms  [] Myalgia/myositis   [] Muscle cramps  [x] Muscle imbalances  [] Temporomandibular Dysfunction  [] Other:    Chart reviewed for the following:  Leticia OAKES, have reviewed the medical history, summary list and precautions/contraindications for [employer]. TIME OUT performed immediately prior to start of procedure:  Leticia OAKES, have performed the following reviews on [employer] prior to the start of the session:      [x] Verified patient identification by name and date of birth    [x] Agreement on all muscles being treated was verified   [x] Purpose of dry needling, side effects, possible complications, risks and benefits were explained to the patient   [x] Procedure site(s) verified  [x] Patient was positioned for comfort and draped for privacy  [x] Informed Consent was signed (initial visit) and verified verbally (subsequent visits)  [] Patient was instructed on the need to report the use of blood thinners and/or immunosuppressant medications  [] How to respond to possible adverse effects of treatment  [] Self treatment of post needling soreness: ice, heat (moist heat, heat wraps) and stretching  [x] Opportunity was given to ask any questions, all questions were answered            Time: 9:38  Date of procedure: 9/19/2019  Treatment:  The following muscles were treated today with intramuscular dry needling  [] Left [] Right Masster  [] Left [] Right Temporalis  [] Left [] Right Zygomaticus Major / Minor  [] Left [] Right Lateral Pterygoid  [] Left [] Right Medial Pterygoid  [] Left [] Right Digastric Post / Anterior Belly  [] Left [] Right Sternocleidomastoid  [] Left [] Right Scalene Anterior / Medial / Posterior  [] Left [] Right Extra Laryngeal Muscles  [] Left [] Right Upper Trapezius  [] Left [] Right Middle Trapezius  [] Left [] Right Lower Trapezius  [] Left [] Right Oblique Capitis Inferior  [] Left [] Right Splenius Capitis / Cervicis  [] Left [] Right Semispinalis: Capitis / Cervicis  [] Left [] Right Multifidi / Rotatores Cervicis / Thoracic  [] Left [] Right Longissimus Thoracis / Illiocostalis  [] Left [] Right Levator Scapulae  [] Left [x] Right Supraspinatus / Infraspinatus  [] Left [x] Right Teres Major / Minor  [] Left [] Right Rhomboids / Serratus posterior superior  [] Left [] Right Pectoralis Major / Minor  [] Left [] Right Serratus Anterior  [] Left [] Right Latissimus Dorsi  [] Left [] Right Subscapularis  [] Left [] Right Coracobrachialis  [] Left [] Right Biceps Brachii  [] Left [] Right Deltoid: Anterior / Medial / Posterior  [] Left [] Right Brachialis  [] Left [] Right Triceps  [] Left [] Right Brachioradialis  [] Left [] Right Extensor Carpi Radialis Brevis / Extensor Carpi Radialis Longus    [] Left [] Right  Extensor digitorum  [] Left [] Right Supinator / Pronator Teres  [] Left [] Right Flexor Carpi Radialis/ Flexor Carpi Ulnaris   [] Left [] Right  Flexor Digitorum Superficialis/ Flexor Digitorum Profundus  [] Left [] Right Flexor Pollicis Longus / Flexor Pollicis Brevis / Palmaris Longus  [] Left [] Right Abductor Pollicis Longus / Abductor Pollicis Brevis  [] Left [] Right Opponens Pollicis / Adductor Pollicis  [] Left [] Right Dorsal / Palmar Interossei / Lumbricalis  [] Left [] Right Abductor Digiti Minimi / Opponens Digiti Minimi    Patient's response to today's treatment:  [x] Latent Twitch Response     [] Muscle relaxation [] Pain Relief  [x] Post needling soreness    [x] without complications  [] Increased Range of Motion   [] Decreased headaches    [] Decreased Tinnitus  [] Other:     Performed by: Jero Ordoñez DPT, CMTPT            With   [] TE   [] TA   [] neuro   [] other: Patient Education: [x] Review HEP    [] Progressed/Changed HEP based on:   [] positioning   [] body mechanics   [] transfers   [] heat/ice application    [] other:      Other Objective/Functional Measures: 130 deg right shoulder AAROM flexion with slight scapular hike     Pain Level (0-10 scale) post treatment: 0    ASSESSMENT/Changes in Function: Pt with a bit more soreness with DN today, requesting to stop. She does report improved mobility immediately following however. Progress with end range joint mobility and RTC strength    Patient will continue to benefit from skilled PT services to modify and progress therapeutic interventions, address functional mobility deficits, address ROM deficits, address strength deficits, analyze and address soft tissue restrictions, analyze and cue movement patterns and analyze and modify body mechanics/ergonomics to attain remaining goals. []  See Plan of Care  []  See progress note/recertification  []  See Discharge Summary         Updated Goals: to be achieved in 3 weeks:  1. Patient will improve FOTO score to 66 in order to demonstrate improvements in functional independence. FOTO score - 38 (7/18/19) progressing- 57% 9/12/19  2.  Patient will be able to improve right shoulder AAROM flexion to 130 degrees in order to improve ease of ADLs Met- 130 deg with slight hike 9/19/19  3. Pt will improve right shoulder AROM flexion and abduction to 110 deg for improved ease of self care and grooming.     PLAN  [x]  Upgrade activities as tolerated     []  Continue plan of care  []  Update interventions per flow sheet       []  Discharge due to:_  []  Other:_      Joey Royal DPT, CMTPT 9/19/2019  9:31 AM    Future Appointments   Date Time Provider Yi Osborn   9/24/2019  9:30 AM Calli Syed, [place]   9/26/2019  8:30 AM Gilford Dole [facility]   10/1/2019  9:30 AM Calli Syed PTA [facility]   10/3/2019  8:30 AM Gilford Dole [facility]   11/20/2019 11:00 AM [address] Valerio, [address]

## 2019-09-24 ENCOUNTER — HOSPITAL ENCOUNTER (OUTPATIENT)
Dept: PHYSICAL THERAPY | Age: 49
Discharge: HOME | End: 2019-09-24
Payer: COMMERCIAL

## 2019-09-24 PROCEDURE — 97140 MANUAL THERAPY 1/> REGIONS: CPT

## 2019-09-24 PROCEDURE — 97110 THERAPEUTIC EXERCISES: CPT

## 2019-09-24 NOTE — PROGRESS NOTES
PT DAILY TREATMENT NOTE 10-18    Patient Name: Gianna Montoya  Date:2019  : 1970  [x]  Patient  Verified  Payor: Antoinette Mcduffie / Plan: [employer] / Product Type: HMO /    In time:9:30  Out time:10:19  Total Treatment Time (min): 52  Visit #: 3 of 6    Medicare/BCBS Only   Total Timed Codes (min):  39 1:1 Treatment Time:  39       Treatment Area: Primary osteoarthritis, right shoulder [M19.011]    SUBJECTIVE  Pain Level (0-10 scale): 0/10  Any medication changes, allergies to medications, adverse drug reactions, diagnosis change, or new procedure performed?: [x] No    [] Yes (see summary sheet for update)  Subjective functional status/changes:   [] No changes reported  Pt reports decreased pain and improved functional mobility of right shoulder.      OBJECTIVE    Modality rationale: decrease inflammation and decrease pain to improve the patients ability to tolerate ADLS   Min Type Additional Details    [] Estim:  []Unatt       []IFC  []Premod                        []Other:  []w/ice   []w/heat  Position:  Location:    [] Estim: []Att    []TENS instruct  []NMES                    []Other:  []w/US   []w/ice   []w/heat  Position:  Location:    []  Traction: [] Cervical       []Lumbar                       [] Prone          []Supine                       []Intermittent   []Continuous Lbs:  [] before manual  [] after manual    []  Ultrasound: []Continuous   [] Pulsed                           []1MHz   []3MHz W/cm2:  Location:    []  Iontophoresis with dexamethasone         Location: [] Take home patch   [] In clinic   10 []  Ice     [x]  heat  []  Ice massage  []  Laser   []  Anodyne Position:sitting  Location:right shoulder    []  Laser with stim  []  Other:  Position:  Location:    []  Vasopneumatic Device Pressure:       [] lo [] med [] hi   Temperature: [] lo [] med [] hi   [] Skin assessment post-treatment:  []intact []redness- no adverse reaction    []redness  adverse reaction:       31 min Therapeutic Exercise:  [x] See flow sheet :   Rationale: increase ROM and increase strength to improve the patients ability to tolerate ADLs. 8 min Manual Therapy:  PROM to right shoulder    Rationale: decrease pain, increase ROM and increase tissue extensibility to improve tolerance to ADLs. With   [] TE   [] TA   [] neuro   [] other: Patient Education: [x] Review HEP    [] Progressed/Changed HEP based on:   [] positioning   [] body mechanics   [] transfers   [] heat/ice application    [] other:      Other Objective/Functional Measures: improved right shoulder mobility. Pain Level (0-10 scale) post treatment: 0/10    ASSESSMENT/Changes in Function: Pt demonstrates decreased guarding during PROM. Patient will continue to benefit from skilled PT services to modify and progress therapeutic interventions, address functional mobility deficits, address ROM deficits, address strength deficits, analyze and address soft tissue restrictions, analyze and cue movement patterns and analyze and modify body mechanics/ergonomics to attain remaining goals. []  See Plan of Care  []  See progress note/recertification  []  See Discharge Summary         Progress towards goals / Updated goals:  Updated Goals: to be achieved in 3 weeks:  1. Patient will improve FOTO score to 66 in order to demonstrate improvements in functional independence. FOTO score - 38 (7/18/19) progressing- 57% 9/12/19  2.  Patient will be able to improve right shoulder AAROM flexion to 130 degrees in order to improve ease of ADLs Met- 130 deg with slight hike 9/19/19  3. Pt will improve right shoulder AROM flexion and abduction to 110 deg for improved ease of self care and grooming.     PLAN  []  Upgrade activities as tolerated     [x]  Continue plan of care  []  Update interventions per flow sheet       []  Discharge due to:_  []  Other:_      Mynor Chaidez PTA 9/24/2019  9:44 AM    Future Appointments   Date Time Provider Yi Perdomoi   9/26/2019  8:30 AM James, [address] [facility]   10/1/2019  9:30 AM Osvaldo Faulkner PTA [facility]   10/3/2019  8:30 AM Aristeo Tobin [facility]   11/20/2019 11:00 AM Ken Valerio, Ricky , [address]

## 2019-09-26 ENCOUNTER — HOSPITAL ENCOUNTER (OUTPATIENT)
Dept: PHYSICAL THERAPY | Age: 49
Discharge: HOME | End: 2019-09-26
Payer: COMMERCIAL

## 2019-09-26 ENCOUNTER — OFFICE VISIT (OUTPATIENT)
Dept: FAMILY MEDICINE CLINIC | Age: 49
End: 2019-09-26

## 2019-09-26 VITALS
RESPIRATION RATE: 16 BRPM | OXYGEN SATURATION: 99 % | WEIGHT: 213 LBS | BODY MASS INDEX: 39.2 KG/M2 | HEIGHT: 62 IN | TEMPERATURE: 99.2 F | SYSTOLIC BLOOD PRESSURE: 124 MMHG | HEART RATE: 85 BPM | DIASTOLIC BLOOD PRESSURE: 80 MMHG

## 2019-09-26 DIAGNOSIS — B37.31 YEAST VAGINITIS: Primary | ICD-10-CM

## 2019-09-26 DIAGNOSIS — Z72.51 UNPROTECTED SEXUAL INTERCOURSE: ICD-10-CM

## 2019-09-26 DIAGNOSIS — Z20.2 POTENTIAL EXPOSURE TO STD: ICD-10-CM

## 2019-09-26 PROCEDURE — 97140 MANUAL THERAPY 1/> REGIONS: CPT

## 2019-09-26 PROCEDURE — 97110 THERAPEUTIC EXERCISES: CPT

## 2019-09-26 RX ORDER — FLUCONAZOLE 150 MG/1
150 TABLET ORAL DAILY
Qty: 1 TAB | Refills: 0 | Status: SHIPPED | OUTPATIENT
Start: 2019-09-26 | End: 2019-09-27

## 2019-09-26 NOTE — PATIENT INSTRUCTIONS
Safer Sex: Care Instructions  Your Care Instructions  Safer sex is a way to reduce your risk of getting an infection spread through sex. It can also help prevent pregnancy. Most infections that are spread through sex, also called sexually transmitted infections or STIs, can be cured. But some can decrease your chances of getting pregnant if they are not treated early. Others, such as herpes, have no cure. And some, such as HIV, can be deadly. Several products can help you practice safer sex and reduce your chance of STIs. One of the best is a condom. There are condoms for men and for women. The female condom is a tube of soft plastic with a closed end that is placed deep into the vagina. You can use a special rubber sheet (dental dam) for protection during oral sex. Disposable gloves can keep your hands from touching blood, semen, or other body fluids that can carry infections. Remember that birth control methods such as diaphragms, IUDs, foams, and birth control pills do not stop you from getting STIs. Follow-up care is a key part of your treatment and safety. Be sure to make and go to all appointments, and call your doctor if you are having problems. It's also a good idea to know your test results and keep a list of the medicines you take. How can you care for yourself at home? · Think about getting shots to prevent hepatitis A and hepatitis B. These two diseases can be spread through sex. You also can get hepatitis A if you eat infected food. · Use condoms or female condoms each time and every time you have sex. · Learn the right way to use a male condom:  ? Condoms come in several sizes. Make sure you use the right size. A condom that is too small can break easily. A condom that is too big can slip off during sex. Use a new condom each time you have sex. ? Be careful not to poke a hole in the condom when you open the wrapper. ? Squeeze the tip of the condom to keep out air.   ? Pull down the loose skin (foreskin) from the head of an uncircumcised penis. ? While squeezing the tip of the condom, unroll it all the way down to the base of the firm penis. ? Never use petroleum jelly (such as Vaseline), grease, hand lotion, baby oil, or anything with oil in it. These products can make holes in the condom. ? After sex, hold the condom on your penis as you remove your penis from your partner. This will keep semen from spilling out of the condom. · Learn to use a female condom:  ? You can put in a female condom up to 8 hours before sex. ? Squeeze the smaller ring at the closed end and insert it deep into the vagina. The larger ring at the open end should stay outside the vagina. ? During sex, make sure the penis goes into the condom. ? After the penis is removed, close the open end of the condom by twisting it. Remove the condom. · Do not use a female condom and male condom at the same time. · Do not have sex with anyone who has symptoms of an STI, such as sores on the genitals or mouth. The herpes virus that causes cold sores can spread to and from the penis and vagina. · Do not drink a lot of alcohol or use drugs before sex. This can cause you to let down your guard and not practice safer sex. · Having one sex partner (who does not have STIs and does not have sex with anyone else) is a sure way to avoid STIs. · Talk to your partner before you have sex. Find out if he or she has or is at risk for any STI. Keep in mind that a person may be able to spread an STI even if he or she does not have symptoms. You and your partner may want to get an HIV test. You should get tested again 6 months later. Where can you learn more? Go to http://elana-bela.info/. Enter Y623 in the search box to learn more about \"Safer Sex: Care Instructions. \"  Current as of: September 11, 2018  Content Version: 12.2  © 4607-6230 [employer].  Care instructions adapted under license by [facility] (which disclaims liability or warranty for this information). If you have questions about a medical condition or this instruction, always ask your healthcare professional. [address] any warranty or liability for your use of this information. Vaginal Yeast Infection: Care Instructions  Your Care Instructions    A vaginal yeast infection is caused by too many yeast cells in the vagina. This is common in women of all ages. Itching, vaginal discharge and irritation, and other symptoms can bother you. But yeast infections don't often cause other health problems. Some medicines can increase your risk of getting a yeast infection. These include antibiotics, birth control pills, hormones, and steroids. You may also be more likely to get a yeast infection if you are pregnant, have diabetes, douche, or wear tight clothes. With treatment, most yeast infections get better in 2 to 3 days. Follow-up care is a key part of your treatment and safety. Be sure to make and go to all appointments, and call your doctor if you are having problems. It's also a good idea to know your test results and keep a list of the medicines you take. How can you care for yourself at home? · Take your medicines exactly as prescribed. Call your doctor if you think you are having a problem with your medicine. · Ask your doctor about over-the-counter (OTC) medicines for yeast infections. They may cost less than prescription medicines. If you use an OTC treatment, read and follow all instructions on the label. · Do not use tampons while using a vaginal cream or suppository. The tampons can absorb the medicine. Use pads instead. · Wear loose cotton clothing. Do not wear nylon or other fabric that holds body heat and moisture close to the skin. · Try sleeping without underwear. · Do not scratch. Relieve itching with a cold pack or a cool bath. · Do not wash your vaginal area more than once a day.  Use plain water or a mild, unscented soap. Air-dry the vaginal area. · Change out of wet swimsuits after swimming. · Do not have sex until you have finished your treatment. · Do not douche. When should you call for help? Call your doctor now or seek immediate medical care if:    · You have unexpected vaginal bleeding.     · You have new or increased pain in your vagina or pelvis.    Watch closely for changes in your health, and be sure to contact your doctor if:    · You have a fever.     · You are not getting better after 2 days.     · Your symptoms come back after you finish your medicines. Where can you learn more? Go to http://elana-bela.info/. Enter H110 in the search box to learn more about \"Vaginal Yeast Infection: Care Instructions. \"  Current as of: February 19, 2019  Content Version: 12.2  © 2293-5372 [employer]. Care instructions adapted under license by [employer] (which disclaims liability or warranty for this information). If you have questions about a medical condition or this instruction, always ask your healthcare professional. [address] any warranty or liability for your use of this information. Fluconazole (By mouth)   Fluconazole (phcd-HXI-[id number]-zole)  Prevents and treats fungal infections. Brand Name(s): Diflucan   There may be other brand names for this medicine. When This Medicine Should Not Be Used: This medicine is not right for everyone. Do not use it if you had an allergic reaction to fluconazole, or if you are pregnant. How to Use This Medicine:   Liquid, Tablet  · Your doctor will tell you how much medicine to use. Do not use more than directed. · Oral liquid: Shake well just before each use. Measure the oral liquid medicine with a marked measuring spoon, oral syringe, or medicine cup.   · Take all of the medicine in your prescription to clear up your infection, even if you feel better after the first few doses.  · Read and follow the patient instructions that come with this medicine. Talk to your doctor or pharmacist if you have any questions. · Missed dose: Take a dose as soon as you remember. If it is almost time for your next dose, wait until then and take a regular dose. Do not take extra medicine to make up for a missed dose. · Store the medicine in a closed container at room temperature, away from heat, moisture, and direct light. Store the oral liquid in the refrigerator or at room temperature and use it within 14 days. Do not freeze. Drugs and Foods to Avoid:   Ask your doctor or pharmacist before using any other medicine, including over-the-counter medicines, vitamins, and herbal products. · Do not use this medicine together with astemizole, cisapride, erythromycin, pimozide, quinidine, or terfenadine. · Some foods and medicines can affect how fluconazole works. Tell your doctor if you are using cimetidine, midazolam, prednisone, rifabutin, rifampin, theophylline, tofacitinib, triazolam, vitamin A supplements, or voriconazole.  Also tell your doctor if you are using any of the following:   ¨ A blood thinner (such as warfarin)  ¨ A diuretic or \"water pill\" (such as hydrochlorothiazide), or blood pressure medicine (such as amlodipine, felodipine, isradipine, losartan, nifedipine)  ¨ Birth control pills  ¨ Cancer medicine (cyclophosphamide, vinblastine, vincristine)  ¨ Diabetes medicine that you take by mouth (glipizide, glyburide, tolbutamide)  ¨ Medicine to lower cholesterol (atorvastatin, fluvastatin, simvastatin)  ¨ Medicine to treat depression (amitriptyline, nortriptyline)  ¨ Medicine to treat HIV/AIDS (saquinavir, zidovudine)  ¨ Medicine to treat malaria (halofantrine)  ¨ Medicine to treat seizures (carbamazepine, phenytoin)  ¨ Medicine that weakens the immune system (cyclosporine, sirolimus, tacrolimus)  ¨ Narcotic pain medicine (alfentanil, fentanyl, methadone)  ¨ Pain or arthritis medicine (aspirin, celecoxib, diclofenac, ibuprofen, naproxen)  Warnings While Using This Medicine:   · It is not safe to take this medicine during pregnancy. It could harm an unborn baby. Tell your doctor right away if you become pregnant. · Tell your doctor if you are breastfeeding, or if you have kidney disease, liver disease, heart disease, heart rhythm problems, cancer, or HIV/AIDS. · This medicine may cause the following problems:   ¨ Liver problems  ¨ Serious skin reactions  ¨ Changes in heart rhythm, such as a condition called QT prolongation  · This medicine may make you dizzy or drowsy. Do not drive or do anything that could be dangerous until you know how this medicine affects you. · Call your doctor if your symptoms do not improve or if they get worse. · Keep all medicine out of the reach of children. Never share your medicine with anyone. Possible Side Effects While Using This Medicine:   Call your doctor right away if you notice any of these side effects:  · Allergic reaction: Itching or hives, swelling in your face or hands, swelling or tingling in your mouth or throat, chest tightness, trouble breathing  · Blistering, peeling, or red skin rash  · Dark urine or pale stools, nausea, vomiting, loss of appetite, stomach pain, yellow skin or eyes  · Fast, pounding, or uneven heartbeat  · Unusual bleeding, bruising, or weakness  If you notice these less serious side effects, talk with your doctor:   · Headache  · Mild nausea, vomiting, stomach pain, or diarrhea  If you notice other side effects that you think are caused by this medicine, tell your doctor. Call your doctor for medical advice about side effects. You may report side effects to FDA at 3-112-FDA-0115  © 2017 [facility] Information is for End User's use only and may not be sold, redistributed or otherwise used for commercial purposes. The above information is an  only.  It is not intended as medical advice for individual conditions or treatments. Talk to your doctor, nurse or pharmacist before following any medical regimen to see if it is safe and effective for you.

## 2019-09-26 NOTE — PROGRESS NOTES
Chief Complaint   Patient presents with    Vaginal Itching     for the past three days      1. Have you been to the ER, urgent care clinic since your last visit? Hospitalized since your last visit? No    2. Have you seen or consulted any other health care providers outside of the [address] [place] [address] since your last visit? Include any pap smears or colon screening.  No

## 2019-09-26 NOTE — PROGRESS NOTES
HISTORY OF PRESENT ILLNESS    Magali Mar is a 54y.o. year old female here today for:  Vaginal discharge     Onset one week  Context has had unprotected sex with estranged    Vaginal irritation and vaginal discharge   Site vagina  Duration  One week   Type of pain or sensation vaginal irritation external   Associated symptoms none   Severity moderate vaginal discharge   Exacerbating factors none  Relieving factors none and has tried no over the counter medications   Review of Systems   Constitutional: Negative. Gastrointestinal: Negative. Genitourinary: Negative. PMH: reviewed medications and allergy lists and medical and family history. OBJECTIVE:  Awake and alert in no acute distress  Lungs clear throughout  S1 S2 RRR without ectopy or murmur auscultated. Pelvic exam: VULVA: normal appearing vulva with no masses, tenderness or lesions, VAGINA: vaginal discharge - white, copious and creamy, WET MOUNT done - results: KOH done, hyphae, CERVIX: normal appearing cervix without discharge or lesions-nuswab collected patient tolerate well, UTERUS: uterus is normal size, shape, consistency and nontender, ADNEXA: normal adnexa in size, nontender and no masses. Visit Vitals  /80 (BP 1 Location: Left arm, BP Patient Position: Sitting)   Pulse 85   Temp 99.2 °F (37.3 °C) (Oral)   Resp 16   Ht 5' 2\" (1.575 m)   Wt 213 lb (96.6 kg)   SpO2 99%   BMI 38.96 kg/m²     Diagnoses and all orders for this visit:    Yeast vaginitis  -     fluconazole (DIFLUCAN) 150 mg tablet; Take 1 Tab by mouth daily for 1 day. FDA advises cautious prescribing of oral fluconazole in pregnancy. , Normal, Disp-1 Tab, R-0    Potential exposure to STD  -     HIV 1/2 AG/AB, 4TH GENERATION,W RFLX CONFIRM; Future  -     T PALLIDUM AB; Future  -     HEPATITIS PANEL, ACUTE; Future  -     NUSWAB VAGINITIS PLUS; Future    Unprotected sexual intercourse  -     HIV 1/2 AG/AB, 4TH GENERATION,W RFLX CONFIRM;  Future  -     T PALLIDUM AB; Future  -     HEPATITIS PANEL, ACUTE; Future  -     NUSWAB VAGINITIS PLUS; Future      Reviewed risks and benefits and common side effects of new medication  General comfort measures  Pelvic rest   Condoms recommended   Patient agrees with plan and verbalizes understanding. Will notify of lab results when available via my chart   I have discussed the diagnosis with the patient and the intended plan as seen in the above orders. The patient has received an after-visit summary and questions were answered concerning future plans. I have discussed medication side effects and warnings with the patient as well. Follow-up and Dispositions    · Return if symptoms worsen or fail to improve.

## 2019-09-26 NOTE — PROGRESS NOTES
PT DAILY TREATMENT NOTE 10-18    Patient Name: Darin Davis  Date:2019  : 1970  [x]  Patient  Verified  Payor: Pheobe Cabot / Plan: Caleb Perla / Product Type: HMO /    In time:8:29  Out time:9:22  Total Treatment Time (min): 48  Visit #: 4 of 6    Medicare/BCBS Only   Total Timed Codes (min):  43 1:1 Treatment Time:  30       Treatment Area: Primary osteoarthritis, right shoulder [M19.011]    SUBJECTIVE  Pain Level (0-10 scale): 0  Any medication changes, allergies to medications, adverse drug reactions, diagnosis change, or new procedure performed?: [x] No    [] Yes (see summary sheet for update)  Subjective functional status/changes:   [] No changes reported  \"I can lift it better, the torture is working\"    OBJECTIVE    Modality rationale: decrease inflammation and decrease pain to improve the patients ability to perform ADLs with less post needling soreness   Min Type Additional Details    [] Estim:  []Unatt       []IFC  []Premod                        []Other:  []w/ice   []w/heat  Position:  Location:    [] Estim: []Att    []TENS instruct  []NMES                    []Other:  []w/US   []w/ice   []w/heat  Position:  Location:    []  Traction: [] Cervical       []Lumbar                       [] Prone          []Supine                       []Intermittent   []Continuous Lbs:  [] before manual  [] after manual    []  Ultrasound: []Continuous   [] Pulsed                           []1MHz   []3MHz W/cm2:  Location:    []  Iontophoresis with dexamethasone         Location: [] Take home patch   [] In clinic   10 [x]  Ice     []  heat  []  Ice massage  []  Laser   []  Anodyne Position: seated  Location: right shoulder    []  Laser with stim  []  Other:  Position:  Location:    []  Vasopneumatic Device Pressure:       [] lo [] med [] hi   Temperature: [] lo [] med [] hi   [] Skin assessment post-treatment:  []intact []redness- no adverse reaction    []redness  adverse reaction:       27 min Therapeutic Exercise:  [] See flow sheet :   Rationale: increase ROM and increase strength to improve the patients ability to perform daily tasks and self care    16 min Manual Therapy:  DN palpation, setup and hemostasis, right shoulder PROM flexion/abd/ER&IR, posterior and inferior capsule mobs   Rationale: increase ROM and increase tissue extensibility to improve ease of ADL performance    Dry Needling Procedure Note    Procedure: An intramuscular manual therapy (dry needling) and a neuro-muscular re-education treatment was done to deactivate myofascial trigger points with a 30 gauge filament needle under aseptic technique. Indications:  [] Myofascial pain and dysfunction [] Muscled spasms  [x] Myalgia/myositis   [] Muscle cramps  [x] Muscle imbalances  [] Temporomandibular Dysfunction  [] Other:    Chart reviewed for the following:  Giovanna OAKES, have reviewed the medical history, summary list and precautions/contraindications for [employer].   TIME OUT performed immediately prior to start of procedure:  Giovanna OAKES, have performed the following reviews on [employer] prior to the start of the session:      [x] Verified patient identification by name and date of birth    [x] Agreement on all muscles being treated was verified   [x] Purpose of dry needling, side effects, possible complications, risks and benefits were explained to the patient   [x] Procedure site(s) verified  [x] Patient was positioned for comfort and draped for privacy  [x] Informed Consent was signed (initial visit) and verified verbally (subsequent visits)  [] Patient was instructed on the need to report the use of blood thinners and/or immunosuppressant medications  [] How to respond to possible adverse effects of treatment  [] Self treatment of post needling soreness: ice, heat (moist heat, heat wraps) and stretching  [x] Opportunity was given to ask any questions, all questions were answered            Time: 8:40  Date of procedure: 9/26/2019  Treatment: The following muscles were treated today with intramuscular dry needling  [] Left [] Right Masster  [] Left [] Right Temporalis  [] Left [] Right Zygomaticus Major / Minor  [] Left [] Right Lateral Pterygoid  [] Left [] Right Medial Pterygoid  [] Left [] Right Digastric Post / Anterior Belly  [] Left [] Right Sternocleidomastoid  [] Left [] Right Scalene Anterior / Medial / Posterior  [] Left [] Right Extra Laryngeal Muscles  [] Left [] Right Upper Trapezius  [] Left [] Right Middle Trapezius  [] Left [] Right Lower Trapezius  [] Left [] Right Oblique Capitis Inferior  [] Left [] Right Splenius Capitis / Cervicis  [] Left [] Right Semispinalis: Capitis / Cervicis  [] Left [] Right Multifidi / Rotatores Cervicis / Thoracic  [] Left [] Right Longissimus Thoracis / Illiocostalis  [] Left [] Right Levator Scapulae  [] Left [] Right Supraspinatus / Infraspinatus  [] Left [] Right Teres Major / Minor  [] Left [] Right Rhomboids / Serratus posterior superior  [] Left [] Right Pectoralis Major / Minor  [] Left [] Right Serratus Anterior  [] Left [] Right Latissimus Dorsi  [] Left [] Right Subscapularis  [] Left [] Right Coracobrachialis  [] Left [] Right Biceps Brachii  [] Left [x] Right Deltoid: Anterior / Medial / Posterior  [] Left [] Right Brachialis  [] Left [] Right Triceps  [] Left [] Right Brachioradialis  [] Left [] Right Extensor Carpi Radialis Brevis / Extensor Carpi Radialis Longus    [] Left [] Right  Extensor digitorum  [] Left [] Right Supinator / Pronator Teres  [] Left [] Right Flexor Carpi Radialis/ Flexor Carpi Ulnaris   [] Left [] Right  Flexor Digitorum Superficialis/ Flexor Digitorum Profundus  [] Left [] Right Flexor Pollicis Longus / Flexor Pollicis Brevis / Palmaris Longus  [] Left [] Right Abductor Pollicis Longus / Abductor Pollicis Brevis  [] Left [] Right Opponens Pollicis / Adductor Pollicis  [] Left [] Right Dorsal / Palmar Interossei / Lumbricalis  [] Left [] Right Abductor Digiti Minimi / Opponens Digiti Minimi    Patient's response to today's treatment:  [x] Latent Twitch Response     [] Muscle relaxation [] Pain Relief  [x] Post needling soreness    [x] without complications  [] Increased Range of Motion   [] Decreased headaches    [] Decreased Tinnitus  [] Other:     Performed by: Brisa Byrne DPT, CMTPT         With   [] TE   [] TA   [] neuro   [] other: Patient Education: [x] Review HEP    [] Progressed/Changed HEP based on:   [] positioning   [] body mechanics   [] transfers   [] heat/ice application    [] other:      Other Objective/Functional Measures:      Pain Level (0-10 scale) post treatment: 0    ASSESSMENT/Changes in Function: Pt with slow progress thus far regarding increased AROM. Recommend moving to HEP over next two visits as nearing end of DN utilization and max benefits. Pt seems agreeable to this plan    Patient will continue to benefit from skilled PT services to modify and progress therapeutic interventions, address functional mobility deficits, address ROM deficits, address strength deficits, analyze and address soft tissue restrictions, analyze and cue movement patterns and analyze and modify body mechanics/ergonomics to attain remaining goals. []  See Plan of Care  []  See progress note/recertification  []  See Discharge Summary         Updated Goals: to be achieved in 3 weeks:  1. Patient will improve FOTO score to 66 in order to demonstrate improvements in functional independence. FOTO score - 38 (7/18/19) progressing- 57% 9/12/19  2.  Patient will be able to improve right shoulder AAROM flexion to 130 degrees in order to improve ease of ADLs Met- 130 deg with slight hike 9/19/19  3. Pt will improve right shoulder AROM flexion and abduction to 110 deg for improved ease of self care and grooming.  Slow progress 96 deg ABd  98 deg flexion 9/25/19    PLAN  []  Upgrade activities as tolerated     []  Continue plan of care  []  Update interventions per flow sheet       []  Discharge due to:_  []  Other:_      Gianluca Donahue DPT, CMTPT 9/26/2019  8:42 AM    Future Appointments   Date Time Provider Yi Irena   9/26/2019 11:00 AM Yen Cao NP [facility]   10/1/2019  9:30 [facility]   10/3/2019  8:30 AM [facility]   11/20/2019 11:00 AM Hebert Goss PA [address]

## 2019-09-28 LAB
HAV IGM SERPL QL IA: NEGATIVE
HBV CORE IGM SERPL QL IA: NEGATIVE
HBV SURFACE AG SERPL QL IA: NEGATIVE
HCV AB S/CO SERPL IA: <0.1 S/CO RATIO (ref 0–0.9)
HIV 1+2 AB+HIV1 P24 AG SERPL QL IA: NON REACTIVE
T PALLIDUM AB SER QL IA: NEGATIVE

## 2019-10-01 ENCOUNTER — TELEPHONE (OUTPATIENT)
Dept: FAMILY MEDICINE CLINIC | Age: 49
End: 2019-10-01

## 2019-10-01 ENCOUNTER — HOSPITAL ENCOUNTER (OUTPATIENT)
Dept: PHYSICAL THERAPY | Age: 49
Discharge: HOME | End: 2019-10-01
Payer: COMMERCIAL

## 2019-10-01 PROCEDURE — 97112 NEUROMUSCULAR REEDUCATION: CPT

## 2019-10-01 PROCEDURE — 97110 THERAPEUTIC EXERCISES: CPT

## 2019-10-01 PROCEDURE — 97140 MANUAL THERAPY 1/> REGIONS: CPT

## 2019-10-01 NOTE — PROGRESS NOTES
PT DAILY TREATMENT NOTE 10-18    Patient Name: Adeline Madison  Date:10/1/2019  : 1970  [x]  Patient  Verified  Payor: Familia Bustamante / Plan: Pearl Martin / Product Type: HMO /    In time:9:30  Out time: 10:18  Total Treatment Time (min): 48  Visit #: 5 of 6    Medicare/BCBS Only   Total Timed Codes (min):  38 1:1 Treatment Time:  38       Treatment Area: Primary osteoarthritis, right shoulder [M19.011]    SUBJECTIVE  Pain Level (0-10 scale): 0/10  Any medication changes, allergies to medications, adverse drug reactions, diagnosis change, or new procedure performed?: [x] No    [] Yes (see summary sheet for update)  Subjective functional status/changes:   [] No changes reported  Pt denies pain currently. OBJECTIVE    Modality rationale: decrease pain and increase tissue extensibility to improve the patients ability to tolerate ADLs.     Min Type Additional Details    [] Estim:  []Unatt       []IFC  []Premod                        []Other:  []w/ice   []w/heat  Position:  Location:    [] Estim: []Att    []TENS instruct  []NMES                    []Other:  []w/US   []w/ice   []w/heat  Position:  Location:    []  Traction: [] Cervical       []Lumbar                       [] Prone          []Supine                       []Intermittent   []Continuous Lbs:  [] before manual  [] after manual    []  Ultrasound: []Continuous   [] Pulsed                           []1MHz   []3MHz W/cm2:  Location:    []  Iontophoresis with dexamethasone         Location: [] Take home patch   [] In clinic   10 []  Ice     [x]  heat  []  Ice massage  []  Laser   []  Anodyne Position: sitting  Location:right shoulder    []  Laser with stim  []  Other:  Position:  Location:    []  Vasopneumatic Device Pressure:       [] lo [] med [] hi   Temperature: [] lo [] med [] hi   [] Skin assessment post-treatment:  []intact []redness- no adverse reaction    []redness - adverse reaction:     20 min Therapeutic Exercise:  [x] See flow sheet :   Rationale: increase ROM and increase strength to improve the patients ability to tolerate ADLs. 10 min Neuromuscular Re-education:  []  See flow sheet :   Rationale: increase strength, improve coordination and increase proprioception  to improve the patients ability to improve functional mobility of right shoulder    8 min Manual Therapy:  PROM to right shoulder   Rationale: decrease pain, increase ROM and increase tissue extensibility to tolerate ADLs. With   [] TE   [] TA   [] neuro   [] other: Patient Education: [x] Review HEP    [] Progressed/Changed HEP based on:   [] positioning   [] body mechanics   [] transfers   [] heat/ice application    [] other:      Other Objective/Functional Measures: Good form with all exercises. Pain Level (0-10 scale) post treatment: 0/10    ASSESSMENT/Changes in Function: Pt continues to demonstrate improved right shoulder mobility with decreased right UT compensations. Patient will continue to benefit from skilled PT services to modify and progress therapeutic interventions, address functional mobility deficits, address ROM deficits, address strength deficits, analyze and address soft tissue restrictions, analyze and cue movement patterns and analyze and modify body mechanics/ergonomics to attain remaining goals. []  See Plan of Care  []  See progress note/recertification  []  See Discharge Summary         Progress towards goals / Updated goals:  1. Patient will improve FOTO score to 66 in order to demonstrate improvements in functional independence. FOTO score - 38 (7/18/19) progressing- 57% 9/12/19  2.  Patient will be able to improve right shoulder AAROM flexion to 130 degrees in order to improve ease of ADLs Met- 130 deg with slight hike 9/19/19  3. Pt will improve right shoulder AROM flexion and abduction to 110 deg for improved ease of self care and grooming.  Slow progress 96 deg ABd  98 deg flexion 9/25/19     PLAN  []  Upgrade activities as tolerated     [x]  Continue plan of care  []  Update interventions per flow sheet       []  Discharge due to:_  []  Other:_      Real Will, PTA 10/1/2019  9:49 AM    Future Appointments   Date Time Provider Yi Osborn   10/3/2019  8:30 AM Naveed Payne [facility]   11/20/2019 11:00 AM [address] Valerio, CRISTELA [place]

## 2019-10-01 NOTE — TELEPHONE ENCOUNTER
Pt requesting a return call re labs results from 09/26/2019    She tried her my chart, but her portal is currently down.

## 2019-10-01 NOTE — TELEPHONE ENCOUNTER
Spoke with the patient today. Advised patient that nuswab has not been resulted as of now. All other labs were negative. She verbalized understanding.

## 2019-10-02 LAB
A VAGINAE DNA VAG QL NAA+PROBE: NORMAL SCORE
BVAB2 DNA VAG QL NAA+PROBE: NORMAL SCORE
C ALBICANS DNA VAG QL NAA+PROBE: NEGATIVE
C GLABRATA DNA VAG QL NAA+PROBE: NEGATIVE
C TRACH RRNA SPEC QL NAA+PROBE: NEGATIVE
MEGA1 DNA VAG QL NAA+PROBE: NORMAL SCORE
N GONORRHOEA RRNA SPEC QL NAA+PROBE: NEGATIVE
T VAGINALIS RRNA SPEC QL NAA+PROBE: NEGATIVE

## 2019-10-02 NOTE — TELEPHONE ENCOUNTER
Patient calling asking if she can have another script for the diflucan sent into her pharmacy.     pls advise

## 2019-10-03 ENCOUNTER — HOSPITAL ENCOUNTER (OUTPATIENT)
Dept: PHYSICAL THERAPY | Age: 49
Discharge: HOME | End: 2019-10-03
Payer: COMMERCIAL

## 2019-10-03 PROCEDURE — 97140 MANUAL THERAPY 1/> REGIONS: CPT

## 2019-10-03 PROCEDURE — 97110 THERAPEUTIC EXERCISES: CPT

## 2019-10-03 NOTE — PROGRESS NOTES
PT DISCHARGE DAILY NOTE AND [id number]  Patient name: Margarita Koo Sprain Start of Care: 2019   Referral Baltazar Villarreal,* USI: [id number]                Medical Diagnosis: Primary osteoarthritis, right shoulder [M19.011]  Payor: [employer]OSS [employer]ICA[employer] / Plan: Christianne Nance / Product Type: Managed Care Medicaid /  Onset Date: DOS: 19                Treatment Diagnosis: Right shoulder pain   Prior Hospitalization: see medical history Provider#: [id number]   Medications: Verified on Patient summary List    Comorbidities: arthritis   Prior Level of Function: (I) with all ADLs and employed as a  for the school system.   Visits from Start of Care: 30    Missed Visits: 1    Reporting Period : 2019 to 10/3/2019    Date:10/3/2019  : 1970  [x]  Patient  Verified  Payor: Margareth Calle / Plan: Dara Plant / Product Type: HMO /    In time:8:30  Out time:9:17  Total Treatment Time (min): 47  Visit #: 6 of 6    Medicare/BCBS Only   Total Timed Codes (min):  37 1:1 Treatment Time:  29       SUBJECTIVE  Pain Level (0-10 scale): 0  Any medication changes, allergies to medications, adverse drug reactions, diagnosis change, or new procedure performed?: [x] No    [] Yes (see summary sheet for update)  Subjective functional status/changes:   [] No changes reported  \"its doing pretty good\"    OBJECTIVE    Modality rationale: decrease inflammation and decrease pain to improve the patients ability to perform daily tasks with less soreness   Min Type Additional Details    [] Estim:  []Unatt       []IFC  []Premod                        []Other:  []w/ice   []w/heat  Position:  Location:    [] Estim: []Att    []TENS instruct  []NMES                    []Other:  []w/US   []w/ice   []w/heat  Position:  Location:    []  Traction: [] Cervical       []Lumbar                       [] Prone          []Supine                       []Intermittent   []Continuous Lbs:  [] before manual  [] after manual    []  Ultrasound: []Continuous   [] Pulsed                           []1MHz   []3MHz W/cm2:  Location:    []  Iontophoresis with dexamethasone         Location: [] Take home patch   [] In clinic   10 [x]  Ice     []  heat  []  Ice massage  []  Laser   []  Anodyne Position: seated  Location: right shoulder    []  Laser with stim  []  Other:  Position:  Location:    []  Vasopneumatic Device Pressure:       [] lo [] med [] hi   Temperature: [] lo [] med [] hi   [] Skin assessment post-treatment:  []intact []redness- no adverse reaction    []redness - adverse reaction:     21 min Therapeutic Exercise:  [] See flow sheet :   Rationale: increase ROM and increase strength to improve the patients ability to perform daily tasks and self care    16 min Manual Therapy:  DN palpation and setup, PROM right shoulder scaption/ABD/ER   Rationale: increase ROM and increase tissue extensibility to improve ease of ADLs    Dry Needling Procedure Note    Procedure: An intramuscular manual therapy (dry needling) and a neuro-muscular re-education treatment was done to deactivate myofascial trigger points with a 30 gauge filament needle under aseptic technique. Indications:  [x] Myofascial pain and dysfunction [] Muscled spasms  [x] Myalgia/myositis   [] Muscle cramps  [x] Muscle imbalances  [] Temporomandibular Dysfunction  [] Other:    Chart reviewed for the following:  Bernarda OAKES, have reviewed the medical history, summary list and precautions/contraindications for [employer].   TIME OUT performed immediately prior to start of procedure:  Bernarda OAKES, have performed the following reviews on [employer] prior to the start of the session:      [x] Verified patient identification by name and date of birth    [x] Agreement on all muscles being treated was verified   [x] Purpose of dry needling, side effects, possible complications, risks and benefits were explained to the patient [x] Procedure site(s) verified  [x] Patient was positioned for comfort and draped for privacy  [x] Informed Consent was signed (initial visit) and verified verbally (subsequent visits)  [] Patient was instructed on the need to report the use of blood thinners and/or immunosuppressant medications  [] How to respond to possible adverse effects of treatment  [] Self treatment of post needling soreness: ice, heat (moist heat, heat wraps) and stretching  [x] Opportunity was given to ask any questions, all questions were answered            Time: 8:40  Date of procedure: 10/3/2019  Treatment: The following muscles were treated today with intramuscular dry needling  [] Left [] Right Masster  [] Left [] Right Temporalis  [] Left [] Right Zygomaticus Major / Minor  [] Left [] Right Lateral Pterygoid  [] Left [] Right Medial Pterygoid  [] Left [] Right Digastric Post / Anterior Belly  [] Left [] Right Sternocleidomastoid  [] Left [] Right Scalene Anterior / Medial / Posterior  [] Left [] Right Extra Laryngeal Muscles  [] Left [] Right Upper Trapezius  [] Left [] Right Middle Trapezius  [] Left [] Right Lower Trapezius  [] Left [] Right Oblique Capitis Inferior  [] Left [] Right Splenius Capitis / Cervicis  [] Left [] Right Semispinalis: Capitis / Cervicis  [] Left [] Right Multifidi / Rotatores Cervicis / Thoracic  [] Left [] Right Longissimus Thoracis / Illiocostalis  [] Left [] Right Levator Scapulae  [] Left [] Right Supraspinatus / Infraspinatus  [] Left [] Right Teres Major / Minor  [] Left [] Right Rhomboids / Serratus posterior superior  [] Left [] Right Pectoralis Major / Minor  [] Left [] Right Serratus Anterior  [] Left [] Right Latissimus Dorsi  [] Left [x] Right Subscapularis  [] Left [] Right Coracobrachialis  [] Left [] Right Biceps Brachii  [] Left [] Right Deltoid: Anterior / Medial / Posterior  [] Left [] Right Brachialis  [] Left [] Right Triceps  [] Left [] Right Brachioradialis  [] Left [] Right Extensor Carpi Radialis Brevis / Extensor Carpi Radialis Longus    [] Left [] Right  Extensor digitorum  [] Left [] Right Supinator / Pronator Teres  [] Left [] Right Flexor Carpi Radialis/ Flexor Carpi Ulnaris   [] Left [] Right  Flexor Digitorum Superficialis/ Flexor Digitorum Profundus  [] Left [] Right Flexor Pollicis Longus / Flexor Pollicis Brevis / Palmaris Longus  [] Left [] Right Abductor Pollicis Longus / Abductor Pollicis Brevis  [] Left [] Right Opponens Pollicis / Adductor Pollicis  [] Left [] Right Dorsal / Palmar Interossei / Lumbricalis  [] Left [] Right Abductor Digiti Minimi / Opponens Digiti Minimi    Patient's response to today's treatment:  [x] Latent Twitch Response     [] Muscle relaxation [] Pain Relief  [x] Post needling soreness    [x] without complications  [] Increased Range of Motion   [] Decreased headaches    [] Decreased Tinnitus  [] Other:     Performed by: Laina Boston DPT, CMTPT           With   [] TE   [] TA   [] neuro   [] other: Patient Education: [x] Review HEP    [] Progressed/Changed HEP based on:   [] positioning   [] body mechanics   [] transfers   [] heat/ice application    [] other:      Other Objective/Functional Measures: 98 deg AROM flexion 96 deg ABD prior to needling      Pain Level (0-10 scale) post treatment: 0    Summary of Care:  1. Patient will improve FOTO score to 66 in order to demonstrate improvements in functional independence. FOTO score - 38 (7/18/19) progressing- 57% 9/12/19 Met- increased to 69 10/3/19  2.  Patient will be able to improve right shoulder AAROM flexion to 130 degrees in order to improve ease of ADLs Met- 130 deg with slight hike 9/19/19  3. Pt will improve right shoulder AROM flexion and abduction to 110 deg for improved ease of self care and grooming. Slow progress 96 deg ABd  98 deg flexion 9/25/19    ASSESSMENT/Changes in Function: Pt with unchanged AROM measurements since last goal check.  She has reached primary benefits maximum, will D/C to HEP management at this time to save secondary coverage for remainder of calendar year.      Thank you for this referral!      PLAN  [x]Discontinue therapy: [x]Patient has reached or is progressing toward set goals      []Patient is non-compliant or has abdicated      []Due to lack of appreciable progress towards set goals    YASMEEN Boston DPT 10/3/2019  8:56 AM

## 2019-10-03 NOTE — TELEPHONE ENCOUNTER
Called and spoke with patient regarding her negative lab results, patient verbalized understanding but stated she was still having vaginal itching, denied any abnormal discharge. Patient is requesting another round of diflucan, she stated she just started her cycle 10/2/19 so she is not able to come in for a pelvic and would the provider be able to send her a prescription without being seen.

## 2019-10-04 RX ORDER — FLUCONAZOLE 150 MG/1
150 TABLET ORAL DAILY
Qty: 1 TAB | Refills: 0 | Status: SHIPPED | OUTPATIENT
Start: 2019-10-04 | End: 2019-10-05

## 2019-10-04 NOTE — TELEPHONE ENCOUNTER
Please let her know that I did send the diflucan but if her symptoms continue she will have to come back for follow up visit.   Angelique Iraheta, YOSELIN, FNP-BC

## 2019-11-19 ENCOUNTER — OFFICE VISIT (OUTPATIENT)
Dept: ORTHOPEDIC SURGERY | Age: 49
End: 2019-11-19

## 2019-11-19 VITALS
WEIGHT: 224.6 LBS | HEART RATE: 84 BPM | BODY MASS INDEX: 41.33 KG/M2 | OXYGEN SATURATION: 97 % | DIASTOLIC BLOOD PRESSURE: 78 MMHG | RESPIRATION RATE: 18 BRPM | SYSTOLIC BLOOD PRESSURE: 140 MMHG | HEIGHT: 62 IN | TEMPERATURE: 98.8 F

## 2019-11-19 DIAGNOSIS — M19.011 GLENOHUMERAL ARTHRITIS, RIGHT: Primary | ICD-10-CM

## 2019-11-19 NOTE — PROGRESS NOTES
1. Have you been to the ER, urgent care clinic since your last visit? Hospitalized since your last visit? No    2. Have you seen or consulted any other health care providers outside of the [address] [place] [address] since your last visit? Include any pap smears or colon screening.  No

## 2019-11-19 NOTE — PROGRESS NOTES
Tawanda Rodriguez  1970     HISTORY OF PRESENT ILLNESS  Tawanda Rodriguez is a 54 y.o. female who presents today for evaluation s/p Right shoulder arthroscopic glenoid resurfacing on 6/14/19. Patient has been to PT. Describes pain as a 0/10. Pt notes her shoulder locked up on her the other day, describes the sensation as a \"ton of bricks\" on her shoulder. She took an 800 mg Motrin and the pain improved. She notes overall improvement since surgery but notes concerns about getting the strength back in her arm. She has significant improvement in her ROM today. Patient denies any fever, chills, chest pain, shortness of breath or calf pain. The remainder of the review of systems is negative. There are no new illness or injuries to report since last seen in the office. No changes in medications, allergies, social or family history. PHYSICAL EXAM:   Visit Vitals  /78   Pulse 84   Temp 98.8 °F (37.1 °C) (Oral)   Resp 18   Ht 5' 2\" (1.575 m)   Wt 224 lb 9.6 oz (101.9 kg)   SpO2 97%   BMI 41.08 kg/m²      The patient is a well-developed, well-nourished female in no acute distress. The patient is alert and oriented times three. The patient appears to be well groomed. Mood and affect are normal.  LYMPHATIC: lymph nodes are not enlarged and are within normal limits  SKIN: normal in color and non tender to palpation. There are no bruises or abrasions noted. NEUROLOGICAL: Motor sensory exam is within normal limits. Reflexes are equal bilaterally. There is normal sensation to pinprick and light touch  ORTHOPEDIC EXAM of right shoulder:  Inspection: swelling present,  Bruising not present  Incisions well healed  Passive glenohumeral abduction 0-80 degrees, able to reach to side of her face  Stability: Stable  Strength: 4/5  2+ distal pulses    IMPRESSION:  S/P Right shoulder arthroscopic glenoid resurfacing     Encounter Diagnosis   Name Primary?  Glenohumeral arthritis, right Yes       PLAN:   1. Pt presents today s/p Right shoulder arthroscopic glenoid resurfacing on 6/14/19. She has significant improvement in her ROM today and will continue her exercises at home. She was given a note today clearing her to go back to work on 12/04/19. She may return if the locking sensation increases in frequency. Risk factors include: BMI>40   2. No ultrasound exam indicated today  3. No cortisone injection indicated today   4. No Physical/Occupational Therapy indicated today  5. No diagnostic test indicated today:   6. No durable medical equipment indicated today  7. No referral indicated today   8. No medications indicated today:   9.  No Narcotic indicated today       RTC prn      Scribed by [address]) as dictated by MARILEE Jurado PA-C [phone]

## 2019-11-19 NOTE — LETTER
NOTIFICATION RETURN TO WORK / SCHOOL 
 
11/19/2019 9:21 AM 
 
Ms. Renny Martinez [address] To Whom It May Concern: 
 
Renny Martinez is currently under the care of [address] [place] [address] Larry Atwood. She will return to work on 12/04/19. If there are questions or concerns please have the patient contact our office. Sincerely, CRISTELA Hitchcock

## 2019-12-10 ENCOUNTER — OFFICE VISIT (OUTPATIENT)
Dept: ORTHOPEDIC SURGERY | Age: 49
End: 2019-12-10

## 2019-12-10 VITALS
DIASTOLIC BLOOD PRESSURE: 90 MMHG | SYSTOLIC BLOOD PRESSURE: 140 MMHG | TEMPERATURE: 98.2 F | OXYGEN SATURATION: 99 % | HEART RATE: 80 BPM | HEIGHT: 62 IN | BODY MASS INDEX: 41.7 KG/M2 | WEIGHT: 226.6 LBS | RESPIRATION RATE: 16 BRPM

## 2019-12-10 DIAGNOSIS — M54.50 LEFT LOW BACK PAIN, UNSPECIFIED CHRONICITY, UNSPECIFIED WHETHER SCIATICA PRESENT: ICD-10-CM

## 2019-12-10 DIAGNOSIS — M47.816 LUMBAR SPONDYLOSIS: ICD-10-CM

## 2019-12-10 DIAGNOSIS — M17.12 PRIMARY OSTEOARTHRITIS OF LEFT KNEE: Primary | ICD-10-CM

## 2019-12-10 DIAGNOSIS — M25.562 LEFT KNEE PAIN, UNSPECIFIED CHRONICITY: ICD-10-CM

## 2019-12-10 RX ORDER — MELOXICAM 15 MG/1
15 TABLET ORAL
Qty: 30 TAB | Refills: 1 | Status: SHIPPED | OUTPATIENT
Start: 2019-12-10 | End: 2020-09-22 | Stop reason: ALTCHOICE

## 2019-12-10 RX ORDER — BACLOFEN 10 MG/1
10 TABLET ORAL 2 TIMES DAILY
Qty: 60 TAB | Refills: 1 | Status: SHIPPED | OUTPATIENT
Start: 2019-12-10 | End: 2020-11-09

## 2019-12-10 RX ORDER — TRIAMCINOLONE ACETONIDE 40 MG/ML
40 INJECTION, SUSPENSION INTRA-ARTICULAR; INTRAMUSCULAR ONCE
Qty: 1 ML | Refills: 0
Start: 2019-12-10 | End: 2019-12-10

## 2019-12-10 NOTE — PROGRESS NOTES
1. Have you been to the ER, urgent care clinic since your last visit? Hospitalized since your last visit? Yes When: [facility] Where: three weeks ago Reason for visit: left knee pain    2. Have you seen or consulted any other health care providers outside of the [address] [place] [address] since your last visit? Include any pap smears or colon screening.  No

## 2019-12-10 NOTE — PROGRESS NOTES
Kali Lawler  1970   Chief Complaint   Patient presents with    Knee Pain     left knee pain        HISTORY OF PRESENT ILLNESS  Kali Lawler is a 54 y.o. female who presents today for reevaluation of left knee pain. Patient rates pain as 10/10 today. The pain has been present for about 1 year but has worsened recently. Pt has been exercising at the gym recently which may have exacerbated the pain. The patient has had a cortisone injection which provided some relief, but still pain with prolonged walking and standing, stairs. She works as a  and walks frequently. She has an additional complaint of lower back pain that occasionally radiates down her legs. Pain with prolonged walking. Patient denies any fever, chills, chest pain, shortness of breath or calf pain. The remainder of the review of systems is negative. There are no new illness or injuries to report since last seen in the office. There are no changes to medications, allergies, family or social history. Pain Assessment  12/10/2019   Location of Pain Knee   Location Modifiers Left   Severity of Pain 10   Quality of Pain Throbbing; Other (Comment)   Quality of Pain Comment knee causing pain in the back   Duration of Pain Persistent   Frequency of Pain Constant   Aggravating Factors Walking;Standing;Stairs;Stretching;Bending;Straightening   Aggravating Factors Comment -   Limiting Behavior Yes   Relieving Factors Elevation;Ice;NSAID   Relieving Factors Comment -   Result of Injury No   Work-Related Injury -   How long out of work? -   Type of Injury -   Type of Injury Comment -     PHYSICAL EXAM:   Visit Vitals  /90 (BP 1 Location: Left arm, BP Patient Position: Sitting)   Pulse 80   Temp 98.2 °F (36.8 °C) (Oral)   Resp 16   Ht 5' 2\" (1.575 m)   Wt 226 lb 9.6 oz (102.8 kg)   SpO2 99%   BMI 41.45 kg/m²     The patient is a well-developed, well-nourished female   in no acute distress.   The patient is alert and oriented times three. The patient is alert and oriented times three. Mood and affect are normal.  LYMPHATIC: lymph nodes are not enlarged and are within normal limits  SKIN: normal in color and non tender to palpation. There are no bruises or abrasions noted. NEUROLOGICAL: Motor sensory exam is within normal limits. Reflexes are equal bilaterally. There is normal sensation to pinprick and light touch  MUSCULOSKELETAL:  Examination Left knee   Skin Intact   Range of motion 0-130   Effusion +   Medial joint line tenderness +   Lateral joint line tenderness +   Tenderness Pes Bursa -   Tenderness insertion MCL -   Tenderness insertion LCL -   Razias -   Patella crepitus +   Patella grind -   Lachman -   Pivot shift -   Anterior drawer -   Posterior drawer -   Varus stress -   Valgus stress -   Neurovascular Intact   Calf Swelling and Tenderness to Palpation -   Re's Test -   Hamstring Cord Tightness -     Examination Lumbar   Skin Intact   Tenderness, Paraspinal muscles +   Paraspinal muscle spasms +   Flexion ltd   Extension 10   Knee reflexes Normal   Ankle reflexes Normal   Straight leg raise -   Calf tenderness -     PROCEDURE: After sterile prep, 4 cc of Xylocaine and 1 cc of Kenalog were injected into the left knee.        [facility] - [facility]  OFFICE PROCEDURE PROGRESS NOTE        Chart reviewed for the following:  Diandra Lino MD, have reviewed the History, Physical and updated the Allergic reactions for [address] performed immediately prior to start of procedure:  Diandra Lino MD, have performed the following reviews on [facility] prior to the start of the procedure:            * Patient was identified by name and date of birth   * Agreement on procedure being performed was verified  * Risks and Benefits explained to the patient  * Procedure site verified and marked as necessary  * Patient was positioned for comfort  * Consent was signed and verified     Time: 4:49 PM    Date of procedure: 12/10/2019    Procedure performed by:  Filippo Root MD    Provider assisted by: (see medication administration)    How tolerated by patient: tolerated the procedure well with no complications    Comments: none    IMAGING: XR of left knee dated 12/10/19 was reviewed and read by Dr. Stephon Gonzalez: Marked degenerative arthritis in the medial compartment and patellofemoral joint. XR of lumbar spine dated 12/10/19 was reviewed and read by Dr. Stephon Gonzalez: Spondylotic changes at L5-S1. MRI of left knee dated 2/12/19 was reviewed and read: IMPRESSION:   Medial meniscus posterior horn through body segment complex degeneration/tearing with partial extrusion of the body segment.  -In the outer medial compartment there is notable subchondral marrow edema, possibly degenerative or a subtle subchondral fracture difficult to exclude.   -Patchy high-grade to full-thickness cartilage loss throughout the medial compartment.  -These findings of clearly worsened since 2011. Patellofemoral compartment with patchy full-thickness cartilage loss. In the lateral compartment there is a single high-grade to full-thickness fissure in the posterior weightbearing condyle. -These findings have clearly worsened since 2011. Moderate joint effusion. Mild Hoffa's fat pad edema. XR of the left knee dated 1/10/19 was reviewed and read: decreased joint space on the medial side, left worst than right    IMPRESSION:      ICD-10-CM ICD-9-CM    1. Primary osteoarthritis of left knee M17.12 715.16 meloxicam (MOBIC) 15 mg tablet      TRIAMCINOLONE ACETONIDE INJ      triamcinolone acetonide (KENALOG) 40 mg/mL injection      DRAIN/INJECT LARGE JOINT/BURSA   2.  Left knee pain, unspecified chronicity M25.562 719.46 AMB POC XRAY, KNEE; 1/2 VIEWS   3. Left low back pain, unspecified chronicity, unspecified whether sciatica present M54.5 724.2 AMB POC XRAY, SPINE, LUMBOSACRAL; 2 O   4. Lumbar spondylosis M47.816 721.3 meloxicam (MOBIC) 15 mg tablet      baclofen (LIORESAL) 10 mg tablet      REFERRAL TO PHYSICAL THERAPY        PLAN:   1. Pt presents today with left knee pain due to primary OA and I would like to try an injection today. She also presents with lower back pain likely due to lumbar spondylosis and I would like for her to begin PT. She was given prescriptions for Mobic and Baclofen today. Risk factors include: n/a  2. No ultrasound exam indicated today  3. Yes cortisone injection indicated today L KNEE  4. Yes Physical/Occupational Therapy indicated today  5. No diagnostic test indicated today:   6. No durable medical equipment indicated today  7. No referral indicated today   8. Yes medications indicated today: MOBIC & BACLOFEN  9. No Narcotic indicated today     RTC 3 weeks if pain continues      Scribed by Severiano Creamer Jacob Buckles) as dictated by MD VIOLETTE Soto, Dr. Danyell Dillard, confirm that all documentation is accurate.     Danyell Dillard M.D.   [facility]

## 2019-12-12 ENCOUNTER — HOSPITAL ENCOUNTER (OUTPATIENT)
Dept: PHYSICAL THERAPY | Age: 49
Discharge: HOME | End: 2019-12-12
Payer: COMMERCIAL

## 2019-12-12 PROCEDURE — 97162 PT EVAL MOD COMPLEX 30 MIN: CPT

## 2019-12-12 PROCEDURE — 97110 THERAPEUTIC EXERCISES: CPT

## 2019-12-12 NOTE — PROGRESS NOTES
PT DAILY TREATMENT NOTE 10-18    Patient Name: Lindsey Falcon  Date:2019  : 1970  [x]  Patient  Verified  Payor: Jayne Barragan / Plan: Sofia Boo / Product Type: HMO /    In time:730  Out time:808  Total Treatment Time (min): 38  Visit #: 1 of 8    Medicare/BCBS Only   Total Timed Codes (min):  15 1:1 Treatment Time:  38       Treatment Area: Low back pain [M54.5]    SUBJECTIVE  Pain Level (0-10 scale): 5  Any medication changes, allergies to medications, adverse drug reactions, diagnosis change, or new procedure performed?: [x] No    [] Yes (see summary sheet for update)  Subjective functional status/changes:   [] No changes reported       OBJECTIVE    Modality rationale:    Min Type Additional Details    [] Estim:  []Unatt       []IFC  []Premod                        []Other:  []w/ice   []w/heat  Position:  Location:    [] Estim: []Att    []TENS instruct  []NMES                    []Other:  []w/US   []w/ice   []w/heat  Position:  Location:    []  Traction: [] Cervical       []Lumbar                       [] Prone          []Supine                       []Intermittent   []Continuous Lbs:  [] before manual  [] after manual    []  Ultrasound: []Continuous   [] Pulsed                           []1MHz   []3MHz W/cm2:  Location:    []  Iontophoresis with dexamethasone         Location: [] Take home patch   [] In clinic    []  Ice     []  heat  []  Ice massage  []  Laser   []  Anodyne Position:  Location:    []  Laser with stim  []  Other:  Position:  Location:    []  Vasopneumatic Device Pressure:       [] lo [] med [] hi   Temperature: [] lo [] med [] hi   [] Skin assessment post-treatment:  []intact []redness- no adverse reaction    []redness  adverse reaction:     23 min [x]Eval                  []Re-Eval       15 min Therapeutic Exercise:  [] See flow sheet : HEP   Rationale: increase ROM and increase strength to improve the patients ability to perform ADL            With   [] TE   [] TA   [] neuro   [] other: Patient Education: [x] Review HEP    [] Progressed/Changed HEP based on:   [] positioning   [] body mechanics   [] transfers   [] heat/ice application    [] other:      Other Objective/Functional Measures:       Pain Level (0-10 scale) post treatment: 5    ASSESSMENT/Changes in Function:      Patient will continue to benefit from skilled PT services to modify and progress therapeutic interventions, address functional mobility deficits, address ROM deficits, address strength deficits, analyze and address soft tissue restrictions, analyze and cue movement patterns and analyze and modify body mechanics/ergonomics to attain remaining goals. [x]  See Plan of Care  []  See progress note/recertification  []  See Discharge Summary         Progress towards goals / Updated goals:  Short Term Goals: To be accomplished in 1 weeks:               1. Pt will be I and compliant with HEP   IE- instructed and issued HEP  Long Term Goals: To be accomplished in 4 weeks:               1. Improve FOTO score to 66 to improve ability for functional tasks. IE- 37               2. Pt will report no difficulty with getting in and out of bed. IE a little difficulty               3. Pt will report no difficulty with lifting a box of groceries from the floor   IE- moderate difficulty               4. Pt will demonstrate 4+/5 glute strength to improve ability for job activities. IE- 4-/5  PLAN  []  Upgrade activities as tolerated     [x]  Continue plan of care  []  Update interventions per flow sheet       []  Discharge due to:_  []  Other:_      Leigh Ann Lockhart, PT 12/12/2019  7:34 AM    No future appointments.
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
## 2020-01-03 NOTE — PROGRESS NOTES
Physical Therapy Discharge Instructions      [facility]  [address] [facility] [address]  [place], [address]  [phone] fax    Patient: Adeline Madison  : 1970      Continue Home Exercise Program 1-2 times per day for 4-6 weeks, then decrease to 4 times per week      Continue with    [] Ice  as needed  times per day     [] Heat           Follow up with MD:     [] Upon completion of therapy     [x] As needed      Recommendations:     [x]   Return to activity with home program    []   Return to activity with the following modifications:       []Post Rehab Program    []Join Independent aquatic program     []Return to/join local gym        Additional Comments: Continue with strengthening exercises to improve functional mobility          Maulik KRAUST, CMTPT 10/3/2019 10:33 AM COPD                 Assessment/Plan:    Active Hospital Problems    Diagnosis    HCAP (healthcare-associated pneumonia) [J18.9]    Chest pain on breathing [R07.1]    Mild protein-calorie malnutrition (HCC) [E44.1]    Acute exacerbation of chronic obstructive pulmonary disease (COPD) (HCC) [J44.1]    Elevated troponin [R79.89]    Acute on chronic respiratory failure with hypoxia and hypercapnia (HCC) [J96.21, J96.22]    COPD exacerbation (HCC) [J44.1]    Acute respiratory failure with hypoxia and hypercapnia (HCC) [J96.01, J96.02]    Tobacco abuse [Z72.0]       Acute on chronic hypoxic/hypercapnic respiratory failure  - patient presented with shortness of breath.  Also c/o chest pain  - workup consisted with COPD exacerbation  - admitted to ICU on BiPAP. worsened with dyspnea and hypercarbic acidosis,   - intubated and was on vent, now extubated to bipap    Pulmonology consultation obtained  - imaging with no acute infiltrates     COPD exacerbation  - IV solu-medrol to prednisone . Was On Doxy   - broadened abx as there is not improvement, currently on cefepime and vanc  - sputum with serratia   - Scheduled/PRN Duonebs, off vent ,now using prn bipap   - continue Singulair     Chest pain and abn EKG  - elevated troponins  - likely demand ischemia   - started on lovenox , BB and nitrates if BP tolerates  - started ASA and statins  - ECHO with normal EF and no RWMA , cards consulted, planned for stress test once extubated     SIRS  - due to above  - leukocytosis, tachypnea, tachycardia  - treat as above and monitor for improvement     Hypothyroidism  - home dose of synthroid 137 mcg      Tobacco Dependence  - Recommend cessation after extubation       DVT Prophylaxis: Lovenox  Diet: DIET DYSPHAGIA MINCED AND MOIST;  Dysphagia Minced and Moist  Code Status: Full Code    PT/OT Eval Status: ordered    Dispo - cont care in ICU      Shawn Zazueta MD

## 2020-01-07 ENCOUNTER — APPOINTMENT (OUTPATIENT)
Dept: PHYSICAL THERAPY | Age: 50
End: 2020-01-07
Payer: COMMERCIAL

## 2020-01-09 ENCOUNTER — HOSPITAL ENCOUNTER (OUTPATIENT)
Dept: PHYSICAL THERAPY | Age: 50
Discharge: HOME | End: 2020-01-09
Payer: COMMERCIAL

## 2020-01-09 PROCEDURE — 97140 MANUAL THERAPY 1/> REGIONS: CPT

## 2020-01-09 PROCEDURE — 97110 THERAPEUTIC EXERCISES: CPT

## 2020-01-09 NOTE — PROGRESS NOTES
[facility]  [address]  [place], [address]  [phone] fax    Physical Therapy Progress Note  Patient name: Robina Hernandez Start of Care: 2019   Referral source: Jayda Owusu,* : 1970                Medical Diagnosis: Low back pain [M54.5]  Payor: Handy Benavides / Plan: Judge Shell / Product Type: HMO /  Onset Date:2019                Treatment Diagnosis: LBP   Prior Hospitalization: see medical history Provider#: [id number]   Medications: Verified on Patient summary List    Comorbidities: OA, right shoulder RTC repair   Prior Level of Function: functionally I with all activities    Visits from Start of Care: 2    Missed Visits: 0      Key Functional Changes:       pt has been compliant with HEP but was unable to attend supervised PT due until January due to insurance concerns. She therefore demonstrates limited overall change to date. She will benefit from continued PT at this time to further her progress with supervised PT.      Patient will continue to benefit from skilled PT services to modify and progress therapeutic interventions, address functional mobility deficits, address ROM deficits, address strength deficits, analyze and address soft tissue restrictions, analyze and cue movement patterns, analyze and modify body mechanics/ergonomics and assess and modify postural abnormalities to attain remaining goals. Progress towards goals:  Short Term Goals:                1. Pt will be I and compliant with HEP              IE- instructed and issued HEP              Current: MET pt reports compliance on -20  Long Term Goals: No significant change to below goals due to only 2 visits to date.               1.  Improve FOTO score to 66 to improve ability for functional tasks.              QM- 37               2. Pt will report no difficulty with getting in and out of bed.              IE a little difficulty               3. Pt will report no difficulty with lifting a box of groceries from the floor              IE- moderate difficulty               4. Pt will demonstrate 4+/5 glute strength to improve ability for job activities.                IE- 4-/5  Updated Goals: to be achieved in 4 weeks:    1. Improve FOTO score to 66 to improve ability for functional tasks.              PN- 37               2. Pt will report no difficulty with getting in and out of bed.              PN-  a little difficulty               3. Pt will report no difficulty with lifting a box of groceries from the floor              PN- moderate difficulty               4. Pt will demonstrate 4+/5 glute strength to improve ability for job activities.                PN- 4-/5    ASSESSMENT/RECOMMENDATIONS:  [x]Continue therapy per initial plan/protocol at a frequency of  2 x per week for 4 weeks  []Continue therapy with the following recommended changes:_____________________      _____________________________________________________________________  []Discontinue therapy progressing towards or have reached established goals  []Discontinue therapy due to lack of appreciable progress towards goals  []Discontinue therapy due to lack of attendance or compliance  []Await Physician's recommendations/decisions regarding therapy  []Other:________________________________________________________________    Thank you for this referral.    Alicia Nolasco, PT 1/9/2020 8:12 AM  NOTE TO PHYSICIAN:  [address] AND   FAX TO [facility]: [phone]  If you are unable to process this request in 24 hours please contact our office: [phone]    ? I have read the above report and request that my patient continue as recommended. ? I have read the above report and request that my patient continue therapy with the following changes/special instructions:__________________________________________________________  ?  I have read the above report and request that my patient be discharged from therapy.     Physicians signature: ______________________________Date: ______Time:______

## 2020-01-09 NOTE — PROGRESS NOTES
PT DAILY TREATMENT NOTE 10-18    Patient Name: Navya White  Date:2020  : 1970  [x]  Patient  Verified  Payor: Keagan Krueger / Plan: Greg Lopez / Product Type: HMO /    In time:0730  Out RRRD:1437  Total Treatment Time (min): 48  Visit #: 2 of 8    Medicare/BCBS Only   Total Timed Codes (min):  38 1:1 Treatment Time:  38       Treatment Area: Low back pain [M54.5]    SUBJECTIVE  Pain Level (0-10 scale): 0  Any medication changes, allergies to medications, adverse drug reactions, diagnosis change, or new procedure performed?: [x] No    [] Yes (see summary sheet for update)  Subjective functional status/changes:   [] No changes reported  Pt reports HEP compliance. She reports pain increases after increased standing at work. OBJECTIVE    Modality rationale: Improve tissue extensibility to improve the patients ability to perform functional tasks.     Min Type Additional Details    [] Estim:  []Unatt       []IFC  []Premod                        []Other:  []w/ice   []w/heat  Position:  Location:    [] Estim: []Att    []TENS instruct  []NMES                    []Other:  []w/US   []w/ice   []w/heat  Position:  Location:    []  Traction: [] Cervical       []Lumbar                       [] Prone          []Supine                       []Intermittent   []Continuous Lbs:  [] before manual  [] after manual    []  Ultrasound: []Continuous   [] Pulsed                           []1MHz   []3MHz W/cm2:  Location:    []  Iontophoresis with dexamethasone         Location: [] Take home patch   [] In clinic   10 []  Ice     [x]  heat  []  Ice massage  []  Laser   []  Anodyne Position: prone  Location: lumbar    []  Laser with stim  []  Other:  Position:  Location:    []  Vasopneumatic Device Pressure:       [] lo [] med [] hi   Temperature: [] lo [] med [] hi   [] Skin assessment post-treatment:  []intact []redness- no adverse reaction    []redness  adverse reaction:       30 min Therapeutic Exercise:  [x] See flow sheet :   Rationale: increase ROM and increase strength to improve the patients ability to perform functional tasks    8 min Manual Therapy:  Myofascial stick to left piriformis and glutes   Rationale: decrease pain, increase ROM and increase tissue extensibility to perform daily tasks. With   [] TE   [] TA   [] neuro   [] other: Patient Education: [x] Review HEP    [] Progressed/Changed HEP based on:   [] positioning   [] body mechanics   [] transfers   [] heat/ice application    [] other:      Other Objective/Functional Measures: initiated treatment per flow sheet     Pain Level (0-10 scale) post treatment: 0    ASSESSMENT/Changes in Function: pt has been compliant with HEP but was unable to attend supervised PT due until January due to insurance concerns. She therefore demonstrates limited overall change to date. She will benefit from continued PT at this time to further her progress with supervised PT. Patient will continue to benefit from skilled PT services to modify and progress therapeutic interventions, address functional mobility deficits, address ROM deficits, address strength deficits, analyze and address soft tissue restrictions, analyze and cue movement patterns, analyze and modify body mechanics/ergonomics and assess and modify postural abnormalities to attain remaining goals. []  See Plan of Care  [x]  See progress note/recertification  []  See Discharge Summary         Progress towards goals / Updated goals:  Short Term Goals: To be accomplished in 1 weeks:               1. Pt will be I and compliant with HEP              IE- instructed and issued HEP   Current: MET pt reports compliance on 1-9-20  Long Term Goals: To be accomplished in 4 weeks:               1. Improve FOTO score to 66 to improve ability for functional tasks. IE- 37               2. Pt will report no difficulty with getting in and out of bed. IE a little difficulty               3. Pt will report no difficulty with lifting a box of groceries from the floor              IE- moderate difficulty               4. Pt will demonstrate 4+/5 glute strength to improve ability for job activities.               IE- 4-/5    PLAN  []  Upgrade activities as tolerated     [x]  Continue plan of care  []  Update interventions per flow sheet       []  Discharge due to:_  []  Other:_      Mansoor Officer, PT 1/9/2020  7:37 AM    Future Appointments   Date Time Provider Yi Osborn   1/14/2020  7:30 AM Gloria [place], PT [facility]   1/16/2020  7:30 AM Gloria [place], PT [facility]   1/21/2020  7:30 AM Gloria [place], PT [facility]   1/23/2020  7:30 AM Gloria [place], PT [facility]   1/28/2020  7:30 AM Gloria [place], PT [facility]   1/30/2020  7:30 AM Gloria [place], PT [facility]

## 2020-01-14 ENCOUNTER — HOSPITAL ENCOUNTER (OUTPATIENT)
Dept: PHYSICAL THERAPY | Age: 50
Discharge: HOME | End: 2020-01-14
Payer: COMMERCIAL

## 2020-01-14 PROCEDURE — 97140 MANUAL THERAPY 1/> REGIONS: CPT

## 2020-01-14 PROCEDURE — 97110 THERAPEUTIC EXERCISES: CPT

## 2020-01-14 NOTE — PROGRESS NOTES
PT DAILY TREATMENT NOTE 10-18    Patient Name: Cyril Sumner  Date:2020  : 1970  [x]  Patient  Verified  Payor: BLUE CROSS / Plan: [address] [place] [address] Iraan / Product Type: PPO /    In time:730  Out time:0810  Total Treatment Time (min): 40  Visit #: 1 of 8    Medicare/BCBS Only   Total Timed Codes (min):  40 1:1 Treatment Time:  38       Treatment Area: Low back pain [M54.5]    SUBJECTIVE  Pain Level (0-10 scale): 0  Any medication changes, allergies to medications, adverse drug reactions, diagnosis change, or new procedure performed?: [x] No    [] Yes (see summary sheet for update)  Subjective functional status/changes:   [x] No changes reported       OBJECTIVE    Modality rationale:    Min Type Additional Details    [] Estim:  []Unatt       []IFC  []Premod                        []Other:  []w/ice   []w/heat  Position:  Location:    [] Estim: []Att    []TENS instruct  []NMES                    []Other:  []w/US   []w/ice   []w/heat  Position:  Location:    []  Traction: [] Cervical       []Lumbar                       [] Prone          []Supine                       []Intermittent   []Continuous Lbs:  [] before manual  [] after manual    []  Ultrasound: []Continuous   [] Pulsed                           []1MHz   []3MHz W/cm2:  Location:    []  Iontophoresis with dexamethasone         Location: [] Take home patch   [] In clinic    []  Ice     []  heat  []  Ice massage  []  Laser   []  Anodyne Position:  Location:    []  Laser with stim  []  Other:  Position:  Location:    []  Vasopneumatic Device Pressure:       [] lo [] med [] hi   Temperature: [] lo [] med [] hi   [] Skin assessment post-treatment:  []intact []redness- no adverse reaction    []redness  adverse reaction:     32 min Therapeutic Exercise:  [x]?  See flow sheet :   Rationale: increase ROM and increase strength to improve the patients ability to perform functional tasks     8 min Manual Therapy:  Myofascial stick to left piriformis and glutes   Rationale: decrease pain, increase ROM and increase tissue extensibility to perform daily tasks. With   [] TE   [] TA   [] neuro   [] other: Patient Education: [x] Review HEP    [] Progressed/Changed HEP based on:   [] positioning   [] body mechanics   [] transfers   [] heat/ice application    [] other:      Other Objective/Functional Measures: increased reps per flow sheet     Pain Level (0-10 scale) post treatment: 0    ASSESSMENT/Changes in Function: pt tolerated added therex well and without pain. The pt demonstrates good overall pain levels. Patient will continue to benefit from skilled PT services to modify and progress therapeutic interventions, address functional mobility deficits, address ROM deficits, address strength deficits and analyze and address soft tissue restrictions to attain remaining goals. []  See Plan of Care  []  See progress note/recertification  []  See Discharge Summary         Progress towards goals / Updated goals:  Updated Goals: to be achieved in 4 weeks:               1. Improve FOTO score to 66 to improve ability for functional tasks.              PN- 37               2. Pt will report no difficulty with getting in and out of bed.              PN-  a little difficulty               3. Pt will report no difficulty with lifting a box of groceries from the floor              PN- moderate difficulty               4. Pt will demonstrate 4+/5 glute strength to improve ability for job activities.                PN- 4-/5    PLAN  []  Upgrade activities as tolerated     [x]  Continue plan of care  []  Update interventions per flow sheet       []  Discharge due to:_  []  Other:_      Dede Michaud, PT 1/14/2020  7:44 AM    Future Appointments   Date Time Provider Yi Osborn   1/16/2020  7:30 AM Sebastian Nur, PT [facility]   1/21/2020  4:00 PM Rebecca Navarro PTA [facility]   1/23/2020  7:30 AM Sebastian Nur, PT [facility]   1/28/2020  7:30 [facility]   1/30/2020  7:30 AM [facility]

## 2020-01-16 ENCOUNTER — HOSPITAL ENCOUNTER (OUTPATIENT)
Dept: PHYSICAL THERAPY | Age: 50
Discharge: HOME | End: 2020-01-16
Payer: COMMERCIAL

## 2020-01-16 PROCEDURE — 97110 THERAPEUTIC EXERCISES: CPT

## 2020-01-16 PROCEDURE — 97140 MANUAL THERAPY 1/> REGIONS: CPT

## 2020-01-16 NOTE — PROGRESS NOTES
PT DAILY TREATMENT NOTE 10-18    Patient Name: Sherwin Pinto  Date:2020  : 1970  [x]  Patient  Verified  Payor: Rodrigue Meehan / Plan: Edna Serrano / Product Type: HMO /    In OHBP:[id number]  Out time:0810  Total Treatment Time (min): 38  Visit #: 2 of 9    Medicare/BCBS Only   Total Timed Codes (min):  38 1:1 Treatment Time:  38       Treatment Area: Low back pain [M54.5]    SUBJECTIVE  Pain Level (0-10 scale): 0  Any medication changes, allergies to medications, adverse drug reactions, diagnosis change, or new procedure performed?: [x] No    [] Yes (see summary sheet for update)  Subjective functional status/changes:   [] No changes reported  Pt reports some pain yesterday after increase working but overall she reports she is feeling better. OBJECTIVE    Modality rationale:    Min Type Additional Details    [] Estim:  []Unatt       []IFC  []Premod                        []Other:  []w/ice   []w/heat  Position:  Location:    [] Estim: []Att    []TENS instruct  []NMES                    []Other:  []w/US   []w/ice   []w/heat  Position:  Location:    []  Traction: [] Cervical       []Lumbar                       [] Prone          []Supine                       []Intermittent   []Continuous Lbs:  [] before manual  [] after manual    []  Ultrasound: []Continuous   [] Pulsed                           []1MHz   []3MHz W/cm2:  Location:    []  Iontophoresis with dexamethasone         Location: [] Take home patch   [] In clinic    []  Ice     []  heat  []  Ice massage  []  Laser   []  Anodyne Position:  Location:    []  Laser with stim  []  Other:  Position:  Location:    []  Vasopneumatic Device Pressure:       [] lo [] med [] hi   Temperature: [] lo [] med [] hi   [] Skin assessment post-treatment:  []intact []redness- no adverse reaction    []redness  adverse reaction:      30 min Therapeutic Exercise:  [x]? ? See flow sheet :   Rationale: increase ROM and increase strength to improve the patients ability to perform functional tasks     8 min Manual Therapy:  Myofascial stick to left piriformis and glutes, TrP release left glute max   Rationale: decrease pain, increase ROM and increase tissue extensibility to perform daily tasks.             With   [] TE   [] TA   [] neuro   [] other: Patient Education: [x] Review HEP    [] Progressed/Changed HEP based on:   [] positioning   [] body mechanics   [] transfers   [] heat/ice application    [] other:      Other Objective/Functional Measures: increased reps with clams and LTR     Pain Level (0-10 scale) post treatment: 0    ASSESSMENT/Changes in Function: pt is progressing well with PT. She demonstrates diminished pain and left glute/piriformis restrictions appear to be improving. Patient will continue to benefit from skilled PT services to modify and progress therapeutic interventions, address functional mobility deficits, address ROM deficits, address strength deficits, analyze and address soft tissue restrictions and analyze and cue movement patterns to attain remaining goals. []  See Plan of Care  []  See progress note/recertification  []  See Discharge Summary         Progress towards goals / Updated goals:  Updated Goals: to be achieved in 4 weeks:               1. Improve FOTO score to 66 to improve ability for functional tasks.              PN- 37               2. Pt will report no difficulty with getting in and out of bed.              PN-  a little difficulty   Current: progressing - no difficulty at times but varies ib 1-16-20               3. Pt will report no difficulty with lifting a box of groceries from the floor              PN- moderate difficulty               4. Pt will demonstrate 4+/5 glute strength to improve ability for job activities.                PN- 4-/5    PLAN  []  Upgrade activities as tolerated     [x]  Continue plan of care  []  Update interventions per flow sheet       []  Discharge due to:_  []  Other:_      Juana Lin Kyung Cuevas, PT 1/16/2020  7:40 AM    Future Appointments   Date Time Provider Yi Irena   1/21/2020  4:00 PM Martita Adkins [place] [facility]   1/23/2020  7:30 AM [facility]   1/28/2020  7:30 AM Isabel Santos PT [facility]   1/30/2020  7:30 AM [facility]

## 2020-01-18 ENCOUNTER — OFFICE VISIT (OUTPATIENT)
Dept: FAMILY MEDICINE CLINIC | Age: 50
End: 2020-01-18

## 2020-01-18 VITALS
TEMPERATURE: 98.2 F | WEIGHT: 225 LBS | DIASTOLIC BLOOD PRESSURE: 75 MMHG | HEART RATE: 66 BPM | SYSTOLIC BLOOD PRESSURE: 137 MMHG | HEIGHT: 62 IN | RESPIRATION RATE: 18 BRPM | BODY MASS INDEX: 41.41 KG/M2 | OXYGEN SATURATION: 98 %

## 2020-01-18 DIAGNOSIS — N89.8 VAGINAL DISCHARGE: Primary | ICD-10-CM

## 2020-01-18 LAB — WET MOUNT POCT, WMPOCT: NORMAL

## 2020-01-18 NOTE — PROGRESS NOTES
Subjective:   54 y.o. female complains of thick gray/yellow vaginal discharge for 2 days. Reports discharge was clear at first. She denies any dysuria, vaginal irritation or itching, or abdominal pain. Denies abnormal vaginal bleeding or significant pelvic pain or  fever. No UTI symptoms. Reports a h/o trich last year which was treated. States she does have a new partner, however they use condoms. She would like to get tested for STDs. Patient's last menstrual period was 01/10/2020. Patient Active Problem List    Diagnosis Date Noted    Cyst of ovary 05/09/2018    Dysmenorrhea 05/09/2018    Fatigue 05/09/2018    Hypertrophy of uterus 05/09/2018    Uterine leiomyoma 05/09/2018    Obesity, morbid ([address] 12/07/2017    Iron deficiency anemia 01/02/2015     Current Outpatient Medications   Medication Sig Dispense Refill    meloxicam (MOBIC) 15 mg tablet Take 1 Tab by mouth daily (with breakfast). 30 Tab 1    baclofen (LIORESAL) 10 mg tablet Take 1 Tab by mouth two (2) times a day. 60 Tab 1    ibuprofen (MOTRIN) 800 mg tablet Take 1 Tab by mouth every eight (8) hours as needed for Pain. 60 Tab 2    acyclovir (ZOVIRAX) 5 % ointment Apply every 3 hrs up to 5x/day 5 g 1    Lisdexamfetamine (VYVANSE) 40 mg capsule Take by mouth daily.        Allergies   Allergen Reactions    Codeine Nausea and Vomiting     Patient states she gets jittery, has upset stomach      Past Medical History:   Diagnosis Date    ADD (attention deficit disorder)     Depression     Left foot pain     Plantar fasciitis, left     Right shoulder pain      Past Surgical History:   Procedure Laterality Date    HX TUBAL LIGATION  2009    Tubal ligation     Family History   Problem Relation Age of Onset    Hypertension Mother     Hypertension Father     Arthritis-osteo Other      Social History     Tobacco Use    Smoking status: Never Smoker    Smokeless tobacco: Never Used   Substance Use Topics    Alcohol use: No       Objective: She appears well, afebrile. Abdomen: benign, soft, nontender, no masses. Pelvic Exam: VULVA: normal appearing vulva with no masses, tenderness or lesions, VAGINA: normal appearing vagina with normal color and discharge, no lesions, vaginal discharge - white and thin, CERVIX: normal appearing cervix without discharge or lesions, UTERUS: uterus is normal size, shape, consistency and nontender, ADNEXA: normal adnexa in size, nontender and no masses, RECTAL: normal rectal, no masses, WET MOUNT done - results: negative for pathogens, normal epithelial cells. STD swab sent out for testing . Assessment/Plan:   no pathogens identified causing these symptoms  GC and chlamydia DNA  probe sent to lab. Treatment: Sent out for testing  ROV prn if symptoms persist or worsen. ICD-10-CM ICD-9-CM    1. Vaginal discharge N89.8 623.5 NUSWAB VAGINITIS      AMB POC SMEAR, STAIN & INTERPRET, WET MOUNT      CT/NG/T.VAGINALIS AMPLIFICATION     Diagnoses and all orders for this visit:    1. Vaginal discharge  -     NUSWAB VAGINITIS  -     AMB POC SMEAR, STAIN & INTERPRET, WET MOUNT  -     CT/NG/T.VAGINALIS AMPLIFICATION      Follow-up and Dispositions    · Return if symptoms worsen or fail to improve. Ladonna Youssef

## 2020-01-18 NOTE — PROGRESS NOTES
Chief Complaint   Patient presents with    Vaginal Discharge   1. Have you been to the ER, urgent care clinic since your last visit? Hospitalized since your last visit? No    2. Have you seen or consulted any other health care providers outside of the [address] [place] [address] since your last visit? Include any pap smears or colon screening.  No

## 2020-01-21 ENCOUNTER — HOSPITAL ENCOUNTER (OUTPATIENT)
Dept: PHYSICAL THERAPY | Age: 50
Discharge: HOME | End: 2020-01-21
Payer: COMMERCIAL

## 2020-01-21 PROCEDURE — 97140 MANUAL THERAPY 1/> REGIONS: CPT

## 2020-01-21 PROCEDURE — 97110 THERAPEUTIC EXERCISES: CPT

## 2020-01-21 NOTE — PROGRESS NOTES
PT DAILY TREATMENT NOTE 10-18    Patient Name: Phoenix Klein  Date:2020  : 1970  [x]  Patient  Verified  Payor: Petrona Bear / Plan: [employer] / Product Type: HMO /    In time:4:10  Out time:4:48  Total Treatment Time (min): 38  Visit #: 3 of 9    Medicare/BCBS Only   Total Timed Codes (min):  28 1:1 Treatment Time:  28       Treatment Area: Low back pain [M54.5]    SUBJECTIVE  Pain Level (0-10 scale): 10  Any medication changes, allergies to medications, adverse drug reactions, diagnosis change, or new procedure performed?: [x] No    [] Yes (see summary sheet for update)  Subjective functional status/changes:   [] No changes reported  \"Just started hurting. \"  Pt late for appointment. OBJECTIVE    Modality rationale: decrease pain and increase tissue extensibility to improve the patients ability to perform ADL's.    Min Type Additional Details    [] Estim:  []Unatt       []IFC  []Premod                        []Other:  []w/ice   []w/heat  Position:  Location:    [] Estim: []Att    []TENS instruct  []NMES                    []Other:  []w/US   []w/ice   []w/heat  Position:  Location:    []  Traction: [] Cervical       []Lumbar                       [] Prone          []Supine                       []Intermittent   []Continuous Lbs:  [] before manual  [] after manual    []  Ultrasound: []Continuous   [] Pulsed                           []1MHz   []3MHz W/cm2:  Location:    []  Iontophoresis with dexamethasone         Location: [] Take home patch   [] In clinic   10 []  Ice     [x]  heat  []  Ice massage  []  Laser   []  Anodyne Position: Prone  Location: Left Low back and hip.    []  Laser with stim  []  Other:  Position:  Location:    []  Vasopneumatic Device Pressure:       [] lo [] med [] hi   Temperature: [] lo [] med [] hi   [] Skin assessment post-treatment:  []intact []redness- no adverse reaction    []redness  adverse reaction:     20 min Therapeutic Exercise:  [x] See flow sheet :   Rationale: increase ROM, increase strength and increase proprioception to improve the patients ability to perform ADL's.    8 min Manual Therapy:  STM/DTM Left QL, L/S paraspinals, piriformis, gluteals. Pt prone. Rationale: decrease pain, increase ROM, increase tissue extensibility and decrease trigger points to improve ease of performing ADL's. With   [x] TE   [] TA   [] neuro   [] other: Patient Education: [x] Review HEP    [] Progressed/Changed HEP based on:   [] positioning   [] body mechanics   [] transfers   [] heat/ice application    [] other:      Other Objective/Functional Measures:      Pain Level (0-10 scale) post treatment: 0/10    ASSESSMENT/Changes in Function: Cueing to correct PPT mechanics. Pt reports no difficulty lifting a box of groceries from the floor. Continues to be hypersensitive with deep pressure into Left piriformis/gluteals. Continue PT to decrease mm restrictions, increase core stability and hip strength to improve ease of performing functional activities. Patient will continue to benefit from skilled PT services to modify and progress therapeutic interventions, address functional mobility deficits, address ROM deficits, address strength deficits, analyze and address soft tissue restrictions and analyze and modify body mechanics/ergonomics to attain remaining goals. [x]  See Plan of Care  []  See progress note/recertification  []  See Discharge Summary         Progress towards goals / Updated goals:  Updated Goals: to be achieved in 4 weeks:               1.  Improve FOTO score to 66 to improve ability for functional tasks.              PN- 37               2. Pt will report no difficulty with getting in and out of bed.              PN-  a little difficulty              Current: progressing - no difficulty at times but varies ib 1-16-20               3. Pt will report no difficulty with lifting a box of groceries from the floor              PN- moderate difficulty   Current: Met,pPt reports no difficulty lifting a box of groceries from the floor. 1/21/2020               4. Pt will demonstrate 4+/5 glute strength to improve ability for job activities.                PN- 4-/5    PLAN  []  Upgrade activities as tolerated     [x]  Continue plan of care  []  Update interventions per flow sheet       []  Discharge due to:_  []  Other:_      Leanor Foot, PTA 1/21/2020  4:12 PM    Future Appointments   Date Time Provider Yi Osborn   1/23/2020  7:30 AM [facility]   1/28/2020  7:30 AM Read Shake, PT [facility]   1/30/2020  7:30 AM Read Shake, PT [facility]

## 2020-01-23 ENCOUNTER — HOSPITAL ENCOUNTER (OUTPATIENT)
Dept: PHYSICAL THERAPY | Age: 50
Discharge: HOME | End: 2020-01-23
Payer: COMMERCIAL

## 2020-01-23 ENCOUNTER — TELEPHONE (OUTPATIENT)
Dept: FAMILY MEDICINE CLINIC | Age: 50
End: 2020-01-23

## 2020-01-23 PROCEDURE — 97110 THERAPEUTIC EXERCISES: CPT

## 2020-01-23 PROCEDURE — 97140 MANUAL THERAPY 1/> REGIONS: CPT

## 2020-01-23 NOTE — PROGRESS NOTES
PT DAILY TREATMENT NOTE 10-18    Patient Name: Darshan Quinteros  Date:2020  : 1970  [x]  Patient  Verified  Payor: Rolando Connell / Plan: Darshana Bassett / Product Type: HMO /    In [id number]  Out time:0808  Total Treatment Time (min): 40  Visit #: 4 of 9    Medicare/BCBS Only   Total Timed Codes (min):  40 1:1 Treatment Time:  40       Treatment Area: Low back pain [M54.5]    SUBJECTIVE  Pain Level (0-10 scale): 0  Any medication changes, allergies to medications, adverse drug reactions, diagnosis change, or new procedure performed?: [x] No    [] Yes (see summary sheet for update)  Subjective functional status/changes:   [] No changes reported  Pt reports she is feeling pretty good today, just stiff. OBJECTIVE    Modality rationale:    Min Type Additional Details    [] Estim:  []Unatt       []IFC  []Premod                        []Other:  []w/ice   []w/heat  Position:  Location:    [] Estim: []Att    []TENS instruct  []NMES                    []Other:  []w/US   []w/ice   []w/heat  Position:  Location:    []  Traction: [] Cervical       []Lumbar                       [] Prone          []Supine                       []Intermittent   []Continuous Lbs:  [] before manual  [] after manual    []  Ultrasound: []Continuous   [] Pulsed                           []1MHz   []3MHz W/cm2:  Location:    []  Iontophoresis with dexamethasone         Location: [] Take home patch   [] In clinic    []  Ice     []  heat  []  Ice massage  []  Laser   []  Anodyne Position:  Location:    []  Laser with stim  []  Other:  Position:  Location:    []  Vasopneumatic Device Pressure:       [] lo [] med [] hi   Temperature: [] lo [] med [] hi   [] Skin assessment post-treatment:  []intact []redness- no adverse reaction    []redness  adverse reaction:        32 min Therapeutic Exercise:  [x]?  See flow sheet :   Rationale: increase ROM, increase strength and increase proprioception to improve the patients ability to perform ADL's.     8 min Manual Therapy:  Myofascial stick to left piriformis and glutes, TrP release left glute max   Rationale: decrease pain, increase ROM, increase tissue extensibility and decrease trigger points to improve ease of performing ADL's. With   [] TE   [] TA   [] neuro   [] other: Patient Education: [x] Review HEP    [] Progressed/Changed HEP based on:   [] positioning   [] body mechanics   [] transfers   [] heat/ice application    [] other:      Other Objective/Functional Measures: increased reps with SKTC and clams, added march with PPT     Pain Level (0-10 scale) post treatment: 0    ASSESSMENT/Changes in Function:  Pt demonstrates improving myofascial restrictions and is progress well with therex. She is progressing towards all goals. Patient will continue to benefit from skilled PT services to modify and progress therapeutic interventions, address functional mobility deficits, address ROM deficits, address strength deficits, analyze and address soft tissue restrictions, analyze and cue movement patterns and analyze and modify body mechanics/ergonomics to attain remaining goals. []  See Plan of Care  []  See progress note/recertification  []  See Discharge Summary         Progress towards goals / Updated goals:  Updated Goals: to be achieved in 4 weeks:               1. Improve FOTO score to 66 to improve ability for functional tasks.              PN- 43               2. Pt will report no difficulty with getting in and out of bed.              PN-  a little difficulty              Current: progressing - no difficulty at times but varies  1-16-20               3. Pt will report no difficulty with lifting a box of groceries from the floor              PN- moderate difficulty              Current: Met, Pt reports no difficulty lifting a box of groceries from the floor.  1/21/2020               4. Pt will demonstrate 4+/5 glute strength to improve ability for job activities.                PN- 4-/5   Current; progressing 4/5 to 4+/5 on 01-23-20       PLAN  []  Upgrade activities as tolerated     [x]  Continue plan of care  []  Update interventions per flow sheet       []  Discharge due to:_  []  Other:_      Lucia Doss, PT 1/23/2020  7:34 AM    Future Appointments   Date Time Provider Yi Osborn   1/28/2020  7:30 AM Elpidio Mccormick, PT [facility]   1/30/2020  7:30 AM [facility]

## 2020-01-25 LAB
A VAGINAE DNA VAG QL NAA+PROBE: NORMAL SCORE
BVAB2 DNA VAG QL NAA+PROBE: NORMAL SCORE
C ALBICANS DNA VAG QL NAA+PROBE: NEGATIVE
C GLABRATA DNA VAG QL NAA+PROBE: NEGATIVE
C TRACH RRNA SPEC QL NAA+PROBE: NEGATIVE
MEGA1 DNA VAG QL NAA+PROBE: NORMAL SCORE
N GONORRHOEA RRNA SPEC QL NAA+PROBE: NEGATIVE
T VAGINALIS DNA SPEC QL NAA+PROBE: NEGATIVE
T VAGINALIS RRNA SPEC QL NAA+PROBE: NORMAL

## 2020-01-27 NOTE — TELEPHONE ENCOUNTER
Spoke with patient to inform that as per Dr. January Bowen, the swabs were negative. Patient verbalized understanding.

## 2020-01-28 ENCOUNTER — APPOINTMENT (OUTPATIENT)
Dept: PHYSICAL THERAPY | Age: 50
End: 2020-01-28
Payer: COMMERCIAL

## 2020-01-30 ENCOUNTER — HOSPITAL ENCOUNTER (OUTPATIENT)
Dept: PHYSICAL THERAPY | Age: 50
Discharge: HOME | End: 2020-01-30
Payer: COMMERCIAL

## 2020-01-30 PROCEDURE — 97140 MANUAL THERAPY 1/> REGIONS: CPT

## 2020-01-30 PROCEDURE — 97110 THERAPEUTIC EXERCISES: CPT

## 2020-01-30 NOTE — PROGRESS NOTES
PT DAILY TREATMENT NOTE 10-18    Patient Name: Maximino Marquez  Date:2020  : 1970  [x]  Patient  Verified  Payor: Sigrid Johnston / Plan: Thuy Meyer / Product Type: HMO /    In time:731  Out time:809  Total Treatment Time (min): 39  Visit #: 5 of 9    Medicare/BCBS Only   Total Timed Codes (min):  39 1:1 Treatment Time:  29       Treatment Area: Low back pain [M54.5]    SUBJECTIVE  Pain Level (0-10 scale): 0  Any medication changes, allergies to medications, adverse drug reactions, diagnosis change, or new procedure performed?: [x] No    [] Yes (see summary sheet for update)  Subjective functional status/changes:   [] No changes reported  Pt reports she is mainly having some pain after prolonged standing but is doing good overall. OBJECTIVE    Modality rationale:    Min Type Additional Details    [] Estim:  []Unatt       []IFC  []Premod                        []Other:  []w/ice   []w/heat  Position:  Location:    [] Estim: []Att    []TENS instruct  []NMES                    []Other:  []w/US   []w/ice   []w/heat  Position:  Location:    []  Traction: [] Cervical       []Lumbar                       [] Prone          []Supine                       []Intermittent   []Continuous Lbs:  [] before manual  [] after manual    []  Ultrasound: []Continuous   [] Pulsed                           []1MHz   []3MHz W/cm2:  Location:    []  Iontophoresis with dexamethasone         Location: [] Take home patch   [] In clinic    []  Ice     []  heat  []  Ice massage  []  Laser   []  Anodyne Position:  Location:    []  Laser with stim  []  Other:  Position:  Location:    []  Vasopneumatic Device Pressure:       [] lo [] med [] hi   Temperature: [] lo [] med [] hi   [] Skin assessment post-treatment:  []intact []redness- no adverse reaction    []redness  adverse reaction:     31 min Therapeutic Exercise:  [x]? ? See flow sheet :   Rationale: increase ROM, increase strength and increase proprioception to improve the patients ability to perform ADL's.     8 min Manual Therapy:  Myofascial stick to left piriformis and glutes, TrP release left glute max   Rationale: decrease pain, increase ROM, increase tissue extensibility and decrease trigger points to improve ease of performing ADL's. With   [] TE   [] TA   [] neuro   [] other: Patient Education: [x] Review HEP    [] Progressed/Changed HEP based on:   [] positioning   [] body mechanics   [] transfers   [] heat/ice application    [] other:      Other Objective/Functional Measures: increased reps per flow sheet     Pain Level (0-10 scale) post treatment: 0    ASSESSMENT/Changes in Function: pt is progressing well with PT, pain level seems controlled, possible discharge following next session. Patient will continue to benefit from skilled PT services to modify and progress therapeutic interventions, address functional mobility deficits, address ROM deficits, address strength deficits and analyze and address soft tissue restrictions to attain remaining goals. []  See Plan of Care  []  See progress note/recertification  []  See Discharge Summary         Progress towards goals / Updated goals:  Updated Goals: to be achieved in 4 weeks:               1. Improve FOTO score to 66 to improve ability for functional tasks.              PN- 43               2. Pt will report no difficulty with getting in and out of bed.              PN-  a little difficulty              Current: progressing -MET  no difficulty reported - 1-30-20               3. Pt will report no difficulty with lifting a box of groceries from the floor              PN- moderate difficulty              Current: Met, Pt reports no difficulty lifting a box of groceries from the floor. 1/21/2020               4. Pt will demonstrate 4+/5 glute strength to improve ability for job activities.                PN- 4-/5              Current; progressing 4/5 to 4+/5 on 01-23-20    PLAN  []  Upgrade activities as tolerated     [x]  Continue plan of care  []  Update interventions per flow sheet       []  Discharge due to:_  []  Other:_      [facility], PT 1/30/2020  7:46 AM    Future Appointments   Date Time Provider Yi Osborn   2/3/2020  7:00 AM Chiquita Cano, PT [facility]

## 2020-02-03 ENCOUNTER — HOSPITAL ENCOUNTER (OUTPATIENT)
Dept: PHYSICAL THERAPY | Age: 50
Discharge: HOME | End: 2020-02-03
Payer: COMMERCIAL

## 2020-02-03 PROCEDURE — 97110 THERAPEUTIC EXERCISES: CPT

## 2020-02-03 PROCEDURE — 97140 MANUAL THERAPY 1/> REGIONS: CPT

## 2020-02-03 NOTE — PROGRESS NOTES
[facility]  [address]  [place], [address]  [phone] fax    Physical Therapy Discharge Summary  Patient name: Margarita Cannon Start of Care: 12/12/2019   Referral Juan Perla,* [facility]: 4/[id number]/[id number]                Medical Diagnosis: Low back pain [M54.5]  Payor: Bonilla Whitley / Plan: Laura Else / Product Type: HMO /  Onset Date:November 2019                Treatment Diagnosis: LBP   Prior Hospitalization: see medical history Provider#: [id number]   Medications: Verified on Patient summary List    Comorbidities: OA, right shoulder RTC repair   Prior Level of Function: functionally I with all activities    Visits from Start of Care: 8    Missed Visits: 1  Reporting Period : 12-12-19 to 2-3-20      Summary of Care:  pt has progressed well with PT and feels she is now ready for discharge to [facility]. Progress towards goals :               1. Improve FOTO score to 66 to improve ability for functional tasks.              PN- 43   Current: progressed but not met 59 on 2-3-20               2. Pt will report no difficulty with getting in and out of bed.              PN-  a little difficulty              Current: -MET  no difficulty reported - 1-30-20               3. Pt will report no difficulty with lifting a box of groceries from the floor              PN- moderate difficulty              Current: Met, Pt reports no difficulty lifting a box of groceries from the floor. 1/21/2020               4. Pt will demonstrate 4+/5 glute strength to improve ability for job activities.                PN- 4-/5              Current; MET 4+/5 on 2-3-20      ASSESSMENT/RECOMMENDATIONS:  [x]Discontinue therapy: [x]Patient has reached or is progressing toward set goals      []Patient is non-compliant or has abdicated      []Due to lack of appreciable progress towards set [address], PT 2/3/2020 7:38 AM

## 2020-03-03 ENCOUNTER — HOSPITAL ENCOUNTER (EMERGENCY)
Age: 50
Discharge: STILL ADMITTED OR ADMINISTRATIVE | End: 2020-03-03
Attending: EMERGENCY MEDICINE

## 2020-03-09 ENCOUNTER — OFFICE VISIT (OUTPATIENT)
Dept: ORTHOPEDIC SURGERY | Age: 50
End: 2020-03-09

## 2020-03-09 VITALS
DIASTOLIC BLOOD PRESSURE: 73 MMHG | BODY MASS INDEX: 41.66 KG/M2 | HEIGHT: 62 IN | SYSTOLIC BLOOD PRESSURE: 116 MMHG | HEART RATE: 100 BPM | RESPIRATION RATE: 16 BRPM | TEMPERATURE: 98 F | WEIGHT: 226.4 LBS | OXYGEN SATURATION: 100 %

## 2020-03-09 DIAGNOSIS — M17.12 PRIMARY OSTEOARTHRITIS OF LEFT KNEE: ICD-10-CM

## 2020-03-09 DIAGNOSIS — S83.242D ACUTE MEDIAL MENISCUS TEAR, LEFT, SUBSEQUENT ENCOUNTER: Primary | ICD-10-CM

## 2020-03-09 NOTE — PROGRESS NOTES
Delbert Moreno  1970   Chief Complaint   Patient presents with    Knee Pain     left knee pain        HISTORY OF PRESENT ILLNESS  Delbert Moreno is a 54 y.o. female who presents today for reevaluation of left knee pain. Patient rates pain as 10/10 today. The pain has been present for awhile. At last OV on 12/10/19, patient had a cortisone injection which provided relief for about 1 month. Pain with prolonged walking and standing, stairs. Pt has hx of a knee arthroscopy on the right knee. She works as a  and does a lot of walking for her job. Patient denies any fever, chills, chest pain, shortness of breath or calf pain. The remainder of the review of systems is negative. There are no new illness or injuries to report since last seen in the office. There are no changes to medications, allergies, family or social history. Pain Assessment  3/9/2020   Location of Pain Knee   Location Modifiers Left   Severity of Pain 10   Quality of Pain Throbbing;Aching;Popping   Quality of Pain Comment swelling   Duration of Pain Persistent   Frequency of Pain Constant   Aggravating Factors Walking;Standing;Stairs; Bending;Squatting;Kneeling   Aggravating Factors Comment -   Limiting Behavior -   Relieving Factors NSAID   Relieving Factors Comment -   Result of Injury No   Work-Related Injury -   How long out of work? -   Type of Injury -   Type of Injury Comment -     PHYSICAL EXAM:   Visit Vitals  /73 (BP 1 Location: Right arm, BP Patient Position: Sitting)   Pulse 100   Temp 98 °F (36.7 °C) (Oral)   Resp 16   Ht 5' 2\" (1.575 m)   Wt 226 lb 6.4 oz (102.7 kg)   SpO2 100%   BMI 41.41 kg/m²     The patient is a well-developed, well-nourished female   in no acute distress. The patient is alert and oriented times three. The patient is alert and oriented times three.  Mood and affect are normal.  LYMPHATIC: lymph nodes are not enlarged and are within normal limits  SKIN: normal in color and non tender to palpation. There are no bruises or abrasions noted. NEUROLOGICAL: Motor sensory exam is within normal limits. Reflexes are equal bilaterally. There is normal sensation to pinprick and light touch  MUSCULOSKELETAL:  Examination Left knee   Skin Intact   Range of motion 0-130   Effusion +   Medial joint line tenderness +   Lateral joint line tenderness +   Tenderness Pes Bursa -   Tenderness insertion MCL -   Tenderness insertion LCL -   Razias -   Patella crepitus +   Patella grind -   Lachman -   Pivot shift -   Anterior drawer -   Posterior drawer -   Varus stress -   Valgus stress -   Neurovascular Intact   Calf Swelling and Tenderness to Palpation -   Re's Test -   Hamstring Cord Tightness -       IMAGING: XR of left knee dated 12/10/19 was reviewed and read by Dr. Allison Hassan: Marked degenerative arthritis in the medial compartment and patellofemoral joint. MRI of left knee dated 2/12/19 was reviewed and read: IMPRESSION:   Medial meniscus posterior horn through body segment complex degeneration/tearing with partial extrusion of the body segment.  -In the outer medial compartment there is notable subchondral marrow edema, possibly degenerative or a subtle subchondral fracture difficult to exclude.   -Patchy high-grade to full-thickness cartilage loss throughout the medial compartment.  -These findings of clearly worsened since 2011. Patellofemoral compartment with patchy full-thickness cartilage loss. In the lateral compartment there is a single high-grade to full-thickness fissure in the posterior weightbearing condyle. -These findings have clearly worsened since 2011. Moderate joint effusion. Mild Hoffa's fat pad edema. XR of the left knee dated 1/10/19 was reviewed and read: decreased joint space on the medial side, left worst than right    IMPRESSION:      ICD-10-CM ICD-9-CM    1.  Acute medial meniscus tear, left, subsequent encounter S83.242D V58.89 MRI KNEE LT WO CONT 836.0    2. Primary osteoarthritis of left knee M17.12 715.16         PLAN:   1. Pt presents today with left knee pain and I am ordering an MRI to r/o a medial meniscus tear. Risk factors include: BMI>40, primary OA  2. No ultrasound exam indicated today  3. No cortisone injection indicated today  4. No Physical/Occupational Therapy indicated today  5. Yes diagnostic test indicated today: MRI L KNEE  6. No durable medical equipment indicated today  7. No referral indicated today   8. No medications indicated today  9. No Narcotic indicated today     RTC following MRI      Scribed by Michael Lee[address] [place] [address] [address]) as dictated by Jesus Harding MD    I, Dr. Jessu Harding, confirm that all documentation is accurate.     Jesus Harding M.D.   [facility]

## 2020-03-09 NOTE — PATIENT INSTRUCTIONS
If we order a Diagnostic test (such as MRI or CT) during your office visit please see below: 
  
Coordination of Care will be calling you to schedule your diagnostic test. If you have not heard from Coordination of Care within 5 business days, please call [phone]. Once you have a date scheduled for your diagnostic test, you will need to contact our office to schedule a follow up appointment, as this is when the physician will review your diagnostic test results with you. You can contact our office to schedule appointment by phone at [phone], or you can send a message via [facility] to request an appointment.

## 2020-03-19 ENCOUNTER — TELEPHONE (OUTPATIENT)
Dept: ORTHOPEDIC SURGERY | Age: 50
End: 2020-03-19

## 2020-03-19 NOTE — TELEPHONE ENCOUNTER
Christos Bacon from [facility] scheduling provided peer to peer info for 3/21 MRI:    Please call [facility] at [phone], follow prompts, reference ID [id number].

## 2020-03-20 NOTE — TELEPHONE ENCOUNTER
Spoke with patient, she states it is the left knee. I informed her that we already have an MRI from Feb 2019. She states she wanted to have a knee scope done in June, so she states she will contact our office and schedule f/u appt to set up surgery later in May.

## 2020-04-30 ENCOUNTER — TELEPHONE (OUTPATIENT)
Dept: FAMILY MEDICINE CLINIC | Age: 50
End: 2020-04-30

## 2020-04-30 NOTE — TELEPHONE ENCOUNTER
Patient called for info on where to be tested for covid, states that her family had been exposed due her being near her brother and the nurse that cared for him has tested positive and currently hospitalized,  Please adv [phone]

## 2020-05-18 ENCOUNTER — VIRTUAL VISIT (OUTPATIENT)
Dept: ORTHOPEDIC SURGERY | Age: 50
End: 2020-05-18

## 2020-05-18 DIAGNOSIS — S83.242D ACUTE MEDIAL MENISCUS TEAR, LEFT, SUBSEQUENT ENCOUNTER: Primary | ICD-10-CM

## 2020-05-18 RX ORDER — IBUPROFEN 800 MG/1
800 TABLET ORAL
Qty: 60 TAB | Refills: 1 | Status: SHIPPED | OUTPATIENT
Start: 2020-05-18 | End: 2020-10-18

## 2020-05-18 NOTE — PROGRESS NOTES
Chip Jhaveri is a 54 y.o. female who was seen by synchronous (real-time) audio-video technology on 5/18/2020 via doxy. me. The visit was performed by myself in the office while the patient was at home. Octavio Dubon LPN helped to initiate the visit and was present during entire visit. Subjective:   Chip Jhaveri is a 54 y.o. female who presents today for reevaluation of left knee. Pain score 6/10. Pain is described as sharp, achy, throbbing pain that is constant in nature. Pain is worse with stairs, squatting and bending. Pt states she had MRI done in Feb 2019. Pain has worsened since then. She states she had an injury at the gym in Nov 2019 that caused the left knee pain to worsen. Pt has tried taking 800 mg ibuprofen and tried cortisone injections previously, with no relief of pain. She is here for virtual visit to discuss setting up surgery for her left knee. Patient denies any fever, chills, chest pain, shortness of breath or calf pain. The remainder of the review of systems is negative. There are no new illness or injuries to report since last seen in the office. No changes in medications, allergies, social or family history. Prior to Admission medications    Medication Sig Start Date End Date Taking? Authorizing Provider   meloxicam (MOBIC) 15 mg tablet Take 1 Tab by mouth daily (with breakfast). 12/10/19   Angelica Solomon MD   baclofen (LIORESAL) 10 mg tablet Take 1 Tab by mouth two (2) times a day. 12/10/19   Angelica Solomon MD   ibuprofen (MOTRIN) 800 mg tablet Take 1 Tab by mouth every eight (8) hours as needed for Pain. 8/8/19   Zoila Covington PA   acyclovir (ZOVIRAX) 5 % ointment Apply every 3 hrs up to 5x/day 3/4/19   Janusz Seymour MD   Lisdexamfetamine (VYVANSE) 40 mg capsule Take by mouth daily.     Provider, Historical     Allergies   Allergen Reactions    Codeine Nausea and Vomiting     Patient states she gets jittery, has upset stomach ROS      Objective:     General: alert, cooperative, no distress   Mental  status: mental status: alert, oriented to person, place, and time, normal mood, behavior, speech, dress, motor activity, and thought processes   Resp: resp: normal effort and no respiratory distress   Neuro: neuro: no gross deficits   Skin: skin: no discoloration or lesions of concern on visible areas   ORTHO exam of :left knee  Diffuse swelling  Due to this being a TeleHealth evaluation, many elements of the physical examination are unable to be assessed. IMAGING:  XR of left knee taken in office on 12/10/19 was reviewed and read by Dr. Schuler Call: Marked degenerative arthritis in the medial compartment and patellofemoral joint. MRI of left knee done on 2/12/19 at [facility], read and reviewed by Dr. Schuler Call:   IMPRESSION:   Medial meniscus posterior horn through body segment complex degeneration/tearing with partial extrusion of the body segment.  -In the outer medial compartment there is notable subchondral marrow edema, possibly degenerative or a subtle subchondral fracture difficult to exclude.   -Patchy high-grade to full-thickness cartilage loss throughout the medial compartment.  -These findings of clearly worsened since 2011. Patellofemoral compartment with patchy full-thickness cartilage loss. In the lateral compartment there is a single high-grade to full-thickness fissure in the posterior weightbearing condyle. -These findings have clearly worsened since 2011. Moderate joint effusion. Mild Hoffa's fat pad edema. Assessment & Plan:   Diagnoses and all orders for this visit:    1. Acute medial meniscus tear, left, subsequent encounter            1. We will schedule surgery for medial meniscus tear   Risk factors include: obesity  2. No cortisone injection indicated today   3. No Physical/Occupational Therapy indicated today  4. No diagnostic test indicated today:   5.  No durable medical equipment indicated today  6. No referral indicated today   7. Yes medications indicated today: 800 mg ibuprofen  8. No Narcotic indicated today      I discussed the risks and benefits and potential adverse outcomes of both operative vs non operative treatment of left knee medial meniscus tear with the patient and patient wishes to proceed with left knee arthroscopic partial medial menisectomy. Risks of operative intervention include but not limited to bleeding, infection, deep vein thrombosis, pulmonary embolism, death, limb length discrepancy, reflexive sympathetic dystrophy, fat embolism syndrome,damage to blood vessels and nerves, malunion, non-union, delayed union, failure of hardware, post traumatic arthritis, stroke, heart attack, and death. Patient understands that infection may arise and may require numerous surgeries. The patient was counseled at length about the risks of ford Covid-19 during their perioperative period and any recovery window from their procedure. The patient was made aware that ford Covid-19  may worsen their prognosis for recovering from their procedure and lend to a higher morbidity and/or mortality risk. All material risks, benefits, and reasonable alternatives including postponing the procedure were discussed. The patient does  wish to proceed with the procedure at this time. History and physical exam to be preformed at a later date. RTC for H&P     We discussed the expected course, resolution and complications of the diagnosis(es) in detail. Medication risks, benefits, costs, interactions, and alternatives were discussed as indicated. I advised her to contact the office if her condition worsens, changes or fails to improve as anticipated. She expressed understanding with the diagnosis(es) and plan.      CPT Codes 38774-93112 for Established Patients may apply to this Telehealth Visit  Time-based coding, delete if not needed: I spent at least 15 minutes with this established patient, and >50% of the time was spent counseling and/or coordinating care regarding left knee     Pursuant to the emergency declaration under the [facility], [facility] waiver authority and the [employer] Act, this Virtual  Visit was conducted, with patient's consent, to reduce the patient's risk of exposure to COVID-19 and provide continuity of care for an established patient. Services were provided through a video synchronous discussion virtually to substitute for in-person clinic visit.       Linnette Waller [facility]

## 2020-05-18 NOTE — PROGRESS NOTES
Danyelle Pierre is a 54 y.o. female evaluated via telephone on 5/18/2020. Consent:  She and/or health care decision maker is aware that that she may receive a bill for this telephone service, depending on her insurance coverage, and has provided verbal consent to proceed: No - Not billable      Documentation:  I communicated with the patient and/or health care decision maker about yes. Details of this discussion including any medical advice provided: yes      I affirm this is a Patient Initiated Episode with an Established Patient who has not had a related appointment within my department in the past 7 days or scheduled within the next 24 hours. Note: not billable if this call serves to triage the patient into an appointment for the relevant concern    Since your last office visit have you had any    1. New medical diagnoses No  2. Changes in your medications  No  3. Surgical procedures No  4. Changes in your Allergies to medication No  5.  What is your pain level today 6/10     Jeremy Gamez LPN

## 2020-05-26 ENCOUNTER — HOSPITAL ENCOUNTER (OUTPATIENT)
Dept: LAB | Age: 50
Discharge: HOME | End: 2020-05-26

## 2020-05-26 ENCOUNTER — HOSPITAL ENCOUNTER (OUTPATIENT)
Dept: LAB | Age: 50
Discharge: HOME | End: 2020-05-26
Payer: COMMERCIAL

## 2020-05-26 DIAGNOSIS — Z01.818 PREOP EXAMINATION: Primary | ICD-10-CM

## 2020-05-26 DIAGNOSIS — Z01.818 PREOP EXAMINATION: ICD-10-CM

## 2020-05-26 LAB — XX-LABCORP SPECIMEN COL,LCBCF: NORMAL

## 2020-05-26 PROCEDURE — 99001 SPECIMEN HANDLING PT-LAB: CPT

## 2020-05-26 PROCEDURE — 93005 ELECTROCARDIOGRAM TRACING: CPT

## 2020-05-27 NOTE — PATIENT INSTRUCTIONS
Dr. Raoul Avendano Knee Arthroscopy Surgery    What is the surgery? - This is an outpatient procedure at either [place] [address] or [address] [place], [place] [address] will be completely asleep for procedure. Dr. Raoul Avendano will make 2 small incisions in your knee. He will take a tour of your knee with the camera and then address the meniscal tear(s). We will be able to evaluate if any arthritis in your knee but this surgery is not to treat your arthritis. - Total surgery takes about 25-30 mins     What can you expect after surgery? - You will have a bulky dressing on your knee that you can remove 2 days after surgery. You will be able to shower 2 days after surgery but no soaking in a bath, hot tub, ocean or pool x 2 weeks to allow for full wound healing. No special brace is needed. - You will be on crutches or a walker when you leave the hospital. You can place weight on your leg as tolerated starting immediately. You are usually on crutches or your walker for about 4-5 days.   - Even though you can place weight on your leg, we recommend no walking or standing longer than 10mins for the first week. We will gradually increase your activities after that point.    - Dr. Raoul Avendano will start physical therapy for you when you return for your 1 week post op apt  - It will take your 6-8 weeks to fully recover from your surgery. When can I return to work? - Most patients return to desk work only after 1-2 weeks. We recommend no prolonged walking or standing, climbing, kneeling, or crawling x 6-8 weeks. Not all knee arthroscopies are the same. The specifics of your individual case will be discussed at length with you by Dr. Raoul Avendano and his Physician Assistant. Kaelyn Caal  Surgical Coordinator  [facility] [address] [place].  [address], [address] [email or web address] [email or web address]  P: [phone]  F: [phone]

## 2020-05-28 ENCOUNTER — OFFICE VISIT (OUTPATIENT)
Dept: ORTHOPEDIC SURGERY | Age: 50
End: 2020-05-28

## 2020-05-28 VITALS
DIASTOLIC BLOOD PRESSURE: 88 MMHG | HEIGHT: 62 IN | TEMPERATURE: 97.8 F | OXYGEN SATURATION: 100 % | WEIGHT: 234 LBS | HEART RATE: 88 BPM | SYSTOLIC BLOOD PRESSURE: 109 MMHG | BODY MASS INDEX: 43.06 KG/M2 | RESPIRATION RATE: 16 BRPM

## 2020-05-28 DIAGNOSIS — S83.242D ACUTE MEDIAL MENISCUS TEAR, LEFT, SUBSEQUENT ENCOUNTER: Primary | ICD-10-CM

## 2020-05-28 LAB
ATRIAL RATE: 78 BPM
CALCULATED P AXIS, ECG09: 23 DEGREES
CALCULATED R AXIS, ECG10: 33 DEGREES
CALCULATED T AXIS, ECG11: 51 DEGREES
DIAGNOSIS, 93000: NORMAL
P-R INTERVAL, ECG05: 140 MS
Q-T INTERVAL, ECG07: 352 MS
QRS DURATION, ECG06: 86 MS
QTC CALCULATION (BEZET), ECG08: 401 MS
VENTRICULAR RATE, ECG03: 78 BPM

## 2020-05-28 RX ORDER — HYDROCODONE BITARTRATE AND ACETAMINOPHEN 7.5; 325 MG/1; MG/1
1 TABLET ORAL
Qty: 40 TAB | Refills: 0 | Status: SHIPPED | OUTPATIENT
Start: 2020-05-28 | End: 2020-06-04

## 2020-05-28 NOTE — PROGRESS NOTES
HISTORY AND PHYSICAL          Patient: Irina Braga                MRN: [id number]       SSN: [id number]  YOB: 1970          AGE: 54 y.o. SEX: female      Patient scheduled for:  Left knee arthroscopic partial medial menisectomy    Surgeon: Leopoldo Downs MD    ANESTHESIA TYPE:  General    HISTORY:     The patient was seen in the office today for a preoperative history and physical for an upcoming above listed surgery. The patient is a pleasant 54 y.o. female who has a history of left knee pain. Pain score 7/10. Pain is described as sharp, achy, throbbing pain that is constant in nature. Pain is worse with stairs, squatting and bending. Pt states she had MRI done in Feb 2019. Pain has worsened since then. She states she had an injury at the gym in Nov 2019 that caused the left knee pain to worsen. Pt has tried taking 800 mg ibuprofen and tried cortisone injections previously, with no relief of pain. Due to the current findings, affected activity of daily living and continued pain and discomfort, surgical intervention is indicated. The alternatives, risks, and complications, including but not limited to infection, blood loss, need for blood transfusion, neurovascular damage, manuela-incisional numbness, subcutaneous hematoma, bone fracture, anesthetic complications, DVT, PE, death, RSD, postoperative stiffness and pain, possible surgical scar, delayed healing and nonhealing, reflexive sympathetic dystrophy, damage to blood vessels and nerves, need for more surgery, MI, and stroke,  failure of hardware, gait disturbances,have been discussed. The patient understands and wishes to proceed with surgery.      PAST MEDICAL HISTORY:     Past Medical History:   Diagnosis Date    ADD (attention deficit disorder)     Arthritis     knees, R shoulder    Depression     Left foot pain     Plantar fasciitis, left     Right shoulder pain        CURRENT MEDICATIONS:     Current Outpatient Medications   Medication Sig Dispense Refill    HYDROcodone-acetaminophen (Norco) 7.5-325 mg per tablet Take 1 Tab by mouth every four (4) hours as needed for Pain for up to 7 days. Max Daily Amount: 6 Tabs. Do not take until after surgery (for acute post operative pain) 40 Tab 0    ibuprofen (MOTRIN) 800 mg tablet Take 1 Tab by mouth every eight (8) hours as needed for Pain. 60 Tab 1    meloxicam (MOBIC) 15 mg tablet Take 1 Tab by mouth daily (with breakfast). 30 Tab 1    baclofen (LIORESAL) 10 mg tablet Take 1 Tab by mouth two (2) times a day. 60 Tab 1    ibuprofen (MOTRIN) 800 mg tablet Take 1 Tab by mouth every eight (8) hours as needed for Pain. 60 Tab 2    acyclovir (ZOVIRAX) 5 % ointment Apply every 3 hrs up to 5x/day 5 g 1    Lisdexamfetamine (VYVANSE) 40 mg capsule Take by mouth daily. ALLERGIES:     Allergies   Allergen Reactions    Codeine Nausea and Vomiting     Patient states she gets jittery, has upset stomach          SURGICAL HISTORY:     Past Surgical History:   Procedure Laterality Date    HX TUBAL LIGATION  2009    Tubal ligation       SOCIAL HISTORY:     Social History     Socioeconomic History    Marital status:      Spouse name: Not on file    Number of children: Not on file    Years of education: Not on file    Highest education level: Not on file   Occupational History    Occupation: [employer]     Employer: [employer]   Tobacco Use    Smoking status: Never Smoker    Smokeless tobacco: Never Used   Substance and Sexual Activity    Alcohol use: No    Drug use: No    Sexual activity: Yes     Partners: Male       FAMILY HISTORY:     Family History   Problem Relation Age of Onset    Hypertension Mother     Hypertension Father     Arthritis-osteo Other        REVIEW OF SYSTEMS:     Negative for fevers, chills, chest pain, shortness of breath, weight loss, recent illness     General: Negative for fever and chills. No unexpected change in weight. Denies fatigue.  No change in appetite. Skin: Negative for rash or itching. HEENT: Negative for congestion, sore throat, neck pain and neck stiffness. No change in vision or hearing. Hasn't noted any enlarged lymph nodes in the neck. Cardiovascular:  Negative for chest pain and palpitations. Has not noted pedal edema. Respiratory: Negative for cough, colds, sinus, hemoptysis, shortness of breath and wheezing. Gastrointestinal: Negative for nausea and vomiting, rectal bleeding, coffee ground emesis, abdominal pain, diarrhea and constipation. Genitourinary: Negative for dysuria, frequency urgency, or burning on micturition. No flank pain, no foul smelling urine, no difficulty with initiating urination. Hematological: Negative for bleeding or easy bruising. Musculoskeletal: Negative  for arthralgias, back pain or neck pain. Neurological: Negative for dizziness, seizures or syncopal episodes. Denies headaches. Endocrine: Denies excessive thirst.  No heat/cold intolerance. Psychiatric: Negative for depression or insomnia. PHYSICAL EXAMINATION:     VITALS:   Visit Vitals  /88 (BP 1 Location: Left arm, BP Patient Position: Sitting)   Pulse 88   Temp 97.8 °F (36.6 °C) (Oral)   Resp 16   Ht 5' 2\" (1.575 m)   Wt 234 lb (106.1 kg)   SpO2 100%   BMI 42.80 kg/m²     GEN:  Well developed, well nourished 54 y.o. female in no acute distress. HEENT: Normocephalic and atraumatic. Eyes: Conjunctivae and EOM are normal.Pupils are equal, round, and reactive to light. External ear normal appearance, external nose normal appearing. Mouth/Throat: Oropharynx is clear and moist, able to handle oral secretions w/out difficulty, airway patent  NECK: Supple. Normal ROM, No lymphadenopathy. Trachea is midline. No bruising, swelling or deformity  RESP: Clear to auscultation bilaterally. No wheezes, rales, rhonchi. Normal effort and breath sounds. No respiratory distress  CARDIO:  Normal rate, regular rhythm and normal heart sounds.  No MGR.  ABDOMEN: Soft, non-tender, non-distended, normoactive bowel sounds in all four quadrants. There is no tenderness. There is no rebound and no guarding. BACK: No CVA or spinal tenderness  BREAST:  Deferred  PELVIC:    Deferred   RECTAL:  Deferred   :           Deferred  EXTREMITIES: EXAMINATION OF: left knee  Examination Left knee   Skin Intact   Range of motion 0-120   Effusion +   Medial joint line tenderness +   Lateral joint line tenderness -   Tenderness Pes Bursa -   Tenderness insertion MCL -   Tenderness insertion LCL -   Razias +   Patella crepitus -   Patella grind -   Lachman -   Pivot shift -   Anterior drawer -   Posterior drawer -   Varus stress -   Valgus stress -   Neurovascular Intact   Calf Swelling and Tenderness to Palpation -   Re's Test -   Hamstring Cord Tightness -       NEUROVASCULAR: Sensation intact to light touch and strength grossly intact and symmetrical. No nystagmus. Positive distal pulses and capillary refill. DVT ASSESSMENT:  There is not  calf tenderness. No evidence of DVT seen on physical exam.  MOTOR: In tact  PSYCH: Alert an oriented to person, place and time.  Mood, memory, affect, behavior and judgment normal       RADIOGRAPHS & DIAGNOSTIC STUDIES:     MRI/xray reveals : XR of left knee taken in office on 12/10/19 was reviewed and read by Dr. Andree Bryant: Marked degenerative arthritis in the medial compartment and patellofemoral joint.     MRI of left knee done on 2/12/19 at [facility], read and reviewed by Dr. Andree Bryant:   IMPRESSION:   Medial meniscus posterior horn through body segment complex degeneration/tearing with partial extrusion of the body segment.  -In the outer medial compartment there is notable subchondral marrow edema, possibly degenerative or a subtle subchondral fracture difficult to exclude.   -Patchy high-grade to full-thickness cartilage loss throughout the medial compartment.  -These findings of clearly worsened since 2011.  Patellofemoral compartment with patchy full-thickness cartilage loss. In the lateral compartment there is a single high-grade to full-thickness fissure in the posterior weightbearing condyle. -These findings have clearly worsened since 2011. Moderate joint effusion. Mild Hoffa's fat pad edema.         LABS:       @  CBC:   Lab Results   Component Value Date/Time    WBC 7.9 03/17/2017 09:18 AM    RBC 4.14 03/17/2017 09:18 AM    HGB 12.1 05/09/2018 12:00 AM    HCT 36.1 05/09/2018 12:00 AM    PLATELET 281 95/06/4767 09:18 AM    and BMP:   Lab Results   Component Value Date/Time    Glucose 79 05/09/2018 12:00 AM    Sodium 141 05/09/2018 12:00 AM    Potassium 4.3 05/09/2018 12:00 AM    Chloride 103 05/09/2018 12:00 AM    CO2 25 05/09/2018 12:00 AM    BUN 6 05/09/2018 12:00 AM    Creatinine 0.69 05/09/2018 12:00 AM    Calcium 9.0 05/09/2018 12:00 AM   @    Preoperative labs were reviewed and are substantially within normal limits       ASSESSMENT:       Encounter Diagnosis   Name Primary?  Acute medial meniscus tear, left, subsequent encounter Yes       PLAN:     Again, the alternatives, risks, and complications, as well as expected outcome were discussed. The patient understands and agrees to proceed with left knee arthroscopic partial medial menisectomy. The patient was counseled at length about the risks of ford Covid-19 during their perioperative period and any recovery window from their procedure. The patient was made aware that ford Covid-19  may worsen their prognosis for recovering from their procedure and lend to a higher morbidity and/or mortality risk. All material risks, benefits, and reasonable alternatives including postponing the procedure were discussed. The patient does  wish to proceed with the procedure at this time.    Patient given orders listed below:    Orders Placed This Encounter    EKG, 12 LEAD, INITIAL    HYDROcodone-acetaminophen (Norco) 7.5-325 mg per tablet Sanju Martinez PA-C  5/28/2020  8:33 AM

## 2020-05-29 ENCOUNTER — DOCUMENTATION ONLY (OUTPATIENT)
Dept: ORTHOPEDIC SURGERY | Age: 50
End: 2020-05-29

## 2020-05-29 NOTE — PROGRESS NOTES
Patient stopped at the  to drop off an attending physicians statement.  Patient can be reached at [phone]

## 2020-06-04 ENCOUNTER — DOCUMENTATION ONLY (OUTPATIENT)
Dept: ORTHOPEDIC SURGERY | Age: 50
End: 2020-06-04

## 2020-06-04 NOTE — PROGRESS NOTES
[employer] form faxed with confirmation, copy to scanning and available for  at [facility] location.   FMLA form completed, copy to scanning, available for pickup at [facility] location

## 2020-06-08 ENCOUNTER — OFFICE VISIT (OUTPATIENT)
Dept: ORTHOPEDIC SURGERY | Age: 50
End: 2020-06-08

## 2020-06-08 VITALS — WEIGHT: 239.4 LBS | HEIGHT: 62 IN | BODY MASS INDEX: 44.05 KG/M2 | TEMPERATURE: 98.3 F

## 2020-06-08 DIAGNOSIS — S83.242D ACUTE MEDIAL MENISCUS TEAR, LEFT, SUBSEQUENT ENCOUNTER: Primary | ICD-10-CM

## 2020-06-08 DIAGNOSIS — M19.011 GLENOHUMERAL ARTHRITIS, RIGHT: ICD-10-CM

## 2020-06-08 RX ORDER — IBUPROFEN 800 MG/1
800 TABLET ORAL
Qty: 60 TAB | Refills: 2 | Status: SHIPPED | OUTPATIENT
Start: 2020-06-08 | End: 2020-08-11 | Stop reason: SDUPTHER

## 2020-06-08 NOTE — PROGRESS NOTES
1. Have you been to the ER, urgent care clinic since your last visit? Hospitalized since your last visit? No    2. Have you seen or consulted any other health care providers outside of the [address] [place] [address] since your last visit? Include any pap smears or colon screening.  No

## 2020-06-08 NOTE — PROGRESS NOTES
Patient: Tessa Arvizu  YOB: 1970       HISTORY:  The patient presents for reevaluation of her left knee status post arthroscopic partial medial and lateral  menisectomy with grade III chondromalacia on 6/1/2020. Patient is improved, states pain is a 2 out of 10.  she has not gone to physical therapy. Patient denies any fever, chills, chest pain, shortness of breath or calf pain. The remainder of the review of systems is negative. There are no new illness or injuries to report since last seen in the office. No changes in medications, allergies, social or family history. PHYSICAL EXAMINATION:    Visit Vitals  Temp 98.3 °F (36.8 °C) (Oral)   Ht 5' 2\" (1.575 m)   Wt 239 lb 6.4 oz (108.6 kg)   BMI 43.79 kg/m²     The patient is a well-developed, well-nourished female in no acute distress. The patient is alert and oriented times three. The patient appears to be well groomed. Mood and affect are normal.   ORTHOPEDIC EXAM of Left knee: Inspection: Effusion present,  incisions clean, dry intact, sutures in place  TTP: medial and lateral joint line  Range of motion: 0-100 flexion  Stability: Stable  Strength: 5/5  2+ distal pulses    IMPRESSION:  Status post Left knee arthroscopic partial medial menisectomy with degen arthritis with joint space narrowing. PLAN: Incisions cleaned. Surgery was discussed at length today. Stressed to patient that nothing causes an increase in pain or swelling. Patient is weight bearing as tolerated. OK to d/c use of crutches/walker. Will set up with physical therapy. Patient does not request refill of pain medication. Patient will follow up in 3 weeks.     Sheron Dandy, PA-C [phone]

## 2020-06-10 ENCOUNTER — HOSPITAL ENCOUNTER (OUTPATIENT)
Dept: PHYSICAL THERAPY | Age: 50
Discharge: HOME | End: 2020-06-10
Payer: COMMERCIAL

## 2020-06-10 PROCEDURE — 97110 THERAPEUTIC EXERCISES: CPT

## 2020-06-10 PROCEDURE — 97161 PT EVAL LOW COMPLEX 20 MIN: CPT

## 2020-06-10 NOTE — PROGRESS NOTES
PT DAILY TREATMENT NOTE 10-18    Patient Name: Suzanne Ward  Date:6/10/2020  : 1970  [x]  Patient  Verified  Payor: Melinda Harvey / Plan: Micah Langley / Product Type: HMO /    In time:3:14  Out time:3:45  Total Treatment Time (min): 32  Visit #: 1 of 8    Medicare/BCBS Only   Total Timed Codes (min):  17 1:1 Treatment Time:  32       Treatment Area: Pain in left knee [M25.562]    SUBJECTIVE  Pain Level (0-10 scale): 0  Any medication changes, allergies to medications, adverse drug reactions, diagnosis change, or new procedure performed?: [x] No    [] Yes (see summary sheet for update)  Subjective functional status/changes:   [] No changes reported  The pt reports well controlled pain unless standing/walking too long or climbing stairs    OBJECTIVE    Modality rationale: PD to improve the patients ability to    Min Type Additional Details    [] Estim:  []Unatt       []IFC  []Premod                        []Other:  []w/ice   []w/heat  Position:  Location:    [] Estim: []Att    []TENS instruct  []NMES                    []Other:  []w/US   []w/ice   []w/heat  Position:  Location:    []  Traction: [] Cervical       []Lumbar                       [] Prone          []Supine                       []Intermittent   []Continuous Lbs:  [] before manual  [] after manual    []  Ultrasound: []Continuous   [] Pulsed                           []1MHz   []3MHz W/cm2:  Location:    []  Iontophoresis with dexamethasone         Location: [] Take home patch   [] In clinic    []  Ice     []  heat  []  Ice massage  []  Laser   []  Anodyne Position:  Location:    []  Laser with stim  []  Other:  Position:  Location:    []  Vasopneumatic Device Pressure:       [] lo [] med [] hi   Temperature: [] lo [] med [] hi   [] Skin assessment post-treatment:  []intact []redness- no adverse reaction    []redness  adverse reaction:     15 min [x]Eval                  []Re-Eval       8 min Therapeutic Exercise:  [] See flow sheet : Rationale: increase ROM and increase strength to improve the patients ability to improve ease of ADL performance    7 min Manual Therapy:  Patellar mobs left knee   Rationale: decrease pain and increase ROM to improve ease of ADL performance and gait            With   [] TE   [] TA   [] neuro   [] other: Patient Education: [x] Review HEP    [] Progressed/Changed HEP based on:   [] positioning   [] body mechanics   [] transfers   [] heat/ice application    [] other:      Other Objective/Functional Measures:      Pain Level (0-10 scale) post treatment: 0    ASSESSMENT/Changes in Function: see POC    Patient will continue to benefit from skilled PT services to modify and progress therapeutic interventions, address functional mobility deficits, address ROM deficits, address strength deficits, analyze and address soft tissue restrictions, analyze and cue movement patterns and analyze and modify body mechanics/ergonomics to attain remaining goals. [x]  See Plan of Care  []  See progress note/recertification  []  See Discharge Summary         Progress towards goals / Updated goals:  Short Term Goals: To be accomplished in 2 weeks:  1. Pt will demonstrate I and compliance with HEP to maximize therapeutic effect. IE: HEP issued  2. Pt will improve left knee AROM to 0-120 deg for improved ease of ADLs and transfers. IE: 0-105 deg  Long Term Goals: To be accomplished in 4 weeks:  1. Pt will demonstrate non-antalgic gait pattern without AD use to improve gait efficiency and independence. IE: mild antalgia void of SPC  2. Pt will demonstrate 15 SLR on left without pain to improve quad stability in standing. IE: pain at lateral knee with 3 SLR  3. Pt will negotiate 4 stairs x 2 with reciprocal pattern void of pain increase to improve home and community negotiation. IE: pt reports pain with stairs  4. Pt will report a little difficulty walking 1 mile to demonstrate improved quality of life and function.    IE: extreme difficulty    PLAN  []  Upgrade activities as tolerated     [x]  Continue plan of care  []  Update interventions per flow sheet       []  Discharge due to:_  []  Other:_      [place] JULIANNA LOPEZPT 6/10/2020  3:56 PM    Future Appointments   Date Time Provider Yi Irena   6/16/2020 12:30 PM [place] [address] [facility]   6/18/2020 10:00 AM Salli Henry [facility]   6/22/2020  7:30 AM Cony Esteves, PT [facility]   6/24/2020  8:15 AM Cony Esteves, PT [facility]   6/29/2020  7:30 AM Cony Esteves, PT [facility]   6/29/2020  8:40 AM Slava Oquendo MD [place] [address]   7/1/2020  7:30 AM Salli Henry [facility]   7/6/2020  7:45 AM Ryan Koo, PT [facility]

## 2020-06-10 NOTE — PROGRESS NOTES
[facility]  [address]  [place], [address]  [phone] fax    Plan of Care/ Statement of Necessity for Physical Therapy Services    Patient name: Madan Car Start of Care: 6/10/2020   Referral source: [place], [place] : 1970    Medical Diagnosis: Pain in left knee [M25.562]  Payor: ANNETTE SHEETS / Plan: Leighton Grey / Product Type: HMO /  Onset Date:2020    Treatment Diagnosis: Left knee pain s/p scope   Prior Hospitalization: see medical history Provider#: [id number]   Medications: Verified on Patient summary List    Comorbidities: Right knee scope (), arthritis, right shoulder glenoid resurfacing ()    Prior Level of Function: Pt able to ambulate for > 30 minutes without AD or increased knee pain     The Plan of Care and following information is based on the information from the initial evaluation. Assessment/ key information: The pt is a 55 y/o F presenting s/p left knee arthroscopic menisectomy on . The pt reports known previous small tear that turned into flap tear while performing leg extensions at the gym in 2019. Pt reports mostly limited with ambulation and standing tolerance as well as stair climbing. Pain well controlled overall and good ROM noted aside from some flexion limitations. Port sites appear to be healing well, mild effusion at joint line. The pt is a bit TTP over lateral joint line and patella at this time, limiting her SLR ability. The pt would benefit from skilled PT to improve ROM, strength and pain to return to PLOF.     Evaluation Complexity History MEDIUM  Complexity : 1-2 comorbidities / personal factors will impact the outcome/ POC ; Examination LOW Complexity : 1-2 Standardized tests and measures addressing body structure, function, activity limitation and / or participation in recreation  ;Presentation LOW Complexity : Stable, uncomplicated  ;Clinical Decision Making MEDIUM Complexity : FOTO score of 26-74  Overall Complexity Rating: LOW   Problem List: pain affecting function, decrease ROM, decrease strength, edema affecting function, impaired gait/ balance, decrease ADL/ functional abilitiies, decrease activity tolerance and decrease flexibility/ joint mobility   Treatment Plan may include any combination of the following: Therapeutic exercise, Therapeutic activities, Neuromuscular re-education, Physical agent/modality, Gait/balance training, Manual therapy, Patient education, Self Care training, Functional mobility training and Stair training  Patient / Family readiness to learn indicated by: trying to perform skills  Persons(s) to be included in education: patient (P)  Barriers to Learning/Limitations: None  Patient Goal (s): To walk normally  Patient Self Reported Health Status: excellent  Rehabilitation Potential: good    Short Term Goals: To be accomplished in 2 weeks:  1. Pt will demonstrate I and compliance with HEP to maximize therapeutic effect. 2. Pt will improve left knee AROM to 0-120 deg for improved ease of ADLs and transfers. Long Term Goals: To be accomplished in 4 weeks:  1. Pt will demonstrate non-antalgic gait pattern without AD use to improve gait efficiency and independence. 2. Pt will demonstrate 15 SLR on left without pain to improve quad stability in standing. 3. Pt will negotiate 4 stairs x 2 with reciprocal pattern void of pain increase to improve home and community negotiation. 4. Pt will report a little difficulty walking 1 mile to demonstrate improved quality of life and function. Frequency / Duration: Patient to be seen 2 times per week for 4 weeks.     Patient/ Caregiver education and instruction: Diagnosis, prognosis, self care, activity modification and exercises   [x]  Plan of care has been reviewed with LUCRECIA KRAUST, CMTPT 6/10/2020 3:48 PM    ________________________________________________________________________    I certify that the above Therapy Services are being furnished while the patient is under my care. I agree with the treatment plan and certify that this therapy is necessary.     Physician's Signature:____________Date:_________TIME:________    ** Signature, Date and Time must be completed for valid certification **    Please sign and return to [facility] [address]  [address] [facility] Shon Chirinos [address] [place] [address], [facility] [address]  [phone] fax

## 2020-06-16 ENCOUNTER — HOSPITAL ENCOUNTER (OUTPATIENT)
Dept: PHYSICAL THERAPY | Age: 50
Discharge: HOME | End: 2020-06-16
Payer: COMMERCIAL

## 2020-06-16 PROCEDURE — 97140 MANUAL THERAPY 1/> REGIONS: CPT

## 2020-06-16 PROCEDURE — 97110 THERAPEUTIC EXERCISES: CPT

## 2020-06-16 NOTE — PROGRESS NOTES
PT DAILY TREATMENT NOTE 10-18    Patient Name: Michelle Zendejas  Date:2020  : 1970  [x]  Patient  Verified  Payor: Amber Booth / Plan: Arlette Owens / Product Type: HMO /    In time:12:31  Out time:1:16  Total Treatment Time (min): 39  Visit #: 2 of 8    Medicare/BCBS Only   Total Timed Codes (min):  35 1:1 Treatment Time:  35       Treatment Area: Pain in left knee [M25.562]    SUBJECTIVE  Pain Level (0-10 scale): 3  Any medication changes, allergies to medications, adverse drug reactions, diagnosis change, or new procedure performed?: [x] No    [] Yes (see summary sheet for update)  Subjective functional status/changes:   [] No changes reported  The pt reports her knee is a bit sore and swollen today, she feels it may be related to the weather.  Reports good HEP response    OBJECTIVE    Modality rationale: decrease edema and decrease inflammation to improve the patients ability to perform daily tasks with less swelling and pain   Min Type Additional Details    [] Estim:  []Unatt       []IFC  []Premod                        []Other:  []w/ice   []w/heat  Position:  Location:    [] Estim: []Att    []TENS instruct  []NMES                    []Other:  []w/US   []w/ice   []w/heat  Position:  Location:    []  Traction: [] Cervical       []Lumbar                       [] Prone          []Supine                       []Intermittent   []Continuous Lbs:  [] before manual  [] after manual    []  Ultrasound: []Continuous   [] Pulsed                           []1MHz   []3MHz W/cm2:  Location:    []  Iontophoresis with dexamethasone         Location: [] Take home patch   [] In clinic    []  Ice     []  heat  []  Ice massage  []  Laser   []  Anodyne Position:  Location:    []  Laser with stim  []  Other:  Position:  Location:   10 [x]  Vasopneumatic Device Pressure:       [x] lo [] med [] hi   Temperature: [x] lo [] med [] hi   [] Skin assessment post-treatment:  []intact []redness- no adverse reaction []redness  adverse reaction:         27 min Therapeutic Exercise:  [] See flow sheet :   Rationale: increase ROM and increase strength to improve the patients ability to improve ease of ADL performance      8 min Manual Therapy:  Left patellar mobs in extension & flexion, passive knee flexion stretching, STM quad and popliteal space   Rationale: increase ROM and increase tissue extensibility to improve gait and transfer ease            With   [] TE   [] TA   [] neuro   [] other: Patient Education: [x] Review HEP    [] Progressed/Changed HEP based on:   [] positioning   [] body mechanics   [] transfers   [] heat/ice application    [] other:      Other Objective/Functional Measures:      Pain Level (0-10 scale) post treatment: 0    ASSESSMENT/Changes in Function: Pt with good response to first f/u session. Improved knee flexion mobility noted. Continue with strength progression as knee mobility improves. Patient will continue to benefit from skilled PT services to modify and progress therapeutic interventions, address functional mobility deficits, address ROM deficits, address strength deficits, analyze and address soft tissue restrictions, analyze and cue movement patterns and analyze and modify body mechanics/ergonomics to attain remaining goals. []  See Plan of Care  []  See progress note/recertification  []  See Discharge Summary         Progress towards goals / Updated goals:  Short Term Goals: To be accomplished in 2 weeks:  1. Pt will demonstrate I and compliance with HEP to maximize therapeutic effect. IE: HEP issued   Current: Met- pt reports compliance 6/16/2020  2. Pt will improve left knee AROM to 0-120 deg for improved ease of ADLs and transfers. IE: 0-105 deg   Current: progressing 0-115 deg 6/16/2020  Long Term Goals: To be accomplished in 4 weeks:  1. Pt will demonstrate non-antalgic gait pattern without AD use to improve gait efficiency and independence. IE: mild antalgia void of SPC  2. Pt will demonstrate 15 SLR on left without pain to improve quad stability in standing. IE: pain at lateral knee with 3 SLR  3. Pt will negotiate 4 stairs x 2 with reciprocal pattern void of pain increase to improve home and community negotiation. IE: pt reports pain with stairs  4. Pt will report a little difficulty walking 1 mile to demonstrate improved quality of life and function.               IE: extreme difficulty    PLAN  []  Upgrade activities as tolerated     []  Continue plan of care  []  Update interventions per flow sheet       []  Discharge due to:_  []  Other:_      Janie Salcedo DPT, CMTPT 6/16/2020  12:42 PM    Future Appointments   Date Time Provider Yi Irena   6/18/2020 10:00 AM James [address] [facility]   6/22/2020  7:30 AM Arlette Esteves, PT [facility]   6/24/2020  8:15 AM Arlette Esteves, PT [facility]   6/29/2020  7:30 AM Arlette Esteves, PT [facility]   6/29/2020  8:40 AM Karen Oquendo MD [facility] [address]   7/1/2020  7:30 AM Didi Rodriguez [facility]   7/6/2020  7:45 AM Aaron Bolanos , PT [facility]

## 2020-06-18 ENCOUNTER — HOSPITAL ENCOUNTER (OUTPATIENT)
Dept: PHYSICAL THERAPY | Age: 50
Discharge: HOME | End: 2020-06-18
Payer: COMMERCIAL

## 2020-06-18 PROCEDURE — 97110 THERAPEUTIC EXERCISES: CPT

## 2020-06-18 PROCEDURE — 97140 MANUAL THERAPY 1/> REGIONS: CPT

## 2020-06-18 NOTE — PROGRESS NOTES
PT DAILY TREATMENT NOTE 10-18    Patient Name: Thania Link  Date:2020  : 1970  [x]  Patient  Verified  Payor: Wilberto Villela / Plan: Patrice Roberts / Product Type: HMO /    In time:10:00  Out time:10:45  Total Treatment Time (min): 45  Visit #: 3 of 8    Medicare/BCBS Only   Total Timed Codes (min):  35 1:1 Treatment Time:  35       Treatment Area: Pain in left knee [M25.562]    SUBJECTIVE  Pain Level (0-10 scale): 0  Any medication changes, allergies to medications, adverse drug reactions, diagnosis change, or new procedure performed?: [x] No    [] Yes (see summary sheet for update)  Subjective functional status/changes:   [] No changes reported  \"I feel good today.  It was stiff yesterday and swollen\"    OBJECTIVE    Modality rationale: decrease edema and decrease pain to improve the patients ability to perform daily tasks with less soreness/swelling   Min Type Additional Details    [] Estim:  []Unatt       []IFC  []Premod                        []Other:  []w/ice   []w/heat  Position:  Location:    [] Estim: []Att    []TENS instruct  []NMES                    []Other:  []w/US   []w/ice   []w/heat  Position:  Location:    []  Traction: [] Cervical       []Lumbar                       [] Prone          []Supine                       []Intermittent   []Continuous Lbs:  [] before manual  [] after manual    []  Ultrasound: []Continuous   [] Pulsed                           []1MHz   []3MHz W/cm2:  Location:    []  Iontophoresis with dexamethasone         Location: [] Take home patch   [] In clinic    []  Ice     []  heat  []  Ice massage  []  Laser   []  Anodyne Position:  Location:    []  Laser with stim  []  Other:  Position:  Location:   10 [x]  Vasopneumatic Device Pressure:       [x] lo [] med [] hi   Temperature: [x] lo [] med [] hi   [] Skin assessment post-treatment:  []intact []redness- no adverse reaction    []redness  adverse reaction:         27 min Therapeutic Exercise:  [] See flow sheet :   Rationale: increase ROM and increase strength to improve the patients ability to improve ease of daily tasks and self care      8 min Manual Therapy:  Left patellar mobs medial and inferior in extension and flexion respectively, passive stretching left knee flexion focus, STM left quad and popliteal space    Rationale: increase ROM and increase tissue extensibility to improve gait and transfer ease            With   [] TE   [] TA   [] neuro   [] other: Patient Education: [x] Review HEP    [] Progressed/Changed HEP based on:   [] positioning   [] body mechanics   [] transfers   [] heat/ice application    [] other:      Other Objective/Functional Measures:      Pain Level (0-10 scale) post treatment: 0    ASSESSMENT/Changes in Function: Pt progressing quite well with good pain control and able to progress OK[place] repetitions with good response. She does have some limitation with lateral knee pain in [address], will progress as able to improve stair and ambulation ease. Patient will continue to benefit from skilled PT services to modify and progress therapeutic interventions, address functional mobility deficits, address ROM deficits, address strength deficits, analyze and address soft tissue restrictions, analyze and cue movement patterns and analyze and modify body mechanics/ergonomics to attain remaining goals. []  See Plan of Care  []  See progress note/recertification  []  See Discharge Summary         Progress towards goals / Updated goals:  Short Term Goals: To be accomplished in 2 weeks:  1. Pt will demonstrate I and compliance with HEP to maximize therapeutic effect.              RW: HEP issued              Current: Met- pt reports compliance 6/16/2020  2. Pt will improve left knee AROM to 0-120 deg for improved ease of ADLs and transfers.               IE: 0-105 deg              Current: progressing 0-115 deg 6/16/2020  Long Term Goals: To be accomplished in 4 weeks:  1.  Pt will demonstrate non-antalgic gait pattern without AD use to improve gait efficiency and independence.              IE: mild antalgia void of SPC  2. Pt will demonstrate 15 SLR on left without pain to improve quad stability in standing.              IE: pain at lateral knee with 3 SLR   Current: Met- 15 reps without pain or lag 6/18/2020  3. Pt will negotiate 4 stairs x 2 with reciprocal pattern void of pain increase to improve home and community negotiation.              KD: pt reports pain with stairs  4.  Pt will report a little difficulty walking 1 mile to demonstrate improved quality of life and function.              IE: extreme difficulty    PLAN  [x]  Upgrade activities as tolerated     []  Continue plan of care  []  Update interventions per flow sheet       []  Discharge due to:_  []  Other:_      Zev Edwards DPT, CMTPT 6/18/2020  10:04 AM    Future Appointments   Date Time Provider Yi Osborn   6/22/2020  7:30 AM Vangie Esteves, PT [facility]   6/24/2020  8:15 AM Vangie Esteves, PT [facility]   6/29/2020  7:30 AM Vangie Esteves, PT [facility]   6/29/2020  8:40 AM Sherrie Oquendo MD [address]   7/1/2020  7:30 AM Marya Yañez [facility]   7/6/2020  7:45 AM Marcia Roman, PT [facility]

## 2020-06-22 ENCOUNTER — HOSPITAL ENCOUNTER (OUTPATIENT)
Dept: PHYSICAL THERAPY | Age: 50
Discharge: HOME | End: 2020-06-22
Payer: COMMERCIAL

## 2020-06-22 PROCEDURE — 97110 THERAPEUTIC EXERCISES: CPT

## 2020-06-22 PROCEDURE — 97140 MANUAL THERAPY 1/> REGIONS: CPT

## 2020-06-22 NOTE — PROGRESS NOTES
PT DAILY TREATMENT NOTE 10-18    Patient Name: Charlette King  Date:2020  : 1970  [x]  Patient  Verified  Payor: Yomi Blanca / Plan: Johnny Collazo / Product Type: HMO /    In time:7:33  Out time:8:33  Total Treatment Time (min): 60  Visit #: 4 of 8    Medicare/BCBS Only   Total Timed Codes (min):  50 1:1 Treatment Time:  50       Treatment Area: Pain in left knee [M25.562]    SUBJECTIVE  Pain Level (0-10 scale): 0/10  Any medication changes, allergies to medications, adverse drug reactions, diagnosis change, or new procedure performed?: [x] No    [] Yes (see summary sheet for update)  Subjective functional status/changes:   [] No changes reported  \"I feel good, no pain. \"    OBJECTIVE    Modality rationale: decrease inflammation and decrease pain to improve the patients ability to increase amb tolerance   Min Type Additional Details    [] Estim:  []Unatt       []IFC  []Premod                        []Other:  []w/ice   []w/heat  Position:  Location:    [] Estim: []Att    []TENS instruct  []NMES                    []Other:  []w/US   []w/ice   []w/heat  Position:  Location:    []  Traction: [] Cervical       []Lumbar                       [] Prone          []Supine                       []Intermittent   []Continuous Lbs:  [] before manual  [] after manual    []  Ultrasound: []Continuous   [] Pulsed                           []1MHz   []3MHz W/cm2:  Location:    []  Iontophoresis with dexamethasone         Location: [] Take home patch   [] In clinic    []  Ice     []  heat  []  Ice massage  []  Laser   []  Anodyne Position:  Location:    []  Laser with stim  []  Other:  Position:  Location:   10 [x]  Vasopneumatic Device Pressure:       [x] lo [] med [] hi   Temperature: [x] lo [] med [] hi   [x] Skin assessment post-treatment:  [x]intact []redness- no adverse reaction    []redness  adverse reaction:     42 min Therapeutic Exercise:  [] See flow sheet :   Rationale: increase ROM and increase strength to improve the patients ability to increase standing/amb tolerance     8 min Manual Therapy:  Patellar mobs, Gr III ant/post tibiofemoral glides, manual stretching into knee flexion   Rationale: increase ROM and increase tissue extensibility to improve left knee AROM for ease of squatting, stair negotiation          With   [] TE   [] TA   [] neuro   [] other: Patient Education: [x] Review HEP    [] Progressed/Changed HEP based on:   [] positioning   [] body mechanics   [] transfers   [] heat/ice application    [] other:      Other Objective/Functional Measures: Continued with exercises per flow sheet. Initiated minisquats and Hip x 3 without issue. Performed step ups/lateral step ups on 4 inch step void of pain today. Pain Level (0-10 scale) post treatment: 0/10    ASSESSMENT/Changes in Function: Pt is steadily progressing with skilled therapy. Pt reports walking about 45 minutes through the mall and grocery store last week. Pt noted by end of time, she was starting to have fatigue and return of antalgic limp. Pt still notes pain with stairs, having increased pain with step up onto 6-8 inch step. Patient will continue to benefit from skilled PT services to address functional mobility deficits, address ROM deficits, address strength deficits, analyze and address soft tissue restrictions, analyze and cue movement patterns, analyze and modify body mechanics/ergonomics, assess and modify postural abnormalities, address imbalance/dizziness and instruct in home and community integration to attain remaining goals. []  See Plan of Care  []  See progress note/recertification  []  See Discharge Summary         Progress towards goals / Updated goals:  Short Term Goals: To be accomplished in 2 weeks:  1. Pt will demonstrate I and compliance with HEP to maximize therapeutic effect.              MY: HEP issued              NRUCAJC: Met- pt reports compliance 6/16/2020  2.  Pt will improve left knee AROM to 0-120 deg for improved ease of ADLs and transfers.               IE: 0-105 deg              Current: progressing 0-118 deg 6/22/2020  Long Term Goals: To be accomplished in 4 weeks:  1. Pt will demonstrate non-antalgic gait pattern without AD use to improve gait efficiency and independence.              IE: mild antalgia void of SPC   Current: progressing - not using SPC since last week without apparent gait deviations 6/22/20  2. Pt will demonstrate 15 SLR on left without pain to improve quad stability in standing.              IE: pain at lateral knee with 3 SLR              Current: Met- 15 reps without pain or lag 6/18/2020  3. Pt will negotiate 4 stairs x 2 with reciprocal pattern void of pain increase to improve home and community negotiation.              TF: pt reports pain with stairs   Current: progressing - still has pain with stairs but able to perform step ups using 4 inch step 6/22/20  4.  Pt will report a little difficulty walking 1 mile to demonstrate improved quality of life and function.              IE: extreme difficulty   Current: progressing - walked for 45 minutes through mall, grocery store without A.D but with antaglic limp, fatigue by end 6/22/20    PLAN  [x]  Upgrade activities as tolerated     [x]  Continue plan of care  []  Update interventions per flow sheet       []  Discharge due to:_  []  Other:_      Herminio Esteves PT 6/22/2020  7:38 AM    Future Appointments   Date Time Provider Yi Osborn   6/24/2020  8:15 AM Herminio Esteves PT [facility]   6/29/2020  7:30 AM Herminio Esteves PT [facility]   6/29/2020  8:40 AM [facility]   7/1/2020  7:30 AM Fay No [facility]   7/6/2020  7:45 AM Jose Taylor, PT [facility]

## 2020-06-24 ENCOUNTER — HOSPITAL ENCOUNTER (OUTPATIENT)
Dept: PHYSICAL THERAPY | Age: 50
Discharge: HOME | End: 2020-06-24
Payer: COMMERCIAL

## 2020-06-24 PROCEDURE — 97140 MANUAL THERAPY 1/> REGIONS: CPT

## 2020-06-24 PROCEDURE — 97110 THERAPEUTIC EXERCISES: CPT

## 2020-06-24 NOTE — PROGRESS NOTES
PT DAILY TREATMENT NOTE 10-18    Patient Name: Micehlle Zendejas  Date:2020  : 1970  [x]  Patient  Verified  Payor: Amber Booth / Plan: Arlette Owens / Product Type: HMO /    In time:8:20  Out time:9:12  Total Treatment Time (min): 52   Visit #: 5 of 8    Medicare/BCBS Only   Total Timed Codes (min):  42 1:1 Treatment Time:  42       Treatment Area: Pain in left knee [M25.562]    SUBJECTIVE  Pain Level (0-10 scale): 2/10  Any medication changes, allergies to medications, adverse drug reactions, diagnosis change, or new procedure performed?: [x] No    [] Yes (see summary sheet for update)  Subjective functional status/changes:   [] No changes reported  \"The knee is achy this morning. This isn't the first time I've felt this. There's no cartilage left in the knee so it gets achy from time to time. Mostly when I am walking consistently. I have to remember that I'm still in the healing phase so I need to not push it. \"    OBJECTIVE    Modality rationale: decrease inflammation and decrease pain to improve the patients ability to increase amb tolerance   Min Type Additional Details    [] Estim:  []Unatt       []IFC  []Premod                        []Other:  []w/ice   []w/heat  Position:  Location:    [] Estim: []Att    []TENS instruct  []NMES                    []Other:  []w/US   []w/ice   []w/heat  Position:  Location:    []  Traction: [] Cervical       []Lumbar                       [] Prone          []Supine                       []Intermittent   []Continuous Lbs:  [] before manual  [] after manual    []  Ultrasound: []Continuous   [] Pulsed                           []1MHz   []3MHz W/cm2:  Location:    []  Iontophoresis with dexamethasone         Location: [] Take home patch   [] In clinic    []  Ice     []  heat  []  Ice massage  []  Laser   []  Anodyne Position:  Location:    []  Laser with stim  []  Other:  Position:  Location:   10 [x]  Vasopneumatic Device Pressure:       [x] lo [] med [] hi   Temperature: [x] lo [] med [] hi   [x] Skin assessment post-treatment:  [x]intact []redness- no adverse reaction    []redness  adverse reaction:     32 min Therapeutic Exercise:  [] See flow sheet :   Rationale: increase ROM and increase strength to improve the patients ability to increase standing/amb tolerance     10 min Manual Therapy:  STM to left quadriceps, distal HS; patellar mobs, Gr III ant/post tibiofemoral glides, manual stretching into knee flexion   Rationale: increase ROM and increase tissue extensibility to improve left knee AROM for ease of squatting, stair negotiation          With   [] TE   [] TA   [] neuro   [] other: Patient Education: [x] Review HEP    [] Progressed/Changed HEP based on:   [] positioning   [] body mechanics   [] transfers   [] heat/ice application    [] other:      Other Objective/Functional Measures: Performed exercises per flow sheet. Adjusted reps and exercises due to flare in anterior left knee pain after bike. Pain Level (0-10 scale) post treatment: 0/10    ASSESSMENT/Changes in Function: Pt exhibited intermittent antalgic limping during standing activities from pain in left knee. Pt noted increased walking in the past couple of days. Pt advised to rest more for the next couple of days and continue with use of cold pack to calm down inflammation and pain in left knee. Patient will continue to benefit from skilled PT services to address functional mobility deficits, address ROM deficits, address strength deficits, analyze and address soft tissue restrictions, analyze and cue movement patterns, analyze and modify body mechanics/ergonomics, assess and modify postural abnormalities, address imbalance/dizziness and instruct in home and community integration to attain remaining goals.      []  See Plan of Care  []  See progress note/recertification  []  See Discharge Summary         Progress towards goals / Updated goals:  Short Term Goals: To be accomplished in 2 weeks:  1. Pt will demonstrate I and compliance with HEP to maximize therapeutic effect.              FK: HEP issued              [id number]: Met- pt reports compliance 6/16/2020  2. Pt will improve left knee AROM to 0-120 deg for improved ease of ADLs and transfers.               IE: 0-105 deg              Current: progressing 0-118 deg 6/22/2020  Long Term Goals: To be accomplished in 4 weeks:  1. Pt will demonstrate non-antalgic gait pattern without AD use to improve gait efficiency and independence.              IE: mild antalgia void of SPC   Current: progressing - not using SPC since last week without apparent gait deviations 6/22/20  2. Pt will demonstrate 15 SLR on left without pain to improve quad stability in standing.              IE: pain at lateral knee with 3 SLR              Current: Met- 15 reps without pain or lag 6/18/2020  3. Pt will negotiate 4 stairs x 2 with reciprocal pattern void of pain increase to improve home and community negotiation.              BS: pt reports pain with stairs   Current: progressing - still has pain with stairs but able to perform step ups using 4 inch step 6/22/20  4.  Pt will report a little difficulty walking 1 mile to demonstrate improved quality of life and function.              IE: extreme difficulty   Current: progressing - walked for 45 minutes through mall, grocery store without A.D but with antaglic limp, fatigue by end 6/22/20    PLAN  [x]  Upgrade activities as tolerated     [x]  Continue plan of care  []  Update interventions per flow sheet       []  Discharge due to:_  []  Other:_      Vangie Esteves PT 6/24/2020  7:38 AM    Future Appointments   Date Time Provider Yi Osborn   6/29/2020  7:30 AM Vangie Esteves PT [facility]   6/29/2020  8:40 AM Sherrie Oquendo MD [address]   7/1/2020  7:30 AM Marya Yañez [facility]   7/6/2020  7:45 AM Marcia Roman, PT [facility]

## 2020-06-28 NOTE — PROGRESS NOTES
No note from me 6/29/2020      Keily Richardson to see DR [place].     Joe Reinoso MD  6/29/2020  8:41 AM

## 2020-06-29 ENCOUNTER — OFFICE VISIT (OUTPATIENT)
Dept: ORTHOPEDIC SURGERY | Age: 50
End: 2020-06-29

## 2020-06-29 ENCOUNTER — HOSPITAL ENCOUNTER (OUTPATIENT)
Dept: PHYSICAL THERAPY | Age: 50
Discharge: HOME | End: 2020-06-29
Payer: COMMERCIAL

## 2020-06-29 PROCEDURE — 97140 MANUAL THERAPY 1/> REGIONS: CPT

## 2020-06-29 PROCEDURE — 97110 THERAPEUTIC EXERCISES: CPT

## 2020-06-29 NOTE — PROGRESS NOTES
[facility]  [address]  [place], [address]  [phone] fax    Physical Therapy Progress Note  Patient name: Jo Booker Start of Care: 6/10/2020   Referral source: Wale Santillan : 1970   Medical/Treatment Diagnosis: Pain in left knee [M25.562]  Payor: ANNETTE SHEETS / Plan: Franklin Radford / Product Type: HMO /  Onset Date:2020     Prior Hospitalization: see medical history Provider#: [id number]   Medications: Verified on Patient Summary List    Comorbidities: Right knee scope (2018), arthritis, right shoulder glenoid resurfacing (2019)    Prior Level of Function: Pt able to ambulate for > 30 minutes without AD or increased knee pain    Visits from Start of Care: 6    Missed Visits: 0    Short Term Goals: To be accomplished in 2 weeks:  1. Pt will demonstrate I and compliance with HEP to maximize therapeutic effect.              WL: HEP issued              [id number]: Met- pt reports compliance 2020  2. Pt will improve left knee AROM to 0-120 deg for improved ease of ADLs and transfers.               IE: 0-105 deg              Current: progressing 0-118 deg 2020  Long Term Goals: To be accomplished in 4 weeks:  1. Pt will demonstrate non-antalgic gait pattern without AD use to improve gait efficiency and independence.              IE: mild antalgia void of SPC              Current: progressing - no A.D in home but [address] in community with mild antalgic limp 20  2. Pt will demonstrate 15 SLR on left without pain to improve quad stability in standing.              IE: pain at lateral knee with 3 SLR              Current: Met- 15 reps without pain or lag 2020  3.  Pt will negotiate 4 stairs x 2 with reciprocal pattern void of pain increase to improve home and community negotiation.              DH: pt reports pain with stairs              Current: progressing - still has pain with stairs but able to perform step ups using 4 inch step 6/22/20  4. Pt will report a little difficulty walking 1 mile to demonstrate improved quality of life and function.              IE: extreme difficulty              Current: progressing - quite a bit of difficulty, walked for 45 minutes through [employer] without A.D but with antaglic limp, fatigue by end 6/22/20    Key Functional Changes: Pt has attended initial evaluation and 5 treatment sessions consistently and has made good progress thus far. Pt reports overall reduction in left knee pain and LBP since surgery and that pain is more manageable, and is able to do more activities with less difficulty. Pt continues to have pain, swelling, and pressure to left anterior knee with standing and walking that limits tolerance. Pt continues to need [facility] for community-based amb with antalgic limp. Pt would benefit from continued skilled therapy to improve left knee TKE consistency and left LE strength and stability without increased pain to be able to amb on various surfaces and negotiate stairs without pain or difficulty. Updated Goals: to be achieved in 5 weeks:  1. Pt will improve left knee AROM to 0-120 deg for improved ease of ADLs and transfers.               PN: 0-118 deg              Current:  2. Pt will demonstrate non-antalgic gait pattern without AD use to improve gait efficiency and independence.              PN: no A.D in home but [facility] in community with mild antalgic limp               Current:   3. Pt will negotiate 4 stairs x 2 with reciprocal pattern void of pain increase to improve home and community negotiation.              PN: still has pain with stairs but able to perform step ups using 4 inch step              Current:   5.  Pt will report a little difficulty walking 1 mile to demonstrate improved quality of life and function.              PN: quite a bit of difficulty, walked for 45 minutes through [employer] without A.D but with antaglic limp, fatigue by end              Current:     ASSESSMENT/RECOMMENDATIONS:  [x]Continue therapy per initial plan/protocol at a frequency of  2 x per week for 5 weeks  []Continue therapy with the following recommended changes:_____________________      _____________________________________________________________________  []Discontinue therapy progressing towards or have reached established goals  []Discontinue therapy due to lack of appreciable progress towards goals  []Discontinue therapy due to lack of attendance or compliance  []Await Physician's recommendations/decisions regarding therapy  []Other:________________________________________________________________    Thank you for this referral.    Moriah Esteves, PT 6/29/2020 8:28 AM  NOTE TO PHYSICIAN:  PLEASE COMPLETE THE ORDERS BELOW AND   [facility]X TO [facility]: [phone]  If you are unable to process this request in 24 hours please contact our office: [phone] I have read the above report and request that my patient continue as recommended. ? I have read the above report and request that my patient continue therapy with the following changes/special instructions:__________________________________________________________  ? I have read the above report and request that my patient be discharged from therapy.     Physicians signature: ______________________________Date: ______Time:______

## 2020-06-29 NOTE — PROGRESS NOTES
PT DAILY TREATMENT NOTE 10-18    Patient Name: Tremaine Elmore  Date:2020  : 1970  [x]  Patient  Verified  Payor: Danette Gonzalez / Plan: Lulu Stephen / Product Type: HMO /    In time:7:30  Out time:8:25  Total Treatment Time (min): 55  Visit #: 6 of 8    Medicare/BCBS Only   Total Timed Codes (min):  45 1:1 Treatment Time: 45        Treatment Area: Pain in left knee [M25.562]    SUBJECTIVE  Pain Level (0-10 scale): 4/10  Any medication changes, allergies to medications, adverse drug reactions, diagnosis change, or new procedure performed?: [x] No    [] Yes (see summary sheet for update)  Subjective functional status/changes:   [] No changes reported  \"The knee is achy. I don't know why. I stayed off of it on Thursday and Friday and it felt good, but the pain gradually came back. Nothing in therapy has bothered me. I just hurt when I walk in the front of the knee. \"    OBJECTIVE    Modality rationale: decrease inflammation and decrease pain to improve the patients ability to increase amb tolerance   Min Type Additional Details    [] Estim:  []Unatt       []IFC  []Premod                        []Other:  []w/ice   []w/heat  Position:  Location:    [] Estim: []Att    []TENS instruct  []NMES                    []Other:  []w/US   []w/ice   []w/heat  Position:  Location:    []  Traction: [] Cervical       []Lumbar                       [] Prone          []Supine                       []Intermittent   []Continuous Lbs:  [] before manual  [] after manual    []  Ultrasound: []Continuous   [] Pulsed                           []1MHz   []3MHz W/cm2:  Location:    []  Iontophoresis with dexamethasone         Location: [] Take home patch   [] In clinic   10 [x]  Ice     []  heat  []  Ice massage  []  Laser   []  Anodyne Position: longsitting  Location: left knee    []  Laser with stim  []  Other:  Position:  Location:    []  Vasopneumatic Device Pressure:       [] lo [] med [] hi   Temperature: [] lo [] med [] hi   [x] Skin assessment post-treatment:  [x]intact []redness- no adverse reaction    []redness  adverse reaction:     33 min Therapeutic Exercise:  [] See flow sheet :   Rationale: increase ROM and increase strength to improve the patients ability to increase standing/amb tolerance     12 min Manual Therapy:  STM to left quadriceps, distal HS; patellar mobs, Gr III ant/post tibiofemoral glides, manual stretching into knee flexion   Rationale: increase ROM and increase tissue extensibility to improve left knee AROM for ease of squatting, stair negotiation          With   [] TE   [] TA   [] neuro   [] other: Patient Education: [x] Review HEP    [] Progressed/Changed HEP based on:   [] positioning   [] body mechanics   [] transfers   [] heat/ice application    [] other:      Other Objective/Functional Measures: Performed mostly plinth based exercises today due to flare in left knee pain. Reassessed goals for progress note. Pain Level (0-10 scale) post treatment: 0/10    ASSESSMENT/Changes in Function: Pt has attended initial evaluation and 5 treatment sessions consistently and has made good progress thus far. Pt reports overall reduction in left knee pain and LBP since surgery and is more manageable. Pt is able to do more activities with less difficulty. Pt continues to have pain and pressure to left knee with standing and walking that limits standing/amb tolerance. Pt continues to need SPC for community-based amb and amb with antalgic limp. Pt would benefit from continued skilled therapy to improve left LE ROM and strength without increased pain for return to premorbid status without pain or limitations.     Patient will continue to benefit from skilled PT services to address functional mobility deficits, address ROM deficits, address strength deficits, analyze and address soft tissue restrictions, analyze and cue movement patterns, analyze and modify body mechanics/ergonomics, assess and modify postural abnormalities, address imbalance/dizziness and instruct in home and community integration to attain remaining goals. []  See Plan of Care  [x]  See progress note/recertification  []  See Discharge Summary         Progress towards goals / Updated goals:  Short Term Goals: To be accomplished in 2 weeks:  1. Pt will demonstrate I and compliance with HEP to maximize therapeutic effect.              MR: HEP issued              [id number]: Met- pt reports compliance 6/16/2020  2. Pt will improve left knee AROM to 0-120 deg for improved ease of ADLs and transfers.               IE: 0-105 deg              Current: progressing 0-118 deg 6/22/2020  Long Term Goals: To be accomplished in 4 weeks:  1. Pt will demonstrate non-antalgic gait pattern without AD use to improve gait efficiency and independence.              IE: mild antalgia void of SPC   Current: progressing - no A.D in home but [facility] in community with mild antalgic limp 6/29/20  2. Pt will demonstrate 15 SLR on left without pain to improve quad stability in standing.              IE: pain at lateral knee with 3 SLR              Current: Met- 15 reps without pain or lag 6/18/2020  3. Pt will negotiate 4 stairs x 2 with reciprocal pattern void of pain increase to improve home and community negotiation.              BR: pt reports pain with stairs   Current: progressing - still has pain with stairs but able to perform step ups using 4 inch step 6/22/20  4.  Pt will report a little difficulty walking 1 mile to demonstrate improved quality of life and function.              IE: extreme difficulty   Current: progressing - walked for 45 minutes through mall, grocery store without A.D but with antaglic limp, fatigue by end 6/22/20    PLAN  [x]  Upgrade activities as tolerated     [x]  Continue plan of care  []  Update interventions per flow sheet       []  Discharge due to:_  []  Other:_      Particia Aleja Esteves PT 6/29/2020  7:38 AM    Future Appointments   Date Time Provider Yi Osborn   6/29/2020  8:40 AM [facility] [address] [facility]   7/1/2020  7:30 AM [facility]   7/6/2020  7:45 AM He Rosalba Christianson, PT [facility]

## 2020-06-29 NOTE — PROGRESS NOTES
This encounter was created in error - patient was on the wrong schedule.  Dr. Isa Toussaint did not see patient

## 2020-06-30 ENCOUNTER — OFFICE VISIT (OUTPATIENT)
Dept: ORTHOPEDIC SURGERY | Age: 50
End: 2020-06-30

## 2020-06-30 VITALS
OXYGEN SATURATION: 100 % | HEIGHT: 62 IN | TEMPERATURE: 97.8 F | RESPIRATION RATE: 16 BRPM | WEIGHT: 233.4 LBS | BODY MASS INDEX: 42.95 KG/M2 | HEART RATE: 81 BPM | DIASTOLIC BLOOD PRESSURE: 84 MMHG | SYSTOLIC BLOOD PRESSURE: 128 MMHG

## 2020-06-30 DIAGNOSIS — S83.242D ACUTE MEDIAL MENISCUS TEAR, LEFT, SUBSEQUENT ENCOUNTER: Primary | ICD-10-CM

## 2020-06-30 DIAGNOSIS — M17.12 PRIMARY OSTEOARTHRITIS OF LEFT KNEE: ICD-10-CM

## 2020-06-30 NOTE — LETTER
NOTIFICATION RETURN TO WORK / SCHOOL 
 
6/30/2020 10:18 AM 
 
Ms. Annie Mayen [address] To Whom It May Concern: 
 
Annie Mayen is currently under the care of [address] [place] [address] Larry Atwood. She will be on duty work status until 6/24/2020. If there are questions or concerns please have the patient contact our office. Sincerely, CRISTELA Hernandez

## 2020-06-30 NOTE — PROGRESS NOTES
Patient: Matt Ya  YOB: 1970       HISTORY:  The patient presents for reevaluation of her left knee status post arthroscopic partial medial and lateral  menisectomy with grade III chondromalacia on 6/1/2020. Patient is improved, states pain is a 0 out of 10.  she has gone to physical therapy. She notes persistent aching pain and swelling with prolonged walking. Patient denies any fever, chills, chest pain, shortness of breath or calf pain. The remainder of the review of systems is negative. There are no new illness or injuries to report since last seen in the office. No changes in medications, allergies, social or family history. PHYSICAL EXAMINATION:    Visit Vitals  /84   Pulse 81   Temp 97.8 °F (36.6 °C) (Oral)   Resp 16   Ht 5' 2\" (1.575 m)   Wt 233 lb 6.4 oz (105.9 kg)   SpO2 100%   BMI 42.69 kg/m²     The patient is a well-developed, well-nourished female in no acute distress. The patient is alert and oriented times three. The patient appears to be well groomed. Mood and affect are normal.   ORTHOPEDIC EXAM of Left knee: Inspection: Effusion not present,  incisions well healed  TTP: medial and lateral joint line  Range of motion: 0-120 flexion  Stability: Stable  Strength: 5/5  2+ distal pulses    IMPRESSION:  Status post Left knee arthroscopic partial medial menisectomy with degen arthritis with joint space narrowing. PLAN:   1. Patient improving post operatively  2. Patient still with pain c/w degenerative arthritis. Will auth euflexxa given persistent pain  3.  Cont with PT    RTC 3 weeks    James Rivas PA-C  [facility]

## 2020-06-30 NOTE — PROGRESS NOTES
1. Have you been to the ER, urgent care clinic since your last visit? Hospitalized since your last visit? No    2. Have you seen or consulted any other health care providers outside of the [address] [place] [address] since your last visit? Include any pap smears or colon screening.  No

## 2020-06-30 NOTE — LETTER
NOTIFICATION RETURN TO WORK / SCHOOL 
 
6/30/2020 10:21 AM 
 
Ms. Michelle Zendejas [address] To Whom It May Concern: 
 
Michelle Zendejas is currently under the care of [address] [place], [place] [address]. She will be on duty work status until 8/24/2020. If there are questions or concerns please have the patient contact our office. Sincerely, CRISTELA Talbot

## 2020-07-01 ENCOUNTER — HOSPITAL ENCOUNTER (OUTPATIENT)
Dept: PHYSICAL THERAPY | Age: 50
Discharge: HOME | End: 2020-07-01
Payer: COMMERCIAL

## 2020-07-01 PROCEDURE — 97140 MANUAL THERAPY 1/> REGIONS: CPT

## 2020-07-01 PROCEDURE — 97110 THERAPEUTIC EXERCISES: CPT

## 2020-07-01 NOTE — PROGRESS NOTES
PT DAILY TREATMENT NOTE 10-18    Patient Name: Kali Lawler  Date:2020  : 1970  [x]  Patient  Verified  Payor: Keily Young / Plan: Jessenia Perrin / Product Type: HMO /    In time:7:29  Out time:8:10  Total Treatment Time (min): 41  Visit #: 1 of 10    Medicare/BCBS Only   Total Timed Codes (min):  31 1:1 Treatment Time:  31       Treatment Area: Pain in left knee [M25.562]    SUBJECTIVE  Pain Level (0-10 scale):  2  Any medication changes, allergies to medications, adverse drug reactions, diagnosis change, or new procedure performed?: [x] No    [] Yes (see summary sheet for update)  Subjective functional status/changes:   [] No changes reported  \"Just achy today\"    OBJECTIVE    Modality rationale: decrease inflammation and decrease pain to improve the patients ability to perform daily tasks with less soreness/pain   Min Type Additional Details    [] Estim:  []Unatt       []IFC  []Premod                        []Other:  []w/ice   []w/heat  Position:  Location:    [] Estim: []Att    []TENS instruct  []NMES                    []Other:  []w/US   []w/ice   []w/heat  Position:  Location:    []  Traction: [] Cervical       []Lumbar                       [] Prone          []Supine                       []Intermittent   []Continuous Lbs:  [] before manual  [] after manual    []  Ultrasound: []Continuous   [] Pulsed                           []1MHz   []3MHz W/cm2:  Location:    []  Iontophoresis with dexamethasone         Location: [] Take home patch   [] In clinic   10 [x]  Ice     []  heat  []  Ice massage  []  Laser   []  Anodyne Position: longsit  Location: left knee    []  Laser with stim  []  Other:  Position:  Location:    []  Vasopneumatic Device Pressure:       [] lo [] med [] hi   Temperature: [] lo [] med [] hi   [] Skin assessment post-treatment:  []intact []redness- no adverse reaction    []redness  adverse reaction:         23 min Therapeutic Exercise:  [] See flow sheet :   Rationale: increase ROM and increase strength to improve the patients ability to improve ease of ADLs and community outings     8 min Manual Therapy:  Left knee flexion stretching, STM distal hamstrings and quads in extension and flexion respectively, patellar mobs in flexion   Rationale: increase ROM and increase tissue extensibility to improve knee mobility for increased ease of self care             With   [] TE   [] TA   [] neuro   [] other: Patient Education: [x] Review HEP    [] Progressed/Changed HEP based on:   [] positioning   [] body mechanics   [] transfers   [] heat/ice application    [] other:      Other Objective/Functional Measures:      Pain Level (0-10 scale) post treatment: 0    ASSESSMENT/Changes in Function: Tried performing table exercises prior to attempting standing work. Good overall tolerance, pt mostly challenged by some patellofemoral discomfort with squatting. Doing well with table exercises, advised pt to continue with these at home and we will gradually progress standing stability and ease. Patient will continue to benefit from skilled PT services to modify and progress therapeutic interventions, address functional mobility deficits, address ROM deficits, address strength deficits, analyze and address soft tissue restrictions, analyze and cue movement patterns and analyze and modify body mechanics/ergonomics to attain remaining goals. []  See Plan of Care  []  See progress note/recertification  []  See Discharge Summary         Progress towards goals / Updated goals:  Updated Goals: to be achieved in 5 weeks:  1. Pt will improve left knee AROM to 0-120 deg for improved ease of ADLs and transfers.               PN: 0-118 deg              Current:  2. Pt will demonstrate non-antalgic gait pattern without AD use to improve gait efficiency and independence.              PN: no A.D in home but [facility] [place] [facility] with mild antalgic limp               Current:   3.  Pt will negotiate 4 stairs x 2 with reciprocal pattern void of pain increase to improve home and community negotiation.              PN: still has pain with stairs but able to perform step ups using 4 inch step              Current:   5.  Pt will report a little difficulty walking 1 mile to demonstrate improved quality of life and function.              PN: quite a bit of difficulty, walked for 45 minutes through mall, grocery store without A.D but with antaglic limp, fatigue by end              Current:     PLAN  [x]  Upgrade activities as tolerated     []  Continue plan of care  []  Update interventions per flow sheet       []  Discharge due to:_  []  Other:_      Percy Gowers DPT, CMTPT 7/1/2020  7:31 AM    Future Appointments   Date Time Provider Yi Wilkinsisti   7/6/2020  7:45 AM Toribio Hogan PT [id number]   7/21/2020  9:00 AM Raffaele Kruse PA [address]

## 2020-07-06 ENCOUNTER — HOSPITAL ENCOUNTER (OUTPATIENT)
Dept: PHYSICAL THERAPY | Age: 50
Discharge: HOME | End: 2020-07-06
Payer: COMMERCIAL

## 2020-07-06 PROCEDURE — 97140 MANUAL THERAPY 1/> REGIONS: CPT

## 2020-07-06 PROCEDURE — 97110 THERAPEUTIC EXERCISES: CPT

## 2020-07-06 NOTE — PROGRESS NOTES
PT DAILY TREATMENT NOTE 10-18    Patient Name: Chely Salgado  Date:2020  : 1970  [x]  Patient  Verified  Payor: Emperatriz Ruiz / Plan: Carole Like / Product Type: HMO /    In HFAD:[id number]  Out GHEL:[id number]  Total Treatment Time (min): 49  Visit #: 2 of 10    Medicare/BCBS Only   Total Timed Codes (min):  39 1:1 Treatment Time:  39       Treatment Area: Pain in left knee [M25.562]    SUBJECTIVE  Pain Level (0-10 scale): 2  Any medication changes, allergies to medications, adverse drug reactions, diagnosis change, or new procedure performed?: [x] No    [] Yes (see summary sheet for update)  Subjective functional status/changes:   [] No changes reported  The pt reports her knee \"aches\" this morining.      OBJECTIVE    Modality rationale: decrease inflammation and decrease pain to improve the patients ability to perform ADL   Min Type Additional Details    [] Estim:  []Unatt       []IFC  []Premod                        []Other:  []w/ice   []w/heat  Position:  Location:    [] Estim: []Att    []TENS instruct  []NMES                    []Other:  []w/US   []w/ice   []w/heat  Position:  Location:    []  Traction: [] Cervical       []Lumbar                       [] Prone          []Supine                       []Intermittent   []Continuous Lbs:  [] before manual  [] after manual    []  Ultrasound: []Continuous   [] Pulsed                           []1MHz   []3MHz W/cm2:  Location:    []  Iontophoresis with dexamethasone         Location: [] Take home patch   [] In clinic   10 [x]  Ice     []  heat  []  Ice massage  []  Laser   []  Anodyne Position:reclined  Location: left knee    []  Laser with stim  []  Other:  Position:  Location:    []  Vasopneumatic Device Pressure:       [] lo [] med [] hi   Temperature: [] lo [] med [] hi   [] Skin assessment post-treatment:  []intact []redness- no adverse reaction    []redness  adverse reaction:       30 min Therapeutic Exercise:  [x] See flow sheet :   Rationale: increase ROM and increase strength to improve the patients ability to perform ADL      9 min Manual Therapy:  STM distal quads and hamstrings, PROM left knee   Rationale: increase ROM and increase tissue extensibility to improve functional mobility            With   [] TE   [] TA   [] neuro   [] other: Patient Education: [x] Review HEP    [] Progressed/Changed HEP based on:   [] positioning   [] body mechanics   [] transfers   [] heat/ice application    [] other:      Other Objective/Functional Measures: added 2# wt with LAQ and SAQ     Pain Level (0-10 scale) post treatment: 0    ASSESSMENT/Changes in Function:  The pt was able to progress with strengthening without pain increase. + distal quad myofascial restrictions noted. Stairs improving but pt does still report some pain. Patient will continue to benefit from skilled PT services to modify and progress therapeutic interventions, address functional mobility deficits, address ROM deficits, address strength deficits, analyze and address soft tissue restrictions and analyze and cue movement patterns to attain remaining goals. []  See Plan of Care  []  See progress note/recertification  []  See Discharge Summary         Progress towards goals / Updated goals:  Updated Goals: to be achieved in 5 weeks:  1. Pt will improve left knee AROM to 0-120 deg for improved ease of ADLs and transfers.               PN: 0-118 deg              Current:  2. Pt will demonstrate non-antalgic gait pattern without AD use to improve gait efficiency and independence.              PN: no A.D in home but [facility] in community with mild antalgic limp               Current:   3. Pt will negotiate 4 stairs x 2 with reciprocal pattern void of pain increase to improve home and community negotiation.              PN: still has pain with stairs but able to perform step ups using 4 inch step              Current: able to perform 4 stairsx2 with alternating pattern with rail but some c/o pain. 6-7-20   5.  Pt will report a little difficulty walking 1 mile to demonstrate improved quality of life and function.              PN: quite a bit of difficulty, walked for 45 minutes through mall, grocery store without A.D but with antaglic limp, fatigue by end              Current:     PLAN  []  Upgrade activities as tolerated     [x]  Continue plan of care  []  Update interventions per flow sheet       []  Discharge due to:_  []  Other:_      Maxwell Rosas, PT 7/6/2020  7:55 AM    Future Appointments   Date Time Provider Yi Osborn   7/21/2020  9:00 AM [address], CRIST[address]A [address]

## 2020-07-09 ENCOUNTER — HOSPITAL ENCOUNTER (OUTPATIENT)
Dept: PHYSICAL THERAPY | Age: 50
Discharge: HOME | End: 2020-07-09
Payer: COMMERCIAL

## 2020-07-09 PROCEDURE — 97140 MANUAL THERAPY 1/> REGIONS: CPT

## 2020-07-09 PROCEDURE — 97110 THERAPEUTIC EXERCISES: CPT

## 2020-07-09 NOTE — PROGRESS NOTES
PT DAILY TREATMENT NOTE 10-18    Patient Name: Fei Richmond  Date:2020  : 1970  [x]  Patient  Verified  Payor: Rylie Nice / Plan: John Dubon / Product Type: HMO /    In time:11:43  Out time:12:32  Total Treatment Time (min): 52  Visit #: 3 of 10    Medicare/BCBS Only   Total Timed Codes (min):  39 1:1 Treatment Time:  39       Treatment Area: Pain in left knee [M25.562]    SUBJECTIVE  Pain Level (0-10 scale): 0/10  Any medication changes, allergies to medications, adverse drug reactions, diagnosis change, or new procedure performed?: [x] No    [] Yes (see summary sheet for update)  Subjective functional status/changes:   [] No changes reported  \"No pain right now but the knee is about the same. I see the MD on 20. Still waiting to see if my insurance will cover the gel shots. \"    OBJECTIVE    Modality rationale: decrease inflammation and decrease pain to improve the patients ability to increase ease of ambulation   Min Type Additional Details    [] Estim:  []Unatt       []IFC  []Premod                        []Other:  []w/ice   []w/heat  Position:  Location:    [] Estim: []Att    []TENS instruct  []NMES                    []Other:  []w/US   []w/ice   []w/heat  Position:  Location:    []  Traction: [] Cervical       []Lumbar                       [] Prone          []Supine                       []Intermittent   []Continuous Lbs:  [] before manual  [] after manual    []  Ultrasound: []Continuous   [] Pulsed                           []1MHz   []3MHz W/cm2:  Location:    []  Iontophoresis with dexamethasone         Location: [] Take home patch   [] In clinic    []  Ice     []  heat  []  Ice massage  []  Laser   []  Anodyne Position:  Location:    []  Laser with stim  []  Other:  Position:  Location:   10 [x]  Vasopneumatic Device Pressure:       [x] lo [] med [] hi   Temperature: [x] lo [] med [] hi   [x] Skin assessment post-treatment:  [x]intact []redness- no adverse reaction []redness  adverse reaction:     29 min Therapeutic Exercise:  [] See flow sheet :   Rationale: increase ROM and increase strength to improve the patients ability to increase stability with standing/amb, increase ease of ADLs    10 min Manual Therapy:  Supine STM to left distal quadriceps, HS, ITB; manual stretching into knee flexion   Rationale: increase ROM, increase tissue extensibility and decrease trigger points to improve left knee AROM, reduce pain with standing/amb          With   [] TE   [] TA   [] neuro   [] other: Patient Education: [x] Review HEP    [] Progressed/Changed HEP based on:   [] positioning   [] body mechanics   [] transfers   [] heat/ice application    [] other:      Other Objective/Functional Measures: Added 2# wt for SLR Flex/ABD, HS curls on swiss ball     Pain Level (0-10 scale) post treatment: 0/10    ASSESSMENT/Changes in Function: Pt able to perform progressed exercises at plint without increased pain in left knee. Pt able to negotiate stairs with reciprocal pattern but relied on increased UE support to take pressure off of left knee. Patient will continue to benefit from skilled PT services to address functional mobility deficits, address ROM deficits, address strength deficits, analyze and address soft tissue restrictions, analyze and cue movement patterns, analyze and modify body mechanics/ergonomics, assess and modify postural abnormalities, address imbalance/dizziness and instruct in home and community integration to attain remaining goals. []  See Plan of Care  []  See progress note/recertification  []  See Discharge Summary         Progress towards goals / Updated goals:  Updated Goals: to be achieved in 5 weeks:  1. Pt will improve left knee AROM to 0-120 deg for improved ease of ADLs and transfers.               PN: 0-118 deg              Current:  2.  Pt will demonstrate non-antalgic gait pattern without AD use to improve gait efficiency and independence.              PN: no A.D in home but [facility] in community with mild antalgic limp               Current:   3. Pt will negotiate 4 stairs x 2 with reciprocal pattern void of pain increase to improve home and community negotiation.              PN: still has pain with stairs but able to perform step ups using 4 inch step              Current: able to perform 4 stairsx2 with alternating pattern with rail but some c/o pain. 6-7-20   5.  Pt will report a little difficulty walking 1 mile to demonstrate improved quality of life and function.              PN: quite a bit of difficulty, walked for 45 minutes through mall, grocery store without A.D but with antaglic limp, fatigue by end              Current:     PLAN  [x]  Upgrade activities as tolerated     [x]  Continue plan of care  []  Update interventions per flow sheet       []  Discharge due to:_  []  Other:_      Isabel Esteves, PT 7/9/2020  11:53 AM    Future Appointments   Date Time Provider Yi Perdomoi   7/15/2020  7:30 AM Naveed Payne [facility]   7/17/2020  9:00 AM Mary Starr, PT [facility]   7/21/2020  9:00 AM Rupali Fierro PA [address]

## 2020-07-15 ENCOUNTER — HOSPITAL ENCOUNTER (OUTPATIENT)
Dept: PHYSICAL THERAPY | Age: 50
Discharge: HOME | End: 2020-07-15
Payer: COMMERCIAL

## 2020-07-15 PROCEDURE — 97110 THERAPEUTIC EXERCISES: CPT

## 2020-07-15 PROCEDURE — 97140 MANUAL THERAPY 1/> REGIONS: CPT

## 2020-07-15 NOTE — PROGRESS NOTES
PT DAILY TREATMENT NOTE 10-18    Patient Name: Bruno Triplett  Date:7/15/2020  : 1970  [x]  Patient  Verified  Payor: Margareth Calle / Plan: Dara Plant / Product Type: HMO /    In time:7:30  Out time:8;15  Total Treatment Time (min): 45  Visit #: 4 of 10    Medicare/BCBS Only   Total Timed Codes (min):  35 1:1 Treatment Time:  35       Treatment Area: Pain in left knee [M25.562]    SUBJECTIVE  Pain Level (0-10 scale): 0  Any medication changes, allergies to medications, adverse drug reactions, diagnosis change, or new procedure performed?: [x] No    [] Yes (see summary sheet for update)  Subjective functional status/changes:   [] No changes reported  \"Its not hurting or aching or anything today, so that's good\"    OBJECTIVE    Modality rationale: decrease inflammation to improve the patients ability to perform daily tasks with less soreness and inflammation   Min Type Additional Details    [] Estim:  []Unatt       []IFC  []Premod                        []Other:  []w/ice   []w/heat  Position:  Location:    [] Estim: []Att    []TENS instruct  []NMES                    []Other:  []w/US   []w/ice   []w/heat  Position:  Location:    []  Traction: [] Cervical       []Lumbar                       [] Prone          []Supine                       []Intermittent   []Continuous Lbs:  [] before manual  [] after manual    []  Ultrasound: []Continuous   [] Pulsed                           []1MHz   []3MHz W/cm2:  Location:    []  Iontophoresis with dexamethasone         Location: [] Take home patch   [] In clinic    []  Ice     []  heat  []  Ice massage  []  Laser   []  Anodyne Position:  Location:    []  Laser with stim  []  Other:  Position:  Location:   10 [x]  Vasopneumatic Device Pressure:       [x] lo [] med [] hi   Temperature: [x] lo [] med [] hi   [] Skin assessment post-treatment:  []intact []redness- no adverse reaction    []redness  adverse reaction:         27 min Therapeutic Exercise:  [] See flow sheet :   Rationale: increase ROM and increase strength to improve the patients ability to improve ease of ADL performance      8 min Manual Therapy:  Patellar mobs left knee in flexion and extension, IASTM to left distal quad, passive knee flexion stretching   Rationale: increase ROM and increase tissue extensibility to improve gait and transfer efficiency          With   [] TE   [] TA   [] neuro   [] other: Patient Education: [x] Review HEP    [] Progressed/Changed HEP based on:   [] positioning   [] body mechanics   [] transfers   [] heat/ice application    [] other:      Other Objective/Functional Measures: added shuttle press today in place of HR/TR to improve quad strength in [address]     Pain Level (0-10 scale) post treatment: 0    ASSESSMENT/Changes in Function: Pt reporting improved pain and improved walking endurance at this time. She is progressing well with [facility] strength exercises. Will continue to progress [address] activity as able. Patient will continue to benefit from skilled PT services to modify and progress therapeutic interventions, address functional mobility deficits, address ROM deficits, address strength deficits, analyze and address soft tissue restrictions, analyze and cue movement patterns and analyze and modify body mechanics/ergonomics to attain remaining goals. []  See Plan of Care  []  See progress note/recertification  []  See Discharge Summary         Updated Goals: to be achieved in 5 weeks:  1. Pt will improve left knee AROM to 0-120 deg for improved ease of ADLs and transfers.               PN: 0-118 deg              Current: slow progress  0-118 deg 7/15/2020  2.  Pt will demonstrate non-antalgic gait pattern without AD use to improve gait efficiency and independence.              PN: no A.D in home but [facility] with mild antalgic limp               Current:   3. Pt will negotiate 4 stairs x 2 with reciprocal pattern void of pain increase to improve home and community negotiation.              PN: still has pain with stairs but able to perform step ups using 4 inch step              Current: able to perform 4 stairsx2 with alternating pattern with rail but some c/o pain. 6-7-20   5.  Pt will report a little difficulty walking 1 mile to demonstrate improved quality of life and function.              PN: quite a bit of difficulty, walked for 45 minutes through mall, grocery store without A.D but with antaglic limp, fatigue by end              Current:     PLAN  [x]  Upgrade activities as tolerated     []  Continue plan of care  []  Update interventions per flow sheet       []  Discharge due to:_  []  Other:_      Anton Shoemaker DPT CMTPT  7/15/2020  7:32 AM    Future Appointments   Date Time Provider Yi Osborn   7/17/2020  9:00 AM [facility]   7/21/2020  9:00 AM Anaya Kruse, CRISTELA [address]

## 2020-07-17 ENCOUNTER — HOSPITAL ENCOUNTER (OUTPATIENT)
Dept: PHYSICAL THERAPY | Age: 50
Discharge: HOME | End: 2020-07-17
Payer: COMMERCIAL

## 2020-07-17 PROCEDURE — 97110 THERAPEUTIC EXERCISES: CPT

## 2020-07-17 PROCEDURE — 97140 MANUAL THERAPY 1/> REGIONS: CPT

## 2020-07-17 NOTE — PROGRESS NOTES
PT DAILY TREATMENT NOTE 10-18    Patient Name: Radhika Melendez  Date:2020  : 1970  [x]  Patient  Verified  Payor: Lilian Earing / Plan: Doll Ingles / Product Type: HMO /    In time:0900  Out KDCY:0922  Total Treatment Time (min): 48  Visit #: 5 of 10    Medicare/BCBS Only   Total Timed Codes (min):  38 1:1 Treatment Time:  38       Treatment Area: Pain in left knee [M25.562]    SUBJECTIVE  Pain Level (0-10 scale): 0  Any medication changes, allergies to medications, adverse drug reactions, diagnosis change, or new procedure performed?: [x] No    [] Yes (see summary sheet for update)  Subjective functional status/changes:   [] No changes reported  The pt reports she is no longer using her cane. OBJECTIVE    Modality rationale: decrease inflammation and decrease pain to improve the patients ability to perform daily tasks.     Min Type Additional Details    [] Estim:  []Unatt       []IFC  []Premod                        []Other:  []w/ice   []w/heat  Position:  Location:    [] Estim: []Att    []TENS instruct  []NMES                    []Other:  []w/US   []w/ice   []w/heat  Position:  Location:    []  Traction: [] Cervical       []Lumbar                       [] Prone          []Supine                       []Intermittent   []Continuous Lbs:  [] before manual  [] after manual    []  Ultrasound: []Continuous   [] Pulsed                           []1MHz   []3MHz W/cm2:  Location:    []  Iontophoresis with dexamethasone         Location: [] Take home patch   [] In clinic    []  Ice     []  heat  []  Ice massage  []  Laser   []  Anodyne Position:  Location:    []  Laser with stim  []  Other:  Position:  Location:   10 [x]  Vasopneumatic Device Pressure:       [x] lo [] med [] hi   Temperature: [x] lo [] med [] hi   [] Skin assessment post-treatment:  []intact []redness- no adverse reaction    []redness  adverse reaction:       30 min Therapeutic Exercise:  [x] See flow sheet :   Rationale: increase ROM and increase strength to improve the patients ability to perform functional tasks. 8 min Manual Therapy:  Pavithra with GT4 to left distal quads, patellar mobs, PROM of the left knee   Rationale: decrease pain, increase ROM and increase tissue extensibility to improve functional mobility             With   [] TE   [] TA   [] neuro   [] other: Patient Education: [x] Review HEP    [] Progressed/Changed HEP based on:   [] positioning   [] body mechanics   [] transfers   [] heat/ice application    [] other:      Other Objective/Functional Measures: AROM 0-118 degrees      Pain Level (0-10 scale) post treatment: 0    ASSESSMENT/Changes in Function:  The pt has progressed well with PT and feels she is now ready for discharge. She demonstrates 5/5 quad and hamstring strength. []  See Plan of Care  []  See progress note/recertification  [x]  See Discharge Summary         Progress towards goals / Updated goals:  Updated Goals: to be achieved in 5 weeks:  1. Pt will improve left knee AROM to 0-120 deg for improved ease of ADLs and transfers.               PN: 0-118 deg              Current:progressed  0-118 deg 7/15/2020  2. Pt will demonstrate non-antalgic gait pattern without AD use to improve gait efficiency and independence.              PN: no A.D in home but [facility] in community with mild antalgic limp               Current: MET no cane  3. Pt will negotiate 4 stairs x 2 with reciprocal pattern void of pain increase to improve home and community negotiation.              PN: still has pain with stairs but able to perform step ups using 4 inch step              Current: MET 4 stairs x 2 with alternating pattern   5.  Pt will report a little difficulty walking 1 mile to demonstrate improved quality of life and function.              PN: quite a bit of difficulty, walked for 45 minutes through mall, grocery store without A.D but with antaglic limp, fatigue by end              Current: Not met moderate difficulty on 7-17-20    PLAN  []  Upgrade activities as tolerated     []  Continue plan of care  []  Update interventions per flow sheet       [x]  Discharge due to:_  []  Other:_      Michela Mendoza, ANSHUL 7/17/2020  9:10 AM    Future Appointments   Date Time Provider Yi Osborn   7/21/2020  9:00 AM CRISTELA Ramirez [address]

## 2020-07-17 NOTE — PROGRESS NOTES
[facility]  [address]  [place], [address]  [phone] fax    Physical Therapy Discharge Summary  Patient name: Sabina Cueva Start of Care: 6/10/2020   Referral source: Fiordaliza Bundy Darrylalicema : 1970                Medical Diagnosis: Pain in left knee [M25.562]  Payor: ANNETTE SHEETS / Plan: Frankey Dirk / Product Type: HMO /  Onset Date:2020                Treatment Diagnosis: Left knee pain s/p scope   Prior Hospitalization: see medical history Provider#: [id number]   Medications: Verified on Patient summary List    Comorbidities: Right knee scope (2018), arthritis, right shoulder glenoid resurfacing (2019)    Prior Level of Function: Pt able to ambulate for > 30 minutes without AD or increased knee pain                   Visits from Start of Care: 11    Missed Visits:0  Reporting Period : 6-10-20 to 20      Summary of Care: The pt has progressed well with PT and feels she is now ready for discharge. She demonstrates 5/5 quad and hamstring strength. Progress towards goals   1. Pt will improve left knee AROM to 0-120 deg for improved ease of ADLs and transfers.               PN: 0-118 deg              Current:progressed  0-118 deg 7/15/2020  2. Pt will demonstrate non-antalgic gait pattern without AD use to improve gait efficiency and independence.              PN: no A.D in home but [facility] in community with mild antalgic limp               Current: MET no cane  3. Pt will negotiate 4 stairs x 2 with reciprocal pattern void of pain increase to improve home and community negotiation.              PN: still has pain with stairs but able to perform step ups using 4 inch step              Current: MET 4 stairs x 2 with alternating pattern   5.  Pt will report a little difficulty walking 1 mile to demonstrate improved quality of life and function.              PN: quite a bit of difficulty, walked for 45 minutes through mall, grocery store without A.D but with antaglic limp, fatigue by end              Current: Not met moderate difficulty on 7-17-20      ASSESSMENT/RECOMMENDATIONS:  [x]Discontinue therapy: [x]Patient has reached or is progressing toward set goals      []Patient is non-compliant or has abdicated      []Due to lack of appreciable progress towards set Kelly 71, PT 7/17/2020 10:36 AM

## 2020-07-21 ENCOUNTER — OFFICE VISIT (OUTPATIENT)
Dept: ORTHOPEDIC SURGERY | Age: 50
End: 2020-07-21

## 2020-07-21 ENCOUNTER — DOCUMENTATION ONLY (OUTPATIENT)
Dept: ORTHOPEDIC SURGERY | Age: 50
End: 2020-07-21

## 2020-07-21 VITALS
HEIGHT: 62 IN | HEART RATE: 87 BPM | DIASTOLIC BLOOD PRESSURE: 78 MMHG | OXYGEN SATURATION: 100 % | SYSTOLIC BLOOD PRESSURE: 138 MMHG | RESPIRATION RATE: 16 BRPM | WEIGHT: 232.8 LBS | BODY MASS INDEX: 42.84 KG/M2 | TEMPERATURE: 98.2 F

## 2020-07-21 DIAGNOSIS — M17.12 PRIMARY OSTEOARTHRITIS OF LEFT KNEE: ICD-10-CM

## 2020-07-21 DIAGNOSIS — S83.242D ACUTE MEDIAL MENISCUS TEAR, LEFT, SUBSEQUENT ENCOUNTER: Primary | ICD-10-CM

## 2020-07-21 NOTE — PROGRESS NOTES
Patient: Sabina Cueva  YOB: 1970       HISTORY:  The patient presents for reevaluation of her left knee status post arthroscopic partial medial and lateral  menisectomy with grade III chondromalacia on 6/1/2020. Patient is improved, states pain is a 0 out of 10.  she has gone to physical therapy. She notes persistent aching pain and swelling with prolonged walking still. Patient denies any fever, chills, chest pain, shortness of breath or calf pain. The remainder of the review of systems is negative. There are no new illness or injuries to report since last seen in the office. No changes in medications, allergies, social or family history. PHYSICAL EXAMINATION:    Visit Vitals  /78 (BP 1 Location: Left arm, BP Patient Position: Sitting)   Pulse 87   Temp 98.2 °F (36.8 °C) (Oral)   Resp 16   Ht 5' 2\" (1.575 m)   Wt 232 lb 12.8 oz (105.6 kg)   SpO2 100%   BMI 42.58 kg/m²     The patient is a well-developed, well-nourished female in no acute distress. The patient is alert and oriented times three. The patient appears to be well groomed. Mood and affect are normal.   ORTHOPEDIC EXAM of Left knee: Inspection: Effusion not present,  incisions well healed  TTP: medial and lateral joint line  Range of motion: 0-120 flexion  Stability: Stable  Strength: 5/5  2+ distal pulses    IMPRESSION:  Status post Left knee arthroscopic partial medial menisectomy with degen arthritis with joint space narrowing. PLAN:   1. Patient improving post operatively  2. Patient still with pain c/w degenerative arthritis. Will auth euflexxa given persistent pain  3.  Cont with PT    RTC 3 weeks    No duty until Aug 24, 2020    [facility]

## 2020-07-21 NOTE — PROGRESS NOTES
Patient dropped off Attending Physicians Statement Forms off at the .  Patient can be reached at [phone]

## 2020-07-28 ENCOUNTER — DOCUMENTATION ONLY (OUTPATIENT)
Dept: ORTHOPEDIC SURGERY | Age: 50
End: 2020-07-28

## 2020-07-28 NOTE — PROGRESS NOTES
[employer] [place] form completed, copy to scanning,, faxed with confirmation, and available for  at [facility] location.

## 2020-07-29 ENCOUNTER — DOCUMENTATION ONLY (OUTPATIENT)
Dept: ORTHOPEDIC SURGERY | Age: 50
End: 2020-07-29

## 2020-07-29 NOTE — PROGRESS NOTES
Received new \"[employer] [place] Attending Physician Statement\" via fax. Pt requests that it is redone for her lt knee, not rt shoulder. Pt request it is faxed when completed and that she is called at [phone]. Form placed in [facility] 1 form bin.

## 2020-08-11 ENCOUNTER — OFFICE VISIT (OUTPATIENT)
Dept: ORTHOPEDIC SURGERY | Age: 50
End: 2020-08-11

## 2020-08-11 VITALS
RESPIRATION RATE: 16 BRPM | HEIGHT: 64 IN | HEART RATE: 77 BPM | OXYGEN SATURATION: 99 % | TEMPERATURE: 98.6 F | BODY MASS INDEX: 39.09 KG/M2 | DIASTOLIC BLOOD PRESSURE: 82 MMHG | WEIGHT: 229 LBS | SYSTOLIC BLOOD PRESSURE: 140 MMHG

## 2020-08-11 DIAGNOSIS — S83.242D ACUTE MEDIAL MENISCUS TEAR, LEFT, SUBSEQUENT ENCOUNTER: Primary | ICD-10-CM

## 2020-08-11 DIAGNOSIS — M17.12 PRIMARY OSTEOARTHRITIS OF LEFT KNEE: ICD-10-CM

## 2020-08-11 NOTE — PROGRESS NOTES
Patient: Cecil Beltrán  YOB: 1970       HISTORY:  The patient presents for reevaluation of her left knee status post arthroscopic partial medial and lateral  menisectomy with grade III chondromalacia on 6/1/2020. Patient is improved, states pain is a 0 out of 10.  she has gone to physical therapy. She notes she is doing much better now. Some discomfort but not affecting her day to day. viscosupplementation was denied by both insurances. Patient denies any fever, chills, chest pain, shortness of breath or calf pain. The remainder of the review of systems is negative. There are no new illness or injuries to report since last seen in the office. No changes in medications, allergies, social or family history. PHYSICAL EXAMINATION:    Visit Vitals  /82 (BP 1 Location: Left arm)   Pulse 77   Temp 98.6 °F (37 °C) (Temporal)   Resp 16   Ht 5' 3.5\" (1.613 m)   Wt 229 lb (103.9 kg)   SpO2 99%   BMI 39.93 kg/m²     The patient is a well-developed, well-nourished female in no acute distress. The patient is alert and oriented times three. The patient appears to be well groomed. Mood and affect are normal.   ORTHOPEDIC EXAM of Left knee: Inspection: Effusion not present,  incisions well healed  TTP: medial and lateral joint line  Range of motion: 0-120 flexion  Stability: Stable  Strength: 5/5  2+ distal pulses    IMPRESSION:  Status post Left knee arthroscopic partial medial menisectomy with degen arthritis with joint space narrowing. PLAN:   1. Patient improving post operatively  2. Patient still with pain c/w degenerative arthritis but not affecting her day to day  3.  Patient to cont with all activities as tolerated    RTC PRN      Dulce Little PA-C  [address] [place] [address] [facility]

## 2020-09-22 ENCOUNTER — OFFICE VISIT (OUTPATIENT)
Dept: ORTHOPEDIC SURGERY | Age: 50
End: 2020-09-22
Payer: COMMERCIAL

## 2020-09-22 VITALS
DIASTOLIC BLOOD PRESSURE: 79 MMHG | HEIGHT: 62 IN | BODY MASS INDEX: 42.65 KG/M2 | TEMPERATURE: 97.4 F | SYSTOLIC BLOOD PRESSURE: 114 MMHG | OXYGEN SATURATION: 100 % | WEIGHT: 231.8 LBS | HEART RATE: 70 BPM

## 2020-09-22 DIAGNOSIS — M17.12 PRIMARY OSTEOARTHRITIS OF LEFT KNEE: Primary | ICD-10-CM

## 2020-09-22 PROCEDURE — 20610 DRAIN/INJ JOINT/BURSA W/O US: CPT | Performed by: PHYSICIAN ASSISTANT

## 2020-09-22 PROCEDURE — 99214 OFFICE O/P EST MOD 30 MIN: CPT | Performed by: PHYSICIAN ASSISTANT

## 2020-09-22 RX ORDER — TRIAMCINOLONE ACETONIDE 40 MG/ML
40 INJECTION, SUSPENSION INTRA-ARTICULAR; INTRAMUSCULAR ONCE
Qty: 1 ML | Refills: 0
Start: 2020-09-22 | End: 2020-09-22

## 2020-09-22 RX ORDER — CELECOXIB 200 MG/1
200 CAPSULE ORAL DAILY
Qty: 30 CAP | Refills: 2 | Status: SHIPPED | OUTPATIENT
Start: 2020-09-22 | End: 2020-12-21

## 2020-09-22 NOTE — PROGRESS NOTES
Patient: Tawanda Rodriguez  YOB: 1970       HISTORY:  The patient presents for reevaluation of her left knee status post arthroscopic partial medial and lateral  menisectomy with grade III chondromalacia on 6/1/2020. Patientstates pain is a 9 out of 10.  she has gone to physical therapy. She notes that since going back to work her knee pain has worsened. Dull throbbing pain with sharp stabbing pains with walking. viscosupplementation was denied by both insurances. Patient denies any fever, chills, chest pain, shortness of breath or calf pain. The remainder of the review of systems is negative. There are no new illness or injuries to report since last seen in the office. No changes in medications, allergies, social or family history. Pain Assessment  9/22/2020   Location of Pain Knee   Location Modifiers Left   Severity of Pain 9   Quality of Pain Throbbing   Quality of Pain Comment -   Duration of Pain -   Frequency of Pain -   Aggravating Factors -   Aggravating Factors Comment -   Limiting Behavior -   Relieving Factors -   Relieving Factors Comment -   Result of Injury -   Work-Related Injury -   How long out of work? -   Type of Injury -   Type of Injury Comment -         PHYSICAL EXAMINATION:    Visit Vitals  /79 (BP 1 Location: Right arm, BP Patient Position: Sitting)   Pulse 70   Temp 97.4 °F (36.3 °C) (Oral)   Ht 5' 2\" (1.575 m)   Wt 231 lb 12.8 oz (105.1 kg)   SpO2 100%   BMI 42.40 kg/m²     The patient is a well-developed, well-nourished female in no acute distress. The patient is alert and oriented times three. The patient appears to be well groomed. Mood and affect are normal.   LYMPHATIC: lymph nodes are not enlarged and are within normal limits  SKIN: normal in color and non tender to palpation. There are no bruises or abrasions noted. NEUROLOGICAL: Motor sensory exam is within normal limits. Reflexes are equal bilaterally.  There is normal sensation to pinprick and light touch  ORTHOPEDIC EXAM of Left knee: Inspection: Effusion not present,  incisions well healed  TTP: medial and lateral joint line  Range of motion: 0-120 flexion  Stability: Stable  Strength: 5/5  2+ distal pulses    PROCEDURES: Left knee Injection    Indication:Left knee pain/swelling    After sterile prep, 4 cc of Xylocaine and 1 cc of Kenalog were injected into the left knee. [facility]  OFFICE PROCEDURE PROGRESS NOTE        Chart reviewed for the following:  Cristy OAKES PA, have reviewed the History, Physical and updated the Allergic reactions for [address] [address] performed immediately prior to start of procedure:  Crsity OAKES PA-C, have performed the following reviews on [facility] prior to the start of the procedure:            * Patient was identified by name and date of birth   * Agreement on procedure being performed was verified  * Risks and Benefits explained to the patient  * Procedure site verified and marked as necessary  * Patient was positioned for comfort  * Consent was signed and verified     Time: 2:02 PM    Date of procedure: 9/22/2020    Procedure performed by:  CRISTELA Hitchcock    Provider assisted by: (see medication administration)    How tolerated by patient: tolerated the procedure well with no complications    Comments: none   IMPRESSION:  Status post Left knee arthroscopic partial medial menisectomy with degen arthritis with joint space narrowing. Encounter Diagnosis   Name Primary?  Primary osteoarthritis of left knee Yes        PLAN:   1. Patient with acute exacerbation of degenerative arthritis. Will inject today. Will also rx celebrex for knee given failure of mobic and ibuprofen 800mg. OTC voltaren gel for breakthrough pain  Risk factors include: BMI>35  2. Yes cortisone injection indicated today left knee  3. Yes Physical/Occupational Therapy indicated today left knee  4.  No diagnostic test indicated today:   5. No durable medical equipment indicated today  6. No referral indicated today   7. Yes medications indicated today: celebrex and voltaren gel  8.  No Narcotic indicated today      RTC 3 weeks if pain persists       MARILEE [address] [facility]

## 2020-09-23 ENCOUNTER — HOSPITAL ENCOUNTER (OUTPATIENT)
Dept: PHYSICAL THERAPY | Age: 50
Discharge: HOME | End: 2020-09-23
Payer: COMMERCIAL

## 2020-09-23 PROCEDURE — 97161 PT EVAL LOW COMPLEX 20 MIN: CPT

## 2020-09-23 PROCEDURE — 97110 THERAPEUTIC EXERCISES: CPT

## 2020-09-23 PROCEDURE — 97140 MANUAL THERAPY 1/> REGIONS: CPT

## 2020-09-23 NOTE — PROGRESS NOTES
[facility]  [address]  [place], [address]  [phone] fax    Plan of Care/ Statement of Necessity for Physical Therapy Services    Patient name: Tawanda Rodriguez Start of Care: 2020   Referral source: Wale Menendez : 1970    Medical Diagnosis: Pain in left knee [M25.562]  Payor: BLUE CROSS / Plan: Mau Galvan / Product Type: HMO /  Onset Date:2020    Treatment Diagnosis: Left knee pain   Prior Hospitalization: see medical history Provider#: [id number]   Medications: Verified on Patient summary List    Comorbidities: OA; B knee arthroscopy   Prior Level of Function: Works as a  for [employer]; able to perform daily activities without knee pain     The Plan of Care and following information is based on the information from the initial evaluation. Assessment/ key information: 52y.o. year old female presents with CC of left knee pain and swelling. Patient underwent left knee arthroscopy in 2020; reports exacerbation of pain after patient returned to work. Impairments noted today:  Poor left patellar mobility; increased left ITB mm restrictions; decreased left hamstring and hip ext strength. Patient will benefit from physical therapy to address deficits, and ultimately to return patient to prior level of function.     Evaluation Complexity History MEDIUM  Complexity : 1-2 comorbidities / personal factors will impact the outcome/ POC ; Examination LOW Complexity : 1-2 Standardized tests and measures addressing body structure, function, activity limitation and / or participation in recreation  ;Presentation LOW Complexity : Stable, uncomplicated  ;Clinical Decision Making MEDIUM Complexity : FOTO score of 26-74  Overall Complexity Rating: LOW   Problem List: pain affecting function, decrease strength, decrease ADL/ functional abilitiies, decrease activity tolerance and decrease flexibility/ joint mobility   Treatment Plan may include any combination of the following: Therapeutic exercise, Neuromuscular re-education, Physical agent/modality, Manual therapy and Patient education  Patient / Family readiness to learn indicated by: asking questions, trying to perform skills and interest  Persons(s) to be included in education: patient (P)  Barriers to Learning/Limitations: None  Patient Goal (s): decrease pain  Patient Self Reported Health Status: good  Rehabilitation Potential: good    Short Term Goals: To be accomplished in 2 weeks:  1. I and compliant with HEP for self management of symptoms. Long Term Goals: To be accomplished in 4 weeks:  1. Improve FOTO to 64 to indicate improved function with daily activities. 2. Increase left hamstring strength to 4+/5 to improve stability for walking a mile. 3. Increase left hip EXT strength to 4+/5 to to improve stability for housework. Frequency / Duration: Patient to be seen 2 times per week for 4 weeks. Patient/ Caregiver education and instruction: Diagnosis, prognosis, exercises   [x]  Plan of care has been reviewed with LUCRECIA Alex, PT 9/23/2020 8:47 AM    ________________________________________________________________________    I certify that the above Therapy Services are being furnished while the patient is under my care. I agree with the treatment plan and certify that this therapy is necessary.     Physician's Signature:____________Date:_________TIME:________    ** Signature, Date and Time must be completed for valid certification **    Please sign and return to [facility] [address]  [address]  [place], [address]  [phone] fax

## 2020-09-23 NOTE — PROGRESS NOTES
PT DAILY TREATMENT NOTE 10-18    Patient Name: Amber Cary  Date:2020  : 1970  [x]  Patient  Verified  Payor: Lindsey Singh / Plan: Klaudia Gear / Product Type: HMO /    In time:812  Out time:840  Total Treatment Time (min): 28  Visit #: 1 of 8    Medicare/BCBS Only   Total Timed Codes (min):  20 1:1 Treatment Time:  28       Treatment Area: Pain in left knee [M25.562]    SUBJECTIVE  Pain Level (0-10 scale): 0  Any medication changes, allergies to medications, adverse drug reactions, diagnosis change, or new procedure performed?: [x] No    [] Yes (see summary sheet for update)  Subjective functional status/changes:   [] No changes reported  See eval    OBJECTIVE    8 min [x]Eval                  []Re-Eval       12 min Therapeutic Exercise:  [] See flow sheet :   Rationale: increase ROM and increase strength to improve the patients ability to perform daily activities      8 min Manual Therapy:  KT tape patellar correction 75%   Rationale: decrease pain and increase ROM to improve patellar mobility         With   [] TE   [] TA   [] neuro   [] other: Patient Education: [x] Review HEP    [] Progressed/Changed HEP based on:   [] positioning   [] body mechanics   [] transfers   [] heat/ice application    [] other:      Other Objective/Functional Measures:      Pain Level (0-10 scale) post treatment: 0    ASSESSMENT/Changes in Function: see POC    Patient will continue to benefit from skilled PT services to modify and progress therapeutic interventions, address functional mobility deficits, address ROM deficits, address strength deficits, analyze and address soft tissue restrictions and analyze and cue movement patterns to attain remaining goals. [x]  See Plan of Care  []  See progress note/recertification  []  See Discharge Summary         Progress towards goals / Updated goals:  Short Term Goals: To be accomplished in 2 weeks:  1. I and compliant with HEP for self management of symptoms. IE: issued HEP  Long Term Goals: To be accomplished in 4 weeks:  1. Improve FOTO to 64 to indicate improved function with daily activities. IE: 52  2. Increase left hamstring strength to 4+/5 to improve stability for walking a mile. IE: 3+/5  3. Increase left hip EXT strength to 4+/5 to to improve stability for housework.   IE: 3+/5  PLAN  []  Upgrade activities as tolerated     [x]  Continue plan of care  []  Update interventions per flow sheet       []  Discharge due to:_  []  Other:_      Sarah Monzon, MPT, CMTPT 9/23/2020  8:57 AM    Future Appointments   Date Time Provider Yi Osborn   9/29/2020  7:45 AM Lilian Herron, PTA [facility]   10/2/2020  7:45 AM Lilian Herron, PTA [facility]   10/6/2020  7:45 AM Lilian Herron, PTA [facility]   10/9/2020  7:45 AM Alexandre Ricnon, PTA [facility]   10/13/2020  8:15 AM Jeet John, PT [facility]   10/15/2020  7:45 AM Chevy Hickey, PTA [facility]   10/20/2020  7:45 AM Lilian Herron, PTA [facility]

## 2020-09-29 ENCOUNTER — HOSPITAL ENCOUNTER (OUTPATIENT)
Dept: PHYSICAL THERAPY | Age: 50
Discharge: HOME | End: 2020-09-29
Payer: COMMERCIAL

## 2020-09-29 PROCEDURE — 97112 NEUROMUSCULAR REEDUCATION: CPT

## 2020-09-29 PROCEDURE — 97110 THERAPEUTIC EXERCISES: CPT

## 2020-09-29 NOTE — PROGRESS NOTES
PT DAILY TREATMENT NOTE 10-18    Patient Name: Dillon Miller  Date:2020  : 1970  [x]  Patient  Verified  Payor: Parker Thapa / Plan: Maria Ines Issa / Product Type: HMO /    In time:7:51  Out time:8:33  Total Treatment Time (min): 42  Visit #: 2 of 8    Medicare/BCBS Only   Total Timed Codes (min):  32 1:1 Treatment Time:  32       Treatment Area: Pain in left knee [M25.562]    SUBJECTIVE  Pain Level (0-10 scale): 0/10  Any medication changes, allergies to medications, adverse drug reactions, diagnosis change, or new procedure performed?: [x] No    [] Yes (see summary sheet for update)  Subjective functional status/changes:   [] No changes reported  Pt reports no new complaints of pain. Pt reports compliance with HEP.      OBJECTIVE    Modality rationale: decrease inflammation and decrease pain to improve the patients ability to perform ADLs    Min Type Additional Details    [] Estim:  []Unatt       []IFC  []Premod                        []Other:  []w/ice   []w/heat  Position:  Location:    [] Estim: []Att    []TENS instruct  []NMES                    []Other:  []w/US   []w/ice   []w/heat  Position:  Location:    []  Traction: [] Cervical       []Lumbar                       [] Prone          []Supine                       []Intermittent   []Continuous Lbs:  [] before manual  [] after manual    []  Ultrasound: []Continuous   [] Pulsed                           []1MHz   []3MHz W/cm2:  Location:    []  Iontophoresis with dexamethasone         Location: [] Take home patch   [] In clinic   10 [x]  Ice     []  heat  []  Ice massage  []  Laser   []  Anodyne Position:long sitting  Location:    []  Laser with stim  []  Other:  Position:  Location:    []  Vasopneumatic Device Pressure:       [] lo [] med [] hi   Temperature: [] lo [] med [] hi   [] Skin assessment post-treatment:  []intact []redness- no adverse reaction    []redness  adverse reaction:     22 min Therapeutic Exercise:  [x] See flow sheet :   Rationale: increase ROM and increase strength to improve the patients ability to perform ADLs with increased ease. 10 min Neuromuscular Re-education:  [x]  See flow sheet :   Rationale: increase strength, improve coordination and increase proprioception  to improve the patients ability to perform ADLs with increased ease. With   [] TE   [] TA   [] neuro   [] other: Patient Education: [x] Review HEP    [] Progressed/Changed HEP based on:   [] positioning   [] body mechanics   [] transfers   [] heat/ice application    [] other:      Other Objective/Functional Measures: Initiated exercises as per flow sheet. Pain Level (0-10 scale) post treatment: 0/10    ASSESSMENT/Changes in Function: Pt demonstrates fair control of left LE and has minimal difficulty performing step downs. Patient will continue to benefit from skilled PT services to modify and progress therapeutic interventions, address functional mobility deficits, address ROM deficits, address strength deficits, analyze and address soft tissue restrictions, analyze and cue movement patterns, analyze and modify body mechanics/ergonomics and assess and modify postural abnormalities to attain remaining goals. []  See Plan of Care  []  See progress note/recertification  []  See Discharge Summary         Progress towards goals / Updated goals:  Short Term Goals: To be accomplished in 2 weeks:  1. I and compliant with HEP for self management of symptoms.   IE: issued [place] be accomplished in 4 weeks:  1. Improve FOTO SW 47 [place] indicate improved function with daily activities. IE: 52  2. Increase left hamstring strength to 4+/5 to improve stability for walking a mile. IE: 3+/5  3. Increase left hip EXT strength to 4+/5 to to improve stability for housework.   IE: 3+/5    PLAN  []  Upgrade activities as tolerated     [x]  Continue plan of care  []  Update interventions per flow sheet       []  Discharge due to:_  []  Other:_      Avanell Pump, PTA 9/29/2020  7:58 AM    Future Appointments   Date Time Provider Yi Osborn   10/2/2020  7:45 AM Emy Kowalski, PTA [facility]   10/6/2020  7:45 AM Emy Kowalski, PTA [facility]   10/9/2020  7:45 AM Shaheen Esteban, PTA [facility]   10/13/2020  8:15 AM Nehemiah Koehler, PT [facility]   10/15/2020  7:45 AM Alexander Noe, PTA [facility]   10/20/2020  7:45 AM Emy Kowalski, PTA [facility]

## 2020-10-02 ENCOUNTER — HOSPITAL ENCOUNTER (OUTPATIENT)
Dept: PHYSICAL THERAPY | Age: 50
Discharge: HOME | End: 2020-10-02
Payer: COMMERCIAL

## 2020-10-02 PROCEDURE — 97110 THERAPEUTIC EXERCISES: CPT

## 2020-10-02 PROCEDURE — 97112 NEUROMUSCULAR REEDUCATION: CPT

## 2020-10-02 NOTE — PROGRESS NOTES
PT DAILY TREATMENT NOTE 10-18    Patient Name: Regina Lynne  Date:10/2/2020  : 1970  [x]  Patient  Verified  Payor: Ren Davenport / Plan: Makenna Lyons / Product Type: HMO /    In time:7:46  Out time:8:30  Total Treatment Time (min): 44  Visit #: 3 of 8    Medicare/BCBS Only   Total Timed Codes (min):  34 1:1 Treatment Time:  34       Treatment Area: Pain in left knee [M25.562]    SUBJECTIVE  Pain Level (0-10 scale): 0/10  Any medication changes, allergies to medications, adverse drug reactions, diagnosis change, or new procedure performed?: [x] No    [] Yes (see summary sheet for update)  Subjective functional status/changes:   [] No changes reported  Pt reports she gets numbness and her feet feel cold at times after having her most recent surgery. Pt denies any complaints of pain currently. OBJECTIVE    Modality rationale: decrease inflammation and decrease pain to improve the patients ability to perform ADLs.     Min Type Additional Details    [] Estim:  []Unatt       []IFC  []Premod                        []Other:  []w/ice   []w/heat  Position:  Location:    [] Estim: []Att    []TENS instruct  []NMES                    []Other:  []w/US   []w/ice   []w/heat  Position:  Location:    []  Traction: [] Cervical       []Lumbar                       [] Prone          []Supine                       []Intermittent   []Continuous Lbs:  [] before manual  [] after manual    []  Ultrasound: []Continuous   [] Pulsed                           []1MHz   []3MHz W/cm2:  Location:    []  Iontophoresis with dexamethasone         Location: [] Take home patch   [] In clinic   10 [x]  Ice     []  heat  []  Ice massage  []  Laser   []  Anodyne Position:sitting  Location:left knee    []  Laser with stim  []  Other:  Position:  Location:    []  Vasopneumatic Device Pressure:       [] lo [] med [] hi   Temperature: [] lo [] med [] hi   [] Skin assessment post-treatment:  []intact []redness- no adverse reaction []redness  adverse reaction:     24 min Therapeutic Exercise:  [x] See flow sheet :   Rationale: increase ROM and increase strength to improve the patients ability to perform ADLs. 10 min Neuromuscular Re-education:  [x]  See flow sheet :   Rationale: increase strength, improve coordination and increase proprioception  to improve the patients ability to perform functional activities with increased ease. With   [] TE   [] TA   [] neuro   [] other: Patient Education: [x] Review HEP    [] Progressed/Changed HEP based on:   [] positioning   [] body mechanics   [] transfers   [] heat/ice application    [] other:      Other Objective/Functional Measures: Mod vc's for form with band walks. Pain Level (0-10 scale) post treatment: 0/10    ASSESSMENT/Changes in Function: Pt demonstrates continued limitations with left quad strength and difficulty performing step downs without compensations. Patient will continue to benefit from skilled PT services to modify and progress therapeutic interventions, address functional mobility deficits, address ROM deficits, address strength deficits, analyze and address soft tissue restrictions, analyze and cue movement patterns, analyze and modify body mechanics/ergonomics and assess and modify postural abnormalities to attain remaining goals. []  See Plan of Care  []  See progress note/recertification  []  See Discharge Summary         Progress towards goals / Updated goals:  Short Term Goals: To be accomplished in 2 weeks:  1. I and compliant with HEP for self management of symptoms.   IE: issued HEP  Current:Met per patient report. Long Term Goals: To be accomplished in 4 weeks:  1. Improve FOTO [place]IAG[place] 71 PA indicate improved function with daily activities.   IE: 47  2. Increase left hamstring strength to 4+/5 to improve stability for walking a mile. IE: 3+/5  3. Increase left hip EXT strength to 4+/5 to to improve stability for housework.   IE: 3+/5    PLAN  [] Upgrade activities as tolerated     [x]  Continue plan of care  []  Update interventions per flow sheet       []  Discharge due to:_  []  Other:_      Inderjit Villalta, PTA 10/2/2020  7:57 AM    Future Appointments   Date Time Provider Yi Osborn   10/6/2020  7:45 AM Kim Rico, PTA [facility]   10/9/2020  7:45 AM Selvin Tyson, PTA [facility]   10/13/2020  8:15 AM Hui Carrillo, PT [facility]   10/15/2020  7:45 AM Savita Hurd, PTA [facility]   10/20/2020  7:45 AM Tomma Trisha, PTA [facility]

## 2020-10-06 ENCOUNTER — HOSPITAL ENCOUNTER (OUTPATIENT)
Dept: PHYSICAL THERAPY | Age: 50
Discharge: HOME | End: 2020-10-06
Payer: COMMERCIAL

## 2020-10-06 PROCEDURE — 97110 THERAPEUTIC EXERCISES: CPT

## 2020-10-06 PROCEDURE — 97112 NEUROMUSCULAR REEDUCATION: CPT

## 2020-10-06 NOTE — PROGRESS NOTES
PT DAILY TREATMENT NOTE 10-18    Patient Name: Kassandra Stallings  Date:10/6/2020  : 1970  [x]  Patient  Verified  Payor: Misha Gallegos / Plan: Alana Agarwal / Product Type: HMO /    In time:7:45  Out time:8:28  Total Treatment Time (min): 43  Visit #: 4 of 8    Medicare/BCBS Only   Total Timed Codes (min):  33 1:1 Treatment Time:  33       Treatment Area: Pain in left knee [M25.562]    SUBJECTIVE  Pain Level (0-10 scale): 0/10  Any medication changes, allergies to medications, adverse drug reactions, diagnosis change, or new procedure performed?: [x] No    [] Yes (see summary sheet for update)  Subjective functional status/changes:   [] No changes reported  Pt reports no new complaints of pain. Pt reports compliance with HEP. OBJECTIVE    Modality rationale: decrease inflammation and decrease pain to improve the patients ability to perform ADLs with increased ease.     Min Type Additional Details    [] Estim:  []Unatt       []IFC  []Premod                        []Other:  []w/ice   []w/heat  Position:  Location:    [] Estim: []Att    []TENS instruct  []NMES                    []Other:  []w/US   []w/ice   []w/heat  Position:  Location:    []  Traction: [] Cervical       []Lumbar                       [] Prone          []Supine                       []Intermittent   []Continuous Lbs:  [] before manual  [] after manual    []  Ultrasound: []Continuous   [] Pulsed                           []1MHz   []3MHz W/cm2:  Location:    []  Iontophoresis with dexamethasone         Location: [] Take home patch   [] In clinic   10 [x]  Ice     []  heat  []  Ice massage  []  Laser   []  Anodyne Position:sitting  Location:left knee    []  Laser with stim  []  Other:  Position:  Location:    []  Vasopneumatic Device Pressure:       [] lo [] med [] hi   Temperature: [] lo [] med [] hi   [] Skin assessment post-treatment:  []intact []redness- no adverse reaction    []redness  adverse reaction:     23 min Therapeutic Exercise:  [x] See flow sheet :   Rationale: increase ROM and increase strength to improve the patients ability to perform ADLs with increased ease. 10 min Neuromuscular Re-education:  [x]  See flow sheet :   Rationale: increase strength, improve coordination and increase proprioception  to improve the patients ability to perform ADLs with increased ease. With   [] TE   [] TA   [] neuro   [] other: Patient Education: [x] Review HEP    [] Progressed/Changed HEP based on:   [] positioning   [] body mechanics   [] transfers   [] heat/ice application    [] other:      Other Objective/Functional Measures: added bereket     Pain Level (0-10 scale) post treatment: 0/10    ASSESSMENT/Changes in Function: Pt demonstrates continued progress toward goals with improved functional strength and no increased pain with all strengthening exercises. Patient will continue to benefit from skilled PT services to modify and progress therapeutic interventions, address functional mobility deficits, address ROM deficits, address strength deficits, analyze and address soft tissue restrictions, analyze and cue movement patterns and analyze and modify body mechanics/ergonomics to attain remaining goals. []  See Plan of Care  []  See progress note/recertification  []  See Discharge Summary         Progress towards goals / Updated goals:  Short Term Goals: To be accomplished in 2 weeks:  1. I and compliant with HEP for self management of symptoms.   IE: issued HEP  Current:Met per patient report. Long Term Goals: To be accomplished in 4 weeks:  1. Improve FOTO [place] 84 [place] indicate improved function with daily activities.   IE: 47  2. Increase left hamstring strength to 4+/5 to improve stability for walking a mile. IE: 3+/5  3. Increase left hip EXT strength to 4+/5 to to improve stability for housework.   IE: 3+/5     PLAN  []  Upgrade activities as tolerated     [x]  Continue plan of care  []  Update interventions per flow sheet       []  Discharge due to:_  []  Other:_      Bearl Michelle, PTA 10/6/2020  7:56 AM    Future Appointments   Date Time Provider Yi Irena   10/9/2020  7:45 AM William Dewitt, PTA [facility]   10/13/2020  8:15 AM Deyanira Khan, PT [facility]   10/15/2020  7:45 AM Debra Macias, PTA [facility]   10/20/2020  7:45 AM Halima Acevedo, PTA [facility]

## 2020-10-09 ENCOUNTER — APPOINTMENT (OUTPATIENT)
Dept: PHYSICAL THERAPY | Age: 50
End: 2020-10-09
Payer: COMMERCIAL

## 2020-10-13 ENCOUNTER — OFFICE VISIT (OUTPATIENT)
Dept: ORTHOPEDIC SURGERY | Age: 50
End: 2020-10-13
Payer: COMMERCIAL

## 2020-10-13 ENCOUNTER — HOSPITAL ENCOUNTER (OUTPATIENT)
Dept: PHYSICAL THERAPY | Age: 50
Discharge: HOME | End: 2020-10-13
Payer: COMMERCIAL

## 2020-10-13 VITALS
WEIGHT: 229.6 LBS | OXYGEN SATURATION: 98 % | DIASTOLIC BLOOD PRESSURE: 76 MMHG | BODY MASS INDEX: 42.25 KG/M2 | HEIGHT: 62 IN | TEMPERATURE: 97.5 F | HEART RATE: 78 BPM | SYSTOLIC BLOOD PRESSURE: 108 MMHG

## 2020-10-13 DIAGNOSIS — M17.12 PRIMARY OSTEOARTHRITIS OF LEFT KNEE: ICD-10-CM

## 2020-10-13 DIAGNOSIS — M17.12 PRIMARY OSTEOARTHRITIS OF LEFT KNEE: Primary | ICD-10-CM

## 2020-10-13 DIAGNOSIS — S83.242D ACUTE MEDIAL MENISCUS TEAR, LEFT, SUBSEQUENT ENCOUNTER: ICD-10-CM

## 2020-10-13 PROCEDURE — 97110 THERAPEUTIC EXERCISES: CPT

## 2020-10-13 PROCEDURE — 97112 NEUROMUSCULAR REEDUCATION: CPT

## 2020-10-13 PROCEDURE — 99214 OFFICE O/P EST MOD 30 MIN: CPT | Performed by: PHYSICIAN ASSISTANT

## 2020-10-13 NOTE — PROGRESS NOTES
PT DAILY TREATMENT NOTE 10-18    Patient Name: Shalom Padilla  Date:10/13/2020  : 1970  [x]  Patient  Verified  Payor: Matt Letters / Plan: Roni Horta / Product Type: HMO /    In time:807  Out time:843  Total Treatment Time (min): 36  Visit #: 5 of 8    Medicare/BCBS Only   Total Timed Codes (min):  36 1:1 Treatment Time:  36       Treatment Area: Pain in left knee [M25.562]    SUBJECTIVE  Pain Level (0-10 scale): 3  Any medication changes, allergies to medications, adverse drug reactions, diagnosis change, or new procedure performed?: [x] No    [] Yes (see summary sheet for update)  Subjective functional status/changes:   [] No changes reported  I don't know what happened on Saturday. Something shifted in my left knee (pointing to the medial side). I couldn't bend or straighten it. I don't know what happened. I have a 8:45 appointment with Mone Casiano today. OBJECTIVE    20 min Therapeutic Exercise:  [] See flow sheet :   Rationale: increase ROM and increase strength to improve the patients ability to perform daily activities      8 min Neuromuscular Re-education:  []  See flow sheet :   Rationale: increase strength, improve coordination, improve balance and increase proprioception  to improve the patients stability for daily activities     8 min Manual Therapy:  STM left ITB   Rationale: decrease pain, increase ROM, increase tissue extensibility and decrease trigger points to stop lateral patellar tracking/decrease pain with work tasks        With   [] TE   [] TA   [] neuro   [] other: Patient Education: [x] Review HEP    [] Progressed/Changed HEP based on:   [] positioning   [] body mechanics   [] transfers   [] heat/ice application    [] other:      Other Objective/Functional Measures:      Pain Level (0-10 scale) post treatment: 0    ASSESSMENT/Changes in Function: Poor tolerance to ITB STM, specifically in the distal 1/3. Unable to perform eccentric step downs 2/2 pain today.  Manual has not been performed at all; will include manual treatment from now on. Patient will continue to benefit from skilled PT services to modify and progress therapeutic interventions, address functional mobility deficits, address ROM deficits, address strength deficits, analyze and address soft tissue restrictions and analyze and cue movement patterns to attain remaining goals. []  See Plan of Care  []  See progress note/recertification  []  See Discharge Summary         Progress towards goals / Updated goals:  Short Term Goals: To be accomplished in 2 weeks:  1. I and compliant with HEP for self management of symptoms.   IE: issued HEP  Current:Met per patient report.   [address] [facility] be accomplished in 4 weeks:  1. Improve FOTO [place] 71 AR indicate improved function with daily activities.   IE: 47  2. Increase left hamstring strength to 4+/5 to improve stability for walking a mile. IE: 3+/5  3. Increase left hip EXT strength to 4+/5 to to improve stability for housework.   IE: 3+/5        PLAN  []  Upgrade activities as tolerated     [x]  Continue plan of care  []  Update interventions per flow sheet       []  Discharge due to:_  []  Other:_      Isabel Prior, MPT, CMTPT 10/13/2020  8:13 AM    Future Appointments   Date Time Provider Yi Osborn   10/13/2020  8:15 AM Zahra Braun, PT [facility]   10/13/2020  8:45 AM CRISTELA Alvarado [facility]   10/15/2020  7:45 AM [facility]   10/20/2020  7:45 AM Meliza Goldstein PTA [facility]   10/23/2020  7:00 AM [facility]

## 2020-10-13 NOTE — PATIENT INSTRUCTIONS
You are being scheduled for a diagnostic test. Our  service will be contacting you to set this up at a convenient place and time for you. We would like to see you back following your test to go over your results in person. Once you have your testing date scheduled, please call our office to schedule a follow up with Dr. Sera Tay at least 2 days following your test.  
 
If you have not heard from our  within 1 week of your test being ordered, please contact our office immediately so we can ensure your diagnostic test is scheduled.

## 2020-10-13 NOTE — PROGRESS NOTES
Patient: Luis Serrano  YOB: 1970       HISTORY:  The patient presents for reevaluation of her left knee status post arthroscopic partial medial and lateral  menisectomy with grade III chondromalacia on 6/1/2020. Patientstates pain is a 2 out of 10.  she has gone to physical therapy. She felt much better after the cortisone injection. Was back to doing her normal activities. On Saturday her knee buckled on her and the pain returned. Not as severe and it is improving. She has persistent swelling and shoot sharp pains. Patient denies any fever, chills, chest pain, shortness of breath or calf pain. The remainder of the review of systems is negative. There are no new illness or injuries to report since last seen in the office. No changes in medications, allergies, social or family history. Pain Assessment  10/13/2020   Location of Pain Knee   Location Modifiers Left   Severity of Pain 2   Quality of Pain Aching   Quality of Pain Comment -   Duration of Pain -   Frequency of Pain (No Data)   Frequency of Pain Comment varies   Aggravating Factors Standing;Walking   Aggravating Factors Comment -   Limiting Behavior -   Relieving Factors Elevation; Ice   Relieving Factors Comment -   Result of Injury -   Work-Related Injury -   How long out of work? -   Type of Injury -   Type of Injury Comment -         PHYSICAL EXAMINATION:    Visit Vitals  /76 (BP 1 Location: Right arm, BP Patient Position: Sitting)   Pulse 78   Temp 97.5 °F (36.4 °C) (Temporal)   Ht 5' 2\" (1.575 m)   Wt 229 lb 9.6 oz (104.1 kg)   SpO2 98%   BMI 41.99 kg/m²     The patient is a well-developed, well-nourished female in no acute distress. The patient is alert and oriented times three. The patient appears to be well groomed. Mood and affect are normal.   LYMPHATIC: lymph nodes are not enlarged and are within normal limits  SKIN: normal in color and non tender to palpation. There are no bruises or abrasions noted. NEUROLOGICAL: Motor sensory exam is within normal limits. Reflexes are equal bilaterally. There is normal sensation to pinprick and light touch  ORTHOPEDIC EXAM of Left knee: Inspection: Effusion present,  incisions well healed  TTP: medial and lateral joint line  Range of motion: 0-120 flexion  Stability: Stable  Strength: 5/5  2+ distal pulses      IMPRESSION:  Status post Left knee arthroscopic partial medial menisectomy with degen arthritis with joint space narrowing. Encounter Diagnoses   Name Primary?  Primary osteoarthritis of left knee Yes    Acute medial meniscus tear, left, subsequent encounter         PLAN:   1. Patient with persistent pain. Will order MRI left knee r/o stress fracture. If no internal derangement will proceed with euflexxa samples. Risk factors include: BMI>35  2. No cortisone injection indicated today e  3. Yes Physical/Occupational Therapy indicated today left knee  4. YES diagnostic test indicated today:   5. No durable medical equipment indicated today  6. No referral indicated today   7. Yes medications indicated today: celebrex and voltaren gel  8.  No Narcotic indicated today      RTC after MRI with [facility]

## 2020-10-18 ENCOUNTER — HOSPITAL ENCOUNTER (EMERGENCY)
Age: 50
Discharge: HOME | End: 2020-10-18
Attending: EMERGENCY MEDICINE
Payer: COMMERCIAL

## 2020-10-18 VITALS
RESPIRATION RATE: 16 BRPM | SYSTOLIC BLOOD PRESSURE: 120 MMHG | TEMPERATURE: 97.8 F | HEIGHT: 62 IN | WEIGHT: 223 LBS | HEART RATE: 67 BPM | DIASTOLIC BLOOD PRESSURE: 83 MMHG | BODY MASS INDEX: 41.04 KG/M2 | OXYGEN SATURATION: 100 %

## 2020-10-18 DIAGNOSIS — R25.2 BILATERAL LEG CRAMPS: Primary | ICD-10-CM

## 2020-10-18 DIAGNOSIS — R74.8 ELEVATED CPK: ICD-10-CM

## 2020-10-18 LAB
ANION GAP SERPL CALC-SCNC: 3 MMOL/L (ref 3–18)
BUN SERPL-MCNC: 15 MG/DL (ref 7–18)
BUN/CREAT SERPL: 19 (ref 12–20)
CALCIUM SERPL-MCNC: 8.4 MG/DL (ref 8.5–10.1)
CHLORIDE SERPL-SCNC: 106 MMOL/L (ref 100–111)
CK SERPL-CCNC: 454 U/L (ref 26–192)
CO2 SERPL-SCNC: 29 MMOL/L (ref 21–32)
CREAT SERPL-MCNC: 0.81 MG/DL (ref 0.6–1.3)
GLUCOSE SERPL-MCNC: 81 MG/DL (ref 74–99)
MAGNESIUM SERPL-MCNC: 2 MG/DL (ref 1.6–2.6)
POTASSIUM SERPL-SCNC: 4.3 MMOL/L (ref 3.5–5.5)
SODIUM SERPL-SCNC: 138 MMOL/L (ref 136–145)

## 2020-10-18 PROCEDURE — 80048 BASIC METABOLIC PNL TOTAL CA: CPT

## 2020-10-18 PROCEDURE — 83735 ASSAY OF MAGNESIUM: CPT

## 2020-10-18 PROCEDURE — 99283 EMERGENCY DEPT VISIT LOW MDM: CPT

## 2020-10-18 PROCEDURE — 82550 ASSAY OF CK (CPK): CPT

## 2020-10-18 PROCEDURE — 74011250636 HC RX REV CODE- 250/636: Performed by: EMERGENCY MEDICINE

## 2020-10-18 RX ORDER — SODIUM CHLORIDE 9 MG/ML
1000 INJECTION, SOLUTION INTRAVENOUS ONCE
Status: COMPLETED | OUTPATIENT
Start: 2020-10-18 | End: 2020-10-18

## 2020-10-18 RX ORDER — SPIRONOLACTONE 100 MG/1
100 TABLET, FILM COATED ORAL DAILY
COMMUNITY

## 2020-10-18 RX ADMIN — SODIUM CHLORIDE 1000 ML: 900 INJECTION, SOLUTION INTRAVENOUS at 11:13

## 2020-10-18 NOTE — ED TRIAGE NOTES
Patient c/o intermittent bilateral leg spasms x 2 weeks. States leg spasms have worsened over past two days. Patient states that she takes medication Aldactone that requires lab monitoring.

## 2020-10-18 NOTE — ED PROVIDER NOTES
19-year-old female history of plantar fasciitis, arthritis involving the knees and shoulders, history of arthroscopic left knee surgery presents for evaluation of bilateral lower extremity leg cramps. Intermittent over the last several months. Worse at night. Now preventing her from sleep. Symptoms improved during daytime hours. Attending physical therapy for rehab of the left knee. No other new exercises or increased activity.            Past Medical History:   Diagnosis Date    ADD (attention deficit disorder)     Arthritis     knees, R shoulder    Depression     Left foot pain     Plantar fasciitis, left     Right shoulder pain        Past Surgical History:   Procedure Laterality Date    HX KNEE ARTHROSCOPY Left 6/1/12020    left knee arthroscopic partial medial menisectomy    HX TUBAL LIGATION  2009    Tubal ligation         Family History:   Problem Relation Age of Onset    Hypertension Mother     Hypertension Father     Arthritis-osteo Other        Social History     Socioeconomic History    Marital status:      Spouse name: Not on file    Number of children: Not on file    Years of education: Not on file    Highest education level: Not on file   Occupational History    Occupation: [employer]     Employer: [employer]   Social Needs    Financial resource strain: Not on file    Food insecurity     Worry: Not on file     Inability: Not on file    Transportation needs     Medical: Not on file     Non-medical: Not on file   Tobacco Use    Smoking status: Never Smoker    Smokeless tobacco: Never Used   Substance and Sexual Activity    Alcohol use: No    Drug use: No    Sexual activity: Yes     Partners: Male   Lifestyle    Physical activity     Days per week: Not on file     Minutes per session: Not on file    Stress: Not on file   Relationships    Social connections     Talks on phone: Not on file     Gets together: Not on file     Attends Yarsanism service: Not on file     Active member of club or organization: Not on file     Attends meetings of clubs or organizations: Not on file     Relationship status: Not on file    Intimate partner violence     Fear of current or ex partner: Not on file     Emotionally abused: Not on file     Physically abused: Not on file     Forced sexual activity: Not on file   Other Topics Concern    Not on file   Social History Narrative    Not on file         ALLERGIES: Codeine    Review of Systems   Constitutional: Negative for fever. Gastrointestinal: Negative for abdominal pain. Musculoskeletal: Positive for myalgias. Negative for back pain and neck pain. All other systems reviewed and are negative. Vitals:    10/18/20 0941   BP: 125/81   Pulse: 67   Resp: 16   Temp: 97.8 °F (36.6 °C)   SpO2: 99%   Weight: 101.2 kg (223 lb)   Height: 5' 2\" (1.575 m)            Physical Exam  Vitals signs and nursing note reviewed. Constitutional:       General: She is not in acute distress. Appearance: She is well-developed. HENT:      Head: Normocephalic and atraumatic. Eyes:      General: No scleral icterus. Cardiovascular:      Rate and Rhythm: Normal rate. Pulmonary:      Effort: Pulmonary effort is normal.   Musculoskeletal:      Comments: No edema of the lower extremities. No palpable cords. No calf tenderness. No quadriceps tenderness. No knee effusions. No pain with range of motion about the hips or knees. Distal pulses 2+ and symmetric. Normal capillary refill. Motor and light touch sensory function grossly preserved. Skin:     General: Skin is warm and dry. Neurological:      Mental Status: She is alert and oriented to person, place, and time. MDM  Number of Diagnoses or Management Options  Bilateral leg cramps:   Elevated CPK:   Diagnosis management comments: Impression: Nocturnal leg cramps. Will screen electrolytes, CPK. Benign exam at this time with no muscle tenderness to palpation.   Symptoms are intermittent, worse at night, and present for weeks. History and exam not suggestive of DVT. Normal electrolytes. CPK elevated. Hydrated with 1 L of normal saline. Recent Results (from the past 12 hour(s))   CK    Collection Time: 10/18/20 10:15 AM   Result Value Ref Range     (H) 26 - 417 U/L   METABOLIC PANEL, BASIC    Collection Time: 10/18/20 10:15 AM   Result Value Ref Range    Sodium 138 136 - 145 mmol/L    Potassium 4.3 3.5 - 5.5 mmol/L    Chloride 106 100 - 111 mmol/L    CO2 29 21 - 32 mmol/L    Anion gap 3 3.0 - 18 mmol/L    Glucose 81 74 - 99 mg/dL    BUN 15 7.0 - 18 MG/DL    Creatinine 0.81 0.6 - 1.3 MG/DL    BUN/Creatinine ratio 19 12 - 20      GFR est AA >60 >60 ml/min/1.73m2    GFR est non-AA >60 >60 ml/min/1.73m2    Calcium 8.4 (L) 8.5 - 10.1 MG/DL   MAGNESIUM    Collection Time: 10/18/20 10:15 AM   Result Value Ref Range    Magnesium 2.0 1.6 - 2.6 mg/dL       Procedures    Diagnosis:   1. Bilateral leg cramps    2. Elevated CPK      1. Routine electrolytes occluding potassium, calcium, and magnesium within normal limits. 2.  CPK (muscle enzyme) was elevated. This is sometimes seen with excessive exercise or trauma to the muscles. 3.  Treatment is with hydration. You received 1 L of normal saline IV. Continue with oral hydration at home. 4.  Follow-up with your primary care physician for recheck 5 to 7 days if not improving. 5.  Return for medical emergencies as clinically indicated.     Disposition: home    Follow-up Information     Follow up With Specialties Details Why Contact Info    Thee Branch MD Family Medicine In 1 week for recheck of ongoing symptoms [address]  [address]      [address] [facility] EMERGENCY DEPT Emergency Medicine  As needed, If symptoms worsen [address] [facility]  [phone]          Patient's Medications   Start Taking    No medications on file   Continue Taking    ACYCLOVIR (ZOVIRAX) 5 % OINTMENT Apply every 3 hrs up to 5x/day    BACLOFEN (LIORESAL) 10 MG TABLET    Take 1 Tab by mouth two (2) times a day. CELECOXIB (CELEBREX) 200 MG CAPSULE    Take 1 Cap by mouth daily for 90 days. LISDEXAMFETAMINE (VYVANSE) 40 MG CAPSULE    Take by mouth daily. SPIRONOLACTONE (ALDACTONE) 100 MG TABLET    Take 100 mg by mouth daily. These Medications have changed    No medications on file   Stop Taking    IBUPROFEN (MOTRIN) 800 MG TABLET    Take 1 Tab by mouth every eight (8) hours as needed for Pain.

## 2020-10-18 NOTE — ED NOTES
Verbal shift change report given to MARQUITA Flores (oncoming nurse) by Noel Hankins RN (offgoing nurse). Report included the following information SBAR, ED Summary, Procedure Summary and Recent Results.

## 2020-10-18 NOTE — DISCHARGE INSTRUCTIONS
1.  Routine electrolytes occluding potassium, calcium, and magnesium within normal limits. 2.  CPK (muscle enzyme) was elevated. This is sometimes seen with excessive exercise or trauma to the muscles. 3.  Treatment is with hydration. You received 1 L of normal saline IV. Continue with oral hydration at home. 4.  Follow-up with your primary care physician for recheck 5 to 7 days if not improving. 5.  Return for medical emergencies as clinically indicated. Patient Education        Muscle Cramps: Care Instructions  Your Care Instructions     A muscle cramp occurs when a muscle tightens up suddenly. A cramp often happens in the legs. A muscle cramp is also called a muscle spasm or a charley horse. Muscle cramps usually last less than a minute. However, the pain may last for several minutes. Leg cramps that occur at night may wake you up. Heavy exercise, dehydration, and being overweight can increase your risk of getting cramps. An imbalance of certain chemicals in your blood, called electrolytes, can also lead to muscle cramps. Pregnant women sometimes get muscle cramps during sleep. Muscle cramps can be treated by stretching and massaging the muscle. If cramps keep coming back, your doctor may prescribe medicine that relaxes your muscles. Follow-up care is a key part of your treatment and safety. Be sure to make and go to all appointments, and call your doctor if you are having problems. It's also a good idea to know your test results and keep a list of the medicines you take. How can you care for yourself at home? · Drink plenty of fluids to prevent dehydration. Choose water and other caffeine-free clear liquids until you feel better. If you have kidney, heart, or liver disease and have to limit fluids, talk with your doctor before you increase the amount of fluids you drink. · Stretch your muscles every day, especially before and after exercise and at bedtime.  Regular stretching can relax your muscles and may prevent cramps. · Do not suddenly increase the amount of exercise you get. Increase your exercise a little each week. · When you get a cramp, stretch and massage the muscle. You can also take a warm shower or bath to relax the muscle. A heating pad placed on the muscle can also help. · Take a daily multivitamin supplement. · Ask your doctor if you can take an over-the-counter pain medicine, such as acetaminophen (Tylenol), ibuprofen (Advil, Motrin), or naproxen (Aleve). Be safe with medicines. Read and follow all instructions on the label. When should you call for help? Watch closely for changes in your health, and be sure to contact your doctor if:    · You get muscle cramps often that do not go away after home treatment.     · Your muscle cramps often wake you up at night.     · You do not get better as expected. Where can you learn more? Go to http://www.cornelius.com/  Enter I565 in the search box to learn more about \"Muscle Cramps: Care Instructions. \"  Current as of: March 2, 2020               Content Version: 12.6  © 0358-4378 [employer]. Care instructions adapted under license by [employer] (which disclaims liability or warranty for this information). If you have questions about a medical condition or this instruction, always ask your healthcare professional. [address] any warranty or liability for your use of this information.

## 2020-10-20 ENCOUNTER — HOSPITAL ENCOUNTER (OUTPATIENT)
Dept: PHYSICAL THERAPY | Age: 50
Discharge: HOME | End: 2020-10-20
Payer: COMMERCIAL

## 2020-10-20 PROCEDURE — 97110 THERAPEUTIC EXERCISES: CPT

## 2020-10-20 PROCEDURE — 97140 MANUAL THERAPY 1/> REGIONS: CPT

## 2020-10-20 NOTE — PROGRESS NOTES
PT DAILY TREATMENT NOTE 10-18    Patient Name: Frank Liz  Date:10/20/2020  : 1970  [x]  Patient  Verified  Payor: Sherrie Riddle / Plan: Amie Armando / Product Type: HMO /    In time:7:48  Out time:8:30  Total Treatment Time (min): 42  Visit #: 6 of 8    Medicare/BCBS Only   Total Timed Codes (min):  37 1:1 Treatment Time:  37       Treatment Area: Pain in left knee [M25.562]    SUBJECTIVE  Pain Level (0-10 scale): 0/10  Any medication changes, allergies to medications, adverse drug reactions, diagnosis change, or new procedure performed?: [x] No    [] Yes (see summary sheet for update)  Subjective functional status/changes:   [] No changes reported  Pt reports no new complaints of pain. Pt denies any new status change since last treatment. OBJECTIVE    Modality rationale: decrease inflammation and decrease pain to improve the patients ability to perform ADLs with increased ease.     Min Type Additional Details    [] Estim:  []Unatt       []IFC  []Premod                        []Other:  []w/ice   []w/heat  Position:  Location:    [] Estim: []Att    []TENS instruct  []NMES                    []Other:  []w/US   []w/ice   []w/heat  Position:  Location:    []  Traction: [] Cervical       []Lumbar                       [] Prone          []Supine                       []Intermittent   []Continuous Lbs:  [] before manual  [] after manual    []  Ultrasound: []Continuous   [] Pulsed                           []1MHz   []3MHz W/cm2:  Location:    []  Iontophoresis with dexamethasone         Location: [] Take home patch   [] In clinic   5 [x]  Ice     []  heat  []  Ice massage  []  Laser   []  Anodyne Position:sitting   Location:left Knee      []  Laser with stim  []  Other:  Position:  Location:    []  Vasopneumatic Device Pressure:       [] lo [] med [] hi   Temperature: [] lo [] med [] hi   [] Skin assessment post-treatment:  []intact []redness- no adverse reaction    []redness  adverse reaction: 31 min Therapeutic Exercise:  [x] See flow sheet :   Rationale: increase ROM and increase strength to improve the patients ability to perform ADLs. 8 min Manual Therapy:  Stick to left ITB   Rationale: decrease pain, increase ROM and increase tissue extensibility to improve functional mobility. With   [] TE   [] TA   [] neuro   [] other: Patient Education: [x] Review HEP    [] Progressed/Changed HEP based on:   [] positioning   [] body mechanics   [] transfers   [] heat/ice application    [] other:      Other Objective/Functional Measures: Held step downs due to pain. Pt unable to complete band walks due to pain. Pain Level (0-10 scale) post treatment: 0/10    ASSESSMENT/Changes in Function: Pt demonstrates continued limitations with weight bearing exercises. Pt reports she is scheduled for an MRI due to having an incident 2 weeks ago when her knee \"gave out. \"    Patient will continue to benefit from skilled PT services to modify and progress therapeutic interventions, address functional mobility deficits, address ROM deficits, address strength deficits, analyze and address soft tissue restrictions, analyze and cue movement patterns and analyze and modify body mechanics/ergonomics to attain remaining goals. []  See Plan of Care  []  See progress note/recertification  []  See Discharge Summary         Progress towards goals / Updated goals:  Short Term Goals: To be accomplished in 2 weeks:  1. I and compliant with HEP for self management of symptoms.   IE: issued HEP  Current:Met per patient report.   [address] [facility] be accomplished in 4 weeks:  1. Improve FOTO FN 32 XX indicate improved function with daily activities.   IE: 47  2. Increase left hamstring strength to 4+/5 to improve stability for walking a mile. IE: 3+/5  3. Increase left hip EXT strength to 4+/5 to to improve stability for housework.   IE: 3+/5   Current: 4/5. 10/20/2020       PLAN  []  Upgrade activities as tolerated     [x]  Continue plan of care  []  Update interventions per flow sheet       []  Discharge due to:_  []  Other:_      Richard Barba, LUCRECIA 10/20/2020  7:51 AM    Future Appointments   Date Time Provider Yi Osborn   10/23/2020  7:00 AM Shala Johnson, PT [id number]   11/2/2020  8:30 PM [facility] MRI RM 1 [facility]

## 2020-10-23 ENCOUNTER — HOSPITAL ENCOUNTER (OUTPATIENT)
Dept: PHYSICAL THERAPY | Age: 50
Discharge: HOME | End: 2020-10-23
Payer: COMMERCIAL

## 2020-10-23 PROCEDURE — 97140 MANUAL THERAPY 1/> REGIONS: CPT

## 2020-10-23 PROCEDURE — 97112 NEUROMUSCULAR REEDUCATION: CPT

## 2020-10-23 PROCEDURE — 97110 THERAPEUTIC EXERCISES: CPT

## 2020-10-23 NOTE — PROGRESS NOTES
PT DAILY TREATMENT NOTE 10-18    Patient Name: Jaiden Mckinley  Date:10/23/2020  : 1970  [x]  Patient  Verified  Payor: Trang Stratton / Plan: Rajeev Blanco / Product Type: HMO /    In time:705  Out time:746  Total Treatment Time (min): 41  Visit #: 7 of 8    Medicare/BCBS Only   Total Timed Codes (min):  41 1:1 Treatment Time:  41       Treatment Area: Pain in left knee [M25.562]    SUBJECTIVE  Pain Level (0-10 scale): 0  Any medication changes, allergies to medications, adverse drug reactions, diagnosis change, or new procedure performed?: [x] No    [] Yes (see summary sheet for update)  Subjective functional status/changes:   [] No changes reported  The pt reports she has been feeling much better.      OBJECTIVE    Modality rationale:    Min Type Additional Details    [] Estim:  []Unatt       []IFC  []Premod                        []Other:  []w/ice   []w/heat  Position:  Location:    [] Estim: []Att    []TENS instruct  []NMES                    []Other:  []w/US   []w/ice   []w/heat  Position:  Location:    []  Traction: [] Cervical       []Lumbar                       [] Prone          []Supine                       []Intermittent   []Continuous Lbs:  [] before manual  [] after manual    []  Ultrasound: []Continuous   [] Pulsed                           []1MHz   []3MHz W/cm2:  Location:    []  Iontophoresis with dexamethasone         Location: [] Take home patch   [] In clinic    []  Ice     []  heat  []  Ice massage  []  Laser   []  Anodyne Position:  Location:    []  Laser with stim  []  Other:  Position:  Location:    []  Vasopneumatic Device Pressure:       [] lo [] med [] hi   Temperature: [] lo [] med [] hi   [] Skin assessment post-treatment:  []intact []redness- no adverse reaction    []redness  adverse reaction:       25 min Therapeutic Exercise:  [x] See flow sheet :   Rationale: increase ROM and increase strength to improve the patients ability to perform ADL    8 min Neuromuscular Activity:  [x]  See flow sheet : Glute activation, VMO activation   Rationale: increase strength and increase proprioception  to improve the patients ability to perform ADL       8 min Manual Therapy:  Stick to left ITB   Rationale: increase tissue extensibility to perform functional tasks. With   [] TE   [] TA   [] neuro   [] other: Patient Education: [x] Review HEP    [] Progressed/Changed HEP based on:   [] positioning   [] body mechanics   [] transfers   [] heat/ice application    [] other:      Other Objective/Functional Measures: increased reps per flow sheet, increased resistance with HS curls     Pain Level (0-10 scale) post treatment: 0    ASSESSMENT/Changes in Function: The pt is progressing well with PT. She demonstrates improved strength and diminished pain. Possible discharge following next session. Patient will continue to benefit from skilled PT services to modify and progress therapeutic interventions, address functional mobility deficits, address strength deficits, analyze and address soft tissue restrictions and analyze and cue movement patterns to attain remaining goals. []  See Plan of Care  []  See progress note/recertification  []  See Discharge Summary         Progress towards goals / Updated goals:  Short Term Goals: To be accomplished in 2 weeks:  1. I and compliant with HEP for self management of symptoms.   IE: issued HEP  Current:Met per patient report.   [address] [facility] be accomplished in 4 weeks:  1. Improve FOTO AM 90 RP indicate improved function with daily activities.   IE: 47  2. Increase left hamstring strength to 4+/5 to improve stability for walking a mile. IE: 3+/5  Current: MET 4+/5 on 10-23-20  3. Increase left hip EXT strength to 4+/5 to to improve stability for housework.   IE: 3+/5   Current: 4/5. 10/20/2020          PLAN  []  Upgrade activities as tolerated     [x]  Continue plan of care  []  Update interventions per flow sheet       []  Discharge due to:_  []  Other:_      Ilsa Ramírez, PT 10/23/2020  7:30 AM    Future Appointments   Date Time Provider Yi Osborn   11/2/2020  8:30 PM [facility] MRI RM 1 [facility]

## 2020-10-23 NOTE — PROGRESS NOTES
[facility]  [address]  [place], [address]  [phone] fax    Physical Therapy Progress Note    Patient name: Carole Fitzpatrick Start of Care: 2020   Referral source: Wale Magdaleno : 1970                Medical Diagnosis: Pain in left knee [M25.562]  Payor: BLUE CROSS / Plan: Raoul Mendez / Product Type: HMO /  Onset Date:2020                Treatment Diagnosis: Left knee pain   Prior Hospitalization: see medical history Provider#: [id number]   Medications: Verified on Patient summary List    Comorbidities: OA; B knee arthroscopy   Prior Level of Function: Works as a  for [employer]; able to perform daily activities without knee pain    Visits from Start of Care: 7    Missed Visits: 0        Key Functional Changes: The pt is progressing well with PT. She demonstrates improved strength and diminished pain.     Patient will continue to benefit from skilled PT services to modify and progress therapeutic interventions, address functional mobility deficits, address strength deficits, analyze and address soft tissue restrictions and analyze and cue movement patterns to attain remaining goals. Progress towards goals   Short Term Goals: To be accomplished in 2 weeks:  1. I and compliant with HEP for self management of symptoms.   IE: issued HEP  Current:Met per patient report.   [address] [facility] [address] be accomplished in 4 weeks:  1. Improve FOTO TW 63 MD indicate improved function with daily activities.   IE: 47  2. Increase left hamstring strength to 4+/5 to improve stability for walking a mile. IE: 3+/5  Current: MET 4+/5 on 10-23-20  3. Increase left hip EXT strength to 4+/5 to to improve stability for housework. IE: 3+/5   Current: 4/5. 10/20/2020          Updated Goals: to be achieved in 2-4 weeks:   1. Improve FOTO FU 51 EJ indicate improved function with daily activities.   PN: 47  2. Increase left hip EXT strength to 4+/5 to to improve stability for housework. PN: 4/5. ASSESSMENT/RECOMMENDATIONS:  [x]Continue therapy per initial plan/protocol at a frequency of  1-2 x per week for 2-4 weeks  []Continue therapy with the following recommended changes:_____________________      _____________________________________________________________________  []Discontinue therapy progressing towards or have reached established goals  []Discontinue therapy due to lack of appreciable progress towards goals  []Discontinue therapy due to lack of attendance or compliance  []Await Physician's recommendations/decisions regarding therapy  []Other:________________________________________________________________    Thank you for this referral.    Jose Youssef, PT 10/23/2020 10:18 AM  NOTE TO PHYSICIAN:  PLEASE COMPLETE THE ORDERS BELOW AND   FAX TO [facility]: [phone]  If you are unable to process this request in 24 hours please contact our office: [phone]    ? I have read the above report and request that my patient continue as recommended. ? I have read the above report and request that my patient continue therapy with the following changes/special instructions:__________________________________________________________  ? I have read the above report and request that my patient be discharged from therapy.     Physicians signature: ______________________________Date: ______Time:______

## 2020-10-30 ENCOUNTER — HOSPITAL ENCOUNTER (OUTPATIENT)
Dept: PHYSICAL THERAPY | Age: 50
Discharge: HOME | End: 2020-10-30
Payer: COMMERCIAL

## 2020-10-30 ENCOUNTER — PATIENT OUTREACH (OUTPATIENT)
Dept: CASE MANAGEMENT | Age: 50
End: 2020-10-30

## 2020-10-30 PROCEDURE — 97110 THERAPEUTIC EXERCISES: CPT

## 2020-10-30 PROCEDURE — 97140 MANUAL THERAPY 1/> REGIONS: CPT

## 2020-10-30 PROCEDURE — 97112 NEUROMUSCULAR REEDUCATION: CPT

## 2020-10-30 NOTE — PROGRESS NOTES
[facility]  [address]  [place], [address]  [phone] fax    Physical Therapy Discharge Summary  Patient name: Samuel Maier Start of Care:  2020   Referral source: Wale Nunez : 1970   Medical/Treatment Diagnosis: Pain in left knee [M25.562]  Payor: Matt Letters / Plan: Roni Horta / Product Type: HMO /  Onset Date:2020                  Prior Hospitalization: see medical history Provider#: [id number]   Medications: Verified on Patient Summary List    Comorbidities:  OA; B knee arthroscopy  Prior Level of Function:Works as a  for [employer]; able to perform daily activities without knee pain  Visits from Start of Care: 8    Missed Visits: 1  Reporting Period : 20 to 10/30/20      Progress towards goals / Updated goals:   1. Improve FOTO to 64 to indicate improved function with daily activities.   PN: 47  Current: Near met 10/30/20 FOTO score 63  2. Increase left hip EXT strength to 4+/5 to to improve stability for housework. PN: 4/5.   No change: 10/30/20 left hip EXT MMT 4/5        ASSESSMENT/RECOMMENDATIONS:  Pt reports a significant decrease in pain in the left knee noting improved ambulation and standing tolerance since SOC. Cueing required for form with sidestepping to decrease ER of the hips. No pain reported post treatment. Pt given updated HEP and d/c to manage self care at home.     [x]Discontinue therapy: [x]Patient has reached or is progressing toward set goals      []Patient is non-compliant or has abdicated      []Due to lack of appreciable progress towards set Serjio Pablo PTA 10/30/2020 7:29 PM

## 2020-10-30 NOTE — PROGRESS NOTES
Complex Case Management      Date/Time:  10/30/2020 4:32 PM    Method of communication with patient: phone    [facility] contacted the Patient by telephone to perform Ambulatory Care Coordination. Verified name and  with Patient as identifiers. Provided introduction to self, and explanation of the Ambulatory Care Manager's role. Reviewed most recent clinic visit w/ Patient who verbalized understanding. Patient given an opportunity to ask questions. Top Challenges reviewed with the patient   1. Needs follow up with PCP scheduled       Patient was seen in ED on 10/18/20 for leg cramping. Patient states that cramping has somewhat improved but she still has some cramping at times. She is drinking plenty of fluids. Patient states she has tried to schedule follow up with PCP but has been unable to contact them by phone. ACM will attempt to contact office to schedule follow up. Patient denies any further complaints or concerns at this time. Patient reports taking medications as prescribed and denies needing any refills at this time. The Patient agrees to contact the PCP office or the [facility] for questions related to their healthcare. Provided contact information for future reference. Disease Specific:   N/A    Home Health Active: No     DME Active: No     Barriers to care? None identified at this time. Advance Care Planning:   Does patient have an Advance Directive:  not on file     Medication(s):   Medication reconciliation was performed with patient, who verbalizes understanding of administration of home medications. There were no barriers to obtaining medications identified at this time. Referral to Pharm D needed: no     Current Outpatient Medications   Medication Sig    spironolactone (Aldactone) 100 mg tablet Take 100 mg by mouth daily.  celecoxib (CeleBREX) 200 mg capsule Take 1 Cap by mouth daily for 90 days.     baclofen (LIORESAL) 10 mg tablet Take 1 Tab by mouth two (2) times a day.  acyclovir (ZOVIRAX) 5 % ointment Apply every 3 hrs up to 5x/day    Lisdexamfetamine (VYVANSE) 40 mg capsule Take by mouth daily. No current facility-administered medications for this visit. [facility] follow up appointment(s):   Future Appointments   Date Time Provider Yi Osborn   11/2/2020  8:30 PM [facility] MRI RM 1 [facility]        Non-[facility] follow up appointment(s): n/a    Goals      Attend follow up appointments on schedule      10/30/20 ACM will attempt to assist patient in scheduling PCP follow up.  Knowledge and adherence of prescribed medication (ie. action, side effects, missed dose, etc.).      10/30/20 Patient reports taking medications as prescribed and denies needing any refills at this time.

## 2020-10-30 NOTE — PROGRESS NOTES
PT DAILY TREATMENT NOTE 10-18    Patient Name: Janis Mata  Date:10/30/2020  : 1970  [x]  Patient  Verified  Payor: Dina Burch / Plan: Carl Cancer / Product Type: HMO /    In time:3:45  Out time:4:31  Total Treatment Time (min): 46  Visit #: 1 of -8    Medicare/BCBS Only   Total Timed Codes (min):  46 1:1 Treatment Time:  46       Treatment Area: Pain in left knee [M25.562]    SUBJECTIVE  Pain Level (0-10 scale): 0  Any medication changes, allergies to medications, adverse drug reactions, diagnosis change, or new procedure performed?: [x] No    [] Yes (see summary sheet for update)  Subjective functional status/changes:   [] No changes reported  Pt reports \" I'm doing\"     OBJECTIVE    28 min Therapeutic Exercise:  [x] See flow sheet :   Rationale: increase ROM and increase strength to improve the patients ability to perform daily task with ease. 10 min Neuromuscular Re-education:  [x]  See flow sheet :    Rationale: increase strength, improve coordination, improve balance and increase proprioception  to improve the patients ability to perform ADL's with ease. 8 min Manual Therapy:  Stick to left ITB in right sidelying. Rationale: decrease pain, increase ROM and increase tissue extensibility to improve functional mobility with ADL's. With   [] TE   [] TA   [] neuro   [] other: Patient Education: [x] Review HEP    [] Progressed/Changed HEP based on:   [] positioning   [] body mechanics   [] transfers   [] heat/ice application    [] other:      Other Objective/Functional Measures:      Pain Level (0-10 scale) post treatment: 0    ASSESSMENT/Changes in Function:   Pt reports a significant decrease in pain in the left knee noting improved ambulation and standing tolerance since SOC. Cueing required for form with sidestepping to decrease ER of the hips. No pain reported post treatment. Pt given updated HEP and d/c to manage self care at home.     Patient will continue to benefit from skilled PT services to modify and progress therapeutic interventions, address functional mobility deficits, address ROM deficits, address strength deficits, analyze and address soft tissue restrictions, analyze and cue movement patterns, analyze and modify body mechanics/ergonomics and assess and modify postural abnormalities to attain remaining goals. [x]  See Plan of Care  []  See progress note/recertification  []  See Discharge Summary         Progress towards goals / Updated goals:   1. Improve FOTO XI 27 RA indicate improved function with daily activities.   PN: 47  2. Increase left hip EXT strength to 4+/5 to to improve stability for housework. PN: 4/5.    No change: 10/30/20 left hip EXT MMT 4/5    PLAN  []  Upgrade activities as tolerated     [x]  Continue plan of care  []  Update interventions per flow sheet       []  Discharge due to:_  []  Other:_      Isiah Aponte, LUCRECIA 10/30/2020  3:49 PM    Future Appointments   Date Time Provider Yi Osborn   11/2/2020  8:30 PM [facility] MRI RM 1 [facility]

## 2020-10-30 NOTE — PROGRESS NOTES
Physical Therapy Discharge Instructions      [facility]  [address]  [place], [address]  [phone] fax    Patient: Tonio Crisostomo  : 1970      Continue Home Exercise Program 2-3 times per day for 4-6 weeks, then decrease to3  times per week      Continue with    [x] Ice  as needed 2-3 times per day     [] Heat           Follow up with MD:     [] Upon completion of therapy     [x] As needed      Recommendations:     [x]   Return to activity with home program    []   Return to activity with the following modifications:       []Post Rehab Program    []Join Independent aquatic program     []Return to/join local gym        Additional Comments: continue to strengthen with the HEP program          Sonny Lundberg PTA 10/30/2020 4:36 PM

## 2020-11-02 ENCOUNTER — HOSPITAL ENCOUNTER (OUTPATIENT)
Age: 50
Discharge: HOME | End: 2020-11-02
Attending: PHYSICIAN ASSISTANT
Payer: COMMERCIAL

## 2020-11-02 PROCEDURE — 73721 MRI JNT OF LWR EXTRE W/O DYE: CPT

## 2020-11-09 ENCOUNTER — APPOINTMENT (OUTPATIENT)
Dept: GENERAL RADIOLOGY | Age: 50
End: 2020-11-09
Attending: NURSE PRACTITIONER
Payer: COMMERCIAL

## 2020-11-09 ENCOUNTER — HOSPITAL ENCOUNTER (EMERGENCY)
Age: 50
Discharge: HOME | End: 2020-11-09
Attending: EMERGENCY MEDICINE
Payer: COMMERCIAL

## 2020-11-09 VITALS
WEIGHT: 232 LBS | DIASTOLIC BLOOD PRESSURE: 81 MMHG | BODY MASS INDEX: 42.69 KG/M2 | HEART RATE: 89 BPM | RESPIRATION RATE: 16 BRPM | HEIGHT: 62 IN | OXYGEN SATURATION: 100 % | SYSTOLIC BLOOD PRESSURE: 126 MMHG | TEMPERATURE: 97.8 F
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
## 2020-11-09 DIAGNOSIS — M25.512 ACUTE PAIN OF LEFT SHOULDER: Primary | ICD-10-CM

## 2020-11-09 PROCEDURE — 99282 EMERGENCY DEPT VISIT SF MDM: CPT

## 2020-11-09 PROCEDURE — 73030 X-RAY EXAM OF SHOULDER: CPT
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
## 2020-11-09 NOTE — DISCHARGE INSTRUCTIONS
Patient Education        Joint Pain: Care Instructions  Your Care Instructions     Many people have small aches and pains from overuse or injury to muscles and joints. Joint injuries often happen during sports or recreation, work tasks, or projects around the home. An overuse injury can happen when you put too much stress on a joint or when you do an activity that stresses the joint over and over, such as using the computer or rowing a boat. You can take action at home to help your muscles and joints get better. You should feel better in 1 to 2 weeks, but it can take 3 months or more to heal completely. Follow-up care is a key part of your treatment and safety. Be sure to make and go to all appointments, and call your doctor if you are having problems. It's also a good idea to know your test results and keep a list of the medicines you take. How can you care for yourself at home? · Do not put weight on the injured joint for at least a day or two. · For the first day or two after an injury, do not take hot showers or baths, and do not use hot packs. The heat could make swelling worse. · Put ice or a cold pack on the sore joint for 10 to 20 minutes at a time. Try to do this every 1 to 2 hours for the next 3 days (when you are awake) or until the swelling goes down. Put a thin cloth between the ice and your skin. · Wrap the injury in an elastic bandage. Do not wrap it too tightly because this can cause more swelling. · Prop up the sore joint on a pillow when you ice it or anytime you sit or lie down during the next 3 days. Try to keep it above the level of your heart. This will help reduce swelling. · Take an over-the-counter pain medicine, such as acetaminophen (Tylenol), ibuprofen (Advil, Motrin), or naproxen (Aleve). Read and follow all instructions on the label. · After 1 or 2 days of rest, begin moving the joint gently.  While the joint is still healing, you can begin to exercise using activities that do not strain or hurt the painful joint. When should you call for help? Call your doctor now or seek immediate medical care if:    · You have signs of infection, such as:  ? Increased pain, swelling, warmth, and redness. ? Red streaks leading from the joint. ? A fever. Watch closely for changes in your health, and be sure to contact your doctor if:    · Your movement or symptoms are not getting better after 1 to 2 weeks of home treatment. Where can you learn more? Go to http://www.gray.com/  Enter P205 in the search box to learn more about \"Joint Pain: Care Instructions. \"  Current as of: March 2, 2020               Content Version: 12.6  © 2006-2020 [employer]. Care instructions adapted under license by [employer] (which disclaims liability or warranty for this information). If you have questions about a medical condition or this instruction, always ask your healthcare professional. [place] [address] any warranty or liability for your use of this information. Patient Education     Musculoskeletal Pain: Care Instructions  Your Care Instructions  Different problems with the bones, muscles, nerves, ligaments, and tendons in the body can cause pain. One or more areas of your body may ache or burn. Or they may feel tired, stiff, or sore. The medical term for this type of pain is musculoskeletal pain. It can have many different causes. Sometimes the pain is caused by an injury such as a strain or sprain. Or you might have pain from using one part of your body in the same way over and over again. This is called overuse. In some cases, the cause of the pain is another health problem such as arthritis or fibromyalgia. The doctor will examine you and ask you questions about your health to help find the cause of your pain. Blood tests or imaging tests like an X-ray may also be helpful.  But sometimes doctors can't find a cause of the pain.  Treatment depends on your symptoms and the cause of the pain, if known. The doctor has checked you carefully, but problems can develop later. If you notice any problems or new symptoms, get medical treatment right away. Follow-up care is a key part of your treatment and safety. Be sure to make and go to all appointments, and call your doctor if you are having problems. It's also a good idea to know your test results and keep a list of the medicines you take. How can you care for yourself at home? · Rest until you feel better. · Do not do anything that makes the pain worse. Return to exercise gradually if you feel better and your doctor says it's okay. · Be safe with medicines. Read and follow all instructions on the label. ¨ If the doctor gave you a prescription medicine for pain, take it as prescribed. ¨ If you are not taking a prescription pain medicine, ask your doctor if you can take an over-the-counter medicine. · Put ice or a cold pack on the area for 10 to 20 minutes at a time to ease pain. Put a thin cloth between the ice and your skin. When should you call for help? Call your doctor now or seek immediate medical care if:  · You have new pain, or your pain gets worse. · You have new symptoms such as a fever, a rash, or chills. Watch closely for changes in your health, and be sure to contact your doctor if:  · You do not get better as expected. Where can you learn more? Go to [employer]  Enter Q624 in the search box to learn more about \"Musculoskeletal Pain: Care Instructions. \"   © 4383-8200 [employer]. Care instructions adapted under license by [facility] [employer] (which disclaims liability or warranty for this information).  This care instruction is for use with your licensed healthcare professional. If you have questions about a medical condition or this instruction, always ask your healthcare professional. Flavio Flores any warranty or liability for your use of this information.   Content Version: 88.0.141019; Current as of: November 20, 2015
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
## 2020-11-15 ENCOUNTER — APPOINTMENT (OUTPATIENT)
Dept: GENERAL RADIOLOGY | Age: 50
End: 2020-11-15
Attending: EMERGENCY MEDICINE
Payer: COMMERCIAL

## 2020-11-15 ENCOUNTER — HOSPITAL ENCOUNTER (EMERGENCY)
Age: 50
Discharge: HOME | End: 2020-11-15
Attending: EMERGENCY MEDICINE
Payer: COMMERCIAL

## 2020-11-15 VITALS
HEART RATE: 76 BPM | OXYGEN SATURATION: 99 % | HEIGHT: 62 IN | WEIGHT: 232 LBS | SYSTOLIC BLOOD PRESSURE: 140 MMHG | TEMPERATURE: 98.2 F | DIASTOLIC BLOOD PRESSURE: 85 MMHG | BODY MASS INDEX: 42.69 KG/M2 | RESPIRATION RATE: 20 BRPM

## 2020-11-15 DIAGNOSIS — S16.1XXA STRAIN OF NECK MUSCLE, INITIAL ENCOUNTER: Primary | ICD-10-CM
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
## 2020-11-15 PROCEDURE — 72040 X-RAY EXAM NECK SPINE 2-3 VW: CPT

## 2020-11-15 PROCEDURE — 99282 EMERGENCY DEPT VISIT SF MDM: CPT

## 2020-11-15 NOTE — DISCHARGE INSTRUCTIONS
Patient Education        Neck Strain: Care Instructions  Your Care Instructions     You have strained the muscles and ligaments in your neck. A sudden, awkward movement can strain the neck. This often occurs with falls or car accidents or during certain sports. Everyday activities like working on a computer or sleeping can also cause neck strain if they force you to hold your neck in an awkward position for a long time. It is common for neck pain to get worse for a day or two after an injury, but it should start to feel better after that. You may have more pain and stiffness for several days before it gets better. This is expected. It may take a few weeks or longer for it to heal completely. Good home treatment can help you get better faster and avoid future neck problems. Follow-up care is a key part of your treatment and safety. Be sure to make and go to all appointments, and call your doctor if you are having problems. It's also a good idea to know your test results and keep a list of the medicines you take. How can you care for yourself at home? · If you were given a neck brace (cervical collar) to limit neck motion, wear it as instructed for as many days as your doctor tells you to. Do not wear it longer than you were told to. Wearing a brace for too long can make neck stiffness worse and weaken the neck muscles. · You can try using heat or ice to see if it helps. ? Try using a heating pad on a low or medium setting for 15 to 20 minutes every 2 to 3 hours. Try a warm shower in place of one session with the heating pad. You can also buy single-use heat wraps that last up to 8 hours. ? You can also try an ice pack for 10 to 15 minutes every 2 to 3 hours. · Take pain medicines exactly as directed. ? If the doctor gave you a prescription medicine for pain, take it as prescribed. ? If you are not taking a prescription pain medicine, ask your doctor if you can take an over-the-counter medicine.   · Gently rub the area to relieve pain and help with blood flow. Do not massage the area if it hurts to do so. · Do not do anything that makes the pain worse. Take it easy for a couple of days. You can do your usual activities if they do not hurt your neck or put it at risk for more stress or injury. · Try sleeping on a special neck pillow. Place it under your neck, not under your head. Placing a tightly rolled-up towel under your neck while you sleep will also work. If you use a neck pillow or rolled towel, do not use your regular pillow at the same time. · To prevent future neck pain, do exercises to stretch and strengthen your neck and back. Learn how to use good posture, safe lifting techniques, and proper body mechanics. When should you call for help? Call 911 anytime you think you may need emergency care. For example, call if:    · You are unable to move an arm or a leg at all. Call your doctor now or seek immediate medical care if:    · You have new or worse symptoms in your arms, legs, chest, belly, or buttocks. Symptoms may include:  ? Numbness or tingling. ? Weakness. ? Pain.     · You lose bladder or bowel control. Watch closely for changes in your health, and be sure to contact your doctor if:    · You are not getting better as expected. Where can you learn more? Go to http://www.gray.com/  Enter M253 in the search box to learn more about \"Neck Strain: Care Instructions. \"  Current as of: March 2, 2020               Content Version: 12.6  © 3179-6455 Healthwise, Incorporated. Care instructions adapted under license by [employer] (which disclaims liability or warranty for this information). If you have questions about a medical condition or this instruction, always ask your healthcare professional. [address] any warranty or liability for your use of this information.
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
## 2020-11-18 ENCOUNTER — OFFICE VISIT (OUTPATIENT)
Dept: ORTHOPEDIC SURGERY | Age: 50
End: 2020-11-18
Payer: COMMERCIAL

## 2020-11-18 VITALS
RESPIRATION RATE: 16 BRPM | WEIGHT: 230.8 LBS | DIASTOLIC BLOOD PRESSURE: 64 MMHG | OXYGEN SATURATION: 100 % | SYSTOLIC BLOOD PRESSURE: 125 MMHG | BODY MASS INDEX: 42.47 KG/M2 | HEART RATE: 84 BPM | HEIGHT: 62 IN | TEMPERATURE: 96.6 F

## 2020-11-18 DIAGNOSIS — S83.242D ACUTE MEDIAL MENISCUS TEAR, LEFT, SUBSEQUENT ENCOUNTER: ICD-10-CM

## 2020-11-18 DIAGNOSIS — M17.12 PRIMARY OSTEOARTHRITIS OF LEFT KNEE: Primary | ICD-10-CM

## 2020-11-18 PROCEDURE — 20611 DRAIN/INJ JOINT/BURSA W/US: CPT | Performed by: ORTHOPAEDIC SURGERY

## 2020-11-18 PROCEDURE — 99214 OFFICE O/P EST MOD 30 MIN: CPT | Performed by: ORTHOPAEDIC SURGERY

## 2020-11-18 NOTE — PROGRESS NOTES
Patient: Rachel Fong  YOB: 1970       HISTORY:  The patient presents for reevaluation of her left knee status post arthroscopic partial medial and lateral  menisectomy with grade III chondromalacia on 6/1/2020. Patient states pain is a 0 out of 10 today. She is here today for an MRI review. she has gone to physical therapy. She felt much better after the cortisone injection. Was back to doing her normal activities. On 10/10/2020 her knee buckled on her and the pain returned. Not as severe and it is improving. She has persistent swelling and shoot sharp pains. Patient denies any fever, chills, chest pain, shortness of breath or calf pain. The remainder of the review of systems is negative. There are no new illness or injuries to report since last seen in the office. No changes in medications, allergies, social or family history. Pain Assessment  11/18/2020   Location of Pain Knee   Location Modifiers Left   Severity of Pain 0   Quality of Pain -   Quality of Pain Comment -   Duration of Pain A few hours   Frequency of Pain Intermittent   Frequency of Pain Comment -   Aggravating Factors Walking;Standing   Aggravating Factors Comment -   Limiting Behavior No   Relieving Factors (No Data)   Relieving Factors Comment celebrex   Result of Injury No   Work-Related Injury -   How long out of work? -   Type of Injury -   Type of Injury Comment -         PHYSICAL EXAMINATION:    Visit Vitals  /64 (BP 1 Location: Right arm, BP Patient Position: Sitting)   Pulse 84   Temp (!) 96.6 °F (35.9 °C) (Temporal)   Resp 16   Ht 5' 2\" (1.575 m)   Wt 230 lb 12.8 oz (104.7 kg)   LMP 10/25/2020   SpO2 100%   BMI 42.21 kg/m²     The patient is a well-developed, well-nourished female in no acute distress. The patient is alert and oriented times three. The patient appears to be well groomed.  Mood and affect are normal.   LYMPHATIC: lymph nodes are not enlarged and are within normal limits  SKIN: normal in color and non tender to palpation. There are no bruises or abrasions noted. NEUROLOGICAL: Motor sensory exam is within normal limits. Reflexes are equal bilaterally. There is normal sensation to pinprick and light touch  ORTHOPEDIC EXAM of Left knee: Inspection: Effusion present,  incisions well healed  TTP: medial and lateral joint line  Range of motion: 0-120 flexion  Stability: Stable  Strength: 5/5  2+ distal pulses      PROCEDURE: Left Knee Injection with Ultrasound Guidance  Indication:Left Knee pain/swelling    After sterile prep, 2 cc of Euflexxa were injected into the left knee. Ultrasound images captured using [address] Ultrasound machine and scanned into patient's chart. [facility]  OFFICE PROCEDURE PROGRESS NOTE        Chart reviewed for the following:  Martine Avendano M.D, have reviewed the History, Physical and updated the Allergic reactions for [address] performed immediately prior to start of procedure:  Martine Avendano M.D, have performed the following reviews on Lisa Fu prior to the start of the procedure:            * Patient was identified by name and date of birth   * Agreement on procedure being performed was verified  * Risks and Benefits explained to the patient  * Procedure site verified and marked as necessary  * Patient was positioned for comfort  * Consent was signed and verified     Time: 4:41 PM     Date of procedure: 11/18/2020    Procedure performed by:  Manuel Betancourt M.D    Provider assisted by: (see medication administration)    How tolerated by patient: tolerated the procedure well with no complications    Comments: none      IMAGING: MRI of left knee dated 11/02/2020 was reviewed and read by Dr. Blanc Stable:   IMPRESSION:   1. Interval partial medial meniscectomy as discussed above without definite evidence of repeat tear.  MR arthrogram could be considered to increase exam sensitivity/specificity for recurrent tear. 2. Mucoid degeneration of the ACL with reactive edema in the medial aspect of the lateral femoral condyle. 3. Tricompartmental osteoarthritis, which is advanced in the medial tibiofemoral compartment, where there is progression compared to prior MRI. Increased medial tibiofemoral subchondral edema without evidence of stress fracture. -Multifocal grade 3/4 chondrosis in the patellofemoral and lateral tibiofemoral compartments without significant interval change from prior MRI. 4. 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 Suspected 2 mm intra-articular body along the posterolateral aspect of Hoffa's fat pad (series 3 image 7). 5. Partially imaged mild nonspecific edema in the medial head of the gastrocnemius, possibly reflecting delayed onset muscle soreness or grade 1 muscle strain. IMPRESSION:  Status post Left knee arthroscopic partial medial menisectomy with degen arthritis with joint space narrowing. Encounter Diagnoses   Name Primary?  Primary osteoarthritis of left knee Yes    Acute medial meniscus tear, left, subsequent encounter         PLAN:   1. Patient presents with persistent left knee pain. The MRI does not reveal a re-tear of the meniscus. Will start Euflexxa series today. Her left knee was injected with a sample of Euflexxa today. Will see her back next week for second Euflexxa injection. Risk factors include: BMI>35  2. Yes Euflexxa injection indicated today L KNEE US  3. No Physical/Occupational Therapy indicated today   4. No diagnostic test indicated today:   5. No durable medical equipment indicated today  6. No referral indicated today   7. No medications indicated today  8.  No Narcotic indicated today      RTC next week for second Euflexxa injection    Scribed by Sofia Diaz Dress) as dictated by Chaim Llamas MD    I, Dr. Chaim Llamas, confirm that all documentation is accurate.     Sakina Key M.D.   [facility]
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
## 2020-11-20 ENCOUNTER — OFFICE VISIT (OUTPATIENT)
Dept: ORTHOPEDIC SURGERY | Age: 50
End: 2020-11-20
Payer: COMMERCIAL

## 2020-11-20 VITALS
HEIGHT: 62 IN | RESPIRATION RATE: 16 BRPM | TEMPERATURE: 98.5 F | BODY MASS INDEX: 42.62 KG/M2 | WEIGHT: 231.6 LBS | SYSTOLIC BLOOD PRESSURE: 126 MMHG | HEART RATE: 78 BPM | OXYGEN SATURATION: 100 % | DIASTOLIC BLOOD PRESSURE: 79 MMHG

## 2020-11-20 DIAGNOSIS — M62.838 MUSCLE SPASMS OF NECK: ICD-10-CM

## 2020-11-20 DIAGNOSIS — M89.8X1 PAIN OF BOTH SCAPULAS: ICD-10-CM
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
## 2020-11-20 DIAGNOSIS — M54.2 NECK PAIN: ICD-10-CM

## 2020-11-20 PROCEDURE — 99203 OFFICE O/P NEW LOW 30 MIN: CPT | Performed by: NURSE PRACTITIONER

## 2020-11-20 RX ORDER — METHOCARBAMOL 500 MG/1
500 TABLET, FILM COATED ORAL
Qty: 45 TAB | Refills: 1 | Status: SHIPPED | OUTPATIENT
Start: 2020-11-20 | End: 2021-03-12

## 2020-11-20 RX ORDER — METHYLPREDNISOLONE 4 MG/1
TABLET ORAL
Qty: 1 DOSE PACK | Refills: 0 | Status: SHIPPED | OUTPATIENT
Start: 2020-11-20 | End: 2020-12-30 | Stop reason: ALTCHOICE
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
## 2020-11-24 ENCOUNTER — VIRTUAL VISIT (OUTPATIENT)
Dept: FAMILY MEDICINE CLINIC | Age: 50
End: 2020-11-24
Payer: COMMERCIAL

## 2020-11-24 DIAGNOSIS — M25.511 ACUTE PAIN OF BOTH SHOULDERS: ICD-10-CM

## 2020-11-24 DIAGNOSIS — M54.2 NECK PAIN: Primary | ICD-10-CM

## 2020-11-24 DIAGNOSIS — M25.512 ACUTE PAIN OF BOTH SHOULDERS: ICD-10-CM

## 2020-11-24 PROCEDURE — 99213 OFFICE O/P EST LOW 20 MIN: CPT | Performed by: FAMILY MEDICINE

## 2020-11-24 NOTE — PATIENT INSTRUCTIONS
Shoulder Pain: Care Instructions Your Care Instructions You can hurt your shoulder by using it too much during an activity, such as fishing or baseball. It can also happen as part of the everyday wear and tear of getting older. Shoulder injuries can be slow to heal, but your shoulder should get better with time. Your doctor may recommend a sling to rest your shoulder. If you have injured your shoulder, you may need testing and treatment. Follow-up care is a key part of your treatment and safety. Be sure to make and go to all appointments, and call your doctor if you are having problems. It's also a good idea to know your test results and keep a list of the medicines you take. How can you care for yourself at home? · Take pain medicines exactly as directed. ? If the doctor gave you a prescription medicine for pain, take it as prescribed. ? If you are not taking a prescription pain medicine, ask your doctor if you can take an over-the-counter medicine. ? Do not take two or more pain medicines at the same time unless the doctor told you to. Many pain medicines contain acetaminophen, which is Tylenol. Too much acetaminophen (Tylenol) can be harmful. · If your doctor recommends that you wear a sling, use it as directed. Do not take it off before your doctor tells you to. · Put ice or a cold pack on the sore area for 10 to 20 minutes at a time. Put a thin cloth between the ice and your skin. · If there is no swelling, you can put moist heat, a heating pad, or a warm cloth on your shoulder. Some doctors suggest alternating between hot and cold. · Rest your shoulder for a few days. If your doctor recommends it, you can then begin gentle exercise of the shoulder, but do not lift anything heavy. When should you call for help? Call 911 anytime you think you may need emergency care. For example, call if: 
  · You have chest pain or pressure. This may occur with: ? Sweating. ? Shortness of breath. ? Nausea or vomiting. ? Pain that spreads from the chest to the neck, jaw, or one or both shoulders or arms. ? Dizziness or lightheadedness. ? A fast or uneven pulse. After calling 911, chew 1 adult-strength aspirin. Wait for an ambulance. Do not try to drive yourself.  
  · Your arm or hand is cool or pale or changes color. Call your doctor now or seek immediate medical care if: 
  · You have signs of infection, such as: 
? Increased pain, swelling, warmth, or redness in your shoulder. ? Red streaks leading from a place on your shoulder. ? Pus draining from an area of your shoulder. ? Swollen lymph nodes in your neck, armpits, or groin. ? A fever. Watch closely for changes in your health, and be sure to contact your doctor if: 
  · You cannot use your shoulder.  
  · Your shoulder does not get better as expected. Where can you learn more? Go to http://www.gray.com/ Enter G734 in the search box to learn more about \"Shoulder Pain: Care Instructions. \" Current as of: March 2, 2020               Content Version: 12.6 © 0436-9500 [employer]. Care instructions adapted under license by [employer] (which disclaims liability or warranty for this information). If you have questions about a medical condition or this instruction, always ask your healthcare professional. [place] [address] any warranty or liability for your use of this information.
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
## 2020-11-25 ENCOUNTER — HOSPITAL ENCOUNTER (OUTPATIENT)
Dept: PHYSICAL THERAPY | Age: 50
Discharge: HOME | End: 2020-11-25
Payer: COMMERCIAL

## 2020-11-25 PROCEDURE — 97161 PT EVAL LOW COMPLEX 20 MIN: CPT

## 2020-11-25 PROCEDURE — 97110 THERAPEUTIC EXERCISES: CPT

## 2020-11-25 NOTE — PROGRESS NOTES
PT DAILY TREATMENT NOTE 11    Patient Name: Rafi Cesar  Date:2020  : 1970  [x]  Patient  Verified  Payor: Darrion Garduno / Plan: Karis Collet / Product Type: HMO /    In time: 9:15  Out time:9:46  Total Treatment Time (min): 31  Visit #: 1 of 8    Medicare/BCBS Only   Total Timed Codes (min):  16 1:1 Treatment Time:  15       Treatment Area: Neck pain [M54.2]    SUBJECTIVE  Pain Level (0-10 scale): 6  Any medication changes, allergies to medications, adverse drug reactions, diagnosis change, or new procedure performed?: [x] No    [] Yes (see summary sheet for update)  Subjective functional status/changes:   [] No changes reported  The pt reports pain right > left with ADLs    OBJECTIVE    15 min [x]Eval                  []Re-Eval       8 min Therapeutic Exercise:  [] See flow sheet :   Rationale: increase ROM to improve the patients ability to perform ADLs     8 min Therapeutic Activity:  []  See flow sheet :   Rationale: self care and pt education  to improve the patients ability to self manage symptoms and maximize HEP effect           With   [] TE   [] TA   [] neuro   [] other: Patient Education: [x] Review HEP    [] Progressed/Changed HEP based on:   [] positioning   [] body mechanics   [] transfers   [] heat/ice application    [] other:      Other Objective/Functional Measures:      Pain Level (0-10 scale) post treatment: 6    ASSESSMENT/Changes in Function: see POC    Patient will continue to benefit from skilled PT services to modify and progress therapeutic interventions, address functional mobility deficits, address ROM deficits, address strength deficits, analyze and address soft tissue restrictions, analyze and cue movement patterns and analyze and modify body mechanics/ergonomics to attain remaining goals. [x]  See Plan of Care  []  See progress note/recertification  []  See Discharge Summary         Progress towards goals / Updated goals:  Short Term Goals:  To be accomplished in 2 weeks:  1. Pt will demonstrate I and compliance with HEP to maximize therapeutic effect. IE: HEP issued and instructed  2. Pt will demonstrate cervical flexion to 45 deg without pain to improve ease of ADLs. IE: 35 deg with pain  Long Term Goals: To be accomplished in 4 weeks:  1. Pt will demonstrate (-) Spurling's on left to improve ease of  sleep. IE: (+) Spurling's and SB  2. Pt will demonstrate 70 deg right c/s rotation AROM without pain for improved ease of driving. IE: 60 deg with right side pain  3. Pt will demonstrate 35 deg cervical extension without pain for improved ease of self care and dressing. IE: 30 deg with pain  4. Pt will improve FOTO score to 71 to demonstrate improved quality of life and function.    IE: 62    PLAN  []  Upgrade activities as tolerated     [x]  Continue plan of care  []  Update interventions per flow sheet       []  Discharge due to:_  []  Other:_      Alexis Lundberg DPT, CMTPT 11/25/2020  10:00 AM    Future Appointments   Date Time Provider Yi Osborn   12/1/2020  4:00 PM [place], [place] [facility]   12/3/2020  7:00 AM Jacquelene Relic, PTA [facility]   12/10/2020  7:00 AM Jacquelene Relic, PTA [facility]   12/15/2020  7:00 AM Mo Contes, PT [facility]   12/16/2020  7:00 AM Ang De Souza [facility]   12/22/2020  7:00 AM Mo Contes, PT [facility]   12/24/2020 10:45 AM Jacquelene Relic, PTA [facility]   1/15/2021  9:45 AM Buttery, Zari Drown, NP [facility] AMB
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
## 2020-12-01 ENCOUNTER — OFFICE VISIT (OUTPATIENT)
Dept: ORTHOPEDIC SURGERY | Age: 50
End: 2020-12-01
Payer: COMMERCIAL

## 2020-12-01 VITALS
HEART RATE: 77 BPM | DIASTOLIC BLOOD PRESSURE: 84 MMHG | WEIGHT: 232.4 LBS | TEMPERATURE: 96.6 F | RESPIRATION RATE: 16 BRPM | HEIGHT: 62 IN | BODY MASS INDEX: 42.76 KG/M2 | SYSTOLIC BLOOD PRESSURE: 135 MMHG

## 2020-12-01 DIAGNOSIS — M17.12 PRIMARY OSTEOARTHRITIS OF LEFT KNEE: Primary | ICD-10-CM

## 2020-12-01 PROCEDURE — 20610 DRAIN/INJ JOINT/BURSA W/O US: CPT | Performed by: PHYSICIAN ASSISTANT

## 2020-12-01 NOTE — PROGRESS NOTES
Patient: Darnell Galvin                MRN: [id number]       SSN: [id number]  YOB: 1970        AGE: 52 y.o. SEX: female  Body mass index is 42.51 kg/m². PCP: Charisse Jordan MD  12/01/20    Chief Complaint   Patient presents with    Knee Pain     left knee pain       HISTORY:  Darnell Galvin is a 52 y.o. female who is seen for reevaluation of Left knee here for 2nd injection of euflexxa. PROCEDURE:  Patient's Left knee, after timeout under sterile conditions, was injected with 2 cc of Euflexxa. [facility]  OFFICE PROCEDURE PROGRESS NOTE        Chart reviewed for the following:   Charlie OAKES PA-C, have reviewed the History, Physical and updated the Allergic reactions for [address] performed immediately prior to start of procedure:   Charlie OAKES PA-C, have performed the following reviews on Darnell Galvin prior to the start of the procedure:            * Patient was identified by name and date of birth   * Agreement on procedure being performed was verified  * Risks and Benefits explained to the patient  * Procedure site verified and marked as necessary  * Patient was positioned for comfort  * Consent was signed and verified     Time: 4:54 PM    Date of procedure: 12/1/2020    Procedure performed by:  CRISTELA Day    Provider assisted by: None     How tolerated by patient: tolerated the procedure well with no complications    Comments: none    IMPRESSION:     ICD-10-CM ICD-9-CM    1. Primary osteoarthritis of left knee  M17.12 715.16 sodium hyaluronate (SUPARTZ FX/EUFLEXXA/HYALGAN) 10 mg/mL injection syrg 20 mg      DRAIN/INJECT LARGE JOINT/BURSA        PLAN:  Ms. Halima Castillo will return in one week for her third Euflexxa injection.       Charlie Corley PA-C   [facility]

## 2020-12-03 ENCOUNTER — HOSPITAL ENCOUNTER (OUTPATIENT)
Dept: PHYSICAL THERAPY | Age: 50
Discharge: HOME | End: 2020-12-03
Payer: COMMERCIAL

## 2020-12-03 PROCEDURE — 97110 THERAPEUTIC EXERCISES: CPT

## 2020-12-03 PROCEDURE — 97140 MANUAL THERAPY 1/> REGIONS: CPT

## 2020-12-03 NOTE — PROGRESS NOTES
PT DAILY TREATMENT NOTE     Patient Name: Janis Mata  Date:12/3/2020  : 1970  [x]  Patient  Verified  Payor: Dina Burch / Plan: Carl [employer] / Product Type: HMO /    In UCKW:1879  Out time:0840  Total Treatment Time (min): 55  Visit #: 2 of 8    Medicare/BCBS Only   Total Timed Codes (min):  45 1:1 Treatment Time:  45       Treatment Area: Neck pain [M54.2]    SUBJECTIVE  Pain Level (0-10 scale): 0  Any medication changes, allergies to medications, adverse drug reactions, diagnosis change, or new procedure performed?: [x] No    [] Yes (see summary sheet for update)  Subjective functional status/changes:   [] No changes reported  The pt reports she is not having pain currently but her neck is stiff.      OBJECTIVE    Modality rationale: decrease pain and increase tissue extensibility to improve the patients ability to perform ADL   Min Type Additional Details    [] Estim:  []Unatt       []IFC  []Premod                        []Other:  []w/ice   []w/heat  Position:  Location:    [] Estim: []Att    []TENS instruct  []NMES                    []Other:  []w/US   []w/ice   []w/heat  Position:  Location:    []  Traction: [] Cervical       []Lumbar                       [] Prone          []Supine                       []Intermittent   []Continuous Lbs:  [] before manual  [] after manual    []  Ultrasound: []Continuous   [] Pulsed                           []1MHz   []3MHz W/cm2:  Location:    []  Iontophoresis with dexamethasone         Location: [] Take home patch   [] In clinic   10 []  Ice     [x]  heat  []  Ice massage  []  Laser   []  Anodyne Position: seated  Location:c/s    []  Laser with stim  []  Other:  Position:  Location:    []  Vasopneumatic Device Pressure:       [] lo [] med [] hi   Temperature: [] lo [] med [] hi   [] Skin assessment post-treatment:  []intact []redness- no adverse reaction    []redness  adverse reaction:     35 min Therapeutic Exercise:  [x] See flow sheet : Rationale: increase ROM and increase strength to improve the patients ability to perform ADL    10 min Manual Therapy:  SOR, STM c/s paraspinals- pt supine   The manual therapy interventions were performed at a separate and distinct time from the therapeutic activities interventions. Rationale: decrease pain, increase ROM, increase tissue extensibility and decrease trigger points to improve ability for daily activities      With   [] TE   [] TA   [] neuro   [] other: Patient Education: [x] Review HEP    [] Progressed/Changed HEP based on:   [] positioning   [] body mechanics   [] transfers   [] heat/ice application    [] other:      Other Objective/Functional Measures: initiated 1st f/u treatment     Pain Level (0-10 scale) post treatment: 0    ASSESSMENT/Changes in Function: The pt tolerated all therex well but did report some left UE tingling with left c/s rotation. + cues were needed to avoid UT activity with rows. Patient will continue to benefit from skilled PT services to modify and progress therapeutic interventions, address functional mobility deficits, address ROM deficits, address strength deficits, analyze and address soft tissue restrictions, analyze and cue movement patterns and assess and modify postural abnormalities to attain remaining goals. []  See Plan of Care  []  See progress note/recertification  []  See Discharge Summary         Progress towards goals / Updated goals:  Short Term Goals: To be accomplished in 2 weeks:  1. Pt will demonstrate I and compliance with HEP to maximize therapeutic effect. IE: HEP issued and instructed   Current: Pt reports HEP compliance on 12-3-20  2. Pt will demonstrate cervical flexion to 45 deg without pain to improve ease of ADLs. IE: 35 deg with pain    Long Term Goals: To be accomplished in 4 weeks:  1. Pt will demonstrate (-) Spurling's on left to improve ease of  sleep. IE: (+) Spurling's and SB  2.  Pt will demonstrate 70 deg right c/s rotation AROM without pain for improved ease of driving. IE: 60 deg with right side pain  3. Pt will demonstrate 35 deg cervical extension without pain for improved ease of self care and dressing. IE: 30 deg with pain  4. Pt will improve FOTO score to 71 to demonstrate improved quality of life and function.               IE: 62       PLAN  []  Upgrade activities as tolerated     [x]  Continue plan of care  []  Update interventions per flow sheet       []  Discharge due to:_  []  Other:_      Leela Ocampo, PT 12/3/2020  7:44 AM    Future Appointments   Date Time Provider Yi Osborn   12/3/2020  7:45 AM Joo Brenner, PT [facility]   12/8/2020  4:00 PM Vidhya Vivas MD [facility]   12/10/2020  7:00 AM Tommy Chapa PTA [facility]   1/15/2021  9:45 AM Buddy Aguilar NP [facility] BS AMB

## 2020-12-09 ENCOUNTER — OFFICE VISIT (OUTPATIENT)
Dept: ORTHOPEDIC SURGERY | Age: 50
End: 2020-12-09
Payer: COMMERCIAL

## 2020-12-09 VITALS
OXYGEN SATURATION: 100 % | WEIGHT: 232.8 LBS | SYSTOLIC BLOOD PRESSURE: 114 MMHG | TEMPERATURE: 96.9 F | BODY MASS INDEX: 42.84 KG/M2 | DIASTOLIC BLOOD PRESSURE: 64 MMHG | HEIGHT: 62 IN | HEART RATE: 82 BPM | RESPIRATION RATE: 16 BRPM

## 2020-12-09 DIAGNOSIS — M17.12 PRIMARY OSTEOARTHRITIS OF LEFT KNEE: Primary | ICD-10-CM

## 2020-12-09 PROCEDURE — 20611 DRAIN/INJ JOINT/BURSA W/US: CPT | Performed by: ORTHOPAEDIC SURGERY

## 2020-12-09 NOTE — PROGRESS NOTES
Patient: Stephanie Patel                MRN: [id number]       SSN: [id number]  YOB: 1970        AGE: 52 y.o. SEX: female  Body mass index is 42.58 kg/m². PCP: Ej Ruiz MD  12/09/20    Chief Complaint   Patient presents with    Knee Pain     left knee pain       HISTORY:  Stephanie Patel is a 52 y.o. female who is seen for reevaluation of Left knee and here for 3rd and final injection of Euflexxa. PROCEDURE:  Under ultrasound guidance, patient's Left knee, after timeout under sterile conditions, was injected with 2 cc of Euflexxa. [facility]  OFFICE PROCEDURE PROGRESS NOTE        Chart reviewed for the following:   Harriet Jane MD, have reviewed the History, Physical and updated the Allergic reactions for [address] performed immediately prior to start of procedure:   Harriet Jane MD, have performed the following reviews on Stephanie Patel prior to the start of the procedure:            * Patient was identified by name and date of birth   * Agreement on procedure being performed was verified  * Risks and Benefits explained to the patient  * Procedure site verified and marked as necessary  * Patient was positioned for comfort  * Consent was signed and verified     Time: 3:06 PM       Date of procedure: 12/9/2020    Procedure performed by:  Queen Rodríguez MD    Provider assisted by: None     How tolerated by patient: tolerated the procedure well with no complications    Comments: none    IMPRESSION:     ICD-10-CM ICD-9-CM    1. Primary osteoarthritis of left knee  M17.12 715.16 sodium hyaluronate (SUPARTZ FX/EUFLEXXA/HYALGAN) 10 mg/mL injection syrg 20 mg      ARTHROCENTESIS ASPIR&/INJ MAJOR JT/BURSA W/US        PLAN: Ms. Montse Alcala has completed her Euflexxa injection series. she will return as needed.       Scribed by Yue Jones) as dictated by Brandi Brothers MD    I, Dr. Brandi Brothers, confirm that all documentation is accurate.     Brandi Brothers M.D.   [facility]

## 2020-12-10 ENCOUNTER — HOSPITAL ENCOUNTER (OUTPATIENT)
Dept: PHYSICAL THERAPY | Age: 50
Discharge: HOME | End: 2020-12-10
Payer: COMMERCIAL

## 2020-12-10 PROCEDURE — 97140 MANUAL THERAPY 1/> REGIONS: CPT

## 2020-12-10 PROCEDURE — 97110 THERAPEUTIC EXERCISES: CPT

## 2020-12-10 NOTE — PROGRESS NOTES
PT DAILY TREATMENT NOTE 11    Patient Name: Rafi Cesar  Date:12/10/2020  : 1970  [x]  Patient  Verified  Payor: Darrion Garduno / Plan: Karis Collet / Product Type: HMO /    In time:7:03  Out time:7:51  Total Treatment Time (min): 48  Visit #: 3 of 8    Medicare/BCBS Only   Total Timed Codes (min):  38 1:1 Treatment Time:  38       Treatment Area: Neck pain [M54.2]    SUBJECTIVE  Pain Level (0-10 scale): 3  Any medication changes, allergies to medications, adverse drug reactions, diagnosis change, or new procedure performed?: [x] No    [] Yes (see summary sheet for update)  Subjective functional status/changes:   [] No changes reported  Pt reports she is stiff this morning. OBJECTIVE    Modality rationale: decrease pain and increase tissue extensibility to improve the patients ability to perform functional task with ease.    Min Type Additional Details    [] Estim:  []Unatt       []IFC  []Premod                        []Other:  []w/ice   []w/heat  Position:  Location:    [] Estim: []Att    []TENS instruct  []NMES                    []Other:  []w/US   []w/ice   []w/heat  Position:  Location:    []  Traction: [] Cervical       []Lumbar                       [] Prone          []Supine                       []Intermittent   []Continuous Lbs:  [] before manual  [] after manual    []  Ultrasound: []Continuous   [] Pulsed                           []1MHz   []3MHz W/cm2:  Location:    []  Iontophoresis with dexamethasone         Location: [] Take home patch   [] In clinic   10 []  Ice     [x]  heat  []  Ice massage  []  Laser   []  Anodyne Position:seated  Location: C/S    []  Laser with stim  []  Other:  Position:  Location:    []  Vasopneumatic Device Pressure:       [] lo [] med [] hi   Temperature: [] lo [] med [] hi   [] Skin assessment post-treatment:  []intact []redness- no adverse reaction    []redness  adverse reaction:     28 min Therapeutic Exercise:  [x] See flow sheet :   Rationale: increase ROM and increase strength to improve the patients ability to perform ADL's with ease. 10 min Manual Therapy:  SOR, STM c/s paraspinals- pt supine   The manual therapy interventions were performed at a separate and distinct time from the therapeutic activities interventions. Rationale: decrease pain, increase ROM, increase tissue extensibility and decrease trigger points to improve functional mobility with ADL's. With   [] TE   [] TA   [] neuro   [] other: Patient Education: [x] Review HEP    [] Progressed/Changed HEP based on:   [] positioning   [] body mechanics   [] transfers   [] heat/ice application    [] other:      Other Objective/Functional Measures:      Pain Level (0-10 scale) post treatment: 2    ASSESSMENT/Changes in Function:   Continued symptoms into her left UE and down into her fingertips with left c/s rotation. Decreased symptoms noted when pt decreased mobility to the left with exercises, noted with left rotation with open books, threading the needle and left rotation on pilates ball. Trp noted in the right UT with pt reporting that area is where she often experiences mm spasms. Pt's next appointment is 4 weeks away due to insurance auth. Patient will continue to benefit from skilled PT services to modify and progress therapeutic interventions, address functional mobility deficits, address ROM deficits, address strength deficits, analyze and address soft tissue restrictions, analyze and cue movement patterns, analyze and modify body mechanics/ergonomics and assess and modify postural abnormalities to attain remaining goals. [x]  See Plan of Care  []  See progress note/recertification  []  See Discharge Summary         Progress towards goals / Updated goals:  Short Term Goals: To be accomplished in 2 weeks:  1.  Pt will demonstrate I and compliance with HEP to maximize therapeutic effect.              IE: HEP issued and instructed              Current: Pt reports HEP compliance on 12-3-20  2. Pt will demonstrate cervical flexion to 45 deg without pain to improve ease of ADLs.             NO: 35 deg with pain     Long Term Goals: To be accomplished in 4 weeks:  1. Pt will demonstrate (-) Spurling's on left to improve ease of  sleep.              IE: (+) Spurling's and SB  2. Pt will demonstrate 70 deg right c/s rotation AROM without pain for improved ease of driving.              IE: 60 deg with right side pain  3. Pt will demonstrate 35 deg cervical extension without pain for improved ease of self care and dressing.              IE: 30 deg with pain  4.  Pt will improve FOTO score to 71 to demonstrate improved quality of life and function.              IE: 57    PLAN  []  Upgrade activities as tolerated     [x]  Continue plan of care  []  Update interventions per flow sheet       []  Discharge due to:_  []  Other:_      Brittany Carvalho PTA 12/10/2020  7:01 AM    Future Appointments   Date Time Provider Yi Osborn   1/5/2021  7:00 AM Sarahi Salinas, PT [facility]   1/7/2021  7:00 AM Savita Hurd PTA [facility]   1/12/2021  7:00 AM Sarahi Salinas, PT [facility]   1/14/2021  7:00 AM Savita Hurd PTA [facility]   1/15/2021  9:45 AM Cortney Downing NP [facility]   1/19/2021  7:00 AM Sarahi Salinas, PT [facility]

## 2020-12-15 ENCOUNTER — APPOINTMENT (OUTPATIENT)
Dept: PHYSICAL THERAPY | Age: 50
End: 2020-12-15
Payer: COMMERCIAL

## 2020-12-16 ENCOUNTER — APPOINTMENT (OUTPATIENT)
Dept: PHYSICAL THERAPY | Age: 50
End: 2020-12-16
Payer: COMMERCIAL

## 2020-12-16 ENCOUNTER — VIRTUAL VISIT (OUTPATIENT)
Dept: FAMILY MEDICINE CLINIC | Age: 50
End: 2020-12-16
Payer: COMMERCIAL

## 2020-12-16 DIAGNOSIS — I10 ESSENTIAL HYPERTENSION, BENIGN: Primary | ICD-10-CM

## 2020-12-16 PROCEDURE — 99213 OFFICE O/P EST LOW 20 MIN: CPT | Performed by: INTERNAL MEDICINE

## 2020-12-16 NOTE — PROGRESS NOTES
Arron Chaudhry is a 52 y.o. female who was seen by synchronous (real-time) audio-video technology on 12/16/2020 for Hypertension        Assessment & Plan:   Diagnoses and all orders for this visit:    1. Essential hypertension, benign    Labile HTN  Discussed proper technique measuring BP  Informed her vyvanse dosing may need to be reduced  She will keep log of BP over next 4 weeks and discuss need for further pharmacologic rx at that time with PCP  I have explained plan to patient and the patient verbalizes understanding      15 minutes was spent on this visit  712  Subjective:   Labile BP on home measurements x 1 week  SBP between 110 and 170 with DBP   Asymptomatic (no CP, SOB, HA, dizziness)    Prior to Admission medications    Medication Sig Start Date End Date Taking? Authorizing Provider   methylPREDNISolone (Medrol, Mp,) 4 mg tablet Per dose pack instructions 11/20/20   Elizabeth CALVO NP   methocarbamoL (Robaxin) 500 mg tablet Take 1 Tab by mouth two (2) times daily as needed for Muscle Spasm(s). 11/20/20   Brien Ngo NP   spironolactone (Aldactone) 100 mg tablet Take 100 mg by mouth daily. Other, MD Adama   celecoxib (CeleBREX) 200 mg capsule Take 1 Cap by mouth daily for 90 days. 9/22/20 12/21/20  CRISTELA Pedro   acyclovir (ZOVIRAX) 5 % ointment Apply every 3 hrs up to 5x/day 3/4/19   Arcenio Allen MD   Lisdexamfetamine (VYVANSE) 40 mg capsule Take by mouth daily. Provider, Historical     Current Outpatient Medications   Medication Sig Dispense Refill    methylPREDNISolone (Medrol, Mp,) 4 mg tablet Per dose pack instructions 1 Dose Pack 0    methocarbamoL (Robaxin) 500 mg tablet Take 1 Tab by mouth two (2) times daily as needed for Muscle Spasm(s). 45 Tab 1    spironolactone (Aldactone) 100 mg tablet Take 100 mg by mouth daily.  celecoxib (CeleBREX) 200 mg capsule Take 1 Cap by mouth daily for 90 days.  30 Cap 2    acyclovir (ZOVIRAX) 5 % ointment Apply every 3 hrs up to 5x/day 5 g 1    Lisdexamfetamine (VYVANSE) 40 mg capsule Take by mouth daily. Allergies   Allergen Reactions    Codeine Nausea and Vomiting     Patient states she gets jittery, has upset stomach      Past Medical History:   Diagnosis Date    ADD (attention deficit disorder)     Arthritis     knees, R shoulder    Depression     Left foot pain     Plantar fasciitis, left     Right shoulder pain      Past Surgical History:   Procedure Laterality Date    HX KNEE ARTHROSCOPY Left 6/1/12020    left knee arthroscopic partial medial menisectomy    HX TUBAL LIGATION  2009    Tubal ligation       ROS per HPI    Objective:   No flowsheet data found. General: alert, cooperative, no distress   Mental  status: normal mood, behavior, speech, dress, motor activity, and thought processes, able to follow commands   HENT: NCAT   Neck: no visualized mass   Resp: no respiratory distress   Neuro: no gross deficits   Skin: no discoloration or lesions of concern on visible areas   Psychiatric: normal affect, consistent with stated mood, no evidence of hallucinations     Additional exam findings: no      We discussed the expected course, resolution and complications of the diagnosis(es) in detail. Medication risks, benefits, costs, interactions, and alternatives were discussed as indicated. I advised her to contact the office if her condition worsens, changes or fails to improve as anticipated. She expressed understanding with the diagnosis(es) and plan. Carmelo Machado, who was evaluated through a patient-initiated, synchronous (real-time) audio-video encounter, and/or her healthcare decision maker, is aware that it is a billable service, with coverage as determined by her insurance carrier. She provided verbal consent to proceed: Yes, and patient identification was verified.  It was conducted pursuant to the emergency declaration under the [address], [address] waiver authority and the [employer] and [email or web address] General Act. A caregiver was present when appropriate. Ability to conduct physical exam was limited. I was at home. The patient was at home.       Weston Betancourt MD

## 2020-12-22 ENCOUNTER — APPOINTMENT (OUTPATIENT)
Dept: PHYSICAL THERAPY | Age: 50
End: 2020-12-22
Payer: COMMERCIAL

## 2020-12-24 ENCOUNTER — APPOINTMENT (OUTPATIENT)
Dept: PHYSICAL THERAPY | Age: 50
End: 2020-12-24
Payer: COMMERCIAL

## 2020-12-28 RX ORDER — TRIAMCINOLONE ACETONIDE 1 MG/G
CREAM TOPICAL
Qty: 60 G | Refills: 0 | Status: SHIPPED | OUTPATIENT
Start: 2020-12-28 | End: 2021-03-12

## 2020-12-28 NOTE — TELEPHONE ENCOUNTER
Last Visit: 11/24/20 with MD Jim Yao  Next Appointment: none  Previous Refill Encounter(s): 7/16/18 #60g    Requested Prescriptions     Pending Prescriptions Disp Refills    triamcinolone acetonide (KENALOG) 0.1 % topical cream 60 g 0     Sig: Apply  to affected area two (2) times daily as needed for Skin Irritation.  use thin layer

## 2020-12-30 ENCOUNTER — OFFICE VISIT (OUTPATIENT)
Dept: ORTHOPEDIC SURGERY | Age: 50
End: 2020-12-30
Payer: COMMERCIAL

## 2020-12-30 VITALS
HEIGHT: 62 IN | SYSTOLIC BLOOD PRESSURE: 133 MMHG | WEIGHT: 237 LBS | TEMPERATURE: 98.7 F | DIASTOLIC BLOOD PRESSURE: 69 MMHG | RESPIRATION RATE: 16 BRPM | HEART RATE: 72 BPM | OXYGEN SATURATION: 98 % | BODY MASS INDEX: 43.61 KG/M2

## 2020-12-30 DIAGNOSIS — M54.12 CERVICAL RADICULOPATHY: ICD-10-CM

## 2020-12-30 DIAGNOSIS — M54.2 NECK PAIN: ICD-10-CM

## 2020-12-30 DIAGNOSIS — M54.2 NECK PAIN: Primary | ICD-10-CM

## 2020-12-30 PROCEDURE — 99213 OFFICE O/P EST LOW 20 MIN: CPT | Performed by: NURSE PRACTITIONER

## 2020-12-30 RX ORDER — GABAPENTIN 100 MG/1
100 CAPSULE ORAL 3 TIMES DAILY
Qty: 90 CAP | Refills: 1 | Status: SHIPPED | OUTPATIENT
Start: 2020-12-30 | End: 2021-03-12
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
## 2021-01-05 ENCOUNTER — HOSPITAL ENCOUNTER (OUTPATIENT)
Dept: PHYSICAL THERAPY | Age: 51
Discharge: HOME | End: 2021-01-05
Payer: COMMERCIAL

## 2021-01-05 PROCEDURE — 97140 MANUAL THERAPY 1/> REGIONS: CPT

## 2021-01-05 PROCEDURE — 97110 THERAPEUTIC EXERCISES: CPT
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
## 2021-01-05 NOTE — PROGRESS NOTES
[facility]  [address]  [place], [address]  [phone] fax    Physical Therapy Progress Note  Patient name: Deepali Barrientos Start of Care: 2020   Referral source: Winston Khan NP : 1970                Medical Diagnosis: Neck pain [M54.2]  Payor: Annalisa  / Plan: [employer] / Product Type: HMO /  Onset Date:2020                Treatment Diagnosis: neck pain   Prior Hospitalization: see medical history Provider#: [id number]   Medications: Verified on Patient summary List    Comorbidities: right shoulder surgery, arthritis, left menisectomy   Prior Level of Function: Pt with improved ease of driving and ADLs without neck pain                 Visits from Start of Care: 4    Missed Visits: 4        Key Functional Changes: The pt has attended limited visits to date, which has limited progress. She does exhibit increased c/s AROM but pain with limited change. She will benefit from continued PT to further her functional progress. Progress towards goals   Short Term Goals: To be accomplished in 2 weeks:  1. Pt will demonstrate I and compliance with HEP to maximize therapeutic effect.              DI: HEP issued and instructed              DSHJIEN: Pt reports HEP compliance on 12-3-20  2. Pt will demonstrate cervical flexion to 45 deg without pain to improve ease of ADLs.             IM: 35 deg with pain              Current: 40 degrees with pain on 21     Long Term Goals: To be accomplished in 4 weeks:  1. Pt will demonstrate (-) Spurling's on left to improve ease of  sleep.              IE: (+) Spurling's and SB   Current: + pain on 20  2. Pt will demonstrate 70 deg right c/s rotation AROM without pain for improved ease of driving.              IE: 60 deg with right side pain              Current: progressing 66 degrees on 21  3.  Pt will demonstrate 35 deg cervical extension without pain for improved ease of self care and dressing.              VD: 30 deg with pain              Current: 40 degrees with pain on 1-5-21  4. Pt will improve FOTO score to 71 to demonstrate improved quality of life and function.              IE: 57              Current: unable to complete due to equipment malfunction 1-5-20       Updated Goals: to be achieved in 4 weeks:   1. Pt will demonstrate (-) Spurling's on left to improve ease of  sleep.              PN: (+) Spurling's  2. Pt will demonstrate 70 deg right c/s rotation AROM without pain for improved ease of driving.              PN: 66 deg with right side pain  3. Pt will demonstrate 35 deg cervical extension without pain for improved ease of self care and dressing.              PN: 40 deg with pain  4. Pt will improve FOTO score to 71 to demonstrate improved quality of life and function.              PN: 57    ASSESSMENT/RECOMMENDATIONS:  [x]Continue therapy per initial plan/protocol at a frequency of  2 x per week for 4 weeks  []Continue therapy with the following recommended changes:_____________________      _____________________________________________________________________  []Discontinue therapy progressing towards or have reached established goals  []Discontinue therapy due to lack of appreciable progress towards goals  []Discontinue therapy due to lack of attendance or compliance  []Await Physician's recommendations/decisions regarding therapy  []Other:________________________________________________________________    Thank you for this referral.    Morales Zavala, PT 1/5/2021 7:14 AM  NOTE TO PHYSICIAN:  PLEASE COMPLETE THE ORDERS BELOW AND   FAX TO [facility]: [phone]  If you are unable to process this request in 24 hours please contact our office: [phone]    ? I have read the above report and request that my patient continue as recommended.   ? I have read the above report and request that my patient continue therapy with the following changes/special instructions:__________________________________________________________  ? I have read the above report and request that my patient be discharged from therapy.     Physicians signature: ______________________________Date: ______Time:______

## 2021-01-07 ENCOUNTER — HOSPITAL ENCOUNTER (OUTPATIENT)
Dept: PHYSICAL THERAPY | Age: 51
Discharge: HOME | End: 2021-01-07
Payer: COMMERCIAL

## 2021-01-07 PROCEDURE — 97110 THERAPEUTIC EXERCISES: CPT

## 2021-01-07 PROCEDURE — 97140 MANUAL THERAPY 1/> REGIONS: CPT

## 2021-01-07 NOTE — PROGRESS NOTES
PT DAILY TREATMENT NOTE     Patient Name: Payam Fernandez  Date:2021  : 1970  [x]  Patient  Verified  Payor: Eri Found / Plan: [employer] / Product Type: HMO /    In time:7:00  Out time:7:35  Total Treatment Time (min): 35  Visit #: 2 of 8    Medicare/BCBS Only   Total Timed Codes (min):  25 1:1 Treatment Time:  25       Treatment Area: Neck pain [M54.2]    SUBJECTIVE  Pain Level (0-10 scale): 3  Any medication changes, allergies to medications, adverse drug reactions, diagnosis change, or new procedure performed?: [x] No    [] Yes (see summary sheet for update)  Subjective functional status/changes:   [] No changes reported  Pt reports \"its not so much my neck hurting but more some my arm aching\"    OBJECTIVE    Modality rationale: decrease pain and increase tissue extensibility to improve the patients ability to perform daily task    Min Type Additional Details    [] Estim:  []Unatt       []IFC  []Premod                        []Other:  []w/ice   []w/heat  Position:  Location:    [] Estim: []Att    []TENS instruct  []NMES                    []Other:  []w/US   []w/ice   []w/heat  Position:  Location:    []  Traction: [] Cervical       []Lumbar                       [] Prone          []Supine                       []Intermittent   []Continuous Lbs:  [] before manual  [] after manual    []  Ultrasound: []Continuous   [] Pulsed                           []1MHz   []3MHz W/cm2:  Location:    []  Iontophoresis with dexamethasone         Location: [] Take home patch   [] In clinic   10 []  Ice     [x]  heat  []  Ice massage  []  Laser   []  Anodyne Position:seated  Location: Left UT    []  Laser with stim  []  Other:  Position:  Location:    []  Vasopneumatic Device Pressure:       [] lo [] med [] hi   Temperature: [] lo [] med [] hi   [] Skin assessment post-treatment:  []intact []redness- no adverse reaction    []redness  adverse reaction:       17 min Therapeutic Exercise:  [x] See flow sheet :   Rationale: increase ROM and increase strength to improve the patients ability to perform functional task with ease. 8 min Manual Therapy:  scap clocks and STM to subscap in S/L and supine   The manual therapy interventions were performed at a separate and distinct time from the therapeutic activities interventions. Rationale: decrease pain, increase ROM and increase tissue extensibility to improve functional mobility with ADL's. With   [] TE   [] TA   [] neuro   [] other: Patient Education: [x] Review HEP    [] Progressed/Changed HEP based on:   [] positioning   [] body mechanics   [] transfers   [] heat/ice application    [] other:      Other Objective/Functional Measures:     Pain Level (0-10 scale) post treatment: 3    ASSESSMENT/Changes in Function:   Pt continues to display improved functional mobility in the c/s however continues to report pain, more in the UT and left UE than in her C/S at this time. Pt also continues to report tingling down into the left hand and fingertips, noting she has to elevate her UE at night when in bed to decrease her symptoms and find some comfort. Continued symptoms post treatment but pt left in no distress. Patient will continue to benefit from skilled PT services to modify and progress therapeutic interventions, address functional mobility deficits, address ROM deficits, address strength deficits, analyze and address soft tissue restrictions, analyze and cue movement patterns, analyze and modify body mechanics/ergonomics and assess and modify postural abnormalities to attain remaining goals. [x]  See Plan of Care  []  See progress note/recertification  []  See Discharge Summary         Progress towards goals / Updated goals:  Short Term Goals: To be accomplished in 2 weeks:  1.  Pt will demonstrate I and compliance with HEP to maximize therapeutic effect.              XS: HEP issued and instructed              XFMQDJC: Pt reports HEP compliance on 12-3-20  2. Pt will demonstrate cervical flexion to 45 deg without pain to improve ease of ADLs.             CI: 35 deg with pain              Current: 40 degrees with pain on 1-5-21     Long Term Goals: To be accomplished in 4 weeks:  1. Pt will demonstrate (-) Spurling's on left to improve ease of  sleep.              IE: (+) Spurling's and SB  2. Pt will demonstrate 70 deg right c/s rotation AROM without pain for improved ease of driving.              IE: 60 deg with right side pain              Current: progressing 66 degrees on 1-5-21  3. Pt will demonstrate 35 deg cervical extension without pain for improved ease of self care and dressing.              GD: 30 deg with pain              Current: 40 degrees with pain on 1-5-21  4.  Pt will improve FOTO score to 71 to demonstrate improved quality of life and function.              IE: 57              Current: unable to complete due to equipment malfunction 1-5-20    PLAN  []  Upgrade activities as tolerated     [x]  Continue plan of care  []  Update interventions per flow sheet       []  Discharge due to:_  []  Other:_      Arora Backer, PTA 1/7/2021  6:58 AM    Future Appointments   Date Time Provider Yi Osborn   1/7/2021  7:00 AM Mee Smoker, PTA [facility]   1/12/2021  7:00 AM Nate Perez, PT [facility]   1/14/2021  7:00 AM Mee Smoker, PTA [facility]   1/15/2021 10:00 AM Marlen Calle NP [facility]   1/18/2021  1:00 PM [facility] RM 2 [facility]   1/19/2021  7:00 AM Nate Perez, PT [facility]   1/28/2021  9:30 AM Parth Sutton MD [facility]

## 2021-01-12 ENCOUNTER — APPOINTMENT (OUTPATIENT)
Dept: PHYSICAL THERAPY | Age: 51
End: 2021-01-12
Payer: COMMERCIAL

## 2021-01-14 ENCOUNTER — APPOINTMENT (OUTPATIENT)
Dept: PHYSICAL THERAPY | Age: 51
End: 2021-01-14
Payer: COMMERCIAL

## 2021-01-18 ENCOUNTER — HOSPITAL ENCOUNTER (OUTPATIENT)
Age: 51
Discharge: HOME | End: 2021-01-18
Attending: NURSE PRACTITIONER
Payer: COMMERCIAL

## 2021-01-18 PROCEDURE — 72141 MRI NECK SPINE W/O DYE: CPT

## 2021-01-19 ENCOUNTER — HOSPITAL ENCOUNTER (OUTPATIENT)
Dept: PHYSICAL THERAPY | Age: 51
Discharge: HOME | End: 2021-01-19
Payer: COMMERCIAL

## 2021-01-19 ENCOUNTER — APPOINTMENT (OUTPATIENT)
Dept: PHYSICAL THERAPY | Age: 51
End: 2021-01-19
Payer: COMMERCIAL

## 2021-01-19 PROCEDURE — 97140 MANUAL THERAPY 1/> REGIONS: CPT

## 2021-01-19 PROCEDURE — 97012 MECHANICAL TRACTION THERAPY: CPT

## 2021-01-19 PROCEDURE — 97110 THERAPEUTIC EXERCISES: CPT

## 2021-01-19 NOTE — PROGRESS NOTES
PT DAILY TREATMENT NOTE     Patient Name: Quyen Harvey  Date:2021  : 1970  [x]  Patient  Verified  Payor: Caroline Llamas / Plan: Cristine Gore / Product Type: HMO /    In [id number]  Out [id number]  Total Treatment Time (min): 49  Visit #: 2 of 8    Medicare/BCBS Only   Total Timed Codes (min):  39 1:1 Treatment Time:  39       Treatment Area: Neck pain [M54.2]    SUBJECTIVE  Pain Level (0-10 scale): 0 - c/s, 9-10/10 for left elbow region  Any medication changes, allergies to medications, adverse drug reactions, diagnosis change, or new procedure performed?: [x] No    [] Yes (see summary sheet for update)  Subjective functional status/changes:   [] No changes reported  The pt reports tingling in the left hand and elbow region pain.      OBJECTIVE    Modality rationale: decrease pain and increase tissue extensibility to improve the patients ability to perform ADL   Min Type Additional Details    [] Estim:  []Unatt       []IFC  []Premod                        []Other:  []w/ice   []w/heat  Position:  Location:    [] Estim: []Att    []TENS instruct  []NMES                    []Other:  []w/US   []w/ice   []w/heat  Position:  Location:   10 [x]  Traction: [x] Cervical       []Lumbar                       [] Prone          [x]Supine                       []Intermittent   [x]Continuous Lbs: 22  [] before manual  [x] after manual    []  Ultrasound: []Continuous   [] Pulsed                           []1MHz   []3MHz W/cm2:  Location:    []  Iontophoresis with dexamethasone         Location: [] Take home patch   [] In clinic    []  Ice     []  heat  []  Ice massage  []  Laser   []  Anodyne Position:  Location:    []  Laser with stim  []  Other:  Position:  Location:    []  Vasopneumatic Device Pressure:       [] lo [] med [] hi   Temperature: [] lo [] med [] hi   [] Skin assessment post-treatment:  []intact []redness- no adverse reaction    []redness  adverse reaction:     29 min Therapeutic Exercise: [x] See flow sheet :   Rationale: increase ROM and increase strength to improve the patients ability to perform daily tasks. 10 min Manual Therapy:  STM/MFR/TPR to left UT   The manual therapy interventions were performed at a separate and distinct time from the therapeutic activities interventions. Rationale: decrease pain, increase tissue extensibility and decrease trigger points to perform daily tasks. With   [] TE   [] TA   [] neuro   [] other: Patient Education: [x] Review HEP    [] Progressed/Changed HEP based on:   [] positioning   [] body mechanics   [] transfers   [] heat/ice application    [] other:      Other Objective/Functional Measures:  Performed traction trial     Pain Level (0-10 scale) post treatment: 4 left elbow, 0 c/s    ASSESSMENT/Changes in Function: + reproduction of left UE symptoms with left UT trigger point palpation. Pt seemed to respond well to traction. Patient will continue to benefit from skilled PT services to modify and progress therapeutic interventions, address functional mobility deficits, address ROM deficits, address strength deficits, analyze and address soft tissue restrictions and analyze and cue movement patterns to attain remaining goals. []  See Plan of Care  []  See progress note/recertification  []  See Discharge Summary         Progress towards goals / Updated goals:  Short Term Goals: To be accomplished in 2 weeks:  1. Pt will demonstrate I and compliance with HEP to maximize therapeutic effect.              VW: HEP issued and instructed              YIYIWVG: Pt reports HEP compliance on 12-3-20  2. Pt will demonstrate cervical flexion to 45 deg without pain to improve ease of ADLs.             YZ: 35 deg with pain              KTLFQYM: 40 degrees with pain on 1-5-21     Long Term Goals: To be accomplished in 4 weeks:  1. Pt will demonstrate (-) Spurling's on left to improve ease of  sleep.              IE: (+) Spurling's and SB  2.  Pt will demonstrate 70 deg right c/s rotation AROM without pain for improved ease of driving.              IE: 60 deg with right side pain              Current: progressing 66 degrees on 1-5-21  3. Pt will demonstrate 35 deg cervical extension without pain for improved ease of self care and dressing.              IE: 30 deg with pain              Current: 40 degrees with pain on 1-5-21  4.  Pt will improve FOTO score to 71 to demonstrate improved quality of life and function.              IE: 57              Current: unable to complete due to equipment malfunction 1-5-20       PLAN  []  Upgrade activities as tolerated     [x]  Continue plan of care  []  Update interventions per flow sheet       []  Discharge due to:_  []  Other:_      Ameya Parents, PT 1/19/2021  7:53 AM    Future Appointments   Date Time Provider Yi Osborn   1/21/2021  7:00 AM Meg Davis PTA [facility]   1/25/2021  7:00 AM Isaac Rivera [facility]   1/28/2021  9:30 AM Maggi Knox MD [facility]

## 2021-01-21 ENCOUNTER — HOSPITAL ENCOUNTER (OUTPATIENT)
Dept: PHYSICAL THERAPY | Age: 51
Discharge: HOME | End: 2021-01-21
Payer: COMMERCIAL

## 2021-01-21 PROCEDURE — 97140 MANUAL THERAPY 1/> REGIONS: CPT

## 2021-01-21 PROCEDURE — 97110 THERAPEUTIC EXERCISES: CPT

## 2021-01-21 NOTE — PROGRESS NOTES
PT DAILY TREATMENT NOTE     Patient Name: Armida Eubanks  Date:2021  : 1970  [x]  Patient  Verified  Payor: Elmira Herring / Plan: [employer] / Product Type: HMO /    In time:7:00  Out time:7:46  Total Treatment Time (min): 48  Visit #: 3 of 8    Medicare/BCBS Only   Total Timed Codes (min):  38 1:1 Treatment Time:  38       Treatment Area: Neck pain [M54.2]    SUBJECTIVE  Pain Level (0-10 scale): 1  Any medication changes, allergies to medications, adverse drug reactions, diagnosis change, or new procedure performed?: [x] No    [] Yes (see summary sheet for update)  Subjective functional status/changes:   [] No changes reported  Pt reports her pain is moderate today. OBJECTIVE    Modality rationale: decrease pain and increase tissue extensibility to improve the patients ability to perform functional task with ease.    Min Type Additional Details    [] Estim:  []Unatt       []IFC  []Premod                        []Other:  []w/ice   []w/heat  Position:  Location:    [] Estim: []Att    []TENS instruct  []NMES                    []Other:  []w/US   []w/ice   []w/heat  Position:  Location:   10 [x]  Traction: [x] Cervical       []Lumbar                       [] Prone          []Supine                       []Intermittent   []Continuous Lbs:22#  [] before manual  [x] after manual    []  Ultrasound: []Continuous   [] Pulsed                           []1MHz   []3MHz W/cm2:  Location:    []  Iontophoresis with dexamethasone         Location: [] Take home patch   [] In clinic    []  Ice     []  heat  []  Ice massage  []  Laser   []  Anodyne Position:  Location:    []  Laser with stim  []  Other:  Position:  Location:    []  Vasopneumatic Device Pressure:       [] lo [] med [] hi   Temperature: [] lo [] med [] hi   [] Skin assessment post-treatment:  []intact []redness- no adverse reaction    []redness  adverse reaction:         28 min Therapeutic Exercise:  [x] See flow sheet :   Rationale: increase ROM and increase strength to improve the patients ability to perform daily task with ease. 10 min Manual Therapy:  STM/MFR/TPR to left UT in supine   The manual therapy interventions were performed at a separate and distinct time from the therapeutic activities interventions. Rationale: decrease pain, increase ROM, increase tissue extensibility and decrease trigger points to improve functional mobility with ADL's. With   [] TE   [] TA   [] neuro   [] other: Patient Education: [x] Review HEP    [] Progressed/Changed HEP based on:   [] positioning   [] body mechanics   [] transfers   [] heat/ice application    [] other:      Other Objective/Functional Measures:      Pain Level (0-10 scale) post treatment: 1      ASSESSMENT/Changes in Function:   Continued Trp in the left UT with pt reporting good relief and decreased symptoms into the left elbow and fingers with Trp release. Pt continues to report good relief with mechanical traction. Patient will continue to benefit from skilled PT services to modify and progress therapeutic interventions, address functional mobility deficits, address ROM deficits, address strength deficits, analyze and address soft tissue restrictions, analyze and cue movement patterns, analyze and modify body mechanics/ergonomics and assess and modify postural abnormalities to attain remaining goals. [x]  See Plan of Care  []  See progress note/recertification  []  See Discharge Summary           Progress towards goals / Updated goals:  Short Term Goals: To be accomplished in 2 weeks:  1. Pt will demonstrate I and compliance with HEP to maximize therapeutic effect.              TU: HEP issued and instructed              TTZCQZD: Pt reports HEP compliance on 12-3-20  2. Pt will demonstrate cervical flexion to 45 deg without pain to improve ease of ADLs.               TV: 35 deg with pain              ANVQHQF: 40 degrees with pain on 1-5-21     Long Term Goals: To be accomplished in 4 weeks:  1. Pt will demonstrate (-) Spurling's on left to improve ease of  sleep.              IE: (+) Spurling's and SB  2. Pt will demonstrate 70 deg right c/s rotation AROM without pain for improved ease of driving.              IE: 60 deg with right side pain              Current: progressing 66 degrees on 1-5-21  3. Pt will demonstrate 35 deg cervical extension without pain for improved ease of self care and dressing.              IE: 30 deg with pain              Current: 40 degrees with pain on 1-5-21  4.  Pt will improve FOTO score to 71 to demonstrate improved quality of life and function.              IE: 57              Current: unable to complete due to equipment malfunction 1-5-20    PLAN  []  Upgrade activities as tolerated     [x]  Continue plan of care  []  Update interventions per flow sheet       []  Discharge due to:_  []  Other:_      Josefina Shipley PTA 1/21/2021  6:59 AM    Future Appointments   Date Time Provider Yi Irena   1/21/2021  7:00 AM Chato Daigle PTA [facility]   1/25/2021  7:00 AM Noe Lawler [facility]   1/28/2021  9:30 AM Josue Buckley MD [facility]

## 2021-01-25 ENCOUNTER — HOSPITAL ENCOUNTER (OUTPATIENT)
Dept: PHYSICAL THERAPY | Age: 51
Discharge: HOME | End: 2021-01-25
Payer: COMMERCIAL

## 2021-01-25 PROCEDURE — 97140 MANUAL THERAPY 1/> REGIONS: CPT

## 2021-01-25 PROCEDURE — 97110 THERAPEUTIC EXERCISES: CPT

## 2021-01-25 PROCEDURE — 97012 MECHANICAL TRACTION THERAPY: CPT

## 2021-01-25 NOTE — PROGRESS NOTES
PT DAILY TREATMENT NOTE     Patient Name: Kassy Coffman  Date:2021  : 1970  [x]  Patient  Verified  Payor: Payton Maxwell / Plan: Yang Benz / Product Type: HMO /    In time:703  Out time:757  Total Treatment Time (min): 57  Visit #: 4 of 8    Medicare/BCBS Only   Total Timed Codes (min):  47 1:1 Treatment Time:  47       Treatment Area: Neck pain [M54.2]    SUBJECTIVE  Pain Level (0-10 scale): 0  Any medication changes, allergies to medications, adverse drug reactions, diagnosis change, or new procedure performed?: [x] No    [] Yes (see summary sheet for update)  Subjective functional status/changes:   [] No changes reported  Patient denies pain this morning. She denies paresthesia through UE over the last 2 days and has felt \"good. \"     OBJECTIVE    Modality rationale: decrease pain and increase tissue extensibility to improve the patients ability to perform daily tasks with greater ease    Min Type Additional Details    [] Estim:  []Unatt       []IFC  []Premod                        []Other:  []w/ice   []w/heat  Position:  Location:    [] Estim: []Att    []TENS instruct  []NMES                    []Other:  []w/US   []w/ice   []w/heat  Position:  Location:   12 [x]  Traction: [x] Cervical       []Lumbar                       [] Prone          []Supine                       []Intermittent   []Continuous Lbs: 22  [] before manual  [x] after manual    []  Ultrasound: []Continuous   [] Pulsed                           []1MHz   []3MHz W/cm2:  Location:    []  Iontophoresis with dexamethasone         Location: [] Take home patch   [] In clinic    []  Ice     []  heat  []  Ice massage  []  Laser   []  Anodyne Position:  Location:    []  Laser with stim  []  Other:  Position:  Location:    []  Vasopneumatic Device Pressure:       [] lo [] med [] hi   Temperature: [] lo [] med [] hi   [] Skin assessment post-treatment:  []intact []redness- no adverse reaction    []redness  adverse reaction: 33 min Therapeutic Exercise:  [x] See flow sheet :   Rationale: increase ROM, increase strength and improve coordination to improve the patients ability to perform ADLs and self care tasks with greater ease     8 min Manual Therapy:  Sitting - DTM and TPR to left upper trap    The manual therapy interventions were performed at a separate and distinct time from the therapeutic activities interventions. Rationale: decrease pain, increase ROM, increase tissue extensibility and decrease trigger points to perform functional mobility with greater ease           With   [] TE   [] TA   [] neuro   [] other: Patient Education: [x] Review HEP    [] Progressed/Changed HEP based on:   [] positioning   [] body mechanics   [] transfers   [] heat/ice application    [] other:      Other Objective/Functional Measures: (+) Spurling's and side bend      Pain Level (0-10 scale) post treatment: 0    ASSESSMENT/Changes in Function: Patient able to tolerate all therex without onset of symptoms or paresthesia. Reintroduced thread the needle exercise today with patient reporting improvement in discomfort in left elbow and no onset of negative symptoms as she has had previously. Addition of supine bilateral ER with theraband also introduced today without discomfort. Right arm notable weaker with exercise likely secondary to previous shoulder surgery on right shoulder. She wishes to hold off on scheduling until follow up with MD later this week. Patient will continue to benefit from skilled PT services to modify and progress therapeutic interventions, address functional mobility deficits, address ROM deficits, address strength deficits, analyze and address soft tissue restrictions, analyze and cue movement patterns, analyze and modify body mechanics/ergonomics, assess and modify postural abnormalities, address imbalance/dizziness and instruct in home and community integration to attain remaining goals.      [x]  See Plan of Care  [] See progress note/recertification  []  See Discharge Summary         Progress towards goals / Updated goals:  Short Term Goals: To be accomplished in 2 weeks:  1. Pt will demonstrate I and compliance with HEP to maximize therapeutic effect.              AB: HEP issued and instructed              GEWIQCD: Pt reports HEP compliance on 12-3-20  2. Pt will demonstrate cervical flexion to 45 deg without pain to improve ease of ADLs.             MB: 35 deg with pain              [place]: 40 degrees with pain on 1-5-21     Long Term Goals: To be accomplished in 4 weeks:  1. Pt will demonstrate (-) Spurling's on left to improve ease of  sleep.              IE: (+) Spurling's and SB   Current: No change 1/25/2021 - remains (+) with Spurling's and SB   2. Pt will demonstrate 70 deg right c/s rotation AROM without pain for improved ease of driving.              IE: 60 deg with right side pain              Current: progressing 66 degrees on 1-5-21  3. Pt will demonstrate 35 deg cervical extension without pain for improved ease of self care and dressing.              IE: 30 deg with pain              Current: 40 degrees with pain on 1-5-21  4.  Pt will improve FOTO score to 71 to demonstrate improved quality of life and function.              IE: 57              Current: Met 1/25/2021 - 71    PLAN  []  Upgrade activities as tolerated     [x]  Continue plan of care  []  Update interventions per flow sheet       []  Discharge due to:_  []  Other:_      Bryan Gallegos, PT, DPT 1/25/2021  7:08 AM    Future Appointments   Date Time Provider Yi Osborn   1/28/2021  9:30 AM Norma Knutson MD [facility]

## 2021-01-28 ENCOUNTER — OFFICE VISIT (OUTPATIENT)
Dept: ORTHOPEDIC SURGERY | Age: 51
End: 2021-01-28
Payer: COMMERCIAL

## 2021-01-28 VITALS
WEIGHT: 237 LBS | SYSTOLIC BLOOD PRESSURE: 139 MMHG | TEMPERATURE: 98 F | BODY MASS INDEX: 43.35 KG/M2 | DIASTOLIC BLOOD PRESSURE: 82 MMHG | OXYGEN SATURATION: 99 % | HEART RATE: 87 BPM

## 2021-01-28 DIAGNOSIS — M79.18 CERVICAL MYOFASCIAL PAIN SYNDROME: Primary | ICD-10-CM

## 2021-01-28 DIAGNOSIS — M79.18 MYOFASCIAL PAIN: ICD-10-CM

## 2021-01-28 DIAGNOSIS — M54.12 CERVICAL RADICULOPATHY: ICD-10-CM

## 2021-01-28 DIAGNOSIS — M48.02 CERVICAL SPINAL STENOSIS: ICD-10-CM

## 2021-01-28 PROCEDURE — 99214 OFFICE O/P EST MOD 30 MIN: CPT | Performed by: PHYSICAL MEDICINE & REHABILITATION
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
## 2021-02-05 NOTE — PROGRESS NOTES
[facility]  [address]  [place], [address]  [phone] fax    Physical Therapy Discharge Summary  Patient name: Danyell Fox Start of Care: 2020   Referral source: Vargas Srinivasan NP : 1970   Medical/Treatment Diagnosis: Neck pain [M54.2]  Payor: Edith Sessions / Plan: Raimundo Gibbs / Product Type: HMO /  Onset Date:2020     Prior Hospitalization: see medical history Provider#: [id number]   Medications: Verified on Patient Summary List    Comorbidities: right shoulder surgery, arthritis, left menisectomy   Prior Level of Function: Pt with improved ease of driving and ADLs without neck pain  Visits from Start of Care: 8    Missed Visits: 2  Reporting Period : 2021 to 2021      Summary of Care:  Short Term Goals: To be accomplished in 2 weeks:  1. Pt will demonstrate I and compliance with HEP to maximize therapeutic effect.              JL: HEP issued and instructed              XTOMBTP: Pt reports HEP compliance   2. Pt will demonstrate cervical flexion to 45 deg without pain to improve ease of ADLs.             PX: 35 deg with pain              Current: 40 degrees with pain      Long Term Goals: To be accomplished in 4 weeks:  1. Pt will demonstrate (-) Spurling's on left to improve ease of  sleep.              IE: (+) Spurling's and SB              Current: No change - remains (+) with Spurling's and SB   2. Pt will demonstrate 70 deg right c/s rotation AROM without pain for improved ease of driving.              IE: 60 deg with right side pain              Current: progressing - 66 degrees   3. Pt will demonstrate 35 deg cervical extension without pain for improved ease of self care and dressing.              IE: 30 deg with pain              Current: 40 degrees with pain  4.  Pt will improve FOTO score to 71 to demonstrate improved quality of life and function.              IE: 57              Current: Met - 71      ASSESSMENT/RECOMMENDATIONS: Patient was seen for 4 follow up visits since progress note. She has made good progress with therapy sessions including improved cervical AROM and improved functional mobility as measured by Nj Saini. She continues to have paresthesia through left UE intermittently impacting functional mobility. Patient requesting discharge at this time.  Thank you for this referral.     [x]Discontinue therapy: []Patient has reached or is progressing toward set goals      [x]Patient is non-compliant or has abdicated      []Due to lack of appreciable progress towards set goals    John Stephens PT, DPT 2/5/2021 3:14 PM

## 2021-03-12 ENCOUNTER — HOSPITAL ENCOUNTER (EMERGENCY)
Age: 51
Discharge: HOME | End: 2021-03-13
Attending: EMERGENCY MEDICINE
Payer: COMMERCIAL

## 2021-03-12 ENCOUNTER — APPOINTMENT (OUTPATIENT)
Dept: GENERAL RADIOLOGY | Age: 51
End: 2021-03-12
Attending: EMERGENCY MEDICINE
Payer: COMMERCIAL

## 2021-03-12 VITALS
SYSTOLIC BLOOD PRESSURE: 147 MMHG | BODY MASS INDEX: 40.67 KG/M2 | RESPIRATION RATE: 18 BRPM | HEART RATE: 82 BPM | HEIGHT: 62 IN | DIASTOLIC BLOOD PRESSURE: 81 MMHG | OXYGEN SATURATION: 100 % | WEIGHT: 221 LBS

## 2021-03-12 DIAGNOSIS — M15.8 OTHER OSTEOARTHRITIS INVOLVING MULTIPLE JOINTS: Primary | ICD-10-CM

## 2021-03-12 LAB
ALBUMIN SERPL-MCNC: 3.4 G/DL (ref 3.4–5)
ALBUMIN/GLOB SERPL: 0.9 {RATIO} (ref 0.8–1.7)
ALP SERPL-CCNC: 79 U/L (ref 45–117)
ALT SERPL-CCNC: 24 U/L (ref 13–56)
ANION GAP SERPL CALC-SCNC: 3 MMOL/L (ref 3–18)
APTT PPP: 25.9 SEC (ref 23–36.4)
AST SERPL-CCNC: 20 U/L (ref 10–38)
BASOPHILS # BLD: 0 K/UL (ref 0–0.1)
BASOPHILS NFR BLD: 0 % (ref 0–2)
BILIRUB SERPL-MCNC: 0.4 MG/DL (ref 0.2–1)
BUN SERPL-MCNC: 15 MG/DL (ref 7–18)
BUN/CREAT SERPL: 16 (ref 12–20)
CALCIUM SERPL-MCNC: 8.7 MG/DL (ref 8.5–10.1)
CHLORIDE SERPL-SCNC: 107 MMOL/L (ref 100–111)
CO2 SERPL-SCNC: 28 MMOL/L (ref 21–32)
CREAT SERPL-MCNC: 0.96 MG/DL (ref 0.6–1.3)
D DIMER PPP FEU-MCNC: <0.27 UG/ML(FEU)
DIFFERENTIAL METHOD BLD: ABNORMAL
EOSINOPHIL # BLD: 0.1 K/UL (ref 0–0.4)
EOSINOPHIL NFR BLD: 1 % (ref 0–5)
ERYTHROCYTE [DISTWIDTH] IN BLOOD BY AUTOMATED COUNT: 12 % (ref 11.6–14.5)
GLOBULIN SER CALC-MCNC: 3.8 G/DL (ref 2–4)
GLUCOSE SERPL-MCNC: 84 MG/DL (ref 74–99)
HCT VFR BLD AUTO: 33.8 % (ref 35–45)
HGB BLD-MCNC: 11.3 G/DL (ref 12–16)
INR PPP: 1.2 (ref 0.8–1.2)
LYMPHOCYTES # BLD: 2.8 K/UL (ref 0.9–3.6)
LYMPHOCYTES NFR BLD: 33 % (ref 21–52)
MCH RBC QN AUTO: 30.8 PG (ref 24–34)
MCHC RBC AUTO-ENTMCNC: 33.4 G/DL (ref 31–37)
MCV RBC AUTO: 92.1 FL (ref 74–97)
MONOCYTES # BLD: 0.8 K/UL (ref 0.05–1.2)
MONOCYTES NFR BLD: 9 % (ref 3–10)
NEUTS SEG # BLD: 4.8 K/UL (ref 1.8–8)
NEUTS SEG NFR BLD: 57 % (ref 40–73)
PLATELET # BLD AUTO: 281 K/UL (ref 135–420)
PMV BLD AUTO: 9.5 FL (ref 9.2–11.8)
POTASSIUM SERPL-SCNC: 4 MMOL/L (ref 3.5–5.5)
PROT SERPL-MCNC: 7.2 G/DL (ref 6.4–8.2)
PROTHROMBIN TIME: 14.6 SEC (ref 11.5–15.2)
RBC # BLD AUTO: 3.67 M/UL (ref 4.2–5.3)
SODIUM SERPL-SCNC: 138 MMOL/L (ref 136–145)
WBC # BLD AUTO: 8.5 K/UL (ref 4.6–13.2)

## 2021-03-12 PROCEDURE — 85610 PROTHROMBIN TIME: CPT

## 2021-03-12 PROCEDURE — 80053 COMPREHEN METABOLIC PANEL: CPT

## 2021-03-12 PROCEDURE — 85379 FIBRIN DEGRADATION QUANT: CPT

## 2021-03-12 PROCEDURE — 73502 X-RAY EXAM HIP UNI 2-3 VIEWS: CPT

## 2021-03-12 PROCEDURE — 85025 COMPLETE CBC W/AUTO DIFF WBC: CPT

## 2021-03-12 PROCEDURE — 85730 THROMBOPLASTIN TIME PARTIAL: CPT

## 2021-03-12 PROCEDURE — 99282 EMERGENCY DEPT VISIT SF MDM: CPT

## 2021-03-12 PROCEDURE — 96374 THER/PROPH/DIAG INJ IV PUSH: CPT

## 2021-03-12 RX ORDER — KETOROLAC TROMETHAMINE 15 MG/ML
15 INJECTION, SOLUTION INTRAMUSCULAR; INTRAVENOUS ONCE
Status: COMPLETED | OUTPATIENT
Start: 2021-03-12 | End: 2021-03-13

## 2021-03-12 RX ORDER — ACETAMINOPHEN 500 MG
500 TABLET ORAL
COMMUNITY

## 2021-03-12 RX ORDER — IBUPROFEN 800 MG/1
800 TABLET ORAL
COMMUNITY
End: 2021-03-16 | Stop reason: ALTCHOICE

## 2021-03-13 PROCEDURE — 74011250636 HC RX REV CODE- 250/636: Performed by: EMERGENCY MEDICINE

## 2021-03-13 RX ORDER — MELOXICAM 7.5 MG/1
7.5 TABLET ORAL DAILY
Qty: 14 TAB | Refills: 0 | Status: SHIPPED | OUTPATIENT
Start: 2021-03-12 | End: 2021-03-16 | Stop reason: SDUPTHER

## 2021-03-13 RX ADMIN — KETOROLAC TROMETHAMINE 15 MG: 15 INJECTION, SOLUTION INTRAMUSCULAR; INTRAVENOUS at 00:02

## 2021-03-13 NOTE — ED TRIAGE NOTES
Pt reports bilateral hip and bilateral leg pain (L>R) since having Mirena placed in October 2020. Pt states over the past week the pain has worsened and has been constant. Taking Ibuprofen & Tylenol without relief from pain.

## 2021-03-13 NOTE — ED PROVIDER NOTES
HPI Pt is a 53 yo female who reports having  bilateral hip and bilateral leg pain intemittently for the past 6 months. Pt states over the past week her bilateral hip pain has worsened and has been constant. She has been taking Ibuprofen & Tylenol without relief from pain.       Past Medical History:   Diagnosis Date    ADD (attention deficit disorder)     Arthritis     knees, R shoulder    Depression     Left foot pain     Plantar fasciitis, left     Right shoulder pain        Past Surgical History:   Procedure Laterality Date    HX KNEE ARTHROSCOPY Left 6/1/12020    left knee arthroscopic partial medial menisectomy    HX TUBAL LIGATION  2009    Tubal ligation         Family History:   Problem Relation Age of Onset    Hypertension Mother     Hypertension Father     Arthritis-osteo Other        Social History     Socioeconomic History    Marital status:      Spouse name: Not on file    Number of children: Not on file    Years of education: Not on file    Highest education level: Not on file   Occupational History    Occupation: retirement     Employer: [employer]   Social Needs    Financial resource strain: Not on file    Food insecurity     Worry: Not on file     Inability: Not on file    Transportation needs     Medical: Not on file     Non-medical: Not on file   Tobacco Use    Smoking status: Never Smoker    Smokeless tobacco: Never Used   Substance and Sexual Activity    Alcohol use: No    Drug use: No    Sexual activity: Yes     Partners: Male   Lifestyle    Physical activity     Days per week: Not on file     Minutes per session: Not on file    Stress: Not on file   Relationships    Social connections     Talks on phone: Not on file     Gets together: Not on file     Attends [employer] service: Not on file     Active member of club or organization: Not on file     Attends meetings of clubs or organizations: Not on file     Relationship status: Not on file    Intimate partner violence     Fear of current or ex partner: Not on file     Emotionally abused: Not on file     Physically abused: Not on file     Forced sexual activity: Not on file   Other Topics Concern    Not on file   Social History Narrative    Not on file         ALLERGIES: Codeine    Review of Systems   Constitutional: Negative. HENT: Negative. Eyes: Negative. Respiratory: Negative. Cardiovascular: Negative. Gastrointestinal: Negative. Endocrine: Negative. Genitourinary: Negative. Musculoskeletal: Positive for arthralgias and myalgias. Skin: Negative. Allergic/Immunologic: Negative. Neurological: Negative. Hematological: Negative. Psychiatric/Behavioral: Negative. All other systems reviewed and are negative. Vitals:    03/12/21 1946   BP: (!) 147/81   Pulse: 82   Resp: 18   SpO2: 99%   Weight: 100.2 kg (221 lb)   Height: 5' 2\" (1.575 m)            Physical Exam  Vitals signs and nursing note reviewed. Constitutional:       General: She is not in acute distress. Appearance: She is well-developed. She is not diaphoretic. HENT:      Head: Normocephalic. Right Ear: External ear normal.      Left Ear: External ear normal.      Mouth/Throat:      Pharynx: No oropharyngeal exudate. Eyes:      General: No scleral icterus. Right eye: No discharge. Left eye: No discharge. Conjunctiva/sclera: Conjunctivae normal.      Pupils: Pupils are equal, round, and reactive to light. Neck:      Musculoskeletal: Normal range of motion and neck supple. Thyroid: No thyromegaly. Vascular: No JVD. Trachea: No tracheal deviation. Cardiovascular:      Rate and Rhythm: Normal rate and regular rhythm. Heart sounds: Normal heart sounds. No murmur. No friction rub. No gallop. Pulmonary:      Effort: Pulmonary effort is normal. No respiratory distress. Breath sounds: Normal breath sounds. No stridor. No wheezing or rales.    Chest:      Chest wall: No tenderness. Abdominal:      General: Bowel sounds are normal. There is no distension. Palpations: Abdomen is soft. There is no mass. Tenderness: There is no abdominal tenderness. There is no guarding or rebound. Musculoskeletal: Normal range of motion. General: No tenderness. Comments: BILATERAL HIPS: normal ROM, pulses and senory with pain with movement. Lymphadenopathy:      Cervical: No cervical adenopathy. Skin:     General: Skin is warm and dry. Coloration: Skin is not pale. Findings: No erythema or rash. Neurological:      Mental Status: She is alert and oriented to person, place, and time. Cranial Nerves: No cranial nerve deficit. Motor: No abnormal muscle tone. Coordination: Coordination normal.      Deep Tendon Reflexes: Reflexes normal.          MDM       Procedures    Dx: bilateral osteoarthritis    Disp: discharge, F/U PCP in 3 days. Return to ER prn. Rx: mobic    Dictation disclaimer:  Please note that this dictation was completed with [employer], the computer voice recognition software. Quite often unanticipated grammatical, syntax, homophones, and other interpretive errors are inadvertently transcribed by the computer software. Please disregard these errors. Please excuse any errors that have escaped final proofreading.

## 2021-03-16 ENCOUNTER — VIRTUAL VISIT (OUTPATIENT)
Dept: FAMILY MEDICINE CLINIC | Age: 51
End: 2021-03-16
Payer: COMMERCIAL

## 2021-03-16 DIAGNOSIS — J01.80 ACUTE NON-RECURRENT SINUSITIS OF OTHER SINUS: Primary | ICD-10-CM

## 2021-03-16 PROCEDURE — 99214 OFFICE O/P EST MOD 30 MIN: CPT | Performed by: FAMILY MEDICINE

## 2021-03-16 RX ORDER — MELOXICAM 7.5 MG/1
7.5 TABLET ORAL
Qty: 30 TAB | Refills: 0 | Status: SHIPPED | OUTPATIENT
Start: 2021-03-16

## 2021-03-16 RX ORDER — AZITHROMYCIN 250 MG/1
TABLET, FILM COATED ORAL
Qty: 6 TAB | Refills: 0 | Status: SHIPPED | OUTPATIENT
Start: 2021-03-16 | End: 2021-03-21

## 2021-03-16 NOTE — PROGRESS NOTES
HPI:  Srinivas Sargent is a 52 y.o. female who presents today with   Chief Complaint   Patient presents with    Sinus Pain      C/o sinus tenderness and pain  Has been present since January off and on  Used Tylenol sinus off and on during this time; Med helped temporarily  No fever  No cough   No change in taste or smell. NO exposure to COVID    Has been prescribed MOBIC from the ED for hip pain; would like a refill. Prudent use advised; side effects reviewed. Aware not to take a mobic when she takes motrin. Was only given 14 of mobic from the ED>        3 most recent PHQ Screens 3/16/2021   Little interest or pleasure in doing things Not at all   Feeling down, depressed, irritable, or hopeless Not at all   Total Score PHQ 2 0               PMH,  Meds, Allergies, Family History, Social history reviewed      Current Outpatient Medications   Medication Sig Dispense Refill    ibuprofen (MOTRIN) 800 mg tablet Take 800 mg by mouth every six (6) hours as needed for Pain.  acetaminophen (Tylenol Extra Strength) 500 mg tablet Take 500 mg by mouth every six (6) hours as needed for Pain.  spironolactone (Aldactone) 100 mg tablet Take 100 mg by mouth daily.  Lisdexamfetamine (VYVANSE) 40 mg capsule Take by mouth daily.  meloxicam (Mobic) 7.5 mg tablet Take 1 Tab by mouth daily. 14 Tab 0        Allergies   Allergen Reactions    Codeine Nausea and Vomiting     Patient states she gets jittery, has upset stomach                   ROS neg      Physical Exam   General appearance: alert, cooperative, no distress, appears stated age  No visualized skin abnormalities  Head: NC AT        Assessment/Plan:  Diagnoses and all orders for this visit:    1. Acute non-recurrent sinusitis of other sinus    Other orders  -     azithromycin (ZITHROMAX) 250 mg tablet; Take 2 tablets today, then take 1 tablet daily for 4 days. -     meloxicam (Mobic) 7.5 mg tablet; Take 1 Tab by mouth daily as needed for Pain.  Take with food. zpack as directed  Plenty of liquids  Follow-up and Dispositions    · Return if symptoms worsen or fail to improve. This has been fully explained to the patient, who indicates understanding. AVS is accessible thru mychart and pt has been advised of same. Follow-up and Dispositions    · Return if symptoms worsen or fail to improve. Stephen Gell, was evaluated through a synchronous (real-time) audio-video encounter. The patient (or guardian if applicable) is aware that this is a billable service. Verbal consent to proceed has been obtained within the past 12 months. The visit was conducted pursuant to the emergency declaration under the [facility], [address] [place] [address] authority and the [employer] and [employer] General Act. Patient identification was verified, and a caregiver was present when appropriate. The patient was located in a state where the provider was credentialed to provide care.           Kwame Denny MD

## 2021-03-16 NOTE — PATIENT INSTRUCTIONS
Sinusitis: Care Instructions Your Care Instructions Sinusitis is an infection of the lining of the sinus cavities in your head. Sinusitis often follows a cold. It causes pain and pressure in your head and face. In most cases, sinusitis gets better on its own in 1 to 2 weeks. But some mild symptoms may last for several weeks. Sometimes antibiotics are needed. Follow-up care is a key part of your treatment and safety. Be sure to make and go to all appointments, and call your doctor if you are having problems. It's also a good idea to know your test results and keep a list of the medicines you take. How can you care for yourself at home? · Take an over-the-counter pain medicine, such as acetaminophen (Tylenol), ibuprofen (Advil, Motrin), or naproxen (Aleve). Read and follow all instructions on the label. · If the doctor prescribed antibiotics, take them as directed. Do not stop taking them just because you feel better. You need to take the full course of antibiotics. · Be careful when taking over-the-counter cold or flu medicines and Tylenol at the same time. Many of these medicines have acetaminophen, which is Tylenol. Read the labels to make sure that you are not taking more than the recommended dose. Too much acetaminophen (Tylenol) can be harmful. · Breathe warm, moist air from a steamy shower, a hot bath, or a sink filled with hot water. Avoid cold, dry air. Using a humidifier in your home may help. Follow the directions for cleaning the machine. · Use saline (saltwater) nasal washes to help keep your nasal passages open and wash out mucus and bacteria. You can buy saline nose drops at a grocery store or drugstore. Or you can make your own at home by adding 1 teaspoon of salt and 1 teaspoon of baking soda to 2 cups of distilled water. If you make your own, fill a bulb syringe with the solution, insert the tip into your nostril, and squeeze gently. Raquel Buerger your nose.  
· Put a hot, wet towel or a warm gel pack on your face 3 or 4 times a day for 5 to 10 minutes each time. · Try a decongestant nasal spray like oxymetazoline (Afrin). Do not use it for more than 3 days in a row. Using it for more than 3 days can make your congestion worse. When should you call for help? Call your doctor now or seek immediate medical care if: 
  · You have new or worse swelling or redness in your face or around your eyes.  
  · You have a new or higher fever. Watch closely for changes in your health, and be sure to contact your doctor if: 
  · You have new or worse facial pain.  
  · The mucus from your nose becomes thicker (like pus) or has new blood in it.  
  · You are not getting better as expected. Where can you learn more? Go to http://www.gray.com/ Enter T141 in the search box to learn more about \"Sinusitis: Care Instructions. \" Current as of: April 15, 2020               Content Version: 12.6 © 2006-2020 [employer]. Care instructions adapted under license by [employer] (which disclaims liability or warranty for this information). If you have questions about a medical condition or this instruction, always ask your healthcare professional. [place] [address] any warranty or liability for your use of this information.

## 2021-03-16 NOTE — PROGRESS NOTES
Chief Complaint   Patient presents with    Sinus Pain     1. Have you been to the ER, urgent care clinic since your last visit? Hospitalized since your last visit? Yes for hip pain. [facility] 3/12/21. 2. Have you seen or consulted any other health care providers outside of the [address] [place] [address] since your last visit? Include any pap smears or colon screening.  No

## 2021-04-12 DIAGNOSIS — M19.011 GLENOHUMERAL ARTHRITIS, RIGHT: Primary | ICD-10-CM

## 2021-04-12 RX ORDER — IBUPROFEN 800 MG/1
800 TABLET ORAL
Qty: 60 TAB | Refills: 2 | Status: SHIPPED | OUTPATIENT
Start: 2021-04-12 | End: 2022-07-25 | Stop reason: SDUPTHER

## 2021-04-12 NOTE — TELEPHONE ENCOUNTER
Last Visit: 12/9/20 with MD Janan Dandy  Next Appointment: Advised to follow-up PRN  Previous Refill Encounter(s): 6/8/20 #60 with 2 refills    Requested Prescriptions     Pending Prescriptions Disp Refills    ibuprofen (MOTRIN) 800 mg tablet 60 Tab 2     Sig: Take 1 Tab by mouth every eight (8) hours as needed for Pain.

## 2021-04-27 ENCOUNTER — OFFICE VISIT (OUTPATIENT)
Dept: ORTHOPEDIC SURGERY | Age: 51
End: 2021-04-27
Payer: COMMERCIAL

## 2021-04-27 VITALS
WEIGHT: 227 LBS | HEIGHT: 62 IN | HEART RATE: 100 BPM | OXYGEN SATURATION: 100 % | BODY MASS INDEX: 41.77 KG/M2 | RESPIRATION RATE: 16 BRPM

## 2021-04-27 DIAGNOSIS — M17.12 PRIMARY OSTEOARTHRITIS OF LEFT KNEE: ICD-10-CM

## 2021-04-27 DIAGNOSIS — M70.62 TROCHANTERIC BURSITIS OF LEFT HIP: Primary | ICD-10-CM

## 2021-04-27 DIAGNOSIS — M54.16 LUMBAR RADICULOPATHY: ICD-10-CM

## 2021-04-27 PROCEDURE — 99214 OFFICE O/P EST MOD 30 MIN: CPT | Performed by: ORTHOPAEDIC SURGERY

## 2021-04-27 PROCEDURE — 20611 DRAIN/INJ JOINT/BURSA W/US: CPT | Performed by: ORTHOPAEDIC SURGERY

## 2021-04-27 RX ORDER — TRIAMCINOLONE ACETONIDE 40 MG/ML
40 INJECTION, SUSPENSION INTRA-ARTICULAR; INTRAMUSCULAR ONCE
Status: COMPLETED | OUTPATIENT
Start: 2021-04-27 | End: 2021-04-27

## 2021-04-27 RX ADMIN — TRIAMCINOLONE ACETONIDE 40 MG: 40 INJECTION, SUSPENSION INTRA-ARTICULAR; INTRAMUSCULAR at 16:53

## 2021-07-07 NOTE — PROGRESS NOTES
Reason for Disposition  • [1] Age < 61 years AND [2] > 15 diarrhea stools in past 24 hours    Protocols used: DIARRHEA-A-AH    Spoke with patient - symptoms started last night    Diarrhea - at least 12 times    Vomited last night and this morning    Had abdominal pain below sternum pain 2-3/10; pain left after vomited    Had abdominal pressure all day    Had sulfurous belching all day    No abdominal pain now    Still voiding    Is drinking [employer] power aide -- has not eaten since 2 pm yesterday    Plan - ED d/t more comprehensive testing abilities - patient not sure if he will go -- urged him to go to ED if he stops voiding, diarrhea comes back, vomiting returns or abdominal pain returns -- patient will think about it    Encouraged patient hydrated, bland diet    Sent to PCP Staff as MONIQUE     PT DAILY TREATMENT NOTE 12    Patient Name: Digna Bence  Date:2017  : 1970  [x]  Patient  Verified  Payor: Irina Trevizo / Plan: Brionna Cook / Product Type: HMO /    In time:7:45  Out time:8:13  Total Treatment Time (min): 28  Visit #: 1 of 8    Treatment Area: Right knee pain [M25.561]  Left knee pain [M25.562]    SUBJECTIVE  Pain Level (0-10 scale): 7  Any medication changes, allergies to medications, adverse drug reactions, diagnosis change, or new procedure performed?: [x] No    [] Yes (see summary sheet for update)  Subjective functional status/changes:   [] No changes reported  Pt reports R anterolateral knee pain with standing/walking and squatting    OBJECTIVE    16 min [x]Eval                  []Re-Eval       12 min Therapeutic Exercise:  [] See flow sheet :   Rationale: increase ROM and increase strength to improve the patients ability to perform daily tasks and ADLs        With   [] TE   [] TA   [] neuro   [] other: Patient Education: [x] Review HEP    [] Progressed/Changed HEP based on:   [] positioning   [] body mechanics   [] transfers   [] heat/ice application    [] other:      Other Objective/Functional Measures: patellar crepitus R with distal TTP R vastus lat     Pain Level (0-10 scale) post treatment: 7    ASSESSMENT/Changes in Function: see POC    Patient will continue to benefit from skilled PT services to modify and progress therapeutic interventions, address functional mobility deficits, address ROM deficits, address strength deficits, analyze and address soft tissue restrictions, analyze and cue movement patterns and analyze and modify body mechanics/ergonomics to attain remaining goals. []  See Plan of Care  []  See progress note/recertification  []  See Discharge Summary         Progress towards goals / Updated goals:  Short Term Goals: To be accomplished in 2 weeks:  1. Pt will be I and compliant with HEP to promote self management of condition.   2. Pt will demonstrate pain free quad setting on R for improved knee mechanics with daily tasks. Long Term Goals: To be accomplished in 4 weeks:  1. Pt will demonstrate step down from 6\" step without pain or instability to improve ease of community ambulation. 2. Pt will demonstrate 5/5 HS and glute max strength for improved stability with work duties. 3. Pt will report ability to ambulate 10 stairs with only a little bit of difficulty to improve functional mobility.     PLAN  []  Upgrade activities as tolerated     [x]  Continue plan of care  []  Update interventions per flow sheet       []  Discharge due to:_  []  Other:_      Chioma Girard DPT, CMTPT 11/27/2017  8:26 AM    Future Appointments  Date Time Provider Yi Osborn   11/29/2017 7:30 AM [facility]   12/5/2017 7:30 AM Aguila Green PTA [facility]   12/8/2017 7:00 AM Ramone Tomlinson PTA [facility]   12/12/2017 7:30 AM Aguila Green PTA [facility]   12/13/2017 7:35 AM MD [facility] 71   12/15/2017 7:30 AM Ramone Tomlinson PTA [facility]   12/18/2017 7:30 AM [facility]   12/21/2017 7:30 AM [facility]

## 2021-11-11 ENCOUNTER — OFFICE VISIT (OUTPATIENT)
Dept: ORTHOPEDIC SURGERY | Age: 51
End: 2021-11-11
Payer: COMMERCIAL

## 2021-11-11 VITALS
WEIGHT: 223 LBS | HEART RATE: 72 BPM | OXYGEN SATURATION: 99 % | TEMPERATURE: 97.5 F | HEIGHT: 62 IN | BODY MASS INDEX: 41.04 KG/M2

## 2021-11-11 DIAGNOSIS — M19.011 GLENOHUMERAL ARTHRITIS, RIGHT: ICD-10-CM

## 2021-11-11 DIAGNOSIS — M25.511 ACUTE PAIN OF BOTH SHOULDERS: ICD-10-CM

## 2021-11-11 DIAGNOSIS — S46.012A TRAUMATIC TEAR OF LEFT ROTATOR CUFF, UNSPECIFIED TEAR EXTENT, INITIAL ENCOUNTER: Primary | ICD-10-CM

## 2021-11-11 DIAGNOSIS — M25.512 ACUTE PAIN OF BOTH SHOULDERS: ICD-10-CM

## 2021-11-11 DIAGNOSIS — E66.01 OBESITY, CLASS III, BMI 40-49.9 (MORBID OBESITY) (HCC): ICD-10-CM

## 2021-11-11 PROCEDURE — 73030 X-RAY EXAM OF SHOULDER: CPT | Performed by: ORTHOPAEDIC SURGERY

## 2021-11-11 PROCEDURE — 99214 OFFICE O/P EST MOD 30 MIN: CPT | Performed by: ORTHOPAEDIC SURGERY

## 2021-11-11 RX ORDER — MELOXICAM 15 MG/1
15 TABLET ORAL
Qty: 30 TABLET | Refills: 1 | Status: SHIPPED | OUTPATIENT
Start: 2021-11-11 | End: 2022-04-28 | Stop reason: SDUPTHER

## 2021-11-11 NOTE — LETTER
11/11/2021 10:24 AM    Ms. David [facility] [id number]      She will return to work on 11- with the following restrictions:        No lifting over 3 pounds with bilat upper extremity. She will be re-evaluated after her MRI.         Sincerely,      Levy Tate MD

## 2021-11-11 NOTE — PROGRESS NOTES
Makayla Cail  1970   Chief Complaint   Patient presents with    Shoulder Pain     both shoulders L>R        HISTORY OF PRESENT ILLNESS  Makayla Cali is a 54 y.o. female who presents today for evaluation of bilateral shoulder pain. L>R. Patient rates pain as 10/10 today. Pain has been present since 11/03/2021 after pulling some heavy trash bags while at work. She strained both of her shoulders. She notes posterior right shoulder pain. Has significant limited ROM with the left shoulder with certain movements. Has been seen previously for right glenohumeral arthritis. She is s/p Right shoulder arthroscopic glenoid resurfacing on 6/14/19. Patient denies any fever, chills, chest pain, shortness of breath or calf pain. The remainder of the review of systems is negative. There are no new illness or injuries to report since last seen in the office. There are no changes to medications, allergies, family or social history. PHYSICAL EXAM:   Visit Vitals  Pulse 72   Temp 97.5 °F (36.4 °C) (Temporal)   Ht 5' 2\" (1.575 m)   Wt 223 lb (101.2 kg)   LMP 10/25/2020   SpO2 99%   BMI 40.79 kg/m²     The patient is a well-developed, well-nourished female   in no acute distress. The patient is alert and oriented times three. The patient is alert and oriented times three. Mood and affect are normal.  LYMPHATIC: lymph nodes are not enlarged and are within normal limits  SKIN: normal in color and non tender to palpation. There are no bruises or abrasions noted. NEUROLOGICAL: Motor sensory exam is within normal limits. Reflexes are equal bilaterally.  There is normal sensation to pinprick and light touch  MUSCULOSKELETAL:  Examination Left shoulder Right shoulder   Skin Intact Intact   AC joint tenderness - -   Biceps tenderness - -   Forward flexion/Elevation  100   Active abduction  90   Glenohumeral abduction 90 45   External rotation ROM 45 0   Internal rotation ROM 30 30   Apprehension - -   Rachells Relocation - -   Jerk - -   Load and Shift - -   Obriens - -   Speeds - -   Impingement sign + +   Supraspinatus/Empty Can + -, 5/5   External Rotation Strength -, 5/5 -, 5/5   Lift Off/Belly Press -, 5/5 -, 5/5   Neurovascular Intact Intact       IMAGING: XR of left shoulder with 3 views obtained in the office dated 11/11/2021 was reviewed and read by Dr. Nell Archibald: Moderate degenerative changes in the glenohumeral joint with inferior spur in the glenoid      XR of right shoulder with 3 views obtained in the office dated 11/11/2021 was reviewed and read by Dr. Nell Archibald: End-stage degenerative arthritis with large inferior humeral head spur       XR of left shoulder with 3 views from [facility] Radiology dated 11/09/2020 was reviewed and read by Dr. Nell Archibald:   IMPRESSION:  Possible small joint effusion with no acute fracture. IMPRESSION:      ICD-10-CM ICD-9-CM    1. Traumatic tear of left rotator cuff, unspecified tear extent, initial encounter  S46.012A 840.4    2. Acute pain of both shoulders  M25.511 719.41 AMB POC XRAY, SHOULDER; COMPLETE, 2+    M25.512  AMB POC XRAY, SHOULDER; COMPLETE, 2+   3. Glenohumeral arthritis, right  M19.011 715.91         PLAN:   1. Pt presents today with b/l shoulder pain, L>R, and I am ordering an MRI of the left shoulder to r/o RCT. Will also prescribe Mobic. Was given work note stating she cannot lift anything above 3 pounds. Risk factors include: BMI>40   2. No ultrasound exam indicated today  3. No cortisone injection indicated today   4. No Physical/Occupational Therapy indicated today  5. Yes diagnostic test indicated today: MRI L SHOULDER  6. No durable medical equipment indicated today  7. No referral indicated today   8. Yes medications indicated today: MOBIC  9.  No Narcotic indicated today       RTC following MRI      Scribed by Paula Zheng) as dictated by Ailyn Cat MD    I, Dr. Ailyn Cat, confirm that all documentation is accurate.     Bushra Andersen M.D.   [facility]

## 2021-11-24 ENCOUNTER — HOSPITAL ENCOUNTER (OUTPATIENT)
Age: 51
Discharge: HOME | End: 2021-11-24
Attending: ORTHOPAEDIC SURGERY
Payer: COMMERCIAL

## 2021-11-24 DIAGNOSIS — S46.012A TRAUMATIC TEAR OF LEFT ROTATOR CUFF, UNSPECIFIED TEAR EXTENT, INITIAL ENCOUNTER: ICD-10-CM

## 2021-11-24 PROCEDURE — 73221 MRI JOINT UPR EXTREM W/O DYE: CPT

## 2021-12-07 ENCOUNTER — OFFICE VISIT (OUTPATIENT)
Dept: ORTHOPEDIC SURGERY | Age: 51
End: 2021-12-07
Payer: COMMERCIAL

## 2021-12-07 VITALS
OXYGEN SATURATION: 99 % | WEIGHT: 223 LBS | BODY MASS INDEX: 41.04 KG/M2 | HEART RATE: 88 BPM | HEIGHT: 62 IN | TEMPERATURE: 97.1 F

## 2021-12-07 DIAGNOSIS — M17.0 PRIMARY OSTEOARTHRITIS OF BOTH KNEES: ICD-10-CM

## 2021-12-07 DIAGNOSIS — S46.012A TRAUMATIC TEAR OF LEFT ROTATOR CUFF, UNSPECIFIED TEAR EXTENT, INITIAL ENCOUNTER: Primary | ICD-10-CM

## 2021-12-07 PROCEDURE — 20611 DRAIN/INJ JOINT/BURSA W/US: CPT | Performed by: ORTHOPAEDIC SURGERY

## 2021-12-07 PROCEDURE — 99214 OFFICE O/P EST MOD 30 MIN: CPT | Performed by: ORTHOPAEDIC SURGERY

## 2021-12-07 RX ORDER — TRIAMCINOLONE ACETONIDE 40 MG/ML
80 INJECTION, SUSPENSION INTRA-ARTICULAR; INTRAMUSCULAR ONCE
Status: COMPLETED | OUTPATIENT
Start: 2021-12-07 | End: 2021-12-07

## 2021-12-07 RX ADMIN — TRIAMCINOLONE ACETONIDE 80 MG: 40 INJECTION, SUSPENSION INTRA-ARTICULAR; INTRAMUSCULAR at 11:58

## 2021-12-07 NOTE — PROGRESS NOTES
Srinivas Sargent  1970   Chief Complaint   Patient presents with    Shoulder Pain     left shoulder, mri results        HISTORY OF PRESENT ILLNESS  Srinivas Sargent is a 54 y.o. female who presents today for reevaluation of b/l shoulder pain and MRI of the left shoulder. Patient rates pain as 4/10 today. She notes Motrin has been helping the pain. Pain has been present since 11/03/2021 after pulling some heavy trash bags while at work. Pt also has c/o of b/l knee pain that has been present for a while. She notes pain with prolonged standing. She strained both of her shoulders. She notes posterior right shoulder pain. She is s/p Right shoulder arthroscopic glenoid resurfacing on 6/14/19. Patient denies any fever, chills, chest pain, shortness of breath or calf pain. The remainder of the review of systems is negative. There are no new illness or injuries to report since last seen in the office. There are no changes to medications, allergies, family or social history. PHYSICAL EXAM:   Visit Vitals  Pulse 88   Temp 97.1 °F (36.2 °C) (Temporal)   Ht 5' 2\" (1.575 m)   Wt 223 lb (101.2 kg)   LMP 10/25/2020   SpO2 99%   BMI 40.79 kg/m²     The patient is a well-developed, well-nourished female   in no acute distress. The patient is alert and oriented times three. The patient is alert and oriented times three. Mood and affect are normal.  LYMPHATIC: lymph nodes are not enlarged and are within normal limits  SKIN: normal in color and non tender to palpation. There are no bruises or abrasions noted. NEUROLOGICAL: Motor sensory exam is within normal limits. Reflexes are equal bilaterally.  There is normal sensation to pinprick and light touch  MUSCULOSKELETAL:  Examination Left shoulder Right shoulder   Skin Intact Intact   AC joint tenderness - -   Biceps tenderness - -   Forward flexion/Elevation  100   Active abduction  90   Glenohumeral abduction 90 45   External rotation ROM 45 0 Internal rotation ROM 30 30   Apprehension - -   Rachells Relocation - -   Jerk - -   Load and Shift - -   Obriens - -   Speeds - -   Impingement sign + +   Supraspinatus/Empty Can + -, 5/5   External Rotation Strength -, 5/5 -, 5/5   Lift Off/Belly Press -, 5/5 -, 5/5   Neurovascular Intact Intact     Examination Left knee Right knee   Skin Intact Intact   Range of motion 0-130 0-130   Effusion + +   Medial joint line tenderness + +   Lateral joint line tenderness + +   Tenderness Pes Bursa - -   Tenderness insertion MCL - -   Tenderness insertion LCL - -   Razias - -   Patella crepitus + +   Patella grind - -   Lachman - -   Pivot shift - -   Anterior drawer - -   Posterior drawer - -   Varus stress - -   Valgus stress - -   Neurovascular Intact Intact   Calf Swelling and Tenderness to Palpation - -   Re's Test - -   Hamstring Cord Tightness - -     PROCEDURE: Bilateral knee Injection with Ultrasound Guidance  Indication:Bilateral knee pain/swelling    After sterile prep, 4 cc of Xylocaine and 1 cc of Kenalog were injected into the bilateral  knee. Ultrasound images captured using [employer] Ultrasound machine and scanned into patient's chart.        [facility] - [facility]  OFFICE PROCEDURE PROGRESS NOTE        Chart reviewed for the following:  Fredna Cooks, M.D, have reviewed the History, Physical and updated the Allergic reactions for [address] performed immediately prior to start of procedure:  Fredna Cooks, M.D, have performed the following reviews on Hai Culver prior to the start of the procedure:            * Patient was identified by name and date of birth   * Agreement on procedure being performed was verified  * Risks and Benefits explained to the patient  * Procedure site verified and marked as necessary  * Patient was positioned for comfort  * Consent was signed and verified     Time: 11:49 AM     Date of procedure: 12/30/2021    Procedure performed by:  Bushra Andersen M.D    Provider assisted by: (see medication administration)    How tolerated by patient: tolerated the procedure well with no complications    Comments: none      IMAGING: XR of left shoulder with 3 views obtained in the office dated 11/11/2021 was reviewed and read by Dr. Nicole High: Moderate degenerative changes in the glenohumeral joint with inferior spur in the glenoid      XR of right shoulder with 3 views obtained in the office dated 11/11/2021 was reviewed and read by Dr. Nicole High: End-stage degenerative arthritis with large inferior humeral head spur       XR of left shoulder with 3 views from hospitals Radiology dated 11/09/2020 was reviewed and read by Dr. Nicole High:   IMPRESSION:  Possible small joint effusion with no acute fracture. IMPRESSION:      ICD-10-CM ICD-9-CM    1. Traumatic tear of left rotator cuff, unspecified tear extent, initial encounter  S46.012A 840.4    2. Primary osteoarthritis of both knees  M17.0 715.16 ARTHROCENTESIS ASPIR&/INJ MAJOR JT/BURSA W/US      triamcinolone acetonide (KENALOG-40) 40 mg/mL injection 80 mg        PLAN:   1. Pt presents today with a MRI documented partial RCT in the left shoulder. I offered a cortisone injection today in the glenohumeral joint. She states the motrin has been helping and would like to wait for now. Pt also has pain with b/l knee due to degenerative arthritis with joint space narrowing. I am hopeful a b/l knee cortisone injection will provide relief. Return in 3 weeks if pain continues    Risk factors include: BMI>40   2. No ultrasound exam indicated today  3. Yes cortisone injection indicated today B/L KNEE US   4. No Physical/Occupational Therapy indicated today  5. No diagnostic test indicated today:   6. No durable medical equipment indicated today  7. No referral indicated today   8. No medications indicated today:   9.  No Narcotic indicated today       RTC 3 weeks if pain continues       Scribed by Natividad Curry [facility]) as dictated by MD VIOLETTE Daniels, Dr. Gracy Yoder, confirm that all documentation is accurate.     Gracy Yoder M.D.   [facility]

## 2022-03-09 ENCOUNTER — OFFICE VISIT (OUTPATIENT)
Dept: ORTHOPEDIC SURGERY | Age: 52
End: 2022-03-09
Payer: COMMERCIAL

## 2022-03-09 VITALS — TEMPERATURE: 97.6 F | HEIGHT: 62 IN | BODY MASS INDEX: 41.04 KG/M2 | WEIGHT: 223 LBS

## 2022-03-09 DIAGNOSIS — M70.61 TROCHANTERIC BURSITIS OF RIGHT HIP: ICD-10-CM

## 2022-03-09 DIAGNOSIS — M25.551 RIGHT HIP PAIN: Primary | ICD-10-CM

## 2022-03-09 DIAGNOSIS — E66.01 OBESITY, CLASS III, BMI 40-49.9 (MORBID OBESITY) (HCC): ICD-10-CM

## 2022-03-09 PROCEDURE — 99214 OFFICE O/P EST MOD 30 MIN: CPT | Performed by: PHYSICIAN ASSISTANT

## 2022-03-09 PROCEDURE — 20611 DRAIN/INJ JOINT/BURSA W/US: CPT | Performed by: PHYSICIAN ASSISTANT

## 2022-03-09 PROCEDURE — 72170 X-RAY EXAM OF PELVIS: CPT | Performed by: PHYSICIAN ASSISTANT

## 2022-03-09 RX ORDER — TRIAMCINOLONE ACETONIDE 40 MG/ML
40 INJECTION, SUSPENSION INTRA-ARTICULAR; INTRAMUSCULAR ONCE
Status: COMPLETED | OUTPATIENT
Start: 2022-03-09 | End: 2022-03-09

## 2022-03-09 RX ORDER — AMMONIUM LACTATE 12 G/100G
LOTION TOPICAL
Qty: 225 G | Refills: 0 | Status: SHIPPED | OUTPATIENT
Start: 2022-03-09

## 2022-03-09 RX ADMIN — TRIAMCINOLONE ACETONIDE 40 MG: 40 INJECTION, SUSPENSION INTRA-ARTICULAR; INTRAMUSCULAR at 16:02

## 2022-03-09 NOTE — PROGRESS NOTES
Elzbieta Stanton  1970   Chief Complaint   Patient presents with    Hip Pain     RIGHT        HISTORY OF PRESENT ILLNESS  Elzbieta Stanton is a 54 y.o. female who presents today for reevaluation of right hip pain. Patient states this has been worsening over the last few weeks. She has a sharp pain that radiates down the side of her leg. It hurts when she lays on it at night. Pain is a 3 out of 10. She denies any numbness or tingling or lower back pain. Patient denies any fever, chills, chest pain, shortness of breath or calf pain. The remainder of the review of systems is negative. There are no new illness or injuries to report since last seen in the office. No changes in medications, allergies, social or family history. PHYSICAL EXAM:   Visit Vitals  Temp 97.6 °F (36.4 °C)   Ht 5' 2\" (1.575 m)   Wt 223 lb (101.2 kg)   LMP 10/25/2020   BMI 40.79 kg/m²     The patient is a well-developed, well-nourished female   in no acute distress. The patient is alert and oriented times three. The patient is alert and oriented times three. Mood and affect are normal.  LYMPHATIC: lymph nodes are not enlarged and are within normal limits  SKIN: normal in color and non tender to palpation. There are no bruises or abrasions noted. NEUROLOGICAL: Motor sensory exam is within normal limits. Reflexes are equal bilaterally. There is normal sensation to pinprick and light touch  MUSCULOSKELETAL:  Examination Right hip   Skin Intact   Flexion    Extension ROM 20   External Rotation ROM 45   Internal Rotation ROM 45   Abduction ROM 45   Adduction ROM 30   FADDIR -   ANNA -   Log roll test -   Trochanteric tenderness +   Arya test +   Neurovascular Intact          PROCEDURE: Right hip Injection with Ultrasound Guidance    Indication:Right Hip pain/swelling    After sterile prep, 3 cc of Xylocaine and 1 cc of Kenalog were injected into the right Hip.  Intra-bursal Ultrasound images captured using [employer] Ultrasound machine using a frequency of 10 MHz with a linear transducer and scanned into patient's chart. [facility] - [facility]  OFFICE PROCEDURE PROGRESS NOTE        Chart reviewed for the following:  Quentin OAKES PA, have reviewed the History, Physical and updated the Allergic reactions for [address] performed immediately prior to start of procedure:  Quentin OAKES PA-C, have performed the following reviews on [place] [id number] prior to the start of the procedure:            * Patient was identified by name and date of birth   * Agreement on procedure being performed was verified  * Risks and Benefits explained to the patient  * Procedure site verified and marked as necessary  * Patient was positioned for comfort  * Consent was signed and verified     Time: 4:05 PM    Date of procedure: 3/9/2022    Procedure performed by:  CRISTELA Wilson    Provider assisted by: (see medication administration)    How tolerated by patient: tolerated the procedure well with no complications    Comments: none      IMAGING: AP pelvis  xray  taken in the office on 3/9/2022 read and reviewed by myself show no acute abnormality  IMPRESSION:      ICD-10-CM ICD-9-CM    1. Right hip pain  M25.551 719.45 POC XRAY, PELVIS; 1 OR 2 VIEWS      ammonium lactate (LAC-HYDRIN) 12 % lotion      triamcinolone acetonide (KENALOG-40) 40 mg/mL injection 40 mg      ARTHROCENTESIS ASPIR&/INJ MAJOR JT/BURSA W/US   2. Trochanteric bursitis of right hip  M70.61 726.5 ammonium lactate (LAC-HYDRIN) 12 % lotion      triamcinolone acetonide (KENALOG-40) 40 mg/mL injection 40 mg      ARTHROCENTESIS ASPIR&/INJ MAJOR JT/BURSA W/US   3. Obesity, Class III, BMI 40-49.9 (morbid obesity) [facility]  E66.01 278.01         PLAN:   1. Patient with acute right trochanteric bursitis. We will inject with cortisone today.   Physician directed exercise program given today  Risk factors include: BMI greater than 40  2. Yes cortisone injection indicated today   3. Yes Physical/Occupational Therapy indicated today  4. No diagnostic test indicated today:   5. No durable medical equipment indicated today  6. No referral indicated today   7. Yes medications indicated today: ammonium lactate lotion rx;d  8.  No Narcotic indicated today     RTC 3 weeks if pain persists       MARILEE Silverio [facility] [address] [facility]

## 2022-03-18 PROBLEM — N94.6 DYSMENORRHEA: Status: ACTIVE | Noted: 2018-05-09

## 2022-03-18 PROBLEM — N85.2 HYPERTROPHY OF UTERUS: Status: ACTIVE | Noted: 2018-05-09

## 2022-03-18 PROBLEM — E66.01 OBESITY, MORBID (HCC): Status: ACTIVE | Noted: 2017-12-07

## 2022-03-19 PROBLEM — N83.209 CYST OF OVARY: Status: ACTIVE | Noted: 2018-05-09

## 2022-03-19 PROBLEM — R53.83 FATIGUE: Status: ACTIVE | Noted: 2018-05-09

## 2022-03-19 PROBLEM — D25.9 UTERINE LEIOMYOMA: Status: ACTIVE | Noted: 2018-05-09

## 2022-04-28 DIAGNOSIS — M19.011 GLENOHUMERAL ARTHRITIS, RIGHT: ICD-10-CM

## 2022-04-28 DIAGNOSIS — S46.012A TRAUMATIC TEAR OF LEFT ROTATOR CUFF, UNSPECIFIED TEAR EXTENT, INITIAL ENCOUNTER: ICD-10-CM

## 2022-04-28 RX ORDER — MELOXICAM 15 MG/1
15 TABLET ORAL
Qty: 30 TABLET | Refills: 1 | Status: SHIPPED | OUTPATIENT
Start: 2022-04-28

## 2022-04-28 NOTE — TELEPHONE ENCOUNTER
Last Visit: 12/7/21 with MD Ang Mix  Next Appointment: none  Previous Refill Encounter(s): 11/11/21 #30 with 1 refill    Requested Prescriptions     Pending Prescriptions Disp Refills    meloxicam (Mobic) 15 mg tablet 30 Tablet 1     Sig: Take 1 Tablet by mouth daily (with breakfast).

## 2022-07-12 NOTE — PROGRESS NOTES
AMBULATORY PROGRESS NOTE      Patient: Yrn Gordon             MRN: [id number]     SSN: [id number] Body mass index is 40.79 kg/m². YOB: 1970     AGE: 54 y.o. EX: female    PCP: Osvaldo Street MD       IMPRESSION //  DIAGNOSIS AND TREATMENT PLAN      Yrn Gordon has a diagnosis of:      DIAGNOSES    1. Bursitis of both feet    2. Bilateral foot pain        Orders Placed This Encounter    AMB POC XRAY, FOOT; COMPLETE, 3+ VIEW     ASK ALL FEMALE PATIENTS IF PREGNANT     Order Specific Question:   Reason for Exam     Answer:   PAIN     Order Specific Question:   Is Patient Pregnant? Answer:   Unknown    AMB POC XRAY, FOOT; COMPLETE, 3+ VIEW     ASK ALL FEMALE PATIENTS IF PREGNANT     Order Specific Question:   Reason for Exam     Answer:   PAIN     Order Specific Question:   Is Patient Pregnant? Answer:   Unknown          PLAN:    1. I will obtain bilateral foot 3V XR and review with the patient. 2. I advise the patient to inspect her feet during episodes of numbness and cold sensations to look for skin color changes (r/o Raynaud's syndrome). 3. AVS: New Balance 982, 850 series - [employer] / [place], Naturalizers /Voltaren gel - apply 2 g to each great toe 3 x daily as needed   4. I anticipate referring her to Neurology for EMG and/or bloodwork if her foot numbness progresses in frequency. RTO 6 weeks    Patient Instructions   New Balance 982, 850 series - [employer]  [place], [employer]    Voltaren gel - apply 2 g to each great toe 3 x daily as needed           Please follow up with your PCP for any health maintenance as recommended         Yrn Gordon  expresses understanding of the diagnosis, treatment plan, and all of their proposed questions were answered to their satisfaction. Patient education has been provided re the diagnoses. HPI //  Echo Louis IS A 54 y.o. female who is a/an  new patient, presenting to my outpatient office for evaluation of  the following chief complaint(s):     Chief Complaint   Patient presents with    Foot Exam     bilateral       Armida Eubanks presents today with bilateral foot pain. She complains of pain (aching and occasionally sharp) in the bilateral medial great toes, L>R, x 3 months with prolonged shoe wear. She also notes occasional circumferential numbness and cold sensations in her feet in cool temperatures, with rest or with ambulation. She denies any history of diabetes mellitus or pinched spinal nerves. She also notes that she has Voltaren gel at home, which she uses for knee pain. Of note, Armida Eubanks is employed as a . Visit Vitals  Temp 97.5 °F (36.4 °C) (Temporal)   Ht 5' 2\" (1.575 m)   Wt 223 lb (101.2 kg)   LMP 10/25/2020   BMI 40.79 kg/m²       Appearance: Alert, well appearing and pleasant patient who is in no distress, oriented to person, place/time, and who follows commands. Normal dress/motor activity/thought processes/memory. This patient is accompanied in the examination room by her  self. Patient arrives to office via: without assistive device. Psychiatric:  Normal Affect/mood. Judgement, behavior, and conduct are appropriate. Speech normal in context and clarity, memory intact grossly, no involuntary movements - tremors. H EENT (2): Head normocephalic & atraumatic. Eye: pupils are round// EOM are intact // Neck: ROM WNL  // Hearings Intact   Respiratory: Breathing non labored     ANKLE/FOOT bilateral     Gait: normal  Tenderness: mild over the medial great toes  Cutaneous: mild redness to the medial eminences of the bilateral great toes, symmetrically  Joint Motion: Normal ankle, hindfoot, and forefoot motion   Joint / Tendon Stability:  No Ankle or Subtalar instability or joint laxity.                        No peroneal sublux ability or dislocation  Alignment: neutral ankle and hindfoot. Mild bilateral bunions with slight pronation of the bilateral great toes and slight abutment of the 1st-2nd toes bilaterally. Neuro Motor/Sensory: NL/NL  Vascular: NL foot/ankle pulses,   Lymphatics: No extremity lymphedema, No calf swelling, no tenderness to calf muscles. CHART REVIEW     Francine Toth has been experiencing pain and discomfort confirmed as outlined in the pain assessment outlined below.  was reviewed by Rdaha Bynum MD on 7/13/2022. Pain Assessment  3/9/2022   Location of Pain Hip   Location Modifiers Right   Severity of Pain 3   Quality of Pain Sharp   Quality of Pain Comment -   Duration of Pain -   Frequency of Pain Constant   Frequency of Pain Comment -   Date Pain First Started -   Date Pain First Started Comment -   Aggravating Factors Walking   Aggravating Factors Comment -   Limiting Behavior Yes   Relieving Factors NSAID   Relieving Factors Comment -   Result of Injury No   Work-Related Injury -   How long out of work? -   Type of Injury -   Type of Injury Comment -        Francine Toth  has a past medical history of ADD (attention deficit disorder), Arthritis, Depression, Left foot pain, Plantar fasciitis, left, and Right shoulder pain. Patients is employed at:          has a past medical history of ADD (attention deficit disorder), Arthritis, Depression, Left foot pain, Plantar fasciitis, left, and Right shoulder pain. has a past surgical history that includes hx tubal ligation (2009) and hx knee arthroscopy (Left, 6/1/12020). family history includes Hypertension in her father and mother; OSTEOARTHRITIS in an other family member. Current Outpatient Medications   Medication Sig    meloxicam (Mobic) 15 mg tablet Take 1 Tablet by mouth daily (with breakfast).     ammonium lactate (LAC-HYDRIN) 12 % lotion rub in to affected area well twice a day    ibuprofen (MOTRIN) 800 mg tablet Take 1 Tab by mouth every eight (8) hours as needed for Pain.  meloxicam (Mobic) 7.5 mg tablet Take 1 Tab by mouth daily as needed for Pain. Take with food.  acetaminophen (Tylenol Extra Strength) 500 mg tablet Take 500 mg by mouth every six (6) hours as needed for Pain.  spironolactone (Aldactone) 100 mg tablet Take 100 mg by mouth daily.  Lisdexamfetamine (VYVANSE) 40 mg capsule Take by mouth daily. No current facility-administered medications for this visit. Allergies   Allergen Reactions    Codeine Nausea and Vomiting     Patient states she gets jittery, has upset stomach      Social History     Occupational History    Occupation: shelter     Employer: [employer]   Tobacco Use    Smoking status: Never Smoker    Smokeless tobacco: Never Used   Substance and Sexual Activity    Alcohol use: No    Drug use: No    Sexual activity: Yes     Partners: Male       reports that she has never smoked. She has never used smokeless tobacco. She reports that she does not drink alcohol and does not use drugs. DIAGNOSTIC LAB DATA      Lab Results   Component Value Date/Time    Hemoglobin A1c 5.4 12/23/2016 09:56 AM    Hemoglobin A1c 4.9 07/21/2014 01:47 PM    Hemoglobin A1c 5.4 12/19/2013 09:04 AM    //   Lab Results   Component Value Date/Time    Glucose 84 03/12/2021 09:43 PM    Glucose POC 94 12/19/2013 09:00 AM        No results found for: [id number], [id number]      Lab Results   Component Value Date/Time    VITAMIN D, 25-HYDROXY 17.7 (L) 07/21/2014 01:47 PM        Drug Screen Most Recent Result Date    No resulted procedures found. REVIEW OF SYSTEMS : 7/13/2022  ALL BELOW ARE Negative except : SEE HPI     All other systems reviewed and are negative. 12 point review of systems otherwise negative unless noted in HPI.     RADIOGRAPHS// IMAGING//DIAGNOSTIC DATA     Orders Placed This Encounter    AMB POC XRAY, FOOT; COMPLETE, 3+ VIEW    AMB POC XRAY, FOOT; COMPLETE, 3+ VIEW        No notes on file     Right foot nonweightbearing x-rays, 3 views, AP/LAT/OBL completed 7/13/2022 AT [facility]    X-rays reveal   no acute fracture//there are no dislocation or subluxation noted. Overall alignment is shows mild bunion deformity acceptable. Soft tissue swelling is mild the medial eminence noted. No osteolytic or osteoblastic lesions noted. Mineralization suggests no osteopenia. Degenerative changes are not noted. Calcified vessels are not present. X-rays LEFT Foot nonweightbearing x-rays, 3 views, AP/LAT/OBL completed 7/13/2022 AT [facility]    X-rays reveal   no acute fracture//there are no dislocation or subluxation noted. Overall alignment is shows mild bunion deformity acceptable. Soft tissue swelling is mild the medial eminence noted. No osteolytic or osteoblastic lesions noted. Mineralization suggests no osteopenia. Degenerative changes are not noted. Calcified vessels are not present. I have personally reviewed the images of the above study. The interpretation of this study is my professional opinion           I have spent 20 minutes reviewing the previous notes, reviewing diagnostic studies [Advanced  Imaging, Diagnostic test results (x-rays)] and had a direct face to face with the patient discussing the diagnosis and importance of compliance with the treatment and plan. There is  discussion for the potential for surgery, answering all questions, as well as documenting patient care coordination for this individual on the day of the visit. Disclaimer:     Sections of this note are dictated using utilizing voice recognition software, which may have resulted in some phonetic based errors in grammar and contents. Even though attempts were made to correct all the mistakes, some may have been missed, and remained in the body of the document. If questions arise, please contact our department. An electronic signature was used to authenticate this note.     Fabiana Cunningham may have a reminder for a \"due or due soon\" health maintenance. I have asked that she contact her primary care provider for follow-up on this health maintenance. Patient Instructions   New Balance 982, 850 series - [employer]  [place], [employer]    Voltaren gel - apply 2 g to each great toe 3 x daily as needed           Please follow up with your PCP for any health maintenance as recommended.                 Written by Corie Guzman, as dictated by Jersey Gaines MD.   7/13/2022  1:18 PM

## 2022-07-13 ENCOUNTER — OFFICE VISIT (OUTPATIENT)
Dept: ORTHOPEDIC SURGERY | Age: 52
End: 2022-07-13
Payer: COMMERCIAL

## 2022-07-13 VITALS — TEMPERATURE: 97.5 F | BODY MASS INDEX: 41.04 KG/M2 | WEIGHT: 223 LBS | HEIGHT: 62 IN

## 2022-07-13 DIAGNOSIS — M77.51 BURSITIS OF BOTH FEET: Primary | ICD-10-CM

## 2022-07-13 DIAGNOSIS — M79.671 BILATERAL FOOT PAIN: ICD-10-CM

## 2022-07-13 DIAGNOSIS — M77.52 BURSITIS OF BOTH FEET: Primary | ICD-10-CM

## 2022-07-13 DIAGNOSIS — M79.672 BILATERAL FOOT PAIN: ICD-10-CM

## 2022-07-13 PROCEDURE — 73630 X-RAY EXAM OF FOOT: CPT | Performed by: ORTHOPAEDIC SURGERY

## 2022-07-13 PROCEDURE — 99213 OFFICE O/P EST LOW 20 MIN: CPT | Performed by: ORTHOPAEDIC SURGERY

## 2022-07-13 NOTE — PATIENT INSTRUCTIONS
New Balance 982, 500 series - [employer]  [place], [employer]    Voltaren gel - apply 2 g to each great toe 3 x daily as needed

## 2022-07-25 DIAGNOSIS — M19.011 GLENOHUMERAL ARTHRITIS, RIGHT: ICD-10-CM

## 2022-07-25 RX ORDER — IBUPROFEN 800 MG/1
800 TABLET ORAL
Qty: 60 TABLET | Refills: 2 | Status: SHIPPED | OUTPATIENT
Start: 2022-07-25

## 2022-07-25 NOTE — TELEPHONE ENCOUNTER
Pt left a voice message requesting a refill. [phone]    Last visit 07/13/2022 MD Oquendo   Next appointment 08/12/2022 [facility]   Previous refill encounter(s)   04/12/2021 Motrin #60 with 2 refills. For Pharmacy Admin Tracking Only    Intervention Detail: New Rx: 1, reason: Patient Preference  Time Spent (min): 5      Requested Prescriptions     Pending Prescriptions Disp Refills    ibuprofen (MOTRIN) 800 mg tablet 60 Tablet 2     Sig: Take 1 Tablet by mouth every eight (8) hours as needed for Pain.

## 2022-08-12 ENCOUNTER — OFFICE VISIT (OUTPATIENT)
Dept: ORTHOPEDIC SURGERY | Age: 52
End: 2022-08-12
Payer: COMMERCIAL

## 2022-08-12 VITALS
HEIGHT: 62 IN | TEMPERATURE: 98.6 F | WEIGHT: 226 LBS | BODY MASS INDEX: 41.59 KG/M2 | OXYGEN SATURATION: 100 % | HEART RATE: 66 BPM

## 2022-08-12 DIAGNOSIS — M17.0 PRIMARY OSTEOARTHRITIS OF BOTH KNEES: Primary | ICD-10-CM

## 2022-08-12 PROCEDURE — 73560 X-RAY EXAM OF KNEE 1 OR 2: CPT | Performed by: PHYSICIAN ASSISTANT

## 2022-08-12 PROCEDURE — 20611 DRAIN/INJ JOINT/BURSA W/US: CPT | Performed by: PHYSICIAN ASSISTANT

## 2022-08-12 RX ORDER — MELOXICAM 15 MG/1
15 TABLET ORAL
Qty: 90 TABLET | Refills: 2 | Status: SHIPPED | OUTPATIENT
Start: 2022-08-12

## 2022-08-12 NOTE — PROGRESS NOTES
Harry Ayala  1970   No chief complaint on file. HISTORY OF PRESENT ILLNESS  Harry Ayala is a 54 y.o. female who presents today for evaluation of bilateral knee pain. Pain is a 6/10. Was referred to Dr. Elvin Carnes for TKA but she did not make an appointment because she was doing much better after her cortisone injections. Up until the last couple weeks she was doing well and was able to walk daily without any issues. Now the pain is back left worse than the right. It is affecting her on her activities of daily living. Finished a series of Euflexxa injections for the left knee on 12/09/2020. Euflexxa injections did not help at all. Pt is s/p left knee arthroscopic partial medial and lateral  menisectomy with grade III chondromalacia on 6/1/2020. Had previous left knee cortisone injection on 9/22/2020 which did help some. Patient denies any fever, chills, chest pain, shortness of breath or calf pain. The remainder of the review of systems is negative. There are no new illness or injuries to report since last seen in the office. There are no changes to medications, allergies, family or social history. Pain Assessment  3/9/2022   Location of Pain Hip   Location Modifiers Right   Severity of Pain 3   Quality of Pain Sharp   Quality of Pain Comment -   Duration of Pain -   Frequency of Pain Constant   Frequency of Pain Comment -   Date Pain First Started -   Date Pain First Started Comment -   Aggravating Factors Walking   Aggravating Factors Comment -   Limiting Behavior Yes   Relieving Factors NSAID   Relieving Factors Comment -   Result of Injury No   Work-Related Injury -   How long out of work? -   Type of Injury -   Type of Injury Comment -       PHYSICAL EXAM:   Visit Vitals  LMP 10/25/2020     The patient is a well-developed, well-nourished female   in no acute distress. The patient is alert and oriented times three. The patient is alert and oriented times three.  Mood and affect are normal.  LYMPHATIC: lymph nodes are not enlarged and are within normal limits  SKIN: normal in color and non tender to palpation. There are no bruises or abrasions noted. NEUROLOGICAL: Motor sensory exam is within normal limits. Reflexes are equal bilaterally. There is normal sensation to pinprick and light touch  MUSCULOSKELETAL:   Examination Left knee Right knee   Skin Intact Intact   Range of motion 5-115 0-120   Effusion + +   Medial joint line tenderness - -   Lateral joint line tenderness - -   Tenderness Pes Bursa - -   Tenderness insertion MCL - -   Tenderness insertion LCL - -   Razias - -   Patella crepitus - -   Patella grind - -   Lachman - -   Pivot shift - -   Anterior drawer - -   Posterior drawer - -   Varus stress - -   Valgus stress - -   Neurovascular Intact Intact   Calf Swelling and Tenderness to Palpation - -   Re's Test - -   Hamstring Cord Tightness - -           PROCEDURE:  Bilateral knee Injection with Ultrasound Guidance    Indication:Bilateral Knee pain/swelling    After sterile prep, 4 cc of Xylocaine and 1 cc of Kenalog 40mg/kg were injected into the bilateral  Knee. Intra-articular Ultrasound images captured using [address] [place] [address] Ultrasound machine using a frequency of 10 MHz with a linear transducer and scanned into patient's chart.      [facility]  OFFICE PROCEDURE PROGRESS NOTE        Chart reviewed for the following:  Sanju OAKES PA, have reviewed the History, Physical and updated the Allergic reactions for [address] performed immediately prior to start of procedure:  Sanju OAKES PA-C, have performed the following reviews on Kassy Coffman prior to the start of the procedure:            * Patient was identified by name and date of birth   * Agreement on procedure being performed was verified  * Risks and Benefits explained to the patient  * Procedure site verified and marked as necessary  * Patient was positioned for comfort  * Consent was signed and verified     Time: 2:52 PM    Date of procedure: 2022    Procedure performed by:  CRISTELA España    Provider assisted by: (see medication administration)    How tolerated by patient: tolerated the procedure well with no complications    Comments: none        IMAGIN view x-rays standing of bilateral knees taken in the office on 2022 read and reviewed by myself reveal significant degenerative changes with joint space narrowing in the medial compartment and patellofemoral joint. XR of left hip with 2 views from [facility] dated 3/12/2021 was reviewed and read by Dr. Schuler Call: 1. Moderate left hip osteoarthritis. MRI of left knee dated 2020 was reviewed and read by Dr. Schuler Call:   IMPRESSION:   1. Interval partial medial meniscectomy as discussed above without definite evidence of repeat tear. MR arthrogram could be considered to increase exam sensitivity/specificity for recurrent tear. 2. Mucoid degeneration of the ACL with reactive edema in the medial aspect of the lateral femoral condyle. 3. Tricompartmental osteoarthritis, which is advanced in the medial tibiofemoral compartment, where there is progression compared to prior MRI. Increased medial tibiofemoral subchondral edema without evidence of stress fracture. -Multifocal grade 3/4 chondrosis in the patellofemoral and lateral tibiofemoral compartments without significant interval change from prior MRI. 4. 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 Suspected 2 mm intra-articular body along the posterolateral aspect of Hoffa's fat pad (series 3 image 7). 5. Partially imaged mild nonspecific edema in the medial head of the gastrocnemius, possibly reflecting delayed onset muscle soreness or grade 1 muscle strain. IMPRESSION:      ICD-10-CM ICD-9-CM    1. Primary osteoarthritis of both knees  M17.0 715.16            PLAN:   1. Patient with acute exacerbation of degenerative arthritis in bilateral knees. Discussed treatment options with the patient today. We will repeat cortisone injections under ultrasound guidance today. A physician directed exercise program was given to the patient. She will continue with all activities as tolerated. Risk factors include: BMI>40   2. No ultrasound exam indicated today  3. No  cortisone injection indicated today   4. Yes Physical/Occupational Therapy indicated today  5. No diagnostic test indicated today:   6. No durable medical equipment indicated today  7. No  referral indicated today  8. No medications indicated today:   9.  No Narcotic indicated today      RTC PRN    [facility]

## 2022-09-07 RX ORDER — TRIAMCINOLONE ACETONIDE 40 MG/ML
40 INJECTION, SUSPENSION INTRA-ARTICULAR; INTRAMUSCULAR ONCE
Status: COMPLETED | OUTPATIENT
Start: 2022-09-07 | End: 2022-09-08

## 2022-09-08 PROCEDURE — 96372 THER/PROPH/DIAG INJ SC/IM: CPT | Performed by: PHYSICIAN ASSISTANT

## 2022-09-08 RX ADMIN — TRIAMCINOLONE ACETONIDE 40 MG: 40 INJECTION, SUSPENSION INTRA-ARTICULAR; INTRAMUSCULAR at 12:23

## 2022-10-20 ENCOUNTER — OFFICE VISIT (OUTPATIENT)
Dept: ORTHOPEDIC SURGERY | Age: 52
End: 2022-10-20
Payer: COMMERCIAL

## 2022-10-20 ENCOUNTER — DOCUMENTATION ONLY (OUTPATIENT)
Dept: ORTHOPEDIC SURGERY | Age: 52
End: 2022-10-20

## 2022-10-20 VITALS — HEART RATE: 94 BPM | OXYGEN SATURATION: 100 % | WEIGHT: 231 LBS | BODY MASS INDEX: 42.25 KG/M2

## 2022-10-20 DIAGNOSIS — M17.12 PRIMARY OSTEOARTHRITIS OF LEFT KNEE: Primary | ICD-10-CM

## 2022-10-20 PROCEDURE — 99213 OFFICE O/P EST LOW 20 MIN: CPT | Performed by: ORTHOPAEDIC SURGERY

## 2022-10-20 NOTE — PROGRESS NOTES
Patient dropped off [employer] \"Attending Physician Statement\" at [facility] office. She would like to pick it up when completed.

## 2022-10-20 NOTE — PROGRESS NOTES
Wellington Flores  1970   Chief Complaint   Patient presents with    Leg Pain     left          HISTORY OF PRESENT ILLNESS  Wellington Flores is a 50 y.o. female who presents today for evaluation of left leg pain. Pain is a 6/10. Was referred to Dr. Malka Coreas for TKA but she did not make an appointment because she was doing much better after her cortisone injections. Up until August she was doing well and was able to walk daily without any issues. At last OV with CRISTELA Oakes on 8/12/2022, patient had a b/l knee cortisone injection which provided relief however she notes recurrent left leg/knee pain since last Wednesday, 10/12/2022. She did a lot of walking and climbing stairs while at work and also bumped her shin on a trash can. She notes pain and swelling in the left shin region today. Finished a series of Euflexxa injections for the left knee on 12/09/2020. Euflexxa injections did not help at all. Pt is s/p left knee arthroscopic partial medial and lateral  menisectomy with grade III chondromalacia on 6/1/2020. Had previous left knee cortisone injection on 9/22/2020 which did help some. She works as a  which requires a lot of walking. Patient denies any fever, chills, chest pain, shortness of breath or calf pain. The remainder of the review of systems is negative. There are no new illness or injuries to report since last seen in the office. There are no changes to medications, allergies, family or social history. PHYSICAL EXAM:   Visit Vitals  Pulse 94   Wt 231 lb (104.8 kg)   LMP 10/25/2020   SpO2 100%   BMI 42.25 kg/m²     The patient is a well-developed, well-nourished female   in no acute distress. The patient is alert and oriented times three. The patient is alert and oriented times three. Mood and affect are normal.  LYMPHATIC: lymph nodes are not enlarged and are within normal limits  SKIN: normal in color and non tender to palpation.  There are no bruises or abrasions noted.   NEUROLOGICAL: Motor sensory exam is within normal limits. Reflexes are equal bilaterally. There is normal sensation to pinprick and light touch  MUSCULOSKELETAL:   Examination Left knee Right knee   Skin Intact Intact   Range of motion 5-115 0-120   Effusion + +   Medial joint line tenderness + +   Lateral joint line tenderness + +   Tenderness Pes Bursa - -   Tenderness insertion MCL - -   Tenderness insertion LCL - -   Razias - -   Patella crepitus + +   Patella grind + +   Lachman - -   Pivot shift - -   Anterior drawer - -   Posterior drawer - -   Varus stress - -   Valgus stress - -   Neurovascular Intact Intact   Calf Swelling and Tenderness to Palpation - -   Re's Test - -   Hamstring Cord Tightness - -       IMAGIN view x-rays standing of bilateral knees taken in the office on 2022 read and reviewed by myself reveal significant degenerative changes with joint space narrowing in the medial compartment and patellofemoral joint. XR of left hip with 2 views from [facility] dated 3/12/2021 was reviewed and read by Dr. Keshia Iraheta: 1. Moderate left hip osteoarthritis. MRI of left knee dated 2020 was reviewed and read by Dr. Keshia Irhaeta:   IMPRESSION:   1. Interval partial medial meniscectomy as discussed above without definite evidence of repeat tear. MR arthrogram could be considered to increase exam sensitivity/specificity for recurrent tear. 2. Mucoid degeneration of the ACL with reactive edema in the medial aspect of the lateral femoral condyle. 3. Tricompartmental osteoarthritis, which is advanced in the medial tibiofemoral compartment, where there is progression compared to prior MRI. Increased medial tibiofemoral subchondral edema without evidence of stress fracture. -Multifocal grade 3/4 chondrosis in the patellofemoral and lateral tibiofemoral compartments without significant interval change from prior MRI.   4. 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 Suspected 2 mm intra-articular body along the posterolateral aspect of Hoffa's fat pad (series 3 image 7). 5. Partially imaged mild nonspecific edema in the medial head of the gastrocnemius, possibly reflecting delayed onset muscle soreness or grade 1 muscle strain. IMPRESSION:      ICD-10-CM ICD-9-CM    1. Primary osteoarthritis of left knee  M17.12 715.16              PLAN:   1. Patient with acute exacerbation of degenerative arthritis in the left knee and hematoma of the left shin region. Will proceed with Euflexxa authorization for the left knee and I explained to the patient that her hematoma should heal on its own. Patient would like to hold off on knee replacement for as long as possible. Risk factors include: BMI>40   2. No ultrasound exam indicated today  3. No  cortisone injection indicated today   4. No Physical/Occupational Therapy indicated today  5. No diagnostic test indicated today:   6. No durable medical equipment indicated today  7. No  referral indicated today  8. No medications indicated today:   9. No Narcotic indicated today      RTC following Euflexxa auth    Scribed by José Garibay Jordan) as dictated by Alec Belle MD    I, Dr. Alec Belle, confirm that all documentation is accurate.     Alec Belle M.D.   [facility]

## 2022-10-25 ENCOUNTER — DOCUMENTATION ONLY (OUTPATIENT)
Dept: ORTHOPEDIC SURGERY | Age: 52
End: 2022-10-25

## 2022-10-25 NOTE — PROGRESS NOTES
[employer] form completed, copy to scanning. Available for  at [facility] location.

## 2022-11-22 ENCOUNTER — OFFICE VISIT (OUTPATIENT)
Dept: ORTHOPEDIC SURGERY | Age: 52
End: 2022-11-22
Payer: COMMERCIAL

## 2022-11-22 VITALS — WEIGHT: 230 LBS | TEMPERATURE: 97.8 F | BODY MASS INDEX: 42.33 KG/M2 | RESPIRATION RATE: 16 BRPM | HEIGHT: 62 IN

## 2022-11-22 DIAGNOSIS — M17.12 PRIMARY OSTEOARTHRITIS OF LEFT KNEE: Primary | ICD-10-CM

## 2022-11-22 PROCEDURE — 20611 DRAIN/INJ JOINT/BURSA W/US: CPT | Performed by: PHYSICIAN ASSISTANT

## 2022-11-22 RX ORDER — TRIAMCINOLONE ACETONIDE 40 MG/ML
40 INJECTION, SUSPENSION INTRA-ARTICULAR; INTRAMUSCULAR ONCE
Status: COMPLETED | OUTPATIENT
Start: 2022-11-22 | End: 2022-11-22

## 2022-11-22 RX ADMIN — TRIAMCINOLONE ACETONIDE 40 MG: 40 INJECTION, SUSPENSION INTRA-ARTICULAR; INTRAMUSCULAR at 15:07

## 2022-11-22 NOTE — PROGRESS NOTES
Maria D Nichols  1970   Chief Complaint   Patient presents with    Knee Pain     Speedy           HISTORY OF PRESENT ILLNESS  Maria D Nichols is a 50 y.o. female who presents today for evaluation of left leg pain. Pain is a 10/10. States knee pain became extreme s/p having to clean the football stadium [place] [employer] the other day. Finished a series of Euflexxa injections for the left knee on 12/09/2020. Euflexxa injections did not help at all. Pt is s/p left knee arthroscopic partial medial and lateral  menisectomy with grade III chondromalacia on 6/1/2020. Patient denies any fever, chills, chest pain, shortness of breath or calf pain. The remainder of the review of systems is negative. There are no new illness or injuries to report since last seen in the office. There are no changes to medications, allergies, family or social history. PHYSICAL EXAM:   Visit Vitals  Temp 97.8 °F (36.6 °C) (Temporal)   Resp 16   Ht 5' 2\" (1.575 m)   Wt 230 lb (104.3 kg)   LMP 10/25/2020   BMI 42.07 kg/m²     The patient is a well-developed, well-nourished female   in no acute distress. The patient is alert and oriented times three. The patient is alert and oriented times three. Mood and affect are normal.  LYMPHATIC: lymph nodes are not enlarged and are within normal limits  SKIN: normal in color and non tender to palpation. There are no bruises or abrasions noted. NEUROLOGICAL: Motor sensory exam is within normal limits. Reflexes are equal bilaterally.  There is normal sensation to pinprick and light touch  MUSCULOSKELETAL:   Examination Left knee   Skin Intact   Range of motion 5-115   Effusion +   Medial joint line tenderness +   Lateral joint line tenderness +   Tenderness Pes Bursa -   Tenderness insertion MCL -   Tenderness insertion LCL -   Razias -   Patella crepitus +   Patella grind +   Lachman -   Pivot shift -   Anterior drawer -   Posterior drawer -   Varus stress -   Valgus stress -   Neurovascular Intact   Calf Swelling and Tenderness to Palpation -   Re's Test -   Hamstring Cord Tightness -       IMAGIN view x-rays standing of bilateral knees taken in the office on 2022 read and reviewed by myself reveal significant degenerative changes with joint space narrowing in the medial compartment and patellofemoral joint. XR of left hip with 2 views from [facility] dated 3/12/2021 was reviewed and read by Dr. Elysia Jernigan: 1. Moderate left hip osteoarthritis. MRI of left knee dated 2020 was reviewed and read by Dr. Elysia Jernigan:   IMPRESSION:   1. Interval partial medial meniscectomy as discussed above without definite evidence of repeat tear. MR arthrogram could be considered to increase exam sensitivity/specificity for recurrent tear. 2. Mucoid degeneration of the ACL with reactive edema in the medial aspect of the lateral femoral condyle. 3. Tricompartmental osteoarthritis, which is advanced in the medial tibiofemoral compartment, where there is progression compared to prior MRI. Increased medial tibiofemoral subchondral edema without evidence of stress fracture. -Multifocal grade 3/4 chondrosis in the patellofemoral and lateral tibiofemoral compartments without significant interval change from prior MRI. 4. 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 Suspected 2 mm intra-articular body along the posterolateral aspect of Hoffa's fat pad (series 3 image 7). 5. Partially imaged mild nonspecific edema in the medial head of the gastrocnemius, possibly reflecting delayed onset muscle soreness or grade 1 muscle strain. PROCEDURE: Left knee Injection with Ultrasound Guidance    Indication:Left Knee pain/swelling    After sterile prep, 4 cc of Xylocaine and 1 cc of Kenalog were injected into the left Knee.  Intra-articular Ultrasound images captured using [employer] Ultrasound machine using a frequency of 10 MHz with a linear transducer and scanned into patient's chart. [facility]  OFFICE PROCEDURE PROGRESS NOTE        Chart reviewed for the following:  Pancho OAKES PA, have reviewed the History, Physical and updated the Allergic reactions for [address] performed immediately prior to start of procedure:  Pancho OAKES PA-C, have performed the following reviews on [place] [id number] prior to the start of the procedure:            * Patient was identified by name and date of birth   * Agreement on procedure being performed was verified  * Risks and Benefits explained to the patient  * Procedure site verified and marked as necessary  * Patient was positioned for comfort  * Consent was signed and verified     Time: 2:47 PM    Date of procedure: 11/22/2022    Procedure performed by:  CRISTELA Martino    Provider assisted by: (see medication administration)    How tolerated by patient: tolerated the procedure well with no complications    Comments: none      IMPRESSION:    No diagnosis found. PLAN:   1. Patient with acute exacerbation of degenerative arthritis in the left knee will inject under ultrasound guidance today. We will also continue to obtain authorization for viscosupplementation for the knee. Risk factors include: BMI>40   2. No ultrasound exam indicated today  3. yes  cortisone injection indicated today   4. No Physical/Occupational Therapy indicated today  5. No diagnostic test indicated today:   6. No durable medical equipment indicated today  7. No  referral indicated today  8. No medications indicated today:   9.  No Narcotic indicated today    Pancho Gordon PA-C  [facility]

## 2022-12-14 ENCOUNTER — DOCUMENTATION ONLY (OUTPATIENT)
Dept: ORTHOPEDIC SURGERY | Age: 52
End: 2022-12-14

## 2023-01-05 ENCOUNTER — TELEPHONE (OUTPATIENT)
Dept: ORTHOPEDIC SURGERY | Age: 53
End: 2023-01-05

## 2023-01-05 RX ORDER — DICLOFENAC SODIUM 75 MG/1
75 TABLET, DELAYED RELEASE ORAL 2 TIMES DAILY WITH MEALS
Qty: 60 TABLET | Refills: 0 | Status: SHIPPED | OUTPATIENT
Start: 2023-01-05

## 2023-01-05 NOTE — TELEPHONE ENCOUNTER
Patient called for  or CRISTELA Valerio. Patient is asking if she could get a Different Medication. That the Meloxicam Medication is no longer helping for her Left Knee pain. [facility] Dr. Telly Looney. [phone]    Patient tel. [phone]. Note : patient last seen on 11/22/2022 for the Left Knee.

## 2023-02-16 NOTE — TELEPHONE ENCOUNTER
Patient called in and requested a refill for her pain medication for her knees. She didn't know the exact name but she said it didn't have any refills. Please advise. Patient can be reached at [phone].

## 2023-04-06 NOTE — PROGRESS NOTES
Swelling and Tenderness to Palpation -   Adelina's Test -   Hamstring Cord Tightness -       IMAGIN view x-rays standing of bilateral knees taken in the office on 2022 read and reviewed by myself reveal significant degenerative changes with joint space narrowing in the medial compartment and  patellofemoral joint. XR of left hip with 2 views from [facility] dated 3/12/2021 was reviewed and read by Dr. Robinson Dandy: 1. Moderate left hip osteoarthritis. MRI of left knee dated 2020 was reviewed and read by Dr. Robinson Dandy:    IMPRESSION:    1. Interval partial medial meniscectomy as discussed above without definite evidence of repeat tear. MR arthrogram could be considered to increase exam sensitivity/specificity  for recurrent tear. 2. Mucoid degeneration of the ACL with reactive edema in the medial aspect of the lateral femoral condyle. 3. Tricompartmental osteoarthritis, which is advanced in the medial tibiofemoral compartment, where there is progression compared to prior MRI. Increased medial tibiofemoral subchondral edema without evidence of stress fracture. -Multifocal grade 3/4 chondrosis in the patellofemoral and lateral tibiofemoral compartments without significant interval change from prior MRI. 4. 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 Suspected 2 mm intra-articular body along the posterolateral aspect of Hoffa's fat pad (series 3 image 7). 5. Partially imaged mild nonspecific edema in the medial head of the gastrocnemius, possibly reflecting delayed onset muscle soreness or grade 1 muscle strain. PROCEDURE: left knee Injection with Ultrasound Guidance    Indication:  knee pain/swelling    After sterile prep, 4mL lidocaine with 1mL 40mg Kenalog were injected into the left knee intraarticular under ultrasound guidance. Ultrasound images captured and scanned into patient's chart.         [address]

## 2023-04-07 ENCOUNTER — OFFICE VISIT (OUTPATIENT)
Age: 53
End: 2023-04-07

## 2023-04-07 VITALS — HEIGHT: 62 IN | WEIGHT: 230 LBS | BODY MASS INDEX: 42.33 KG/M2

## 2023-04-07 DIAGNOSIS — M17.12 UNILATERAL PRIMARY OSTEOARTHRITIS, LEFT KNEE: Primary | ICD-10-CM

## 2023-04-07 DIAGNOSIS — M17.0 BILATERAL PRIMARY OSTEOARTHRITIS OF KNEE: ICD-10-CM

## 2023-04-07 RX ORDER — TRIAMCINOLONE ACETONIDE 40 MG/ML
40 INJECTION, SUSPENSION INTRA-ARTICULAR; INTRAMUSCULAR ONCE
Status: COMPLETED | OUTPATIENT
Start: 2023-04-07 | End: 2023-04-07

## 2023-04-07 RX ADMIN — TRIAMCINOLONE ACETONIDE 40 MG: 40 INJECTION, SUSPENSION INTRA-ARTICULAR; INTRAMUSCULAR at 14:26

## 2023-11-20 ENCOUNTER — OFFICE VISIT (OUTPATIENT)
Age: 53
End: 2023-11-20
Payer: COMMERCIAL

## 2023-11-20 VITALS — BODY MASS INDEX: 42.33 KG/M2 | HEIGHT: 62 IN | WEIGHT: 230 LBS

## 2023-11-20 DIAGNOSIS — M17.11 PRIMARY OSTEOARTHRITIS OF RIGHT KNEE: ICD-10-CM

## 2023-11-20 DIAGNOSIS — M17.0 BILATERAL PRIMARY OSTEOARTHRITIS OF KNEE: Primary | ICD-10-CM

## 2023-11-20 DIAGNOSIS — M17.12 PRIMARY OSTEOARTHRITIS OF LEFT KNEE: ICD-10-CM

## 2023-11-20 PROCEDURE — 20611 DRAIN/INJ JOINT/BURSA W/US: CPT | Performed by: PHYSICIAN ASSISTANT

## 2023-11-20 PROCEDURE — 99213 OFFICE O/P EST LOW 20 MIN: CPT | Performed by: PHYSICIAN ASSISTANT

## 2023-11-20 RX ORDER — TRIAMCINOLONE ACETONIDE 40 MG/ML
40 INJECTION, SUSPENSION INTRA-ARTICULAR; INTRAMUSCULAR ONCE
Status: COMPLETED | OUTPATIENT
Start: 2023-11-20 | End: 2023-11-20

## 2023-11-20 RX ORDER — TRIAMCINOLONE ACETONIDE 40 MG/ML
40 INJECTION, SUSPENSION INTRA-ARTICULAR; INTRAMUSCULAR ONCE
Status: SHIPPED | OUTPATIENT
Start: 2023-11-20

## 2023-11-20 RX ADMIN — TRIAMCINOLONE ACETONIDE 40 MG: 40 INJECTION, SUSPENSION INTRA-ARTICULAR; INTRAMUSCULAR at 16:26

## 2023-11-20 NOTE — PROGRESS NOTES
stress - -   Valgus stress - -   Neurovascular Intact Intact   Calf Swelling and Tenderness to Palpation - -   Adelina's Test - -   Hamstring Cord Tightness - -           IMAGIN view x-rays standing of bilateral knees taken in the office on 2022 read and reviewed by myself reveal significant degenerative changes with joint space narrowing in the medial compartment and  patellofemoral joint. XR of left hip with 2 views from \[facility]"" Radiology dated 3/12/2021 was reviewed and read by Dr. Elvira Miranda: 1. Moderate left hip osteoarthritis. MRI of left knee dated 2020 was reviewed and read by Dr. Elvira Miranda:    IMPRESSION:    1. Interval partial medial meniscectomy as discussed above without definite evidence of repeat tear. MR arthrogram could be considered to increase exam sensitivity/specificity  for recurrent tear. 2. Mucoid degeneration of the ACL with reactive edema in the medial aspect of the lateral femoral condyle. 3. Tricompartmental osteoarthritis, which is advanced in the medial tibiofemoral compartment, where there is progression compared to prior MRI. Increased medial tibiofemoral subchondral edema without evidence of stress fracture. -Multifocal grade 3/4 chondrosis in the patellofemoral and lateral tibiofemoral compartments without significant interval change from prior MRI. 4. 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 Suspected 2 mm intra-articular body along the posterolateral aspect of Hoffa's fat pad (series 3 image 7). 5. Partially imaged mild nonspecific edema in the medial head of the gastrocnemius, possibly reflecting delayed onset muscle soreness or grade 1 muscle strain. PROCEDURE: bilateral knee Injection with Ultrasound Guidance    Indication:  knee pain/swelling    After sterile prep, 4mL lidocaine with 1mL 40mg Kenalog were injected into the bilateral knee intraarticular under ultrasound guidance.  Ultrasound images captured and scanned

## 2023-11-24 NOTE — PROGRESS NOTES
Patient is in the office today for lip swelling. 1. Have you been to the ER, urgent care clinic since your last visit? Hospitalized since your last visit? [facility]    2. Have you seen or consulted any other health care providers outside of the [address] [place] [address] since your last visit? Include any pap smears or colon screening.  No This document is complete and the patient is ready for discharge.

## 2023-12-16 NOTE — PROGRESS NOTES
PT DAILY TREATMENT NOTE 10-18    Patient Name: Haim Fonseca  Date:2/3/2020  : 1970  [x]  Patient  Verified  Payor: Jamey Larry / Plan: Patrick Guevara / Product Type: HMO /    In time:0700  Out time:739  Total Treatment Time (min): 39  Visit #: 6 of 9    Medicare/BCBS Only   Total Timed Codes (min):  39 1:1 Treatment Time:  39       Treatment Area: Low back pain [M54.5]    SUBJECTIVE  Pain Level (0-10 scale): 0  Any medication changes, allergies to medications, adverse drug reactions, diagnosis change, or new procedure performed?: [x] No    [] Yes (see summary sheet for update)  Subjective functional status/changes:   [] No changes reported  Pt reports she feels tired but no pain at the moment. OBJECTIVE    Modality rationale:    Min Type Additional Details    [] Estim:  []Unatt       []IFC  []Premod                        []Other:  []w/ice   []w/heat  Position:  Location:    [] Estim: []Att    []TENS instruct  []NMES                    []Other:  []w/US   []w/ice   []w/heat  Position:  Location:    []  Traction: [] Cervical       []Lumbar                       [] Prone          []Supine                       []Intermittent   []Continuous Lbs:  [] before manual  [] after manual    []  Ultrasound: []Continuous   [] Pulsed                           []1MHz   []3MHz W/cm2:  Location:    []  Iontophoresis with dexamethasone         Location: [] Take home patch   [] In clinic    []  Ice     []  heat  []  Ice massage  []  Laser   []  Anodyne Position:  Location:    []  Laser with stim  []  Other:  Position:  Location:    []  Vasopneumatic Device Pressure:       [] lo [] med [] hi   Temperature: [] lo [] med [] hi   [] Skin assessment post-treatment:  []intact []redness- no adverse reaction    []redness  adverse reaction:            31 min Therapeutic Exercise:  [x]? ?? See flow sheet :   Rationale: increase ROM, increase strength and increase proprioception to improve the patients ability to perform ADL's.     8 min Manual Therapy:  Myofascial stick to left piriformis and glutes, TrP release left glute max   Rationale: decrease pain, increase ROM, increase tissue extensibility and decrease trigger points to improve ease of performing ADL's. With   [] TE   [] TA   [] neuro   [] other: Patient Education: [x] Review HEP    [] Progressed/Changed HEP based on:   [] positioning   [] body mechanics   [] transfers   [] heat/ice application    [] other:      Other Objective/Functional Measures: FOTO: 59    Pain Level (0-10 scale) post treatment: 0    ASSESSMENT/Changes in Function: pt has progressed well with PT and feels she is now ready for discharge to [facility]. []  See Plan of Care  []  See progress note/recertification  [x]  See Discharge Summary         Progress towards goals / Updated goals:  Updated Goals: to be achieved in 4 weeks:               1. Improve FOTO score to 66 to improve ability for functional tasks.              PN- 43   Current: progressed but not met 59 on 2-3-20               2. Pt will report no difficulty with getting in and out of bed.              PN-  a little difficulty              Current: -MET  no difficulty reported - 1-30-20               3. Pt will report no difficulty with lifting a box of groceries from the floor              PN- moderate difficulty              Current: Met, Pt reports no difficulty lifting a box of groceries from the floor. 1/21/2020               4. Pt will demonstrate 4+/5 glute strength to improve ability for job activities.                PN- 4-/5              Current; MET 4+/5 on 2-3-20    PLAN  []  Upgrade activities as tolerated     []  Continue plan of care  []  Update interventions per flow sheet       [x]  Discharge due to:_  []  Other:_      Jessica Parks, PT 2/3/2020  7:02 AM    No future appointments. Yes

## 2023-12-20 DIAGNOSIS — M25.561 CHRONIC PAIN OF RIGHT KNEE: ICD-10-CM

## 2023-12-20 DIAGNOSIS — G89.29 CHRONIC PAIN OF LEFT KNEE: ICD-10-CM

## 2023-12-20 DIAGNOSIS — M21.162 ACQUIRED VARUS DEFORMITY KNEE, LEFT: ICD-10-CM

## 2023-12-20 DIAGNOSIS — M25.562 CHRONIC PAIN OF LEFT KNEE: ICD-10-CM

## 2023-12-20 DIAGNOSIS — M21.161 ACQUIRED VARUS DEFORMITY KNEE, RIGHT: ICD-10-CM

## 2023-12-20 DIAGNOSIS — G89.29 CHRONIC PAIN OF RIGHT KNEE: ICD-10-CM

## 2023-12-20 DIAGNOSIS — M17.11 PRIMARY OSTEOARTHRITIS OF RIGHT KNEE: ICD-10-CM

## 2023-12-20 DIAGNOSIS — M17.12 PRIMARY OSTEOARTHRITIS OF LEFT KNEE: ICD-10-CM

## 2023-12-22 ENCOUNTER — HOSPITAL ENCOUNTER (OUTPATIENT)
Facility: HOSPITAL | Age: 53
Discharge: HOME | End: 2023-12-25

## 2023-12-22 LAB — LABCORP SPECIMEN COLLECTION: NORMAL

## 2023-12-23 LAB
BASOPHILS # BLD AUTO: 0 X10E3/UL (ref 0–0.2)
BASOPHILS NFR BLD AUTO: 0 %
CENTROMERE B AB SER-ACNC: <0.2 AI (ref 0–0.9)
CHROMATIN AB SERPL-ACNC: <0.2 AI (ref 0–0.9)
CK SERPL-CCNC: 450 U/L (ref 32–182)
CRP SERPL-MCNC: 3 MG/L (ref 0–10)
DSDNA AB SER-ACNC: <1 IU/ML (ref 0–9)
ENA JO1 AB SER-ACNC: <0.2 AI (ref 0–0.9)
ENA RNP AB SER-ACNC: <0.2 AI (ref 0–0.9)
ENA SCL70 AB SER-ACNC: <0.2 AI (ref 0–0.9)
ENA SM AB SER-ACNC: <0.2 AI (ref 0–0.9)
ENA SS-A AB SER-ACNC: <0.2 AI (ref 0–0.9)
ENA SS-B AB SER-ACNC: <0.2 AI (ref 0–0.9)
EOSINOPHIL # BLD AUTO: 0.1 X10E3/UL (ref 0–0.4)
EOSINOPHIL NFR BLD AUTO: 1 %
ERYTHROCYTE [DISTWIDTH] IN BLOOD BY AUTOMATED COUNT: 12.1 % (ref 11.7–15.4)
HCT VFR BLD AUTO: 40.5 % (ref 34–46.6)
HGB BLD-MCNC: 13.2 G/DL (ref 11.1–15.9)
IMM GRANULOCYTES # BLD AUTO: 0 X10E3/UL (ref 0–0.1)
IMM GRANULOCYTES NFR BLD AUTO: 0 %
LYMPHOCYTES # BLD AUTO: 1.8 X10E3/UL (ref 0.7–3.1)
LYMPHOCYTES NFR BLD AUTO: 25 %
Lab: NORMAL
MCH RBC QN AUTO: 30.2 PG (ref 26.6–33)
MCHC RBC AUTO-ENTMCNC: 32.6 G/DL (ref 31.5–35.7)
MCV RBC AUTO: 93 FL (ref 79–97)
MONOCYTES # BLD AUTO: 0.6 X10E3/UL (ref 0.1–0.9)
MONOCYTES NFR BLD AUTO: 8 %
NEUTROPHILS # BLD AUTO: 4.7 X10E3/UL (ref 1.4–7)
NEUTROPHILS NFR BLD AUTO: 66 %
PLATELET # BLD AUTO: 284 X10E3/UL (ref 150–450)
RBC # BLD AUTO: 4.37 X10E6/UL (ref 3.77–5.28)
RHEUMATOID FACT SERPL-ACNC: <10 IU/ML
URATE SERPL-MCNC: 6.8 MG/DL (ref 3–7.2)
WBC # BLD AUTO: 7.3 X10E3/UL (ref 3.4–10.8)

## 2024-01-24 ENCOUNTER — OFFICE VISIT (OUTPATIENT)
Age: 54
End: 2024-01-24
Payer: COMMERCIAL

## 2024-01-24 VITALS — WEIGHT: 257 LBS | HEIGHT: 62 IN | BODY MASS INDEX: 47.29 KG/M2

## 2024-01-24 DIAGNOSIS — M25.562 CHRONIC PAIN OF LEFT KNEE: ICD-10-CM

## 2024-01-24 DIAGNOSIS — M21.161 ACQUIRED VARUS DEFORMITY KNEE, RIGHT: ICD-10-CM

## 2024-01-24 DIAGNOSIS — M25.561 CHRONIC PAIN OF RIGHT KNEE: ICD-10-CM

## 2024-01-24 DIAGNOSIS — M21.162 ACQUIRED VARUS DEFORMITY KNEE, LEFT: ICD-10-CM

## 2024-01-24 DIAGNOSIS — G89.29 CHRONIC PAIN OF LEFT KNEE: ICD-10-CM

## 2024-01-24 DIAGNOSIS — M17.12 PRIMARY OSTEOARTHRITIS OF LEFT KNEE: Primary | ICD-10-CM

## 2024-01-24 DIAGNOSIS — G89.29 CHRONIC PAIN OF RIGHT KNEE: ICD-10-CM

## 2024-01-24 DIAGNOSIS — M17.11 PRIMARY OSTEOARTHRITIS OF RIGHT KNEE: ICD-10-CM

## 2024-01-24 PROCEDURE — 99214 OFFICE O/P EST MOD 30 MIN: CPT | Performed by: ORTHOPAEDIC SURGERY

## 2024-01-24 NOTE — PROGRESS NOTES
education: Not on file    Highest education level: Not on file   Occupational History    Not on file   Tobacco Use    Smoking status: Never    Smokeless tobacco: Never   Substance and Sexual Activity    Alcohol use: No    Drug use: No    Sexual activity: Not on file   Other Topics Concern    Not on file   Social History Narrative    Not on file     Social Determinants of Health     Financial Resource Strain: Not on file   Food Insecurity: Not on file   Transportation Needs: Not on file   Physical Activity: Not on file   Stress: Not on file   Social Connections: Not on file   Intimate Partner Violence: Not on file   Housing Stability: Not on file       There were no vitals taken for this visit.      PHYSICAL EXAMINATION:  GENERAL: Alert and oriented x3, in no acute distress, well-developed, well-nourished, afebrile.  HEART: No JVD.  EYES: No scleral icterus   NECK: No significant lymphadenopathy   LUNGS: No respiratory compromise or indrawing  ABDOMEN: Soft, non-tender, non-distended.         Note: This note was completed using voice recognition software. Any typographical/name errors or mistakes are unintentional.    Electronically signed by: Yun Echevarria MA

## 2024-02-27 ENCOUNTER — OFFICE VISIT (OUTPATIENT)
Age: 54
End: 2024-02-27
Payer: COMMERCIAL

## 2024-02-27 VITALS — BODY MASS INDEX: 46.61 KG/M2 | WEIGHT: 253.3 LBS | HEIGHT: 62 IN

## 2024-02-27 DIAGNOSIS — G89.29 CHRONIC PAIN OF LEFT KNEE: ICD-10-CM

## 2024-02-27 DIAGNOSIS — R74.8 ELEVATED CK: Primary | ICD-10-CM

## 2024-02-27 DIAGNOSIS — M17.12 PRIMARY OSTEOARTHRITIS OF LEFT KNEE: ICD-10-CM

## 2024-02-27 DIAGNOSIS — M25.562 CHRONIC PAIN OF LEFT KNEE: ICD-10-CM

## 2024-02-27 PROCEDURE — 20611 DRAIN/INJ JOINT/BURSA W/US: CPT | Performed by: PHYSICIAN ASSISTANT

## 2024-02-27 PROCEDURE — 99214 OFFICE O/P EST MOD 30 MIN: CPT | Performed by: PHYSICIAN ASSISTANT

## 2024-02-27 RX ORDER — LIDOCAINE HYDROCHLORIDE 10 MG/ML
3 INJECTION, SOLUTION INFILTRATION; PERINEURAL ONCE
Status: COMPLETED | OUTPATIENT
Start: 2024-02-27 | End: 2024-02-27

## 2024-02-27 RX ORDER — BETAMETHASONE SODIUM PHOSPHATE AND BETAMETHASONE ACETATE 3; 3 MG/ML; MG/ML
6 INJECTION, SUSPENSION INTRA-ARTICULAR; INTRALESIONAL; INTRAMUSCULAR; SOFT TISSUE ONCE
Status: COMPLETED | OUTPATIENT
Start: 2024-02-27 | End: 2024-02-27

## 2024-02-27 RX ADMIN — BETAMETHASONE SODIUM PHOSPHATE AND BETAMETHASONE ACETATE 6 MG: 3; 3 INJECTION, SUSPENSION INTRA-ARTICULAR; INTRALESIONAL; INTRAMUSCULAR; SOFT TISSUE at 09:15

## 2024-02-27 RX ADMIN — LIDOCAINE HYDROCHLORIDE 3 ML: 10 INJECTION, SOLUTION INFILTRATION; PERINEURAL at 09:16

## 2024-02-27 NOTE — PROGRESS NOTES
Patient: Jessica Snyder                MRN: [id number]       SSN: [id number]  YOB: 1970        AGE: 53 y.o.        SEX: female  Body mass index is 46.33 kg/m².    PCP: Viet Renae MD  02/27/24            REVIEW OF SYSTEMS:  Constitutional: Negative for fever, chills, weight loss and malaise/fatigue.   HENT: Negative.    Eyes: Negative.    Respiratory: Negative.   Cardiovascular: Negative.   Gastrointestinal: No bowel incontinence or constipation.  Genitourinary: No bladder incontinence or saddle anesthesia.  Skin: Negative.   Neurological: Negative.    Endo/Heme/Allergies: Negative.    Psychiatric/Behavioral: Negative.  Musculoskeletal: As per HPI above.     Past Medical History:   Diagnosis Date    ADD (attention deficit disorder)     Arthritis     knees, R shoulder    Depression     Left foot pain     Plantar fasciitis, left     Right shoulder pain          Current Outpatient Medications:     acetaminophen (TYLENOL) 500 MG tablet, Take 1 tablet by mouth every 6 hours as needed, Disp: , Rfl:     ammonium lactate (LAC-HYDRIN) 12 % lotion, rub in to affected area well twice a day, Disp: , Rfl:     Lisdexamfetamine Dimesylate 40 MG CAPS, Take by mouth daily., Disp: , Rfl:     spironolactone (ALDACTONE) 100 MG tablet, Take 1 tablet by mouth daily, Disp: , Rfl:     diclofenac sodium (VOLTAREN) 1 % GEL, Apply 4 g topically 4 times daily, Disp: 500 g, Rfl: 3    ibuprofen (ADVIL;MOTRIN) 800 MG tablet, Take 1 tablet by mouth every 8 hours as needed, Disp: , Rfl:     meloxicam (MOBIC) 15 MG tablet, Take 1 tablet by mouth daily (with breakfast), Disp: , Rfl:     meloxicam (MOBIC) 7.5 MG tablet, Take 1 tablet by mouth daily as needed, Disp: , Rfl:     Allergies   Allergen Reactions    Codeine Nausea And Vomiting     Patient states she gets jittery, has upset stomach        Social History     Socioeconomic History    Marital status:      Spouse name: Not on file    Number of children: Not

## 2024-03-18 ENCOUNTER — OFFICE VISIT (OUTPATIENT)
Age: 54
End: 2024-03-18
Payer: COMMERCIAL

## 2024-03-18 VITALS — WEIGHT: 255 LBS | HEIGHT: 63 IN | BODY MASS INDEX: 45.18 KG/M2

## 2024-03-18 DIAGNOSIS — M79.671 RIGHT FOOT PAIN: ICD-10-CM

## 2024-03-18 DIAGNOSIS — M72.2 PLANTAR FASCIITIS OF RIGHT FOOT: Primary | ICD-10-CM

## 2024-03-18 PROCEDURE — 73630 X-RAY EXAM OF FOOT: CPT | Performed by: ORTHOPAEDIC SURGERY

## 2024-03-18 PROCEDURE — 99214 OFFICE O/P EST MOD 30 MIN: CPT | Performed by: ORTHOPAEDIC SURGERY

## 2024-03-18 RX ORDER — FAMOTIDINE 20 MG/1
20 TABLET, FILM COATED ORAL 2 TIMES DAILY
Qty: 60 TABLET | Refills: 3 | Status: SHIPPED | OUTPATIENT
Start: 2024-03-18

## 2024-03-18 RX ORDER — MELOXICAM 15 MG/1
15 TABLET ORAL DAILY
Qty: 30 TABLET | Refills: 3 | Status: SHIPPED | OUTPATIENT
Start: 2024-03-18

## 2024-03-18 NOTE — PATIENT INSTRUCTIONS
Please consider purchasing Spenco Total Max Arch Support. This can be bought on [employer] or on [email or web address]. They range ~$40-$50 per pair.

## 2024-03-18 NOTE — PROGRESS NOTES
Dispense:  60 tablet     Refill:  3    DME Order for (Specify) as OP     - DME device ordered - dorsal night splint          Diagnoses:    [x] 1 or more chronic illnesses with exacerbation, progression, or side effects of treatment. The patient is not at their treatment goal is not considered stable, even if their condition has not changed SEE DX ABOVE  [] 2 or more stable chronic conditions [] 1 acute illness or injury with systemic symptoms   [] 1 undiagnosed new problem or injury with uncertain prognosis  [] 1 acute complicated injury    The AMA has clarified that, for the purposes of medical decision making, stable means that a patient is at their individual treatment goal. A patient not at their treatment goal is not considered stable, even if their condition has not changed. This is directly from 2024 documentation and coding for inpatient/outpatient and observation evaluation and management services:: January 18, 2024, 5:30 PM [facility] mandatory webinar.    Amount and/or Complexity of Data to be reviewed and analyzed: Must meet the requirements of at least one of the 3 categories :  []  Any 1 combination of the 3 categories:  1/3  Category 1: Tests, documents, or independent historians  [] Reviewing external notes from each unique source notes, [] Reviewing tests/test results of each unique test (x-rays, MRI, laboratory review, advanced imaging),  [x] Ordering tests each unique test [] Assessment requiring an independent historian: The patient either has a history of dementia, stroke, kidney provide informative information regarding history and/or physical.  The historian is listed in the history, and information provided also.  Category 2 :  [] Independent interpretation of tests performed by another physician, other qualified healthcare progression (not separately reported)   Category 3: Discussion and management of the or test interpretation: [] Discussed case with external physician,

## 2024-03-20 ENCOUNTER — CLINICAL DOCUMENTATION (OUTPATIENT)
Age: 54
End: 2024-03-20

## 2024-03-20 ENCOUNTER — OFFICE VISIT (OUTPATIENT)
Age: 54
End: 2024-03-20
Payer: COMMERCIAL

## 2024-03-20 VITALS — BODY MASS INDEX: 45 KG/M2 | HEIGHT: 63 IN | WEIGHT: 254 LBS

## 2024-03-20 DIAGNOSIS — M25.561 CHRONIC PAIN OF RIGHT KNEE: ICD-10-CM

## 2024-03-20 DIAGNOSIS — M17.12 PRIMARY OSTEOARTHRITIS OF LEFT KNEE: Primary | ICD-10-CM

## 2024-03-20 DIAGNOSIS — M21.162 ACQUIRED VARUS DEFORMITY KNEE, LEFT: ICD-10-CM

## 2024-03-20 DIAGNOSIS — M72.2 PLANTAR FASCIITIS OF RIGHT FOOT: ICD-10-CM

## 2024-03-20 DIAGNOSIS — M21.161 ACQUIRED VARUS DEFORMITY KNEE, RIGHT: ICD-10-CM

## 2024-03-20 DIAGNOSIS — M25.562 CHRONIC PAIN OF LEFT KNEE: ICD-10-CM

## 2024-03-20 DIAGNOSIS — M17.11 PRIMARY OSTEOARTHRITIS OF RIGHT KNEE: ICD-10-CM

## 2024-03-20 DIAGNOSIS — G89.29 CHRONIC PAIN OF LEFT KNEE: ICD-10-CM

## 2024-03-20 DIAGNOSIS — G89.29 CHRONIC PAIN OF RIGHT KNEE: ICD-10-CM

## 2024-03-20 PROCEDURE — 99214 OFFICE O/P EST MOD 30 MIN: CPT | Performed by: ORTHOPAEDIC SURGERY

## 2024-03-20 NOTE — PROGRESS NOTES
negative  GI:    negative   :  negative  MSK: Positive  A twelve point review of systems was completed, positives noted and all other systems were reviewed and are negative          Past Medical History:   Diagnosis Date    ADD (attention deficit disorder)     Arthritis     knees, R shoulder    Depression     Left foot pain     Plantar fasciitis, left     Right shoulder pain        Family History   Problem Relation Age of Onset    Osteoarthritis Other     Hypertension Father     Hypertension Mother        Current Outpatient Medications   Medication Sig Dispense Refill    meloxicam (MOBIC) 15 MG tablet Take 1 tablet by mouth daily 30 tablet 3    famotidine (PEPCID) 20 MG tablet Take 1 tablet by mouth 2 times daily 60 tablet 3    acetaminophen (TYLENOL) 500 MG tablet Take 1 tablet by mouth every 6 hours as needed      ammonium lactate (LAC-HYDRIN) 12 % lotion rub in to affected area well twice a day      ibuprofen (ADVIL;MOTRIN) 800 MG tablet Take 1 tablet by mouth every 8 hours as needed      Lisdexamfetamine Dimesylate 40 MG CAPS Take by mouth daily.      meloxicam (MOBIC) 15 MG tablet Take 1 tablet by mouth daily (with breakfast)      meloxicam (MOBIC) 7.5 MG tablet Take 1 tablet by mouth daily as needed      spironolactone (ALDACTONE) 100 MG tablet Take 1 tablet by mouth daily      diclofenac sodium (VOLTAREN) 1 % GEL Apply 4 g topically 4 times daily 500 g 3     Current Facility-Administered Medications   Medication Dose Route Frequency Provider Last Rate Last Admin    triamcinolone acetonide (KENALOG-40) injection 40 mg  40 mg Other Once Belinda Quezada PA           Allergies   Allergen Reactions    Codeine Nausea And Vomiting     Patient states she gets jittery, has upset stomach        Past Surgical History:   Procedure Laterality Date    KNEE ARTHROSCOPY Left 6/1/12020    left knee arthroscopic partial medial menisectomy    TUBAL LIGATION  2009    Tubal ligation       Social History     Socioeconomic

## 2024-03-20 NOTE — PROGRESS NOTES
Patient dropped off FMLA paperwork at hs office and can be reached at number on file when ready for pickup.

## 2024-04-02 ENCOUNTER — CLINICAL DOCUMENTATION (OUTPATIENT)
Age: 54
End: 2024-04-02

## 2024-05-14 ENCOUNTER — CLINICAL DOCUMENTATION (OUTPATIENT)
Age: 54
End: 2024-05-14

## 2024-05-14 NOTE — PROGRESS NOTES
Patient dropped off attending physician statement form [address] to be filled out and completed. Patient would like to be contacted at [phone] when form is completed, form was placed into form box.

## 2024-05-15 DIAGNOSIS — Z01.818 PRE-OP TESTING: Primary | ICD-10-CM

## 2024-05-15 DIAGNOSIS — M17.12 PRIMARY OSTEOARTHRITIS OF LEFT KNEE: ICD-10-CM

## 2024-05-22 DIAGNOSIS — M17.12 PRIMARY OSTEOARTHRITIS OF LEFT KNEE: ICD-10-CM

## 2024-05-22 DIAGNOSIS — Z01.818 PRE-OP TESTING: ICD-10-CM

## 2024-05-28 LAB
APPEARANCE UR: CLEAR
BACTERIA #/AREA URNS HPF: NORMAL /[HPF]
BASOPHILS # BLD AUTO: 0 X10E3/UL (ref 0–0.2)
BASOPHILS NFR BLD AUTO: 1 %
BILIRUB UR QL STRIP: NEGATIVE
CASTS URNS QL MICRO: NORMAL /LPF
COLOR UR: YELLOW
EOSINOPHIL # BLD AUTO: 0.1 X10E3/UL (ref 0–0.4)
EOSINOPHIL NFR BLD AUTO: 2 %
EPI CELLS #/AREA URNS HPF: NORMAL /HPF (ref 0–10)
ERYTHROCYTE [DISTWIDTH] IN BLOOD BY AUTOMATED COUNT: 11.4 % (ref 11.7–15.4)
GLUCOSE UR QL STRIP: NEGATIVE
HCT VFR BLD AUTO: 36.3 % (ref 34–46.6)
HGB BLD-MCNC: 12.2 G/DL (ref 11.1–15.9)
HGB UR QL STRIP: NEGATIVE
IMM GRANULOCYTES # BLD AUTO: 0 X10E3/UL (ref 0–0.1)
IMM GRANULOCYTES NFR BLD AUTO: 0 %
KETONES UR QL STRIP: NEGATIVE
LEUKOCYTE ESTERASE UR QL STRIP: NEGATIVE
LYMPHOCYTES # BLD AUTO: 1.6 X10E3/UL (ref 0.7–3.1)
LYMPHOCYTES NFR BLD AUTO: 26 %
MCH RBC QN AUTO: 31.3 PG (ref 26.6–33)
MCHC RBC AUTO-ENTMCNC: 33.6 G/DL (ref 31.5–35.7)
MCV RBC AUTO: 93 FL (ref 79–97)
MICRO URNS: NORMAL
MICRO URNS: NORMAL
MONOCYTES # BLD AUTO: 0.6 X10E3/UL (ref 0.1–0.9)
MONOCYTES NFR BLD AUTO: 10 %
NEUTROPHILS # BLD AUTO: 3.8 X10E3/UL (ref 1.4–7)
NEUTROPHILS NFR BLD AUTO: 61 %
NITRITE UR QL STRIP: NEGATIVE
PH UR STRIP: 6 [PH] (ref 5–7.5)
PLATELET # BLD AUTO: 276 X10E3/UL (ref 150–450)
PROT UR QL STRIP: NEGATIVE
RBC # BLD AUTO: 3.9 X10E6/UL (ref 3.77–5.28)
RBC #/AREA URNS HPF: NORMAL /HPF (ref 0–2)
SP GR UR STRIP: 1.02 (ref 1–1.03)
UROBILINOGEN UR STRIP-MCNC: 0.2 MG/DL (ref 0.2–1)
WBC # BLD AUTO: 6.2 X10E3/UL (ref 3.4–10.8)
WBC #/AREA URNS HPF: NORMAL /HPF (ref 0–5)

## 2024-05-29 LAB
ALBUMIN SERPL-MCNC: 4.1 G/DL (ref 3.8–4.9)
APTT PPP: 26 SEC (ref 24–33)
BUN SERPL-MCNC: 28 MG/DL (ref 6–24)
BUN/CREAT SERPL: 19 (ref 9–23)
CALCIUM SERPL-MCNC: 9.8 MG/DL (ref 8.7–10.2)
CHLORIDE SERPL-SCNC: 106 MMOL/L (ref 96–106)
CO2 SERPL-SCNC: 19 MMOL/L (ref 20–29)
CREAT SERPL-MCNC: 1.48 MG/DL (ref 0.57–1)
EGFRCR SERPLBLD CKD-EPI 2021: 43 ML/MIN/1.73
GLUCOSE SERPL-MCNC: 84 MG/DL (ref 70–99)
HBA1C MFR BLD: 5.5 % (ref 4.8–5.6)
INR PPP: 1 (ref 0.9–1.2)
POTASSIUM SERPL-SCNC: 5.4 MMOL/L (ref 3.5–5.2)
PROTHROMBIN TIME: 10.5 SEC (ref 9.1–12)
SODIUM SERPL-SCNC: 139 MMOL/L (ref 134–144)

## 2024-06-03 ENCOUNTER — NURSE ONLY (OUTPATIENT)
Age: 54
End: 2024-06-03

## 2024-06-03 DIAGNOSIS — Z01.818 PREOP EXAMINATION: Primary | ICD-10-CM

## 2024-06-05 ENCOUNTER — OFFICE VISIT (OUTPATIENT)
Age: 54
End: 2024-06-05

## 2024-06-05 ENCOUNTER — HOSPITAL ENCOUNTER (OUTPATIENT)
Facility: HOSPITAL | Age: 54
Discharge: HOME | End: 2024-06-08
Payer: COMMERCIAL

## 2024-06-05 VITALS
BODY MASS INDEX: 43.41 KG/M2 | HEIGHT: 63 IN | SYSTOLIC BLOOD PRESSURE: 120 MMHG | RESPIRATION RATE: 18 BRPM | DIASTOLIC BLOOD PRESSURE: 81 MMHG | WEIGHT: 245 LBS

## 2024-06-05 DIAGNOSIS — M17.12 PRIMARY OSTEOARTHRITIS OF LEFT KNEE: Primary | ICD-10-CM

## 2024-06-05 DIAGNOSIS — Z01.818 PREOP EXAMINATION: ICD-10-CM

## 2024-06-05 DIAGNOSIS — Z01.818 PRE-OP TESTING: ICD-10-CM

## 2024-06-05 DIAGNOSIS — M17.12 PRIMARY OSTEOARTHRITIS OF LEFT KNEE: ICD-10-CM

## 2024-06-05 PROCEDURE — 93005 ELECTROCARDIOGRAM TRACING: CPT

## 2024-06-05 PROCEDURE — 71046 X-RAY EXAM CHEST 2 VIEWS: CPT

## 2024-06-05 RX ORDER — CELECOXIB 100 MG/1
200 CAPSULE ORAL
OUTPATIENT
Start: 2024-06-05 | End: 2024-06-05

## 2024-06-05 RX ORDER — ACETAMINOPHEN 325 MG/1
1000 TABLET ORAL
OUTPATIENT
Start: 2024-06-05 | End: 2024-06-05

## 2024-06-05 RX ORDER — TAMSULOSIN HYDROCHLORIDE 0.4 MG/1
0.4 CAPSULE ORAL
OUTPATIENT
Start: 2024-06-05 | End: 2024-06-05

## 2024-06-05 RX ORDER — DEXAMETHASONE SODIUM PHOSPHATE 4 MG/ML
8 INJECTION, SOLUTION INTRA-ARTICULAR; INTRALESIONAL; INTRAMUSCULAR; INTRAVENOUS; SOFT TISSUE
OUTPATIENT
Start: 2024-06-05 | End: 2024-06-05

## 2024-06-05 RX ORDER — PREGABALIN 25 MG/1
75 CAPSULE ORAL
OUTPATIENT
Start: 2024-06-05 | End: 2024-06-05

## 2024-06-05 NOTE — PATIENT INSTRUCTIONS
Patient: Jessica Snyder                MRN: [id number]       SSN:   YOB: 1970        AGE: 53 y.o.        SEX: female  There is no height or weight on file to calculate BMI.    06/05/24    DO:  1:  Sit with the leg out straight 5-10 min every hour while awake.  Keep the toes pointed  up towards the alexey.             2.  Bend your knee 5-10 min every hour.  Bend it to the point of pain and hold it for 5-10 Min.            3.  ICE your knee 20 min every hour    4.  You can expect to have swelling and discomfort in your thigh down to your knee.  Swelling may extend to your foot.  You may have bruising from the thigh to the foot as well.  Some numbness can be expected to the outside part of the knee.  Wear the compression stockings and elevate your leg.      DO NOT:    1.  Do not place anything under your knee to prop it up  2.  Do not sit in the recliner chair.

## 2024-06-05 NOTE — H&P
100 MG tablet, Take 1 tablet by mouth daily, Disp: , Rfl:     diclofenac sodium (VOLTAREN) 1 % GEL, Apply 4 g topically 4 times daily, Disp: 500 g, Rfl: 3    ibuprofen (ADVIL;MOTRIN) 800 MG tablet, Take 1 tablet by mouth every 8 hours as needed, Disp: , Rfl:     meloxicam (MOBIC) 15 MG tablet, Take 1 tablet by mouth daily (with breakfast), Disp: , Rfl:     meloxicam (MOBIC) 7.5 MG tablet, Take 1 tablet by mouth daily as needed, Disp: , Rfl:     Allergies   Allergen Reactions    Codeine Nausea And Vomiting     Patient states she gets jittery, has upset stomach        Social History     Socioeconomic History    Marital status:      Spouse name: Not on file    Number of children: Not on file    Years of education: Not on file    Highest education level: Not on file   Occupational History    Not on file   Tobacco Use    Smoking status: Never    Smokeless tobacco: Never   Substance and Sexual Activity    Alcohol use: No    Drug use: No    Sexual activity: Not on file   Other Topics Concern    Not on file   Social History Narrative    Not on file     Social Determinants of Health     Financial Resource Strain: Not on file   Food Insecurity: Not on file   Transportation Needs: Not on file   Physical Activity: Not on file   Stress: Not on file   Social Connections: Not on file   Intimate Partner Violence: Not on file   Housing Stability: Not on file       Past Surgical History:   Procedure Laterality Date    KNEE ARTHROSCOPY Left 6/1/12020    left knee arthroscopic partial medial menisectomy    TUBAL LIGATION  2009    Tubal ligation       Family History:  Non-contributory.     Vitals:    06/05/24 0852   BP: 120/81   Site: Right Upper Arm   Position: Sitting   Cuff Size: Large Adult   Resp: 18   Weight: 111.1 kg (245 lb)   Height: 1.6 m (5' 3\")      Body mass index is 43.4 kg/m².     PE:  /81 (Site: Right Upper Arm, Position: Sitting, Cuff Size: Large Adult)   Resp 18   Ht 1.6 m (5' 3\")   Wt 111.1 kg (245 lb)

## 2024-06-05 NOTE — H&P (VIEW-ONLY)
HISTORY & PHYSICAL      Patient: Jessica Snyder                MRN: [id number]       SSN: [id number]  YOB: 1970        AGE: 53 y.o.        SEX: female  Body mass index is 43.4 kg/m².    PCP: Viet Renae MD  06/05/24      CC: left knee end stage OA  Problem List Items Addressed This Visit    None  Visit Diagnoses       Primary osteoarthritis of left knee    -  Primary    Preop examination                  HPI:  The patient is a pleasant 53 y.o. whom has end stage OA of their left knee and has failed conservative treatment including but not limited to NSAIDS, cortisone injections, viscosupplementation, PT, and pain medicine.  Due to the current findings and affected activities of daily living, surgical intervention is indicated.  The alternatives, risks, complications, as well as expected outcome were discussed.  These include but are not limited to infection, blood loss, need for blood transfusion, neurovascular damage, DVT, PE,  post-op stiffness and pain, leg length discrepancy, dislocation, anesthetic complications, prothesis longevity, need for more surgery, MI, stroke, and even death.  The patient understands and wishes to proceed with surgery.      Past Medical History:   Diagnosis Date    ADD (attention deficit disorder)     Arthritis     knees, R shoulder    Depression     Left foot pain     Plantar fasciitis, left     Right shoulder pain          Current Outpatient Medications:     meloxicam (MOBIC) 15 MG tablet, Take 1 tablet by mouth daily, Disp: 30 tablet, Rfl: 3    famotidine (PEPCID) 20 MG tablet, Take 1 tablet by mouth 2 times daily, Disp: 60 tablet, Rfl: 3    acetaminophen (TYLENOL) 500 MG tablet, Take 1 tablet by mouth every 6 hours as needed, Disp: , Rfl:     ammonium lactate (LAC-HYDRIN) 12 % lotion, rub in to affected area well twice a day, Disp: , Rfl:     Lisdexamfetamine Dimesylate 40 MG CAPS, Take by mouth daily., Disp: , Rfl:     spironolactone (ALDACTONE)

## 2024-06-06 LAB
EKG ATRIAL RATE: 75 BPM
EKG DIAGNOSIS: NORMAL
EKG P AXIS: 13 DEGREES
EKG P-R INTERVAL: 148 MS
EKG Q-T INTERVAL: 350 MS
EKG QRS DURATION: 78 MS
EKG QTC CALCULATION (BAZETT): 390 MS
EKG R AXIS: 26 DEGREES
EKG T AXIS: 46 DEGREES
EKG VENTRICULAR RATE: 75 BPM

## 2024-06-06 ASSESSMENT — PROMIS GLOBAL HEALTH SCALE
IN GENERAL, WOULD YOU SAY YOUR QUALITY OF LIFE IS...[ON A SCALE OF 1 (POOR) TO 5 (EXCELLENT)]: EXCELLENT
HOW IS THE PROMIS V1.1 BEING ADMINISTERED?: TELEPHONE
IN GENERAL, HOW WOULD YOU RATE YOUR MENTAL HEALTH, INCLUDING YOUR MOOD AND YOUR ABILITY TO THINK [ON A SCALE OF 1 (POOR) TO 5 (EXCELLENT)]?: EXCELLENT
IN THE PAST 7 DAYS, HOW OFTEN HAVE YOU BEEN BOTHERED BY EMOTIONAL PROBLEMS, SUCH AS FEELING ANXIOUS, DEPRESSED, OR IRRITABLE [ON A SCALE FROM 1 (NEVER) TO 5 (ALWAYS)]?: NEVER
IN GENERAL, HOW WOULD YOU RATE YOUR PHYSICAL HEALTH [ON A SCALE OF 1 (POOR) TO 5 (EXCELLENT)]?: EXCELLENT
SUM OF RESPONSES TO QUESTIONS 3, 6, 7, & 8: 22
TO WHAT EXTENT ARE YOU ABLE TO CARRY OUT YOUR EVERYDAY PHYSICAL ACTIVITIES SUCH AS WALKING, CLIMBING STAIRS, CARRYING GROCERIES, OR MOVING A CHAIR [ON A SCALE OF 1 (NOT AT ALL) TO 5 (COMPLETELY)]?: MODERATELY
IN GENERAL, WOULD YOU SAY YOUR HEALTH IS...[ON A SCALE OF 1 (POOR) TO 5 (EXCELLENT)]: EXCELLENT
IN GENERAL, PLEASE RATE HOW WELL YOU CARRY OUT YOUR USUAL SOCIAL ACTIVITIES (INCLUDES ACTIVITIES AT HOME, AT WORK, AND IN YOUR COMMUNITY, AND RESPONSIBILITIES AS A PARENT, CHILD, SPOUSE, EMPLOYEE, FRIEND, ETC) [ON A SCALE OF 1 (POOR) TO 5 (EXCELLENT)]?: VERY GOOD
WHO IS THE PERSON COMPLETING THE PROMIS V1.1 SURVEY?: SELF
SUM OF RESPONSES TO QUESTIONS 2, 4, 5, & 10: 20
IN THE PAST 7 DAYS, HOW WOULD YOU RATE YOUR PAIN ON AVERAGE [ON A SCALE FROM 0 (NO PAIN) TO 10 (WORST IMAGINABLE PAIN)]?: 9
IN GENERAL, HOW WOULD YOU RATE YOUR SATISFACTION WITH YOUR SOCIAL ACTIVITIES AND RELATIONSHIPS [ON A SCALE OF 1 (POOR) TO 5 (EXCELLENT)]?: EXCELLENT

## 2024-06-06 ASSESSMENT — KOOS JR
KOOS JR TOTAL INTERVAL SCORE: 34.174
STRAIGHTENING KNEE FULLY: MODERATE
RISING FROM SITTING: EXTREME
TWISING OR PIVOTING ON KNEE: SEVERE
GOING UP OR DOWN STAIRS: EXTREME
STANDING UPRIGHT: MODERATE
HOW SEVERE IS YOUR KNEE STIFFNESS AFTER FIRST WAKING IN MORNING: SEVERE
BENDING TO THE FLOOR TO PICK UP OBJECT: SEVERE

## 2024-06-12 ENCOUNTER — TELEPHONE (OUTPATIENT)
Age: 54
End: 2024-06-12

## 2024-06-12 DIAGNOSIS — Z01.818 PREOP EXAMINATION: Primary | ICD-10-CM

## 2024-06-12 DIAGNOSIS — M17.12 PRIMARY OSTEOARTHRITIS OF LEFT KNEE: ICD-10-CM

## 2024-06-12 NOTE — TELEPHONE ENCOUNTER
Patient called today to request all of her surgery medications be available for  at her normal pharmacy instead of from the hospital. She would like them sent to [facility] [address] [facility] [address] - P [phone] - F [phone] if possible. They're open M-F 8-5p and Sat 8-12p, so she is asking if she can pick them up Friday if possible.    Please review and advise patient, [phone]

## 2024-06-13 RX ORDER — DOCUSATE SODIUM 100 MG/1
100 CAPSULE, LIQUID FILLED ORAL 2 TIMES DAILY
Qty: 60 CAPSULE | Refills: 1 | Status: SHIPPED | OUTPATIENT
Start: 2024-06-13 | End: 2024-08-12

## 2024-06-13 RX ORDER — CEFADROXIL 500 MG/1
500 CAPSULE ORAL 2 TIMES DAILY
Qty: 10 CAPSULE | Refills: 0 | Status: SHIPPED | OUTPATIENT
Start: 2024-06-13 | End: 2024-06-18

## 2024-06-13 RX ORDER — CELECOXIB 200 MG/1
200 CAPSULE ORAL 2 TIMES DAILY
Qty: 60 CAPSULE | Refills: 2 | Status: SHIPPED | OUTPATIENT
Start: 2024-06-13

## 2024-06-13 RX ORDER — ASPIRIN 81 MG/1
81 TABLET ORAL 2 TIMES DAILY
Qty: 60 TABLET | Refills: 3 | Status: SHIPPED | OUTPATIENT
Start: 2024-06-13

## 2024-06-13 RX ORDER — OXYCODONE HYDROCHLORIDE 5 MG/1
5 TABLET ORAL EVERY 4 HOURS PRN
Qty: 42 TABLET | Refills: 0 | Status: SHIPPED | OUTPATIENT
Start: 2024-06-13 | End: 2024-06-20

## 2024-06-13 RX ORDER — ONDANSETRON 4 MG/1
4 TABLET, FILM COATED ORAL EVERY 8 HOURS PRN
Qty: 30 TABLET | Refills: 2 | Status: SHIPPED | OUTPATIENT
Start: 2024-06-13

## 2024-06-14 ENCOUNTER — ANESTHESIA EVENT (OUTPATIENT)
Facility: HOSPITAL | Age: 54
End: 2024-06-14
Payer: COMMERCIAL

## 2024-06-14 ENCOUNTER — TELEPHONE (OUTPATIENT)
Facility: HOSPITAL | Age: 54
End: 2024-06-14

## 2024-06-14 NOTE — TELEPHONE ENCOUNTER
Call placed to patient, ID verified x 2. Patient  has decided with their surgeon to have a total knee  replacement to decrease  pain and improve mobility . Topics discussed included surgery preparation, what to expect the day of surgery, medications, physical and occupational therapy, and discharge planning.  It was discussed that this is considered an elective surgery and that prior to the surgery  decisions such as arranging for help at home once they are discharged needs to be made.Patient agreed to get home ready for surgery and to have a ride arranged to go home. She identifies her uncle as her support system, a walker will be provided to her by the coordinator and she would like to discharge home day of surgery. She lives in a one story home with one small step to enter. She will  her postoperative medications prior to surgery. She did inquire about a shower chair. Patient informed that this is not typically covered by insurance but that it can be purchased for much less through [employer] or [email or web address]. She was instructed to let coordinator know if she wants one ordered or not when she meets with her on Monday.  Instructions were given for CHG bathing. Patient will complete the procedure the morning of surgery.  She denies any rashes, bruises or open areas to her skin at this time. Patient was reminded not to apply any deoderant, perfumes, makeup or lotions to skin the morning of surgery. She will remain NPO after midnight and  will take only the medications as instructed to take by her surgeon the morning of surgery with a sip of water. Patient verbalized that she does not take any routine medications in the morning. A total knee replacement    education book will be provided to patient post op prior to discharge if one was not provided at the clinic level . Education regarding the importance of early and frequent ambulation to avoid surgical complications and to assist with pain was provided. Recommended

## 2024-06-17 ENCOUNTER — APPOINTMENT (OUTPATIENT)
Facility: HOSPITAL | Age: 54
End: 2024-06-17
Attending: ORTHOPAEDIC SURGERY
Payer: COMMERCIAL

## 2024-06-17 ENCOUNTER — HOSPITAL ENCOUNTER (OUTPATIENT)
Facility: HOSPITAL | Age: 54
Setting detail: OBSERVATION
Discharge: HOME | End: 2024-06-17
Attending: ORTHOPAEDIC SURGERY | Admitting: ORTHOPAEDIC SURGERY
Payer: COMMERCIAL

## 2024-06-17 ENCOUNTER — ANESTHESIA (OUTPATIENT)
Facility: HOSPITAL | Age: 54
End: 2024-06-17
Payer: COMMERCIAL

## 2024-06-17 ENCOUNTER — HOME HEALTH ADMISSION (OUTPATIENT)
Age: 54
End: 2024-06-17
Payer: COMMERCIAL

## 2024-06-17 VITALS
TEMPERATURE: 98.2 F | WEIGHT: 251.8 LBS | DIASTOLIC BLOOD PRESSURE: 64 MMHG | SYSTOLIC BLOOD PRESSURE: 100 MMHG | HEART RATE: 80 BPM | OXYGEN SATURATION: 98 % | BODY MASS INDEX: 46.33 KG/M2 | HEIGHT: 62 IN | RESPIRATION RATE: 16 BRPM

## 2024-06-17 DIAGNOSIS — M17.12 ARTHRITIS OF KNEE, LEFT: Primary | ICD-10-CM

## 2024-06-17 PROBLEM — M17.10 ARTHRITIS OF KNEE: Status: ACTIVE | Noted: 2024-06-17

## 2024-06-17 PROBLEM — E66.813 CLASS 3 SEVERE OBESITY DUE TO EXCESS CALORIES WITHOUT SERIOUS COMORBIDITY WITH BODY MASS INDEX (BMI) OF 45.0 TO 49.9 IN ADULT: Status: ACTIVE | Noted: 2017-12-07

## 2024-06-17 PROBLEM — E66.01 CLASS 3 SEVERE OBESITY DUE TO EXCESS CALORIES WITHOUT SERIOUS COMORBIDITY WITH BODY MASS INDEX (BMI) OF 45.0 TO 49.9 IN ADULT (HCC): Status: ACTIVE | Noted: 2017-12-07

## 2024-06-17 LAB — POTASSIUM SERPL-SCNC: 4.4 MMOL/L (ref 3.5–5.5)

## 2024-06-17 PROCEDURE — 2500000003 HC RX 250 WO HCPCS: Performed by: ANESTHESIOLOGY

## 2024-06-17 PROCEDURE — 6360000002 HC RX W HCPCS: Performed by: PHYSICIAN ASSISTANT

## 2024-06-17 PROCEDURE — 6360000002 HC RX W HCPCS: Performed by: NURSE ANESTHETIST, CERTIFIED REGISTERED

## 2024-06-17 PROCEDURE — 84132 ASSAY OF SERUM POTASSIUM: CPT

## 2024-06-17 PROCEDURE — G0378 HOSPITAL OBSERVATION PER HR: HCPCS

## 2024-06-17 PROCEDURE — 3700000000 HC ANESTHESIA ATTENDED CARE: Performed by: ORTHOPAEDIC SURGERY

## 2024-06-17 PROCEDURE — 2580000003 HC RX 258: Performed by: ORTHOPAEDIC SURGERY

## 2024-06-17 PROCEDURE — C1713 ANCHOR/SCREW BN/BN,TIS/BN: HCPCS | Performed by: ORTHOPAEDIC SURGERY

## 2024-06-17 PROCEDURE — 2500000003 HC RX 250 WO HCPCS: Performed by: ORTHOPAEDIC SURGERY

## 2024-06-17 PROCEDURE — 3600000013 HC SURGERY LEVEL 3 ADDTL 15MIN: Performed by: ORTHOPAEDIC SURGERY

## 2024-06-17 PROCEDURE — A4217 STERILE WATER/SALINE, 500 ML: HCPCS | Performed by: ORTHOPAEDIC SURGERY

## 2024-06-17 PROCEDURE — 2580000003 HC RX 258: Performed by: ANESTHESIOLOGY

## 2024-06-17 PROCEDURE — 7100000001 HC PACU RECOVERY - ADDTL 15 MIN: Performed by: ORTHOPAEDIC SURGERY

## 2024-06-17 PROCEDURE — 6370000000 HC RX 637 (ALT 250 FOR IP): Performed by: ORTHOPAEDIC SURGERY

## 2024-06-17 PROCEDURE — 6370000000 HC RX 637 (ALT 250 FOR IP): Performed by: PHYSICIAN ASSISTANT

## 2024-06-17 PROCEDURE — 64447 NJX AA&/STRD FEMORAL NRV IMG: CPT | Performed by: ANESTHESIOLOGY

## 2024-06-17 PROCEDURE — 97116 GAIT TRAINING THERAPY: CPT

## 2024-06-17 PROCEDURE — 27447 TOTAL KNEE ARTHROPLASTY: CPT | Performed by: PHYSICIAN ASSISTANT

## 2024-06-17 PROCEDURE — 3700000001 HC ADD 15 MINUTES (ANESTHESIA): Performed by: ORTHOPAEDIC SURGERY

## 2024-06-17 PROCEDURE — 6360000002 HC RX W HCPCS: Performed by: ANESTHESIOLOGY

## 2024-06-17 PROCEDURE — 2500000003 HC RX 250 WO HCPCS: Performed by: NURSE ANESTHETIST, CERTIFIED REGISTERED

## 2024-06-17 PROCEDURE — 3600000003 HC SURGERY LEVEL 3 BASE: Performed by: ORTHOPAEDIC SURGERY

## 2024-06-17 PROCEDURE — 97535 SELF CARE MNGMENT TRAINING: CPT

## 2024-06-17 PROCEDURE — 27447 TOTAL KNEE ARTHROPLASTY: CPT | Performed by: ORTHOPAEDIC SURGERY

## 2024-06-17 PROCEDURE — 6360000002 HC RX W HCPCS: Performed by: ORTHOPAEDIC SURGERY

## 2024-06-17 PROCEDURE — 97165 OT EVAL LOW COMPLEX 30 MIN: CPT

## 2024-06-17 PROCEDURE — C9290 INJ, BUPIVACAINE LIPOSOME: HCPCS | Performed by: PHYSICIAN ASSISTANT

## 2024-06-17 PROCEDURE — 2709999900 HC NON-CHARGEABLE SUPPLY: Performed by: ORTHOPAEDIC SURGERY

## 2024-06-17 PROCEDURE — 73560 X-RAY EXAM OF KNEE 1 OR 2: CPT

## 2024-06-17 PROCEDURE — 2580000003 HC RX 258: Performed by: PHYSICIAN ASSISTANT

## 2024-06-17 PROCEDURE — 7100000000 HC PACU RECOVERY - FIRST 15 MIN: Performed by: ORTHOPAEDIC SURGERY

## 2024-06-17 PROCEDURE — C1776 JOINT DEVICE (IMPLANTABLE): HCPCS | Performed by: ORTHOPAEDIC SURGERY

## 2024-06-17 PROCEDURE — 97162 PT EVAL MOD COMPLEX 30 MIN: CPT

## 2024-06-17 PROCEDURE — 2500000003 HC RX 250 WO HCPCS: Performed by: PHYSICIAN ASSISTANT

## 2024-06-17 PROCEDURE — 2720000010 HC SURG SUPPLY STERILE: Performed by: ORTHOPAEDIC SURGERY

## 2024-06-17 PROCEDURE — 2580000003 HC RX 258: Performed by: NURSE ANESTHETIST, CERTIFIED REGISTERED

## 2024-06-17 PROCEDURE — 97110 THERAPEUTIC EXERCISES: CPT

## 2024-06-17 DEVICE — IMPLANT PATELLAR DIA32MM THK10MM X3 ASYMMETRIC TRIATHLON: Type: IMPLANTABLE DEVICE | Site: PATELLA | Status: FUNCTIONAL

## 2024-06-17 DEVICE — GENESIS TROCHLEAR PIN 1/8 IN. X 5IN
Type: IMPLANTABLE DEVICE | Site: KNEE | Status: FUNCTIONAL
Brand: GENESIS

## 2024-06-17 DEVICE — CEMENT BNE 20ML 41GM FULL DOSE PMMA W/ TOBRA M VISC RADPQ: Type: IMPLANTABLE DEVICE | Site: KNEE | Status: FUNCTIONAL

## 2024-06-17 DEVICE — GENESIS TROCHLEAR PIN 1/8 X 3
Type: IMPLANTABLE DEVICE | Site: KNEE | Status: FUNCTIONAL
Brand: GENESIS

## 2024-06-17 DEVICE — GENESIS II NON-POROUS TIBIAL                                    BASEPLATE SIZE 4 LEFT
Type: IMPLANTABLE DEVICE | Site: KNEE | Status: FUNCTIONAL
Brand: GENESIS II

## 2024-06-17 DEVICE — GENESIS PIN AND DRILL SET
Type: IMPLANTABLE DEVICE | Site: KNEE | Status: FUNCTIONAL
Brand: GENESIS

## 2024-06-17 DEVICE — LEGION PS HIGH FLEX XLPE SZ 3-4 13MM
Type: IMPLANTABLE DEVICE | Site: KNEE | Status: FUNCTIONAL
Brand: LEGION

## 2024-06-17 DEVICE — GENESIS II OXINIUM POSTERIOR                                    STABILIZED FEMORAL LEFT SIZE 5
Type: IMPLANTABLE DEVICE | Site: KNEE | Status: FUNCTIONAL
Brand: GENESIS II

## 2024-06-17 RX ORDER — PROCHLORPERAZINE EDISYLATE 5 MG/ML
10 INJECTION INTRAMUSCULAR; INTRAVENOUS
Status: DISCONTINUED | OUTPATIENT
Start: 2024-06-17 | End: 2024-06-17 | Stop reason: HOSPADM

## 2024-06-17 RX ORDER — SODIUM CHLORIDE 0.9 % (FLUSH) 0.9 %
5-40 SYRINGE (ML) INJECTION EVERY 12 HOURS SCHEDULED
Status: DISCONTINUED | OUTPATIENT
Start: 2024-06-17 | End: 2024-06-17 | Stop reason: HOSPADM

## 2024-06-17 RX ORDER — SPIRONOLACTONE 25 MG/1
100 TABLET ORAL DAILY
Status: DISCONTINUED | OUTPATIENT
Start: 2024-06-17 | End: 2024-06-17 | Stop reason: HOSPADM

## 2024-06-17 RX ORDER — SENNA AND DOCUSATE SODIUM 50; 8.6 MG/1; MG/1
1 TABLET, FILM COATED ORAL 2 TIMES DAILY
Status: DISCONTINUED | OUTPATIENT
Start: 2024-06-17 | End: 2024-06-17 | Stop reason: HOSPADM

## 2024-06-17 RX ORDER — ONDANSETRON 2 MG/ML
INJECTION INTRAMUSCULAR; INTRAVENOUS PRN
Status: DISCONTINUED | OUTPATIENT
Start: 2024-06-17 | End: 2024-06-17 | Stop reason: SDUPTHER

## 2024-06-17 RX ORDER — FAMOTIDINE 20 MG/1
20 TABLET, FILM COATED ORAL DAILY
Status: DISCONTINUED | OUTPATIENT
Start: 2024-06-17 | End: 2024-06-17 | Stop reason: HOSPADM

## 2024-06-17 RX ORDER — METOPROLOL TARTRATE 1 MG/ML
INJECTION, SOLUTION INTRAVENOUS PRN
Status: DISCONTINUED | OUTPATIENT
Start: 2024-06-17 | End: 2024-06-17 | Stop reason: SDUPTHER

## 2024-06-17 RX ORDER — MIDAZOLAM HYDROCHLORIDE 2 MG/2ML
2 INJECTION, SOLUTION INTRAMUSCULAR; INTRAVENOUS ONCE
Status: COMPLETED | OUTPATIENT
Start: 2024-06-17 | End: 2024-06-17

## 2024-06-17 RX ORDER — SODIUM CHLORIDE 0.9 % (FLUSH) 0.9 %
5-40 SYRINGE (ML) INJECTION PRN
Status: DISCONTINUED | OUTPATIENT
Start: 2024-06-17 | End: 2024-06-17 | Stop reason: HOSPADM

## 2024-06-17 RX ORDER — ACETAMINOPHEN 325 MG/1
1000 TABLET ORAL
Status: COMPLETED | OUTPATIENT
Start: 2024-06-17 | End: 2024-06-17

## 2024-06-17 RX ORDER — FENTANYL CITRATE 50 UG/ML
100 INJECTION, SOLUTION INTRAMUSCULAR; INTRAVENOUS ONCE
Status: COMPLETED | OUTPATIENT
Start: 2024-06-17 | End: 2024-06-17

## 2024-06-17 RX ORDER — ONDANSETRON 4 MG/1
4 TABLET, ORALLY DISINTEGRATING ORAL EVERY 6 HOURS PRN
Status: DISCONTINUED | OUTPATIENT
Start: 2024-06-17 | End: 2024-06-17 | Stop reason: HOSPADM

## 2024-06-17 RX ORDER — DEXAMETHASONE SODIUM PHOSPHATE 4 MG/ML
8 INJECTION, SOLUTION INTRA-ARTICULAR; INTRALESIONAL; INTRAMUSCULAR; INTRAVENOUS; SOFT TISSUE
Status: DISCONTINUED | OUTPATIENT
Start: 2024-06-17 | End: 2024-06-17 | Stop reason: HOSPADM

## 2024-06-17 RX ORDER — SODIUM CHLORIDE 9 MG/ML
INJECTION, SOLUTION INTRAVENOUS CONTINUOUS
Status: DISCONTINUED | OUTPATIENT
Start: 2024-06-17 | End: 2024-06-17 | Stop reason: HOSPADM

## 2024-06-17 RX ORDER — HYDRALAZINE HYDROCHLORIDE 20 MG/ML
INJECTION INTRAMUSCULAR; INTRAVENOUS PRN
Status: DISCONTINUED | OUTPATIENT
Start: 2024-06-17 | End: 2024-06-17 | Stop reason: SDUPTHER

## 2024-06-17 RX ORDER — KETOROLAC TROMETHAMINE 15 MG/ML
30 INJECTION, SOLUTION INTRAMUSCULAR; INTRAVENOUS EVERY 6 HOURS
Status: DISCONTINUED | OUTPATIENT
Start: 2024-06-17 | End: 2024-06-17 | Stop reason: HOSPADM

## 2024-06-17 RX ORDER — SODIUM CHLORIDE 9 MG/ML
INJECTION, SOLUTION INTRAVENOUS PRN
Status: DISCONTINUED | OUTPATIENT
Start: 2024-06-17 | End: 2024-06-17 | Stop reason: HOSPADM

## 2024-06-17 RX ORDER — CELECOXIB 100 MG/1
200 CAPSULE ORAL
Status: COMPLETED | OUTPATIENT
Start: 2024-06-17 | End: 2024-06-17

## 2024-06-17 RX ORDER — SODIUM CHLORIDE, SODIUM LACTATE, POTASSIUM CHLORIDE, CALCIUM CHLORIDE 600; 310; 30; 20 MG/100ML; MG/100ML; MG/100ML; MG/100ML
INJECTION, SOLUTION INTRAVENOUS CONTINUOUS
Status: DISCONTINUED | OUTPATIENT
Start: 2024-06-17 | End: 2024-06-17 | Stop reason: HOSPADM

## 2024-06-17 RX ORDER — FENTANYL CITRATE 50 UG/ML
50 INJECTION, SOLUTION INTRAMUSCULAR; INTRAVENOUS EVERY 5 MIN PRN
Status: DISCONTINUED | OUTPATIENT
Start: 2024-06-17 | End: 2024-06-17 | Stop reason: HOSPADM

## 2024-06-17 RX ORDER — LIDOCAINE HYDROCHLORIDE 20 MG/ML
INJECTION, SOLUTION EPIDURAL; INFILTRATION; INTRACAUDAL; PERINEURAL PRN
Status: DISCONTINUED | OUTPATIENT
Start: 2024-06-17 | End: 2024-06-17 | Stop reason: SDUPTHER

## 2024-06-17 RX ORDER — PROPOFOL 10 MG/ML
INJECTION, EMULSION INTRAVENOUS PRN
Status: DISCONTINUED | OUTPATIENT
Start: 2024-06-17 | End: 2024-06-17 | Stop reason: SDUPTHER

## 2024-06-17 RX ORDER — OXYCODONE HYDROCHLORIDE 10 MG/1
10 TABLET ORAL
Status: DISCONTINUED | OUTPATIENT
Start: 2024-06-17 | End: 2024-06-17 | Stop reason: HOSPADM

## 2024-06-17 RX ORDER — ROCURONIUM BROMIDE 10 MG/ML
INJECTION, SOLUTION INTRAVENOUS PRN
Status: DISCONTINUED | OUTPATIENT
Start: 2024-06-17 | End: 2024-06-17 | Stop reason: SDUPTHER

## 2024-06-17 RX ORDER — POLYETHYLENE GLYCOL 3350 17 G/17G
17 POWDER, FOR SOLUTION ORAL DAILY PRN
Status: DISCONTINUED | OUTPATIENT
Start: 2024-06-17 | End: 2024-06-17 | Stop reason: HOSPADM

## 2024-06-17 RX ORDER — FENTANYL CITRATE 50 UG/ML
INJECTION, SOLUTION INTRAMUSCULAR; INTRAVENOUS PRN
Status: DISCONTINUED | OUTPATIENT
Start: 2024-06-17 | End: 2024-06-17 | Stop reason: SDUPTHER

## 2024-06-17 RX ORDER — ROPIVACAINE HYDROCHLORIDE 2 MG/ML
40 INJECTION, SOLUTION EPIDURAL; INFILTRATION; PERINEURAL ONCE
Status: COMPLETED | OUTPATIENT
Start: 2024-06-17 | End: 2024-06-17

## 2024-06-17 RX ORDER — DEXAMETHASONE SODIUM PHOSPHATE 4 MG/ML
INJECTION, SOLUTION INTRA-ARTICULAR; INTRALESIONAL; INTRAMUSCULAR; INTRAVENOUS; SOFT TISSUE PRN
Status: DISCONTINUED | OUTPATIENT
Start: 2024-06-17 | End: 2024-06-17 | Stop reason: SDUPTHER

## 2024-06-17 RX ORDER — ONDANSETRON 2 MG/ML
4 INJECTION INTRAMUSCULAR; INTRAVENOUS EVERY 6 HOURS PRN
Status: DISCONTINUED | OUTPATIENT
Start: 2024-06-17 | End: 2024-06-17 | Stop reason: HOSPADM

## 2024-06-17 RX ORDER — ONDANSETRON 2 MG/ML
4 INJECTION INTRAMUSCULAR; INTRAVENOUS
Status: DISCONTINUED | OUTPATIENT
Start: 2024-06-17 | End: 2024-06-17 | Stop reason: HOSPADM

## 2024-06-17 RX ORDER — GLYCOPYRROLATE 0.2 MG/ML
INJECTION INTRAMUSCULAR; INTRAVENOUS PRN
Status: DISCONTINUED | OUTPATIENT
Start: 2024-06-17 | End: 2024-06-17 | Stop reason: SDUPTHER

## 2024-06-17 RX ORDER — PREGABALIN 75 MG/1
75 CAPSULE ORAL
Status: COMPLETED | OUTPATIENT
Start: 2024-06-17 | End: 2024-06-17

## 2024-06-17 RX ORDER — OXYCODONE HYDROCHLORIDE 5 MG/1
5 TABLET ORAL
Status: DISCONTINUED | OUTPATIENT
Start: 2024-06-17 | End: 2024-06-17 | Stop reason: HOSPADM

## 2024-06-17 RX ORDER — ROPIVACAINE HYDROCHLORIDE 2 MG/ML
INJECTION, SOLUTION EPIDURAL; INFILTRATION; PERINEURAL
Status: COMPLETED | OUTPATIENT
Start: 2024-06-17 | End: 2024-06-17

## 2024-06-17 RX ORDER — NALOXONE HYDROCHLORIDE 0.4 MG/ML
INJECTION, SOLUTION INTRAMUSCULAR; INTRAVENOUS; SUBCUTANEOUS PRN
Status: DISCONTINUED | OUTPATIENT
Start: 2024-06-17 | End: 2024-06-17 | Stop reason: HOSPADM

## 2024-06-17 RX ORDER — ASPIRIN 81 MG/1
81 TABLET ORAL 2 TIMES DAILY
Status: DISCONTINUED | OUTPATIENT
Start: 2024-06-18 | End: 2024-06-17 | Stop reason: HOSPADM

## 2024-06-17 RX ORDER — TAMSULOSIN HYDROCHLORIDE 0.4 MG/1
0.4 CAPSULE ORAL
Status: COMPLETED | OUTPATIENT
Start: 2024-06-17 | End: 2024-06-17

## 2024-06-17 RX ORDER — ACETAMINOPHEN 500 MG
1000 TABLET ORAL EVERY 6 HOURS
Status: DISCONTINUED | OUTPATIENT
Start: 2024-06-17 | End: 2024-06-17 | Stop reason: HOSPADM

## 2024-06-17 RX ORDER — SUCCINYLCHOLINE/SOD CL,ISO/PF 100 MG/5ML
SYRINGE (ML) INTRAVENOUS PRN
Status: DISCONTINUED | OUTPATIENT
Start: 2024-06-17 | End: 2024-06-17 | Stop reason: SDUPTHER

## 2024-06-17 RX ADMIN — SODIUM CHLORIDE, POTASSIUM CHLORIDE, SODIUM LACTATE AND CALCIUM CHLORIDE: 600; 310; 30; 20 INJECTION, SOLUTION INTRAVENOUS at 07:20

## 2024-06-17 RX ADMIN — SODIUM CHLORIDE, PRESERVATIVE FREE 10 ML: 5 INJECTION INTRAVENOUS at 11:48

## 2024-06-17 RX ADMIN — ONDANSETRON 4 MG: 2 INJECTION INTRAMUSCULAR; INTRAVENOUS at 08:15

## 2024-06-17 RX ADMIN — FENTANYL CITRATE 50 MCG: 50 INJECTION, SOLUTION INTRAMUSCULAR; INTRAVENOUS at 07:06

## 2024-06-17 RX ADMIN — TAMSULOSIN HYDROCHLORIDE 0.4 MG: 0.4 CAPSULE ORAL at 06:17

## 2024-06-17 RX ADMIN — ROCURONIUM BROMIDE 30 MG: 50 INJECTION INTRAVENOUS at 07:45

## 2024-06-17 RX ADMIN — FENTANYL CITRATE 50 MCG: 50 INJECTION INTRAMUSCULAR; INTRAVENOUS at 07:54

## 2024-06-17 RX ADMIN — WATER 2000 MG: 1 INJECTION INTRAMUSCULAR; INTRAVENOUS; SUBCUTANEOUS at 14:28

## 2024-06-17 RX ADMIN — TRANEXAMIC ACID 1000 MG: 100 INJECTION, SOLUTION INTRAVENOUS at 08:56

## 2024-06-17 RX ADMIN — METOPROLOL TARTRATE 2 MG: 5 INJECTION, SOLUTION INTRAVENOUS at 08:12

## 2024-06-17 RX ADMIN — SUGAMMADEX 200 MG: 100 INJECTION, SOLUTION INTRAVENOUS at 09:10

## 2024-06-17 RX ADMIN — DEXAMETHASONE SODIUM PHOSPHATE 8 MG: 4 INJECTION INTRA-ARTICULAR; INTRALESIONAL; INTRAMUSCULAR; INTRAVENOUS; SOFT TISSUE at 07:47

## 2024-06-17 RX ADMIN — HYDRALAZINE HYDROCHLORIDE 10 MG: 20 INJECTION, SOLUTION INTRAMUSCULAR; INTRAVENOUS at 07:35

## 2024-06-17 RX ADMIN — LIDOCAINE HYDROCHLORIDE 100 MG: 20 INJECTION, SOLUTION EPIDURAL; INFILTRATION; INTRACAUDAL; PERINEURAL at 07:30

## 2024-06-17 RX ADMIN — GLYCOPYRROLATE 0.2 MG: 0.2 INJECTION, SOLUTION INTRAMUSCULAR; INTRAVENOUS at 07:18

## 2024-06-17 RX ADMIN — CELECOXIB 200 MG: 100 CAPSULE ORAL at 06:17

## 2024-06-17 RX ADMIN — ROCURONIUM BROMIDE 10 MG: 50 INJECTION INTRAVENOUS at 07:30

## 2024-06-17 RX ADMIN — FENTANYL CITRATE 50 MCG: 50 INJECTION INTRAMUSCULAR; INTRAVENOUS at 07:40

## 2024-06-17 RX ADMIN — ACETAMINOPHEN 1000 MG: 500 TABLET ORAL at 11:38

## 2024-06-17 RX ADMIN — BUPIVACAINE: 13.3 INJECTION, SUSPENSION, LIPOSOMAL INFILTRATION at 08:36

## 2024-06-17 RX ADMIN — FAMOTIDINE 20 MG: 20 TABLET ORAL at 11:41

## 2024-06-17 RX ADMIN — DEXMEDETOMIDINE HYDROCHLORIDE 10 MCG: 100 INJECTION, SOLUTION INTRAVENOUS at 07:18

## 2024-06-17 RX ADMIN — PROPOFOL 200 MG: 10 INJECTION, EMULSION INTRAVENOUS at 07:30

## 2024-06-17 RX ADMIN — ROPIVACAINE HYDROCHLORIDE 30 ML: 2 INJECTION EPIDURAL; INFILTRATION; PERINEURAL at 07:08

## 2024-06-17 RX ADMIN — LIDOCAINE HYDROCHLORIDE 2 ML: 10 INJECTION, SOLUTION EPIDURAL; INFILTRATION; INTRACAUDAL; PERINEURAL at 07:07

## 2024-06-17 RX ADMIN — Medication 160 MG: at 07:30

## 2024-06-17 RX ADMIN — WATER 3000 MG: 1 INJECTION, SOLUTION INTRAMUSCULAR; INTRAVENOUS; SUBCUTANEOUS at 07:39

## 2024-06-17 RX ADMIN — SENNOSIDES AND DOCUSATE SODIUM 1 TABLET: 50; 8.6 TABLET ORAL at 11:43

## 2024-06-17 RX ADMIN — SPIRONOLACTONE 100 MG: 25 TABLET ORAL at 11:41

## 2024-06-17 RX ADMIN — PREGABALIN 75 MG: 75 CAPSULE ORAL at 06:17

## 2024-06-17 RX ADMIN — OXYCODONE HYDROCHLORIDE 5 MG: 5 TABLET ORAL at 14:17

## 2024-06-17 RX ADMIN — TRANEXAMIC ACID 1000 MG: 100 INJECTION, SOLUTION INTRAVENOUS at 07:53

## 2024-06-17 RX ADMIN — ROPIVACAINE HYDROCHLORIDE 20 ML: 2 INJECTION, SOLUTION EPIDURAL; INFILTRATION at 07:02

## 2024-06-17 RX ADMIN — MIDAZOLAM 2 MG: 1 INJECTION INTRAMUSCULAR; INTRAVENOUS at 07:06

## 2024-06-17 RX ADMIN — ACETAMINOPHEN 325MG 975 MG: 325 TABLET ORAL at 06:16

## 2024-06-17 ASSESSMENT — PAIN DESCRIPTION - LOCATION
LOCATION: KNEE

## 2024-06-17 ASSESSMENT — PAIN DESCRIPTION - DESCRIPTORS
DESCRIPTORS: ACHING

## 2024-06-17 ASSESSMENT — PAIN SCALES - GENERAL
PAINLEVEL_OUTOF10: 3
PAINLEVEL_OUTOF10: 3
PAINLEVEL_OUTOF10: 0
PAINLEVEL_OUTOF10: 0
PAINLEVEL_OUTOF10: 5

## 2024-06-17 ASSESSMENT — PAIN DESCRIPTION - FREQUENCY
FREQUENCY: INTERMITTENT

## 2024-06-17 ASSESSMENT — PAIN - FUNCTIONAL ASSESSMENT
PAIN_FUNCTIONAL_ASSESSMENT: ACTIVITIES ARE NOT PREVENTED
PAIN_FUNCTIONAL_ASSESSMENT: 0-10
PAIN_FUNCTIONAL_ASSESSMENT: ACTIVITIES ARE NOT PREVENTED

## 2024-06-17 ASSESSMENT — PAIN DESCRIPTION - ORIENTATION
ORIENTATION: LEFT

## 2024-06-17 ASSESSMENT — PAIN DESCRIPTION - PAIN TYPE
TYPE: SURGICAL PAIN

## 2024-06-17 ASSESSMENT — PAIN DESCRIPTION - ONSET
ONSET: GRADUAL

## 2024-06-17 NOTE — CARE COORDINATION
06/17/24 1320   Discharge Planning   Living Arrangements Children   Support Systems Children   Potential Assistance Needed Durable Medical Equipment  (RW)   Potential Assistance Purchasing Medications No   Potential DME Needed Walker   Type of Home Care Services None   Patient expects to be discharged to: House   Expected Discharge Date 06/17/24     EDUAR Gary

## 2024-06-17 NOTE — HOME CARE
Received home health referral for [facility] for PT,Hay Knee protocol . Discharge order noted for today; spoke to patient in room, Verified demographics,explained HH services ,answered all questions and provided patient with [facility] contact card ;  Patient states she  has DME: cane and a RW has been provided to her by  thru [employer] prior to discharge; HH referral processed to [facility] central Intake and scheduling .NEDA DUCKWORTH.

## 2024-06-17 NOTE — ANESTHESIA PROCEDURE NOTES
Peripheral Block    Patient location during procedure: holding area  Reason for block: post-op pain management and at surgeon's request  Start time: 6/17/2024 7:02 AM  End time: 6/17/2024 7:12 AM  Staffing  Performed: anesthesiologist   Anesthesiologist: Jarad Ponce Jr., MD  Performed by: Jarad Ponce Jr., MD  Authorized by: Jarad Ponce Jr., MD    Preanesthetic Checklist  Completed: patient identified, IV checked, site marked, risks and benefits discussed, surgical/procedural consents, equipment checked, pre-op evaluation, timeout performed, anesthesia consent given, oxygen available, monitors applied/VS acknowledged, fire risk safety assessment completed and verbalized and blood product R/B/A discussed and consented  Peripheral Block   Patient position: supine  Prep: ChloraPrep  Provider prep: mask and sterile gloves  Patient monitoring: cardiac monitor, continuous pulse ox, frequent blood pressure checks, IV access, oxygen and responsive to questions  Block type: Femoral  Adductor canal  Laterality: left  Injection technique: single-shot  Guidance: nerve stimulator and ultrasound guided  Local infiltration: lidocaine  Infiltration strength: 1 %  Local infiltration: lidocaine  Dose: 1 mL    Needle   Needle type: insulated echogenic nerve stimulator needle   Needle gauge: 22 G  Needle localization: anatomical landmarks, nerve stimulator and ultrasound guidance  Test dose: negative  Needle length: 8 cm  Assessment   Injection assessment: negative aspiration for heme, no paresthesia on injection, local visualized surrounding nerve on ultrasound and no intravascular symptoms  Paresthesia pain: none  Slow fractionated injection: yes  Hemodynamics: stable  Outcomes: uncomplicated    Medications Administered  ropivacaine (NAROPIN) injection 0.2% - Perineural   20 mL - 6/17/2024 7:02:00 AM

## 2024-06-17 NOTE — PERIOP NOTE
Patient /Family /Designee has been informed that [facility] is not responsible for patient belongings per policy and the signed [facility] Patient Agreement document.  Personal items should be sent home or checked in with security.  Patient /Family /Designee selected the following action:                            [x]  Send personal items home with a family member or friend                                                 []  Check in personal items with security, excluding clothing                            []  Maintain personal items at the bedside, against recommendation                                 by Ralph Ramirez [facility]

## 2024-06-17 NOTE — CARE COORDINATION
DANA ordered RW from [employer], it was delivered to patient via consignment closet.       Sam Pacheco, MSW     [phone]

## 2024-06-17 NOTE — PROGRESS NOTES
Discharged instruction read. Patient verbalized understanding.  
OCCUPATIONAL THERAPY EVALUATION/DISCHARGE    Patient: Jessica Snyder (53 y.o. female)  Date: 6/17/2024  Primary Diagnosis: Osteoarthritis of left knee, unspecified osteoarthritis type [M17.12]  Arthritis of knee [M17.10]  Procedure(s) (LRB):  LEFT TOTAL KNEE REPLACEMENT (Left) Day of Surgery   Precautions: Weight Bearing, Fall Risk,  , Left Lower Extremity Weight Bearing: Weight Bearing As Tolerated,  ,  ,  ,  ,    PLOF: Pt was independent with self-care and functional mobility.      ASSESSMENT AND RECOMMENDATIONS:  Pt cleared to participate in OT evaluation by RN. Pt seated in chair, alert, and agreeable to participate with daughter present. Pt educated on weight-bearing status, importance of ice, towel rolled underneath ankle, and safety during this admission/ around the house. Pt is independent - supervision with basic self-care and able to dress self for d/c home. Patient supervision with functional transfers using rolling walker in preparation for ADLs. Based on the objective data described below, pt presents with no deficits that impede pt function with ADLs, functional transfers, and functional mobility in preparation for selfcare tasks.  At this time pt is safe to d/c home with family support from selfcare standpoint. Pt left all needs met and call bell in reach.      Maximum therapeutic gains met at current level of care and patient will be discharged from occupational therapy at this time.    Further Equipment Recommendations for Discharge: rolling walker (Issued)    AMPA: AM-PAC Inpatient Daily Activity Raw Score: 22    Current research shows that an AM-PAC score of 18 or greater is associated with a discharge to the patient's home setting.    This AMPAC score should be considered in conjunction with interdisciplinary team recommendations to determine the most appropriate discharge setting. Patient's social support, diagnosis, medical stability, and prior level of function should also be taken into 
Pt verified to call Bolivar Osorio for periop updates and ride home at [phone]  
Received patient from PACU post left knee replacement. Patient assisted from stretcher to chair. Patient drowsy but Aox4, vital signs stable, patient oriented to room and call bell. All questions and concerns answered.    
arrangements for a responsible adult (18 years or older) to be with you for 24 hours after your surgery.   17. ONE VISITOR will be allowed in the waiting area during your surgery.  Exceptions may be made for surgical admissions, per nursing unit guidelines      Special Instructions:      Bring a list of CURRENT medications.  Follow instructions from the office regarding Blood Thinners and/or Insulin  Follow instructions from the office regarding medications to take the morning of surgery.   Bring inhaler.  Bring CPAP machine.  Complete bowel prep per MD instructions.     If you have a history of recreational drug use, you may be required to submit a urine sample for drug testing the day of your procedure, as some recreational drugs can interact with anesthetics and increase your surgical risk.    On day of surgery if you are running late, unable to make procedure time, or sick, please call the Pre-op department at [phone]    
Training  Bed Mobility Training: No  Transfers:     Transfer Training  Transfer Training: Yes  Sit to Stand: Supervision  Stand to Sit: Supervision  Balance:               Balance  Sitting: Intact  Standing: Impaired;With support  Standing - Static: Good  Standing - Dynamic: Good          Ambulation/Gait Training:                       Gait  Gait Training: Yes  Left Side Weight Bearing: As tolerated  Right Side Weight Bearing: As tolerated  Overall Level of Assistance: Stand-by assistance  Distance (ft): 250 Feet  Assistive Device: Walker, rolling  Interventions: Safety awareness training;Weight shifting training/pressure relief  Base of Support: Widened  Speed/Batool: Slow;Shuffled  Step Length: Right shortened;Left shortened  Stance: Left decreased  Gait Abnormalities: Antalgic;Decreased step clearance  Rail Use: Both  Stairs - Level of Assistance: Stand-by assistance  Number of Stairs Trained: 4                 Therapeutic Exercises/Neuromuscular Re-education:   Seated heel slide: x 4  Quad set: x 4  Pain:  Intensity Pre-treatment: 5/10   Intensity Post-treatment: 5/10  Scale: Numeric Rating Scale  Location: Left Knee  Quality: Aching  Intervention(s): Nurse notified, Repositioning , Ice, and Rest      Activity Tolerance:   Activity Tolerance: Patient tolerated evaluation without incident  Please refer to the flowsheet for vital signs taken during this treatment.    After treatment:   [x]         Patient left in no apparent distress sitting up in chair  []         Patient left in no apparent distress in bed  [x]         Call bell left within reach  [x]         Nursing notified  [x]         Family present  []         Bed alarm activated  []         SCDs applied    COMMUNICATION/EDUCATION:   Patient Education  Education Given To: Patient;Family  Education Provided: Role of Therapy;Plan of Care;Home Exercise Program;Precautions;Transfer Training;Energy Conservation;Fall Prevention Strategies;Equipment  Education

## 2024-06-17 NOTE — ANESTHESIA PRE PROCEDURE
Department of Anesthesiology  Preprocedure Note       Name:  Jessica Snyder   Age:  53 y.o.  :  1970                                          MRN:  [id number]         Date:  2024      Surgeon: Surgeon(s):  Bill Guido MD    Procedure: Procedure(s):  LEFT TOTAL KNEE ARTHROPLASTY; MÉNDEZ TO ASSIST [SMITH&NEPHEW ORTHO]; NERVE BLOCK; 23 HR    Medications prior to admission:   Prior to Admission medications    Medication Sig Start Date End Date Taking? Authorizing Provider   oxyCODONE (ROXICODONE) 5 MG immediate release tablet Take 1 tablet by mouth every 4 hours as needed for Pain for up to 7 days. Supervising provider- Dr. Bill Guido- MARIO ALBERTO: [id number] Max Daily Amount: 30 mg 24  Jethro Méndez PA-C   celecoxib (CELEBREX) 200 MG capsule Take 1 capsule by mouth 2 times daily 24   Jethro Méndez PA-C   aspirin (ASPIRIN 81) 81 MG EC tablet Take 1 tablet by mouth in the morning and at bedtime 24   Jethro Méndez PA-C   docusate sodium (COLACE) 100 MG capsule Take 1 capsule by mouth 2 times daily 24  Jethro Méndez PA-C   cefadroxil (DURICEF) 500 MG capsule Take 1 capsule by mouth 2 times daily for 5 days 24  Jethro Méndez PA-C   ondansetron (ZOFRAN) 4 MG tablet Take 1 tablet by mouth every 8 hours as needed for Nausea or Vomiting 24   Jethro Méndez PA-C   meloxicam (MOBIC) 15 MG tablet Take 1 tablet by mouth daily 3/18/24   Germain Islas MD   acetaminophen (TYLENOL) 500 MG tablet Take 2 tablets by mouth every 6 hours as needed for Pain    Automatic Reconciliation, Ar   ammonium lactate (LAC-HYDRIN) 12 % lotion rub in to affected area well twice a day 3/9/22   Automatic Reconciliation, Ar   spironolactone (ALDACTONE) 100 MG tablet Take 1 tablet by mouth daily Indications: skin    Automatic Reconciliation, Ar       Current medications:    Current Facility-Administered Medications   Medication Dose Route Frequency Provider Last Rate Last Admin

## 2024-06-17 NOTE — BRIEF OP NOTE
ORTHOPAEDICS-WD [id number] Left 1 Implanted   IMPLANT PATELLAR [id number] X3 ASYMMETRIC TRIATHLON - [id number]  IMPLANT PATELLAR [id number] X3 ASYMMETRIC TRIATHLON  [employer] [facility]-WD [id number] Left 1 Implanted   INSERT TIB SZ 3-4 [id number] KNEE XLPE POST STBL HI FLX LEGION - [id number]  INSERT TIB SZ 3-4 [id number] KNEE XLPE POST STBL HI FLX LEGION  [employer]-WD [id number] Left 1 Implanted         Drains: * No LDAs found *    Findings:  Infection Present At Time Of Surgery (PATOS) (choose all levels that have infection present):  No infection present  Other Findings: same    Electronically signed by DEE BRYAN MD on 6/17/2024 at 8:59 AM

## 2024-06-17 NOTE — ANESTHESIA POSTPROCEDURE EVALUATION
Department of Anesthesiology  Postprocedure Note    Patient: Jessica Snyder  MRN: [id number]  YOB: 1970  Date of evaluation: 6/17/2024    Procedure Summary       Date: 06/17/24 Room / Location: [facility] MAIN 07 / [facility] MAIN OR    Anesthesia Start: 0724 Anesthesia Stop: 0927    Procedure: LEFT TOTAL KNEE REPLACEMENT (Left: Knee) Diagnosis:       Osteoarthritis of left knee, unspecified osteoarthritis type      (Osteoarthritis of left knee, unspecified osteoarthritis type [M17.12])    Surgeons: Bill Guido MD Responsible Provider: Jarad Ponce Jr., MD    Anesthesia Type: General ASA Status: 3            Anesthesia Type: General    French Phase I: French Score: 9    French Phase II:      Anesthesia Post Evaluation    Patient location during evaluation: bedside  Airway patency: patent  Cardiovascular status: hemodynamically stable  Respiratory status: acceptable  Hydration status: stable  Pain management: adequate    No notable events documented.

## 2024-06-17 NOTE — DISCHARGE SUMMARY
Pain      ammonium lactate (LAC-HYDRIN) 12 % lotion rub in to affected area well twice a day      spironolactone (ALDACTONE) 100 MG tablet Take 1 tablet by mouth daily Indications: skin           STOP taking these medications       meloxicam (MOBIC) 15 MG tablet Comments:   Reason for Stopping:               Discharge Plan:  The patient will be d/c'd to home, total knee protocol, WBAT.  They will have HH PT and nursing.  Total joint protocol.  Pt safe for homebound transfer, after being cleared by PT, sp Total joint replacement.  A walker, bedside commode, and shower chair will be utilized for ADL's.  Follow up with Dr. Guido in 14 days.  Call with any questions or concerns.

## 2024-06-17 NOTE — INTERVAL H&P NOTE
Update History & Physical    The patient's History and Physical of June 17, 2024 was reviewed with the patient and I examined the patient. There was no change. The surgical site was confirmed by the patient and me.     Plan: The risks, benefits, expected outcome, and alternative to the recommended procedure have been discussed with the patient. Patient understands and wants to proceed with the procedure.     Electronically signed by DEE BRYAN MD on 6/17/2024 at 6:52 AM

## 2024-06-18 ENCOUNTER — HOME CARE VISIT (OUTPATIENT)
Age: 54
End: 2024-06-18
Payer: COMMERCIAL

## 2024-06-18 VITALS
OXYGEN SATURATION: 99 % | SYSTOLIC BLOOD PRESSURE: 128 MMHG | RESPIRATION RATE: 16 BRPM | TEMPERATURE: 97.7 F | DIASTOLIC BLOOD PRESSURE: 64 MMHG | HEART RATE: 63 BPM

## 2024-06-18 PROCEDURE — G0151 HHCP-SERV OF PT,EA 15 MIN: HCPCS

## 2024-06-18 ASSESSMENT — ENCOUNTER SYMPTOMS
PAIN LOCATION - PAIN QUALITY: TIGHTNESS
DYSPNEA ACTIVITY LEVEL: AFTER AMBULATING MORE THAN 20 FT

## 2024-06-18 NOTE — HOME HEALTH
PMHx: H+P per hospital 6/17/24 Primary Dx:left Orthopedic / Rheumatologic: Total Knee Replacement  Secondary Dx: Etiological Diagnoses: none    HPI:  Pt has end stage OA of their left knee and had failed conservative treatment.  Due to the current findings and affected activity of daily living surgical intervention is indicated.  The alternatives, risks, complications as well as expected outcome were discussed, the patient understands and wishes to proceed with surgery  List of Comorbidities:   •          ADD (attention deficit disorder)        •          Arthritis                        knees, R shoulder  •          Depression       •          Left foot pain    •          Plantar fasciitis, left     •          Right shoulder pain      SUBJECTIVE: I am doing well. I have not had any real pain at all. Discussed with pt that her pain block might wear off today and she might have increaed pain today, but to cont to ice and take pain meds as prescribed   LIVING SITUATION: Pt lives with daughters in a single level home with 1 small step to enter without HR   REQUIRES CAREGIVER ASSISTANCE FOR: transportation, medications, ADLS, IADLS   PLOF: Pt was I with amb without . Pt was I with dressing and bathing   MEDICATIONS REVIEWED AND RECONCILED  Medications reconciled. All medications are available in the home this visit.  The following education was provided regarding medications, medication interactions, and look alike medications.  Medications are effective at this time.  no severe interactions noted. Pt educated to take Oxycodone as prescribed. Instructed patient/caregiver to take exact amount of narcotics prescribed, signs and symptoms of oversedation, notify PT if oversedated.  May cause constipation, notify SN/PT if no BM x 3 days. Pt educated to take Aspirin as prescribed. Instructed patient/caregiver to notify /PT of any signs and symptoms of antiplatelet adverse effects including SOB, bleeding longer/heavier with

## 2024-06-19 ENCOUNTER — HOME CARE VISIT (OUTPATIENT)
Age: 54
End: 2024-06-19
Payer: COMMERCIAL

## 2024-06-19 PROCEDURE — G0157 HHC PT ASSISTANT EA 15: HCPCS

## 2024-06-20 ENCOUNTER — HOME CARE VISIT (OUTPATIENT)
Age: 54
End: 2024-06-20
Payer: COMMERCIAL

## 2024-06-20 ENCOUNTER — TELEPHONE (OUTPATIENT)
Facility: HOSPITAL | Age: 54
End: 2024-06-20

## 2024-06-20 VITALS
TEMPERATURE: 98.3 F | DIASTOLIC BLOOD PRESSURE: 80 MMHG | OXYGEN SATURATION: 98 % | RESPIRATION RATE: 17 BRPM | SYSTOLIC BLOOD PRESSURE: 122 MMHG | HEART RATE: 76 BPM

## 2024-06-20 PROCEDURE — G0157 HHC PT ASSISTANT EA 15: HCPCS

## 2024-06-20 ASSESSMENT — ENCOUNTER SYMPTOMS: PAIN LOCATION - PAIN QUALITY: ACHE

## 2024-06-20 NOTE — TELEPHONE ENCOUNTER
Call placed to patient, ID verified x 2. Patient is s/p left total knee replacement with Dr. Guido, dos 06/17/2024. She denies chest pain, shortness of breath,nausea, vomiting, fever, chills or calf pain. She denies any difficulty with bladder, she is passing gas. She denies any residual numbness to her left lower extremity and states that her pain is well controlled at present. She has been ambulating hourly with her rolling walker and ice hourly to assist with pain and swelling. She reports her dressing as dry and intact with just a few spots on it. Per patient home physical therapy has been out working with her and she continues to work on her HEP. Overall she feels she is doing very well. She has no questions or concerns at this time. She will follow up with Dr. Guido in two weeks or sooner if needed.

## 2024-06-20 NOTE — HOME HEALTH
SUBJECTIVE: Patient states that she is doing well today, notes that she has no new complaints or concerns. Denies any falls or changes in overall status at this time.     CAREGIVER INVOLVEMENT/ASSISTANCE NEEDED FOR: Patient is currently able to assist in the completion of most to all ADL's such as cooking, cleaning, bathing and dressing.     OBJECTIVE:  See interventions.    PATIENT RESPONSE TO TREATMENT:  Pt encouraged by her tolerance to PT today, she was able to participate more in PT than she had previously thought possible. Reported improved confidence following PT today.     PATIENT LEVEL OF UNDERSTANDING OF EDUCATION PROVIDED: Pt provided education on importance of HEP and how often to complete to increase strength and decrease overall stiffness. Educated on signs and sx of infection and steps to take if one were to arise. Educated on importance of decreasing chance and bed sore and to make sure she is performing weight shifting every 1-2 hours.    ASSESSMENT OF PROGRESS TOWARD GOALS: Patient was seen for PT follow up session for LE strengthening, ROM and transfer training. Sit to stands from edge of bed to stance with B UE pushoff required to reach stance. Patient performing with SBA needed for safety of transfer. Patient completed 10 total reps before rest break was required. Patient completed gait training inside of her home today with use of FWW on level surfaces, patient completing two laps around her home >100ft completed with good flud orm demosntrated today. Patient also demonstrating great knee flexion AROM with her tolerance to 97 deg of knee flexion.     HOME EXERCISE PROGRAM: Re-viewed HEP with patient, educated on all exercises to be included and importance of this to help maintain all pogress made in PT thus far. PAtient verbalized understanding and noted that they would remain compliant. All questions answered in regards to HEP.     THE FOLLOWING DISCHARGE PLANNING WAS DISCUSSED WITH THE

## 2024-06-21 ENCOUNTER — HOME CARE VISIT (OUTPATIENT)
Age: 54
End: 2024-06-21
Payer: COMMERCIAL

## 2024-06-21 PROCEDURE — G0157 HHC PT ASSISTANT EA 15: HCPCS

## 2024-06-22 ENCOUNTER — HOME CARE VISIT (OUTPATIENT)
Age: 54
End: 2024-06-22
Payer: COMMERCIAL

## 2024-06-22 PROCEDURE — G0157 HHC PT ASSISTANT EA 15: HCPCS

## 2024-06-23 ENCOUNTER — HOME CARE VISIT (OUTPATIENT)
Age: 54
End: 2024-06-23
Payer: COMMERCIAL

## 2024-06-23 VITALS
SYSTOLIC BLOOD PRESSURE: 106 MMHG | OXYGEN SATURATION: 96 % | HEART RATE: 84 BPM | DIASTOLIC BLOOD PRESSURE: 72 MMHG | RESPIRATION RATE: 17 BRPM | DIASTOLIC BLOOD PRESSURE: 70 MMHG | RESPIRATION RATE: 17 BRPM | OXYGEN SATURATION: 99 % | SYSTOLIC BLOOD PRESSURE: 120 MMHG | HEART RATE: 74 BPM | TEMPERATURE: 97.9 F | TEMPERATURE: 97.4 F

## 2024-06-23 PROCEDURE — G0157 HHC PT ASSISTANT EA 15: HCPCS

## 2024-06-24 ENCOUNTER — HOME CARE VISIT (OUTPATIENT)
Age: 54
End: 2024-06-24
Payer: COMMERCIAL

## 2024-06-24 VITALS
SYSTOLIC BLOOD PRESSURE: 124 MMHG | TEMPERATURE: 98.9 F | OXYGEN SATURATION: 98 % | SYSTOLIC BLOOD PRESSURE: 110 MMHG | HEART RATE: 72 BPM | HEART RATE: 67 BPM | DIASTOLIC BLOOD PRESSURE: 68 MMHG | RESPIRATION RATE: 14 BRPM | TEMPERATURE: 98.8 F | RESPIRATION RATE: 13 BRPM | OXYGEN SATURATION: 98 % | DIASTOLIC BLOOD PRESSURE: 68 MMHG

## 2024-06-24 VITALS
TEMPERATURE: 97.3 F | HEART RATE: 60 BPM | OXYGEN SATURATION: 100 % | SYSTOLIC BLOOD PRESSURE: 108 MMHG | DIASTOLIC BLOOD PRESSURE: 82 MMHG | RESPIRATION RATE: 17 BRPM

## 2024-06-24 PROCEDURE — G0157 HHC PT ASSISTANT EA 15: HCPCS

## 2024-06-24 ASSESSMENT — ENCOUNTER SYMPTOMS
PAIN LOCATION - PAIN QUALITY: SORENESS
PAIN LOCATION - PAIN QUALITY: SORENESS, TIGHTNESS

## 2024-06-25 ENCOUNTER — HOME CARE VISIT (OUTPATIENT)
Age: 54
End: 2024-06-25
Payer: COMMERCIAL

## 2024-06-25 PROCEDURE — G0157 HHC PT ASSISTANT EA 15: HCPCS

## 2024-06-25 NOTE — HOME HEALTH
Subjective: Patient reports that she has been able to complete her given HEP since last visit and has had no major change in symptoms. She complains of difficulty getting comfortable when at rest.     Objective: Skilled home health physical therapy interventions completed today listed in care plan section.  Interventions performed today to assist in return to prior level of function, address deficits as apparent upon time of initial evaluation, return to community and personal activities, and progress further towards goals as previously established in plan of care. There are not any changes to medications upon timing of this visit. Home health supplies were not ordered/delivered today. Visual inspection finds dressing intact with minimal drainage noted into dressing. Adelina's sign negative. Post surgical ecchymosis noted left thigh.     Assessment:  Patient is progressing towards goals as previously established in POC with skilled home health physical therapy services at this time as made apparent by ability to ambulate well outside with no noted loss of balance with fatigue noted upon completion. She was able to complete car transfer training following demonstration and cueing for safe appropriate form but will need review and continued practice due to height of vehicle. She is displaying overall knee flexion active range of motion improvement and quadriceps motor activation improvement. No suspected post surgical infection nor acute DVT process at this point in time. Family/caregiver child and other family/friend involvement is present/set-up at this point in time and assists with meals, transport, and general encouragement.     Plan:  Discharge planning discussed at this visit regarding eventual discharge once patient has met goals and/or met maximum benefit from home health skilled PT services with patient understanding and agreeable at this time. Continued need for skilled home health PT at this time to address

## 2024-06-25 NOTE — HOME HEALTH
Subjective: Patient relays that she is pleased so far with her progress to date but still has knee tightness and discomfort. She states that she has been able to use the cane around the home safely with no falls.     Objective: Skilled home health physical therapy interventions completed today listed in care plan section.  Interventions performed today to assist in return to prior level of function, address deficits as apparent upon time of initial evaluation, return to community and personal activities, and progress further towards goals as previously established in plan of care. There are not any changes to medications upon timing of this visit. Home health supplies were not ordered/delivered today. Visual inspection finds dressing intact with minimal drainage noted into dressing. Post surgical ecchymosis noted left thigh. Adelina's sign negative.     Assessment:  Patient is progressing towards goals as previously established in POC with skilled home health physical therapy services at this time as made apparent by improving overall mobility including ability to use SPC for safe mobilization around the home. No suspected post surgical infection nor acute DVT process at this point in time. General fatigue and soreness reported upon treatment completion today. Family/caregiver child and other family/friend involvement is present/set-up at this point in time and assists with meals, transport, and general encouragement. She was able to complete given exercises today following all provided cueing/training for appropriate form to maximize effectiveness.     Plan:  Discharge planning discussed at this visit regarding eventual discharge once patient has met goals and/or met maximum benefit from home health skilled PT services with patient understanding and agreeable at this time. Continued need for skilled home health PT at this time to address deficits, reduce risk of falls, and obtain goals as previously established per

## 2024-06-25 NOTE — HOME HEALTH
SUBJECTIVE: Patient states that she is doing well today, notes that she has no new complaints or concerns. Denies any falls or changes in overall status at this time.     CAREGIVER INVOLVEMENT/ASSISTANCE NEEDED FOR: Patient is currently able to assist in the completion of most to all ADL's such as cooking, cleaning, bathing and dressing.     OBJECTIVE:  See interventions.    PATIENT RESPONSE TO TREATMENT:  Pt encouraged by her tolerance to PT today, she was able to participate more in PT than she had previously thought possible. Reported improved confidence following PT today.     PATIENT LEVEL OF UNDERSTANDING OF EDUCATION PROVIDED: Pt verbalized understanding of all education provided during session today.     ASSESSMENT OF PROGRESS TOWARD GOALS: Patient was seen for PT follow up session for LE strengthening, ROM and transfer training. Dressing change to left knee performed by Nikita SANDERS. Old dressing removed. Site clean and dry with no drainage visable. Applied new mepilex dressing. See Wound Assessment form for measurements and status of wound. Gait completed outdoors with use of SPC on level surfaces, >300ft completed before rest break was required. Patient showing evident improvement in overall tolerance and distance with her previous most being >200ft.     HOME EXERCISE PROGRAM: Re-viewed HEP with patient, educated on all exercises to be included and importance of this to help maintain all pogress made in PT thus far. PAtient verbalized understanding and noted that they would remain compliant. All questions answered in regards to HEP.     THE FOLLOWING DISCHARGE PLANNING WAS DISCUSSED WITH THE PATIENT/CAREGIVER: Patient to be D/C from  PT when all goals have been met or progressed well towards.     PLAN FOR NEXT VISIT: Cont PT PoC with focus on progression of LE strengthening, improved function and ROM.

## 2024-06-26 ENCOUNTER — HOME CARE VISIT (OUTPATIENT)
Age: 54
End: 2024-06-26
Payer: COMMERCIAL

## 2024-06-26 VITALS
HEART RATE: 84 BPM | SYSTOLIC BLOOD PRESSURE: 130 MMHG | DIASTOLIC BLOOD PRESSURE: 70 MMHG | OXYGEN SATURATION: 97 % | TEMPERATURE: 97.8 F

## 2024-06-26 VITALS
TEMPERATURE: 97 F | HEART RATE: 77 BPM | OXYGEN SATURATION: 98 % | SYSTOLIC BLOOD PRESSURE: 124 MMHG | DIASTOLIC BLOOD PRESSURE: 82 MMHG | RESPIRATION RATE: 17 BRPM

## 2024-06-26 PROCEDURE — G0151 HHCP-SERV OF PT,EA 15 MIN: HCPCS

## 2024-06-26 ASSESSMENT — ENCOUNTER SYMPTOMS: PAIN LOCATION - PAIN QUALITY: DULL ACHING

## 2024-06-26 NOTE — HOME HEALTH
after visual training.  On SOC gait was Pt amb 35ftx 2 on flat level surfaces with reciprocal gait pattern with SBA. Decreased B step length noted. Slower mary ann decreased L knee flexion noted during swing phase of gait. Decreased L HS at inital contact.  Today at Re Assessment gait is Pt amb household distances with SPC with reciprocal gait pattern with S. No balance checks noted with amb. B feet do clear the floor during swing phase of gait. Normal mary ann   Per PTA visit 6/24/24 Gait completed outdoors with use of SPC on level surfaces, >300ft completed before rest break was required. Patient showing evident improvement in overall tolerance and distance with her previous most being >200ft.. On SOC stairs were NA. Today on Re Assessment stairs are  NA todayPer PTA visit 6/23/24 Limited stair training to step to enter/leave the home with SPC for support training for appropriate sequencing needed 25% of the time only today with close stand by assist for safety provided.. On SOC Pt scored 15/28 on Tinetti Balance Assessment placing pt at high  risk for falls. .  Today at Re Assessment Pt scored a 25/28 on Tinetti Balance Assessment placing pt as a low  fall risk.Pt goal met. Pt did have more than a 4 point statistical improvment. Pt goal have been updated. Pt would cont to benefit from HHPT services to cont to work on AROM  and strength L LE. Cont to work on car transfers. Begin gait training without A DEV on flat level surfaces. All to increase pt functional I and return pt to PLOF   PLAN: gait training without A DEV on flat level surfaces. car transfers, stair training  DISCHARGE PLANNING DISCUSSED: Discharge to self and family under MD supervision once all goals have been met or patient has reached max potential. Patient/caregiver verbalized understanding.

## 2024-06-26 NOTE — HOME HEALTH
SUBJECTIVE: Patient states that she is doing well today, notes that she has no new complaints or concerns. Denies any falls or changes in overall status at this time.     CAREGIVER INVOLVEMENT/ASSISTANCE NEEDED FOR: Patient is currently able to assist in the completion of most to all ADL's such as cooking, cleaning, bathing and dressing.     OBJECTIVE:  See interventions.    PATIENT RESPONSE TO TREATMENT:  Pt encouraged by her tolerance to PT today, she was able to participate more in PT than she had previously thought possible. Reported improved confidence following PT today.     PATIENT LEVEL OF UNDERSTANDING OF EDUCATION PROVIDED: Pt verbalized understanding of all education provided during session today.     ASSESSMENT OF PROGRESS TOWARD GOALS: Patient was seen for PT follow up session for LE strengthening, ROM and transfer training. Gait completed outdoors with use of SPC on level and unlevel surfaces patient completing >300ft today showing improved tolerance to overall distance and endurance with completion. Patient performing stairs to enter and exit home with MOD I at this time. Performing with use of SPC and without cuing required for proper and safe sequencing.     HOME EXERCISE PROGRAM: Re-viewed HEP with patient, educated on all exercises to be included and importance of this to help maintain all pogress made in PT thus far. PAtient verbalized understanding and noted that they would remain compliant. All questions answered in regards to HEP.     THE FOLLOWING DISCHARGE PLANNING WAS DISCUSSED WITH THE PATIENT/CAREGIVER: Patient to be D/C from  PT when all goals have been met or progressed well towards.     PLAN FOR NEXT VISIT: Cont PT PoC with focus on progression of LE strengthening, improved function and ROM.

## 2024-06-27 ENCOUNTER — HOME CARE VISIT (OUTPATIENT)
Age: 54
End: 2024-06-27
Payer: COMMERCIAL

## 2024-06-27 VITALS
TEMPERATURE: 97.7 F | SYSTOLIC BLOOD PRESSURE: 110 MMHG | DIASTOLIC BLOOD PRESSURE: 70 MMHG | RESPIRATION RATE: 17 BRPM | OXYGEN SATURATION: 97 % | HEART RATE: 64 BPM

## 2024-06-27 PROCEDURE — G0157 HHC PT ASSISTANT EA 15: HCPCS

## 2024-06-28 ENCOUNTER — HOME CARE VISIT (OUTPATIENT)
Age: 54
End: 2024-06-28
Payer: COMMERCIAL

## 2024-06-28 PROCEDURE — G0157 HHC PT ASSISTANT EA 15: HCPCS

## 2024-06-29 ENCOUNTER — HOME CARE VISIT (OUTPATIENT)
Age: 54
End: 2024-06-29
Payer: COMMERCIAL

## 2024-06-29 PROCEDURE — G0157 HHC PT ASSISTANT EA 15: HCPCS

## 2024-06-29 ASSESSMENT — ENCOUNTER SYMPTOMS: PAIN LOCATION - PAIN QUALITY: ACHE

## 2024-06-29 NOTE — HOME HEALTH
SUBJECTIVE: Pt reports she is doing well and feeling stronger every day.  CAREGIVER INVOLVEMENT/ASSISTANCE NEEDED FOR: pts dtr and friend assist with needs prn and pts dtr in home during PT session.  .  OBJECTIVE:  See interventions.  PATIENT EDUCATION PROVIDED THIS VISIT: Pt instructed to continue HEP 2x/day and amb with SC in home.  Pt may continue using cold pac to L knee prn for pain relief.  PATIENT RESPONSE TO EDUCATION PROVIDED: Pt reports she uses cold pac throughout the day and does do her HEP 2x/day outside of PT sessions.  PATIENT RESPONSE TO TREATMENT: Pt requires occ rest breaks secondary to c/o fatigue.  Vitals WFL.  .  ASSESSMENT OF PROGRESS TOWARD GOALS: Pt demonstrates good teach back method with HEP and takes breaks prn.  Pt amb outdoors with SC with minimal difficulty with occ cuing to safely manuver around/over obstacles.   .  PLAN FOR NEXT VISIT: Will plan to continue working towards improving LE strength, L knee ROM and gait ability.  THE FOLLOWING DISCHARGE PLANNING WAS DISCUSSED WITH THE PATIENT/CAREGIVER: Pt is scheduled to continue daily through 7/1/24.

## 2024-06-30 ENCOUNTER — HOME CARE VISIT (OUTPATIENT)
Age: 54
End: 2024-06-30
Payer: COMMERCIAL

## 2024-06-30 PROCEDURE — G0157 HHC PT ASSISTANT EA 15: HCPCS

## 2024-07-01 ENCOUNTER — HOME CARE VISIT (OUTPATIENT)
Age: 54
End: 2024-07-01
Payer: COMMERCIAL

## 2024-07-01 VITALS
OXYGEN SATURATION: 98 % | HEART RATE: 69 BPM | RESPIRATION RATE: 13 BRPM | OXYGEN SATURATION: 96 % | TEMPERATURE: 98.9 F | DIASTOLIC BLOOD PRESSURE: 82 MMHG | HEART RATE: 68 BPM | RESPIRATION RATE: 13 BRPM | SYSTOLIC BLOOD PRESSURE: 134 MMHG | TEMPERATURE: 98.8 F | DIASTOLIC BLOOD PRESSURE: 74 MMHG | SYSTOLIC BLOOD PRESSURE: 122 MMHG

## 2024-07-01 VITALS
RESPIRATION RATE: 17 BRPM | DIASTOLIC BLOOD PRESSURE: 78 MMHG | TEMPERATURE: 98.2 F | OXYGEN SATURATION: 98 % | SYSTOLIC BLOOD PRESSURE: 130 MMHG

## 2024-07-01 PROCEDURE — G0151 HHCP-SERV OF PT,EA 15 MIN: HCPCS

## 2024-07-01 ASSESSMENT — ENCOUNTER SYMPTOMS
PAIN LOCATION - PAIN QUALITY: SORENESS, TIGHTNESS
PAIN LOCATION - PAIN QUALITY: DULL ACHING
PAIN LOCATION - PAIN QUALITY: SORENESS, TIGHTNESS

## 2024-07-01 NOTE — HOME HEALTH
SUBJECTIVE: I am still not sleeping well at night. Pt reports that she had a bad day yesterday and took an oxycodone that make her sick to her stomach  REQUIRES CAREGIVER ASSISTANCE FOR: transportation, IADLS   MEDICATIONS REVIEWED AND RECONCILED no changes  NEXT MD APPT: Dr Guido  7/3/24  ROM:R knee in sitting  0-100 degrees   STRENGTH: L hip flexors +4/5 L quads, hamstrings+4/5 DF/PF 5/5  5 x sit to  15.93 sec  WOUNDS: R knee bandge clean dry and intact. No drainage noted no signs or symptoms of infection noted .Pt requested that we not take bandage off today. She repoted that they would be taken off on Wed on her follow up and she reported she wanted to leave them on unitl then. Per dressing change 6/24/24 wound intact stables intact. No signs or symptoms of infection noted   TRANSFERS:sit to stand from couch with  B UE supprot MOD I. sit to stand from kitchen chair x 5 with   GAIT: Pt amb 150 ft on flat level surfaces without A DEV with reciprocal gait pattern MOD I . Normal mary ann and B step length noted. No balance checks with amb and B feet did clear the floor during swing phase of gait. Pt amb 300 ft with SC and S for safety outdoors on various surfaces, occ cuing to increase upright posture.  STAIRS:NA secondary to poor weather. Per PTA visit 6/28/24 Pt amb up/down threshold to enter/exit home with SC, holding onto door frame and S for safety   PATIENT RESPONE TO TX: Pt pain level remained the same throughout tx session   PATIENT LEVEL OF UNDERSTANDING OF EDUCATION PROVIDED Cont to use SPC when amb in public for safety   PATIENT EDUCATION PROVIDED THIS VISIT: safety, HEP, walking, deep breathing,        HEP consisting of:  1. Walking every hour during the day with SPC  Written HEP issued, patient/caregiver verbalized understanding.   Patient is s/p L TKR  and has been treated for ROM, strengthening, gait training, stair training, HEP training, safety training, and balance training. On SOC 6/18/24

## 2024-07-02 NOTE — HOME HEALTH
Next visit is planned discharge with patient aware and agreeable. Patient seems to be looking forward to transition to outpatient therapy and continuing to progress with post surgical rehabilitation.

## 2024-07-02 NOTE — HOME HEALTH
Subjective: Patient reports that she has been having continued soreness and tightness left knee. She states that she is completing her given HEP regularly. She complains of tenderness around left knee.     Objective: Skilled home health physical therapy interventions completed today listed in care plan section.  Interventions performed today to assist in return to prior level of function, address deficits as apparent upon time of initial evaluation, return to community and personal activities, and progress further towards goals as previously established in plan of care. There are not any changes to medications upon timing of this visit. Home health supplies were not ordered/delivered today. Visual inspection finds dressing intact with minimal drainage noted into dressing. Manual therapy techniques myofascial release to left iliotibial band and distal hamstrings. Tenderness to touch palpation sartorius and pes anserine.     Assessment:  Patient is progressing towards goals as previously established in POC with skilled home health physical therapy services at this time as made apparent by improving overall mobility. Decreased tenderness noted post sartorius stretching. Family/caregiver daughters and other family involvement are present/set-up at this point in time and assists with meals, transport, and general encouragement.     Plan:  Discharge planning discussed at this visit regarding eventual discharge once patient has met goals and/or met maximum benefit from home health skilled PT services with patient understanding and agreeable at this time. Continued need for skilled home health PT at this time to address deficits, reduce risk of falls, and obtain goals as previously established per plan of care. Further monitor symptoms and address restrictions. Currently 2 more skilled home health physical therapy visits at this point in time with patient aware and agreeable.

## 2024-07-03 ENCOUNTER — OFFICE VISIT (OUTPATIENT)
Age: 54
End: 2024-07-03
Payer: COMMERCIAL

## 2024-07-03 VITALS — HEIGHT: 62 IN | WEIGHT: 251 LBS | BODY MASS INDEX: 46.19 KG/M2

## 2024-07-03 DIAGNOSIS — Z96.652 STATUS POST TOTAL LEFT KNEE REPLACEMENT: ICD-10-CM

## 2024-07-03 DIAGNOSIS — M17.12 PRIMARY OSTEOARTHRITIS OF LEFT KNEE: Primary | ICD-10-CM

## 2024-07-03 PROCEDURE — 73562 X-RAY EXAM OF KNEE 3: CPT | Performed by: PHYSICIAN ASSISTANT

## 2024-07-03 PROCEDURE — 99024 POSTOP FOLLOW-UP VISIT: CPT | Performed by: PHYSICIAN ASSISTANT

## 2024-07-03 NOTE — PROGRESS NOTES
shaking\"    Hydrocodone-Acetaminophen Other (See Comments)     \" severe N/V, shaking\"       Social History     Socioeconomic History    Marital status:      Spouse name: Not on file    Number of children: Not on file    Years of education: Not on file    Highest education level: Not on file   Occupational History    Not on file   Tobacco Use    Smoking status: Never    Smokeless tobacco: Never   Vaping Use    Vaping Use: Never used   Substance and Sexual Activity    Alcohol use: No    Drug use: No    Sexual activity: Not on file   Other Topics Concern    Not on file   Social History Narrative    Not on file     Social Determinants of Health     Financial Resource Strain: Not on file   Food Insecurity: No Food Insecurity (6/17/2024)    Hunger Vital Sign     Worried About Running Out of Food in the Last Year: Never true     Ran Out of Food in the Last Year: Never true   Transportation Needs: No Transportation Needs (7/1/2024)    OASIS : Transportation     Lack of Transportation (Medical): No     Lack of Transportation (Non-Medical): No     Patient Unable or Declines to Respond: No   Physical Activity: Not on file   Stress: Not on file   Social Connections: Patient Declined (7/3/2024)    Social Connections ([facility] HRSN)     If for any reason you need help with day-to-day activities such as bathing, preparing meals, shopping, managing finances, etc., do you get the help you need?: Not on file   Intimate Partner Violence: Not on file   Housing Stability: Low Risk  (6/17/2024)    Housing Stability Vital Sign     Unable to Pay for Housing in the Last Year: No     Number of Places Lived in the Last Year: 1     Unstable Housing in the Last Year: No       Past Surgical History:   Procedure Laterality Date    COLONOSCOPY      HYSTERECTOMY (CERVIX STATUS UNKNOWN)  2021    KNEE ARTHROSCOPY Left 6/1/12020    left knee arthroscopic partial medial menisectomy    TOTAL KNEE ARTHROPLASTY Left 6/17/2024    LEFT TOTAL KNEE

## 2024-07-11 ENCOUNTER — PATIENT MESSAGE (OUTPATIENT)
Age: 54
End: 2024-07-11

## 2024-07-12 ENCOUNTER — TELEPHONE (OUTPATIENT)
Age: 54
End: 2024-07-12

## 2024-07-12 NOTE — TELEPHONE ENCOUNTER
Informed patient I am waiting for adore swanson to advise on this matter that he was in surgery. I will call after adore swanson gives instructions

## 2024-07-12 NOTE — TELEPHONE ENCOUNTER
Spoke with patient and informed I had not received a response back form adore swanson yet. That it would more than likely be Monday before we got a response. Also stated that my opinion being 4 weeks our from a TLK I would not drive this weekend and to wait until; Monday when we can get a response from provider.

## 2024-07-12 NOTE — TELEPHONE ENCOUNTER
Patient called back about this morning's message.  I did advise that ROULA Pa is still in surgery and advised he would answer her question at the earliest convenience.    Patient can be reached at [phone].

## 2024-07-12 NOTE — TELEPHONE ENCOUNTER
Patient was advised provider in surgery. Patient is 4 weeks out TLK. Will discuss with provider and get back with patient after provider responds

## 2024-07-12 NOTE — TELEPHONE ENCOUNTER
Patient called to follow up on this inquiry - she's very anxious to start driving and would like to know when we think she can.  Please advise patient as soon as possible.

## 2024-07-18 ENCOUNTER — HOSPITAL ENCOUNTER (OUTPATIENT)
Facility: HOSPITAL | Age: 54
Setting detail: RECURRING SERIES
Discharge: HOME | End: 2024-07-21
Payer: COMMERCIAL

## 2024-07-18 PROCEDURE — 97161 PT EVAL LOW COMPLEX 20 MIN: CPT

## 2024-07-18 NOTE — PROGRESS NOTES
[facility]  [address] [place] [address] Ph:[phone] Fx: [phone]    PLAN OF CARE/ Statement of Necessity for Physical Therapy Services           Patient name: Jessica Snyder Start of Care: 2024   Referral source: Jethro Pa PA-C : 1970    Medical Diagnosis: Left knee pain [M25.562]       Onset Date: 2024   Treatment Diagnosis: M25.562  LEFT KNEE PAIN                                      Prior Hospitalization: see medical history Provider#: [id number]   Medications: Verified on Patient Summary List     PLOF: functionally independent, no AD, sedentary lifestyle  Limitations to PLOF: use of SPC; sitting/standing prolonged periods; walking >20 min; climbing steps  PMH/Surgical Hx: Right knee and shoulder arthroscopy   Work Hx:  for [employer]   Living Situation: lives w/ 2 daughters 15 and 21   Pt Goals: \"walking/standing\"    The Plan of Care and following information is based on the information from the initial evaluation.    Assessment / key information:  Patient is a 54 yo female presenting to [facility] with c/o left knee pain. Patient is s/p left knee TKA on 2024. At this time, pt is ambulating with right SPC with antalgic gait. Pt has achieved 0* knee extension and is progressing well with flexion ROM. Pt continues to have significant left LE strength deficits and pain w/ knee flex/ext MMT. Patient presents with TTP of diffuse left knee structures, decreased left knee ROM, decreased global LE strength (left>right), decreased LE flexibility, decreased balance impaired posture, impaired gait mechanics, increased left knee pain, decreased patient reported function, and decreased functional mobility. These impairments decrease patient's ability to perform ADLs including walking, standing, sitting, stair negotiation and participate in occupational tasks.      Patient to 
note/recertification  []  See Discharge Summary         Progress towards goals / Updated goals:  Short Term Goals: To be accomplished in 12 treatments:  1. Patient will be independent and compliant with HEP to progress toward goals and restore functional mobility.   Eval Status: issued at eval    2. Patient will improve pain in left knee to 0/10 to improve ADL and occupational task tolerance and restore prior level of function.  Eval Status: 1/10 at worst    3. Pt will have 4+/5 knee flexion/extension strength to return to goals of ambulation on even and uneven surfaces without use of AD.  Eval Status:   Knee Right Left   Knee Flexion 4+/5 4-/5; pain   Knee Extension 4+/5 4-/5; pain     4. 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    Eval status: use of right SPC with antalgic gait     Long Term Goals: To be accomplished in 24 treatments:  1. Patient will improve LEFS score by 18 points to improve functional tolerance for ambulation on even and uneven surfaces and standing tolerance.  Eval Status: 53/80    2. Pt will have 120* left knee flexion AROM to aid in functional mechanics for ambulation/ADLs.  Eval Status: 0-110* left knee AROM     3. Pt will have 4+/5 left hip flexion strength to return to goals of increased ability negotiate stairs to allow for performance of occupational tasks.  Eval Status: 4-/5 left hip flexion, 4+/5 right hip flexion     4. Pt will demonstrate 15 STS during 30xSTS from standard chair w/o UE use to demonstrate appropriate LE strength for safe home and community ambulation and to decrease falls risk.   Eval Status:  x7, standard chair height, no UE support, inc pain in left knee    PLAN  [x]  Upgrade activities as tolerated     [x]  Continue plan of care  [x]  Update interventions per flow sheet       []  Discharge due to:_  []  Other:_      Elisabeth Smallwood, PT,

## 2024-07-24 ENCOUNTER — OFFICE VISIT (OUTPATIENT)
Age: 54
End: 2024-07-24

## 2024-07-24 DIAGNOSIS — Z96.652 STATUS POST TOTAL LEFT KNEE REPLACEMENT: Primary | ICD-10-CM

## 2024-07-24 PROCEDURE — 99024 POSTOP FOLLOW-UP VISIT: CPT | Performed by: PHYSICIAN ASSISTANT

## 2024-07-24 NOTE — PROGRESS NOTES
Unknown    \"severe N/V shaking\"    Hydrocodone-Acetaminophen Other (See Comments)     \" severe N/V, shaking\"       Social History     Socioeconomic History    Marital status:      Spouse name: Not on file    Number of children: Not on file    Years of education: Not on file    Highest education level: Not on file   Occupational History    Not on file   Tobacco Use    Smoking status: Never    Smokeless tobacco: Never   Vaping Use    Vaping Use: Never used   Substance and Sexual Activity    Alcohol use: No    Drug use: No    Sexual activity: Not on file   Other Topics Concern    Not on file   Social History Narrative    Not on file     Social Determinants of Health     Financial Resource Strain: Not on file   Food Insecurity: No Food Insecurity (6/17/2024)    Hunger Vital Sign     Worried About Running Out of Food in the Last Year: Never true     Ran Out of Food in the Last Year: Never true   Transportation Needs: No Transportation Needs (7/1/2024)    OASIS : Transportation     Lack of Transportation (Medical): No     Lack of Transportation (Non-Medical): No     Patient Unable or Declines to Respond: No   Physical Activity: Not on file   Stress: Not on file   Social Connections: Patient Declined (7/24/2024)    Social Connections ([facility] HRSN)     If for any reason you need help with day-to-day activities such as bathing, preparing meals, shopping, managing finances, etc., do you get the help you need?: Not on file   Intimate Partner Violence: Not on file   Housing Stability: Low Risk  (6/17/2024)    Housing Stability Vital Sign     Unable to Pay for Housing in the Last Year: No     Number of Places Lived in the Last Year: 1     Unstable Housing in the Last Year: No       Past Surgical History:   Procedure Laterality Date    COLONOSCOPY      HYSTERECTOMY (CERVIX STATUS UNKNOWN)  2021    KNEE ARTHROSCOPY Left 6/1/12020    left knee arthroscopic partial medial menisectomy    TOTAL KNEE ARTHROPLASTY Left

## 2024-07-29 ENCOUNTER — HOSPITAL ENCOUNTER (OUTPATIENT)
Facility: HOSPITAL | Age: 54
Setting detail: RECURRING SERIES
Discharge: HOME | End: 2024-08-01
Payer: COMMERCIAL

## 2024-07-29 PROCEDURE — 97016 VASOPNEUMATIC DEVICE THERAPY: CPT

## 2024-07-29 PROCEDURE — 97110 THERAPEUTIC EXERCISES: CPT

## 2024-07-29 PROCEDURE — 97112 NEUROMUSCULAR REEDUCATION: CPT

## 2024-07-29 NOTE — PROGRESS NOTES
PHYSICAL / OCCUPATIONAL THERAPY - DAILY TREATMENT NOTE     Patient Name: Jessica Snyder    Date: 2024    : 1970  Insurance: Payor: VALENTE / Plan: [facility] / Product Type: *No Product type* /      Patient  verified Yes     Visit #   Current / Total 2 24   Time   In / Out 2:29 3:20   Pain   In / Out 0/10 0/10   Subjective Functional Status/Changes: No new complaints. Stiffness with prolonged walking/standing.   Changes to:  Allergies, Med Hx, Sx Hx?   no       TREATMENT AREA =  Left knee pain [M25.562]    If an interpreting service is utilized for treatment of this patient, the contents of this document represent the material reviewed with the patient via the .     OBJECTIVE    Modalities Rationale:     decrease inflammation and decrease pain to improve patient's ability to progress to PLOF and address remaining functional goals.     min [] Estim Unattended, type/location:                                      []  w/ice    []  w/heat    min [] Estim Attended, type/location:                                     []  w/US     []  w/ice    []  w/heat    []  TENS insruct      min []  Mechanical Traction: type/lbs                   []  pro   []  sup   []  int   []  cont    []  before manual    []  after manual    min []  Ultrasound, settings/location:      min []  Iontophoresis w/ dexamethasone, location:                                               []  take home patch       []  in clinic        min  unbilled []  Ice     []  Heat    location/position:     min []  Paraffin,  details:    10 min [x]  Vasopneumatic Device, press/temp: LP/LT - Pt reclined    min []  Whirlpool / Fluido:    If using vaso (only need to measure limb vaso being performed on)      pre-treatment girth : 44cm      post-treatment girth : 44cm      measured at (landmark location) :  mid-patella    min []  Other:    Skin assessment post-treatment (if applicable):    []  intact    []  redness- no adverse

## 2024-07-31 ENCOUNTER — HOSPITAL ENCOUNTER (OUTPATIENT)
Facility: HOSPITAL | Age: 54
Setting detail: RECURRING SERIES
Discharge: HOME | End: 2024-08-03
Payer: COMMERCIAL

## 2024-07-31 PROCEDURE — 97016 VASOPNEUMATIC DEVICE THERAPY: CPT

## 2024-07-31 PROCEDURE — 97110 THERAPEUTIC EXERCISES: CPT

## 2024-07-31 PROCEDURE — 97112 NEUROMUSCULAR REEDUCATION: CPT

## 2024-07-31 NOTE — PROGRESS NOTES
PHYSICAL / OCCUPATIONAL THERAPY - DAILY TREATMENT NOTE     Patient Name: Jessica Snyder    Date: 2024    : 1970  Insurance: Payor: AVLENTE / Plan: [facility] / Product Type: *No Product type* /      Patient  verified Yes     Visit #   Current / Total 3 24   Time   In / Out 8:29 9:20   Pain   In / Out 0/10 0/10   Subjective Functional Status/Changes: No new complaints.   Changes to:  Allergies, Med Hx, Sx Hx?   no       TREATMENT AREA =  Left knee pain [M25.562]    If an interpreting service is utilized for treatment of this patient, the contents of this document represent the material reviewed with the patient via the .     OBJECTIVE    Modalities Rationale:     decrease inflammation and decrease pain to improve patient's ability to progress to PLOF and address remaining functional goals.     min [] Estim Unattended, type/location:                                      []  w/ice    []  w/heat    min [] Estim Attended, type/location:                                     []  w/US     []  w/ice    []  w/heat    []  TENS insruct      min []  Mechanical Traction: type/lbs                   []  pro   []  sup   []  int   []  cont    []  before manual    []  after manual    min []  Ultrasound, settings/location:      min []  Iontophoresis w/ dexamethasone, location:                                               []  take home patch       []  in clinic        min  unbilled []  Ice     []  Heat    location/position:     min []  Paraffin,  details:    10 min [x]  Vasopneumatic Device, press/temp: LP/LT - Pt reclined    min []  Whirlpool / Fluido:    If using vaso (only need to measure limb vaso being performed on)      pre-treatment girth : 45.5cm      post-treatment girth : 45.5cm      measured at (landmark location) :  mid-patella    min []  Other:    Skin assessment post-treatment (if applicable):    []  intact    []  redness- no adverse reaction                 []redness -

## 2024-08-07 ENCOUNTER — HOSPITAL ENCOUNTER (OUTPATIENT)
Facility: HOSPITAL | Age: 54
Setting detail: RECURRING SERIES
Discharge: HOME | End: 2024-08-10
Payer: COMMERCIAL

## 2024-08-07 PROCEDURE — 97110 THERAPEUTIC EXERCISES: CPT

## 2024-08-07 PROCEDURE — 97112 NEUROMUSCULAR REEDUCATION: CPT

## 2024-08-07 PROCEDURE — 97530 THERAPEUTIC ACTIVITIES: CPT

## 2024-08-07 NOTE — PROGRESS NOTES
mild antalgic gait. 7/31/2024      Long Term Goals: To be accomplished in 24 treatments:  1. Patient will improve LEFS score by 18 points to improve functional tolerance for ambulation on even and uneven surfaces and standing tolerance.  Eval Status: 53/80     2. Pt will have 120* left knee flexion AROM to aid in functional mechanics for ambulation/ADLs.  Eval Status: 0-110* left knee AROM    Current: Progressing: left knee AROM flexion:115deg (8/7/24)   3. Pt will have 4+/5 left hip flexion strength to return to goals of increased ability negotiate stairs to allow for performance of occupational tasks.  Eval Status: 4-/5 left hip flexion, 4+/5 right hip flexion      4. Pt will demonstrate 15 STS during 30xSTS from standard chair w/o UE use to demonstrate appropriate LE strength for safe home and community ambulation and to decrease falls risk.   Eval Status:  x7, standard chair height, no UE support, inc pain in left knee      PLAN  Yes  Continue plan of care  []  Upgrade activities as tolerated  []  Discharge due to :  []  Other:    Maribeth Coffman PT    8/7/2024    9:03 AM    Future Appointments   Date Time Provider Department Center   8/7/2024  9:10 AM [facility]   8/9/2024  8:30 AM [facility]   8/12/2024  8:30 AM Juancarlos Landaverde[facility]   8/14/2024  8:30 AM [facility]   8/19/2024  8:30 AM [facility]   8/21/2024  8:30 AM [facility]   8/23/2024  9:20 AM Jethro Pa PA-C [facility]   8/26/2024  8:30 AM [facility]   8/28/2024  8:30 AM [facility]   9/4/2024  8:15 AM [facility]

## 2024-08-09 ENCOUNTER — HOSPITAL ENCOUNTER (OUTPATIENT)
Facility: HOSPITAL | Age: 54
Setting detail: RECURRING SERIES
Discharge: HOME | End: 2024-08-12
Payer: COMMERCIAL

## 2024-08-09 PROCEDURE — 97112 NEUROMUSCULAR REEDUCATION: CPT

## 2024-08-09 PROCEDURE — 97530 THERAPEUTIC ACTIVITIES: CPT

## 2024-08-09 PROCEDURE — 97110 THERAPEUTIC EXERCISES: CPT

## 2024-08-09 PROCEDURE — 97016 VASOPNEUMATIC DEVICE THERAPY: CPT

## 2024-08-09 NOTE — PROGRESS NOTES
PHYSICAL / OCCUPATIONAL THERAPY - DAILY TREATMENT NOTE     Patient Name: Jessica Snyder    Date: 2024    : 1970  Insurance: Payor: VALENTE / Plan: [facility] / Product Type: *No Product type* /      Patient  verified Yes     Visit #   Current / Total 5 24   Time   In / Out 8:30 9:20   Pain   In / Out 0/10 0/10   Subjective Functional Status/Changes: No new complaints, feels knee is getting stronger.   Changes to:  Allergies, Med Hx, Sx Hx?   no       TREATMENT AREA =  Left knee pain [M25.562]    If an interpreting service is utilized for treatment of this patient, the contents of this document represent the material reviewed with the patient via the .     OBJECTIVE    Modalities Rationale:     decrease inflammation and decrease pain to improve patient's ability to progress to PLOF and address remaining functional goals.     min [] Estim Unattended, type/location:                                      []  w/ice    []  w/heat    min [] Estim Attended, type/location:                                     []  w/US     []  w/ice    []  w/heat    []  TENS insruct      min []  Mechanical Traction: type/lbs                   []  pro   []  sup   []  int   []  cont    []  before manual    []  after manual    min []  Ultrasound, settings/location:      min []  Iontophoresis w/ dexamethasone, location:                                               []  take home patch       []  in clinic        min  unbilled []  Ice     []  Heat    location/position:     min []  Paraffin,  details:    10 min [x]  Vasopneumatic Device, press/temp: LP/LT - Pt reclined    min []  Whirlpool / Fluido:    If using vaso (only need to measure limb vaso being performed on)      pre-treatment girth : 45cm      post-treatment girth : 45cm      measured at (landmark location) :  mid-patella    min []  Other:    Skin assessment post-treatment (if applicable):    []  intact    []  redness- no adverse reaction

## 2024-08-12 ENCOUNTER — HOSPITAL ENCOUNTER (OUTPATIENT)
Facility: HOSPITAL | Age: 54
Setting detail: RECURRING SERIES
Discharge: HOME | End: 2024-08-15
Payer: COMMERCIAL

## 2024-08-12 PROCEDURE — 97110 THERAPEUTIC EXERCISES: CPT

## 2024-08-12 PROCEDURE — 97112 NEUROMUSCULAR REEDUCATION: CPT

## 2024-08-12 PROCEDURE — 97016 VASOPNEUMATIC DEVICE THERAPY: CPT

## 2024-08-12 NOTE — PROGRESS NOTES
PHYSICAL / OCCUPATIONAL THERAPY - DAILY TREATMENT NOTE     Patient Name: Jessica Snyder    Date: 2024    : 1970  Insurance: Payor: VALENTE / Plan: [facility] / Product Type: *No Product type* /      Patient  verified Yes     Visit #   Current / Total 6 24   Time   In / Out 0831 0920   Pain   In / Out 0 0   Subjective Functional Status/Changes: Pt reports she does not have pain but continues to experience difficulty with climbing stairs.    Changes to:  Allergies, Med Hx, Sx Hx?   no       TREATMENT AREA =  Left knee pain [M25.562]    If an interpreting service is utilized for treatment of this patient, the contents of this document represent the material reviewed with the patient via the .     OBJECTIVE    Modalities Rationale:     decrease edema, decrease inflammation, and decrease pain to improve patient's ability to progress to PLOF and address remaining functional goals.     min [] Estim Unattended, type/location:                                      []  w/ice    []  w/heat    min [] Estim Attended, type/location:                                     []  w/US     []  w/ice    []  w/heat    []  TENS insruct      min []  Mechanical Traction: type/lbs                   []  pro   []  sup   []  int   []  cont    []  before manual    []  after manual    min []  Ultrasound, settings/location:      min []  Iontophoresis w/ dexamethasone, location:                                               []  take home patch       []  in clinic        min  unbilled []  Ice     []  Heat    location/position:     min []  Paraffin,  details:    10 min [x]  Vasopneumatic Device, press/temp: Left knee in long sitting. LP/LT    min []  Whirlpool / Fluido:    If using vaso (only need to measure limb vaso being performed on)      pre-treatment girth :       post-treatment girth :       measured at (landmark location) :      min []  Other:    Skin assessment post-treatment (if applicable):    []

## 2024-08-14 ENCOUNTER — HOSPITAL ENCOUNTER (OUTPATIENT)
Facility: HOSPITAL | Age: 54
Setting detail: RECURRING SERIES
Discharge: HOME | End: 2024-08-17
Payer: COMMERCIAL

## 2024-08-14 PROCEDURE — 97112 NEUROMUSCULAR REEDUCATION: CPT

## 2024-08-14 PROCEDURE — 97530 THERAPEUTIC ACTIVITIES: CPT

## 2024-08-14 PROCEDURE — 97110 THERAPEUTIC EXERCISES: CPT

## 2024-08-14 NOTE — PROGRESS NOTES
PHYSICAL / OCCUPATIONAL THERAPY - DAILY TREATMENT NOTE     Patient Name: Jessica Snyder    Date: 2024    : 1970  Insurance: Payor: VALENTE / Plan: [facility] / Product Type: *No Product type* /      Patient  verified Yes     Visit #   Current / Total 7 24   Time   In / Out 8:31 9:23   Pain   In / Out 0 0   Subjective Functional Status/Changes: Patient stated knee is good.    Changes to:  Allergies, Med Hx, Sx Hx?   no       TREATMENT AREA =  Left knee pain [M25.562]    If an interpreting service is utilized for treatment of this patient, the contents of this document represent the material reviewed with the patient via the .     OBJECTIVE    Modalities Rationale:     decrease edema, decrease inflammation, and decrease pain to improve patient's ability to progress to PLOF and address remaining functional goals.     min [] Estim Unattended, type/location:                                      []  w/ice    []  w/heat    min [] Estim Attended, type/location:                                     []  w/US     []  w/ice    []  w/heat    []  TENS insruct      min []  Mechanical Traction: type/lbs                   []  pro   []  sup   []  int   []  cont    []  before manual    []  after manual    min []  Ultrasound, settings/location:      min []  Iontophoresis w/ dexamethasone, location:                                               []  take home patch       []  in clinic     10   min  unbilled [x]  Ice     []  Heat    location/position: Left knee/ reclined    min []  Paraffin,  details:     min []  Vasopneumatic Device, press/temp:     min []  Whirlpool / Fluido:    If using vaso (only need to measure limb vaso being performed on)      pre-treatment girth :       post-treatment girth :       measured at (landmark location) :      min []  Other:    Skin assessment post-treatment (if applicable):    []  intact    []  redness- no adverse reaction                 []redness -

## 2024-08-19 ENCOUNTER — HOSPITAL ENCOUNTER (OUTPATIENT)
Facility: HOSPITAL | Age: 54
Setting detail: RECURRING SERIES
Discharge: HOME | End: 2024-08-22
Payer: COMMERCIAL

## 2024-08-19 PROCEDURE — 97140 MANUAL THERAPY 1/> REGIONS: CPT

## 2024-08-19 PROCEDURE — 97112 NEUROMUSCULAR REEDUCATION: CPT

## 2024-08-19 PROCEDURE — 97530 THERAPEUTIC ACTIVITIES: CPT

## 2024-08-19 NOTE — PROGRESS NOTES
[facility]  [address] [facility] [address] [place] [address] - Ph: [phone]   Fx: [phone]    PHYSICAL THERAPY PROGRESS NOTE      Patient name: Jessica Snyder Start of Care: 2024    Referral source: Jethro Pa PA-C : 1970    Medical Diagnosis: Left knee pain [M25.562]  Payor: VALENTE / Plan: [facility] / Product Type: *No Product type* /  Onset Date: 2024     Treatment Diagnosis:  M25.562  LEFT KNEE PAIN       Prior Hospitalization: see medical history Provider#: [id number]   Medications: Verified on Patient summary List   PLOF: functionally independent, no AD, sedentary lifestyle  Limitations to PLOF: use of SPC; sitting/standing prolonged periods; walking >20 min; climbing steps  PMH/Surgical Hx: Right knee and shoulder arthroscopy   Work Hx:  for [employer]   Living Situation: lives w/ 2 daughters 15 and 21   Pt Goals: \"walking/standing\"    Visits from Start of Care: 8    Missed Visits: 0    Goals/Measure of Progress: To be achieved in 12 weeks:    Short Term Goals: To be accomplished in 12 treatments:  1. Patient will be independent and compliant with HEP to progress toward goals and restore functional mobility.   Eval Status: issued at eval  PN: Met, pt reports compliance.  2. Patient will improve pain in left knee to 0/10 to improve ADL and occupational task tolerance and restore prior level of function.  Eval Status: 1/10 at worst  PN: Met; reported 0/10 pain level at worst in the last week   3. Pt will have 4+/5 knee flexion/extension strength to return to goals of ambulation on even and uneven surfaces without use of AD.  Eval Status:   Knee Right Left   Knee Flexion 4+/5 4-/5; pain   Knee Extension 4+/5 4-/5; pain                PN: Met: left knee flexion strength: 5/5; extension: 4+/5   4. Pt will demonstrate ability to walk 100' w/o use of AD and no significant compensations to 
deviation. (2024)   Long Term Goals: To be accomplished in 24 treatments:  1. Patient will improve LEFS score by 18 points to improve functional tolerance for ambulation on even and uneven surfaces and standing tolerance.  Eval Status: 53/80  Current: Met: LEFS: 72/80 (24)  2. Pt will have 120* left knee flexion AROM to aid in functional mechanics for ambulation/ADLs.  Eval Status: 0-110* left knee AROM    Current: Met: left knee AROM flexion:0-120deg (24)   3. Pt will have 4+/5 left hip flexion strength to return to goals of increased ability negotiate stairs to allow for performance of occupational tasks.  Eval Status: 4-/5 left hip flexion, 4+/5 right hip flexion   Current: progressing: Left Hip flexion strength: 4/5 (24)   4. Pt will demonstrate 15 STS during 30xSTS from standard chair w/o UE use to demonstrate appropriate LE strength for safe home and community ambulation and to decrease falls risk.   Eval Status:  x7, standard chair height, no UE support, inc pain in left knee  Current:  progressin sit to stands from standard chair without UE support and denied pain. 2024    PLAN  Yes  Continue plan of care  []  Upgrade activities as tolerated  []  Discharge due to :  []  Other:    Maribeth Coffman PT    2024    8:05 AM    Future Appointments   Date Time Provider Department Center   2024  8:30 AM Maribeth Coffman PT [facility]   2024  8:30 AM Maribeth Coffman PT [facility]   2024  8:30 [facility]   2024  8:30 [facility]   2024  1:40 PM Jethro Pa PA-C [facility]   2024  8:15 AM [facility]

## 2024-08-21 ENCOUNTER — HOSPITAL ENCOUNTER (OUTPATIENT)
Facility: HOSPITAL | Age: 54
Setting detail: RECURRING SERIES
Discharge: HOME | End: 2024-08-24
Payer: COMMERCIAL

## 2024-08-21 PROCEDURE — 97530 THERAPEUTIC ACTIVITIES: CPT

## 2024-08-21 PROCEDURE — 97110 THERAPEUTIC EXERCISES: CPT

## 2024-08-21 PROCEDURE — 97112 NEUROMUSCULAR REEDUCATION: CPT

## 2024-08-21 NOTE — PROGRESS NOTES
PHYSICAL / OCCUPATIONAL THERAPY - DAILY TREATMENT NOTE     Patient Name: Jessica Snyder    Date: 2024    : 1970  Insurance: Payor: VALENTE / Plan: [facility] / Product Type: *No Product type* /      Patient  verified Yes     Visit #   Current / Total 9 24   Time   In / Out 8:35 9:25   Pain   In / Out 0 0   Subjective Functional Status/Changes: Patient reports knee is doing well.    Changes to:  Allergies, Med Hx, Sx Hx?   no       TREATMENT AREA =  Left knee pain [M25.562]    If an interpreting service is utilized for treatment of this patient, the contents of this document represent the material reviewed with the patient via the .     OBJECTIVE    Modalities Rationale:     decrease edema, decrease inflammation, and decrease pain to improve patient's ability to progress to PLOF and address remaining functional goals.     min [] Estim Unattended, type/location:                                      []  w/ice    []  w/heat    min [] Estim Attended, type/location:                                     []  w/US     []  w/ice    []  w/heat    []  TENS insruct      min []  Mechanical Traction: type/lbs                   []  pro   []  sup   []  int   []  cont    []  before manual    []  after manual    min []  Ultrasound, settings/location:      min []  Iontophoresis w/ dexamethasone, location:                                               []  take home patch       []  in clinic        min  unbilled []  Ice     []  Heat    location/position:     min []  Paraffin,  details:    10 min []  Vasopneumatic Device, press/temp: LP/LT    min []  Whirlpool / Fluido:    If using vaso (only need to measure limb vaso being performed on)      pre-treatment girth :       post-treatment girth :       measured at (landmark location) :      min []  Other:    Skin assessment post-treatment (if applicable):    []  intact    []  redness- no adverse reaction                 []redness - adverse

## 2024-08-26 ENCOUNTER — HOSPITAL ENCOUNTER (OUTPATIENT)
Facility: HOSPITAL | Age: 54
Setting detail: RECURRING SERIES
Discharge: HOME | End: 2024-08-29
Payer: COMMERCIAL

## 2024-08-26 PROCEDURE — 97110 THERAPEUTIC EXERCISES: CPT

## 2024-08-26 PROCEDURE — 97112 NEUROMUSCULAR REEDUCATION: CPT

## 2024-08-26 PROCEDURE — 97530 THERAPEUTIC ACTIVITIES: CPT

## 2024-08-26 NOTE — PROGRESS NOTES
PHYSICAL / OCCUPATIONAL THERAPY - DAILY TREATMENT NOTE     Patient Name: Jessica Snyder    Date: 2024    : 1970  Insurance: Payor: VALENTE / Plan: [facility] / Product Type: *No Product type* /      Patient  verified Yes     Visit #   Current / Total 10 24   Time   In / Out 8:30 9:23   Pain   In / Out 0 0   Subjective Functional Status/Changes: Patient reports knee is doing well today. Patient stated that she walked on the beach over the weekend and experienced increase pain in both knees. Patient stated after 4-5 hours left knee pain returned to baseline. Patient stated she is ready to make next PT session her last.     Changes to:  Allergies, Med Hx, Sx Hx?   no       TREATMENT AREA =  Left knee pain [M25.562]    If an interpreting service is utilized for treatment of this patient, the contents of this document represent the material reviewed with the patient via the .     OBJECTIVE    Modalities Rationale:     decrease edema, decrease inflammation, and decrease pain to improve patient's ability to progress to PLOF and address remaining functional goals.     min [] Estim Unattended, type/location:                                      []  w/ice    []  w/heat    min [] Estim Attended, type/location:                                     []  w/US     []  w/ice    []  w/heat    []  TENS insruct      min []  Mechanical Traction: type/lbs                   []  pro   []  sup   []  int   []  cont    []  before manual    []  after manual    min []  Ultrasound, settings/location:      min []  Iontophoresis w/ dexamethasone, location:                                               []  take home patch       []  in clinic     10   min  unbilled [x]  Ice     []  Heat    location/position: Left knee/reclined    min []  Paraffin,  details:     min []  Vasopneumatic Device, press/temp:     min []  Whirlpool / Fluido:    If using vaso (only need to measure limb vaso being performed on)    SPC with antalgic gait  PN: progressing: ambulating without AD, with mild gait deviation.  Current: Progressing: No AD utilized and only slight gait deviation ( 24)   Long Term Goals: To be accomplished in 24 treatments:  1. Patient will improve LEFS score by 18 points to improve functional tolerance for ambulation on even and uneven surfaces and standing tolerance.  Eval Status: 53/80  PN: Met: LEFS: 72/80   2. Pt will have 120* left knee flexion AROM to aid in functional mechanics for ambulation/ADLs.  Eval Status: 0-110* left knee AROM    PN: Met: left knee AROM flexion:0-120deg  3. Pt will have 4+/5 left hip flexion strength to return to goals of increased ability negotiate stairs to allow for performance of occupational tasks.  Eval Status: 4-/5 left hip flexion, 4+/5 right hip flexion   PN: progressing: Left Hip flexion strength: 4/5   Current: No change: left hip flexion strength: 4/5 (24)   4. Pt will demonstrate 15 STS during 30xSTS from standard chair w/o UE use to demonstrate appropriate LE strength for safe home and community ambulation and to decrease falls risk.   Eval Status:  x7, standard chair height, no UE support, inc pain in left knee  PN:  progressin sit to stands from standard chair without UE support and denied pain.     PLAN  Yes  Continue plan of care  []  Upgrade activities as tolerated  []  Discharge due to :  []  Other:    Maribeth Coffman PT    2024    8:38 AM    Future Appointments   Date Time Provider Department Center   2024  8:30 AM [facility]   2024  1:40 PM Jethro Pa PA-C [facility]   2024  8:15 AM [facility] MAMMO 2 [facility]

## 2024-08-28 ENCOUNTER — HOSPITAL ENCOUNTER (OUTPATIENT)
Facility: HOSPITAL | Age: 54
Setting detail: RECURRING SERIES
Discharge: HOME | End: 2024-08-31
Payer: COMMERCIAL

## 2024-08-28 PROCEDURE — 97530 THERAPEUTIC ACTIVITIES: CPT

## 2024-08-28 PROCEDURE — 97112 NEUROMUSCULAR REEDUCATION: CPT

## 2024-08-28 PROCEDURE — 97110 THERAPEUTIC EXERCISES: CPT

## 2024-08-28 NOTE — PROGRESS NOTES
PHYSICAL / OCCUPATIONAL THERAPY - DAILY TREATMENT NOTE     Patient Name: Jessica Snyder    Date: 2024    : 1970  Insurance: Payor: [employer] / Plan: [facility] / Product Type: *No Product type* /      Patient  verified Yes     Visit #   Current / Total 11 24   Time   In / Out 8:30  9:13   Pain   In / Out 0 0   Subjective Functional Status/Changes: Patient stated left knee is doing well, but plans to schedule one more PT appointment so she can see what the MD says about discharging from PT. Patient stated MD appointment is tomorrow.    Changes to:  Allergies, Med Hx, Sx Hx?   no       TREATMENT AREA =  Left knee pain [M25.562]    If an interpreting service is utilized for treatment of this patient, the contents of this document represent the material reviewed with the patient via the .     OBJECTIVE    Modalities Rationale:     patient declined   Therapeutic Procedures:  Tx Min Billable or 1:1 Min (if diff from Tx Min) Procedure, Rationale, Specifics   18 14 [id number] Therapeutic Exercise (timed):  increase ROM, strength, coordination, balance, and proprioception to improve patient's ability to progress to PLOF and address remaining functional goals. (see flow sheet as applicable)    Details if applicable:       15 15 90765 Neuromuscular Re-Education (timed):  improve balance, coordination, kinesthetic sense, posture, core stability and proprioception to improve patient's ability to develop conscious control of individual muscles and awareness of position of extremities in order to progress to PLOF and address remaining functional goals. (see flow sheet as applicable)    Details if applicable:     10 10 79499 Therapeutic Activity (timed):  use of dynamic activities replicating functional movements to increase ROM, strength, coordination, balance, and proprioception in order to improve patient's ability to progress to PLOF and address remaining functional goals.  (see flow

## 2024-08-29 ENCOUNTER — OFFICE VISIT (OUTPATIENT)
Age: 54
End: 2024-08-29
Payer: COMMERCIAL

## 2024-08-29 VITALS — HEIGHT: 62 IN | BODY MASS INDEX: 45.91 KG/M2

## 2024-08-29 DIAGNOSIS — M17.11 PRIMARY OSTEOARTHRITIS OF RIGHT KNEE: Primary | ICD-10-CM

## 2024-08-29 DIAGNOSIS — Z96.652 STATUS POST TOTAL LEFT KNEE REPLACEMENT: ICD-10-CM

## 2024-08-29 PROCEDURE — 20611 DRAIN/INJ JOINT/BURSA W/US: CPT | Performed by: PHYSICIAN ASSISTANT

## 2024-08-29 PROCEDURE — 99024 POSTOP FOLLOW-UP VISIT: CPT | Performed by: PHYSICIAN ASSISTANT

## 2024-08-29 PROCEDURE — 99213 OFFICE O/P EST LOW 20 MIN: CPT | Performed by: PHYSICIAN ASSISTANT

## 2024-08-29 RX ORDER — BETAMETHASONE SODIUM PHOSPHATE AND BETAMETHASONE ACETATE 3; 3 MG/ML; MG/ML
6 INJECTION, SUSPENSION INTRA-ARTICULAR; INTRALESIONAL; INTRAMUSCULAR; SOFT TISSUE ONCE
Status: COMPLETED | OUTPATIENT
Start: 2024-08-29 | End: 2024-08-29

## 2024-08-29 RX ORDER — LIDOCAINE HYDROCHLORIDE 10 MG/ML
3 INJECTION, SOLUTION INFILTRATION; PERINEURAL ONCE
Status: COMPLETED | OUTPATIENT
Start: 2024-08-29 | End: 2024-08-29

## 2024-08-29 RX ADMIN — BETAMETHASONE SODIUM PHOSPHATE AND BETAMETHASONE ACETATE 6 MG: 3; 3 INJECTION, SUSPENSION INTRA-ARTICULAR; INTRALESIONAL; INTRAMUSCULAR; SOFT TISSUE at 14:28

## 2024-08-29 RX ADMIN — LIDOCAINE HYDROCHLORIDE 3 ML: 10 INJECTION, SOLUTION INFILTRATION; PERINEURAL at 14:29

## 2024-08-29 NOTE — PROGRESS NOTES
Patient: Jessica Snyder                MRN: [id number]       SSN: [id number]  YOB: 1970        AGE: 53 y.o.        SEX: female  Body mass index is 45.91 kg/m².    PCP: Viet Renae MD  08/29/24            REVIEW OF SYSTEMS:  Constitutional: Negative for fever, chills, weight loss and malaise/fatigue.   HENT: Negative.    Eyes: Negative.    Respiratory: Negative.   Cardiovascular: Negative.   Gastrointestinal: No bowel incontinence or constipation.  Genitourinary: No bladder incontinence or saddle anesthesia.  Skin: Negative.   Neurological: Negative.    Endo/Heme/Allergies: Negative.    Psychiatric/Behavioral: Negative.  Musculoskeletal: As per HPI above.     Past Medical History:   Diagnosis Date    ADD (attention deficit disorder)     no meds    Arthritis     knees, R shoulder    Depression     Left foot pain     Plantar fasciitis, left     Right shoulder pain          Current Outpatient Medications:     celecoxib (CELEBREX) 200 MG capsule, Take 1 capsule by mouth 2 times daily, Disp: 60 capsule, Rfl: 2    aspirin (ASPIRIN 81) 81 MG EC tablet, Take 1 tablet by mouth in the morning and at bedtime, Disp: 60 tablet, Rfl: 3    ondansetron (ZOFRAN) 4 MG tablet, Take 1 tablet by mouth every 8 hours as needed for Nausea or Vomiting, Disp: 30 tablet, Rfl: 2    acetaminophen (TYLENOL) 500 MG tablet, Take 2 tablets by mouth every 6 hours as needed for Pain, Disp: , Rfl:     ammonium lactate (LAC-HYDRIN) 12 % lotion, rub in to affected area well twice a day, Disp: , Rfl:     spironolactone (ALDACTONE) 100 MG tablet, Take 1 tablet by mouth daily Indications: skin, Disp: , Rfl:     Allergies   Allergen Reactions    Codeine Nausea And Vomiting     Patient states she gets jittery, has upset stomach    Other Reaction(s): gi distress, Unknown    \"severe N/V shaking\"    Hydrocodone-Acetaminophen Other (See Comments)     \" severe N/V, shaking\"       Social History     Socioeconomic History    Marital  repeat injection for the right knee.  We did have discussion regarding total knee replacement on the right side and she is not interested in this at this time.    Care plan outlined and precautions discussed.  Results were reviewed with the patient. All medications were reviewed with the patient. All of pt's questions and concerns were addressed.  Alarm symptoms and return precautions associated with chief complaint and evaluation were reviewed with the patient in detail.  The patient demonstrated adequate understanding.The patient expresses willing compliance with the treatment plan.      Chart reviewed for the following:  Jethro BOLES PA-C, have reviewed the History, Physical and updated the Allergic reactions for Jessica Snyder?    TIME OUT performed immediately prior to start of procedure:  Jethro BOLES PA-C, have performed the following reviews on Jessica Snyder prior to the start of the procedure:  ????????  * Patient was identified by name and date of birth   * Agreement on procedure being performed was verified  * Risks and Benefits explained to the patient  * Procedure site verified and marked as necessary  * Patient was positioned for comfort  * Consent was signed and verified    Time:2:16 PM    Body part: right knee, intra-articular    Medication & Dose: 3ml 1% lidocaine and 6mg celestone    Date of procedure: 08/29/24    Procedure performed by: Jethro Pa PA-C    Provider assisted by: none    Patient assisted by: self    How tolerated by patient: tolerated the procedure well with no complications    Post Procedural Pain Scale: 0    Comments:   [employer] using a frequency of 10MHz with a 12L-RS transducer head was used to confirm needle placement.  Ultrasound images captured using [employer] Ultrasound machine and scanned into patient's chart      By signing my name below, I MCKAY Melendez, attest that this documentation has been prepared under the direction and

## 2024-09-04 ENCOUNTER — TELEPHONE (OUTPATIENT)
Age: 54
End: 2024-09-04

## 2024-09-04 NOTE — TELEPHONE ENCOUNTER
Patient called in and ask if she could get a work note stating that she can return back to work on Sept. 17th     Please advise patient @ [phone]

## 2024-11-21 ENCOUNTER — OFFICE VISIT (OUTPATIENT)
Age: 54
End: 2024-11-21

## 2024-11-21 VITALS — BODY MASS INDEX: 45.91 KG/M2 | HEIGHT: 62 IN

## 2024-11-21 DIAGNOSIS — M17.11 PRIMARY OSTEOARTHRITIS OF RIGHT KNEE: Primary | ICD-10-CM

## 2024-11-21 RX ORDER — BETAMETHASONE SODIUM PHOSPHATE AND BETAMETHASONE ACETATE 3; 3 MG/ML; MG/ML
6 INJECTION, SUSPENSION INTRA-ARTICULAR; INTRALESIONAL; INTRAMUSCULAR; SOFT TISSUE ONCE
Status: COMPLETED | OUTPATIENT
Start: 2024-11-21 | End: 2024-11-21

## 2024-11-21 RX ADMIN — BETAMETHASONE SODIUM PHOSPHATE AND BETAMETHASONE ACETATE 6 MG: 3; 3 INJECTION, SUSPENSION INTRA-ARTICULAR; INTRALESIONAL; INTRAMUSCULAR; SOFT TISSUE at 08:35

## 2024-11-21 NOTE — PROGRESS NOTES
status:      Spouse name: Not on file    Number of children: Not on file    Years of education: Not on file    Highest education level: Not on file   Occupational History    Not on file   Tobacco Use    Smoking status: Never    Smokeless tobacco: Never   Vaping Use    Vaping status: Never Used   Substance and Sexual Activity    Alcohol use: No    Drug use: No    Sexual activity: Not on file   Other Topics Concern    Not on file   Social History Narrative    Not on file     Social Determinants of Health     Financial Resource Strain: Not on file   Food Insecurity: No Food Insecurity (6/17/2024)    Hunger Vital Sign     Worried About Running Out of Food in the Last Year: Never true     Ran Out of Food in the Last Year: Never true   Transportation Needs: No Transportation Needs (7/1/2024)    OASIS : Transportation     Lack of Transportation (Medical): No     Lack of Transportation (Non-Medical): No     Patient Unable or Declines to Respond: No   Physical Activity: Not on file   Stress: Not on file   Social Connections: Feeling Socially Integrated (7/1/2024)    OASIS : Social Isolation     Frequency of experiencing loneliness or isolation: Never   Intimate Partner Violence: Not on file   Housing Stability: Low Risk  (6/17/2024)    Housing Stability Vital Sign     Unable to Pay for Housing in the Last Year: No     Number of Places Lived in the Last Year: 1     Unstable Housing in the Last Year: No       Past Surgical History:   Procedure Laterality Date    COLONOSCOPY      HYSTERECTOMY (CERVIX STATUS UNKNOWN)  2021    KNEE ARTHROSCOPY Left 6/1/12020    left knee arthroscopic partial medial menisectomy    TOTAL KNEE ARTHROPLASTY Left 6/17/2024    LEFT TOTAL KNEE REPLACEMENT performed by Bill Guido MD at [facility] MAIN OR    TUBAL LIGATION  2009    Tubal ligation       Patient seen evaluated today for her knees.  She has had a previous left knee replacement done and did very well with it.  She is very

## 2024-12-24 ENCOUNTER — TELEPHONE (OUTPATIENT)
Age: 54
End: 2024-12-24

## 2024-12-24 RX ORDER — CELECOXIB 200 MG/1
200 CAPSULE ORAL 2 TIMES DAILY
Qty: 60 CAPSULE | Refills: 2 | Status: SHIPPED | OUTPATIENT
Start: 2024-12-24

## 2025-02-27 ENCOUNTER — OFFICE VISIT (OUTPATIENT)
Age: 55
End: 2025-02-27

## 2025-02-27 VITALS — HEIGHT: 62 IN | BODY MASS INDEX: 46.93 KG/M2 | WEIGHT: 255 LBS

## 2025-02-27 DIAGNOSIS — M17.11 PRIMARY OSTEOARTHRITIS OF RIGHT KNEE: Primary | ICD-10-CM

## 2025-02-27 RX ORDER — BETAMETHASONE SODIUM PHOSPHATE AND BETAMETHASONE ACETATE 3; 3 MG/ML; MG/ML
6 INJECTION, SUSPENSION INTRA-ARTICULAR; INTRALESIONAL; INTRAMUSCULAR; SOFT TISSUE ONCE
Status: COMPLETED | OUTPATIENT
Start: 2025-02-27 | End: 2025-02-27

## 2025-02-27 RX ADMIN — BETAMETHASONE SODIUM PHOSPHATE AND BETAMETHASONE ACETATE 6 MG: 3; 3 INJECTION, SUSPENSION INTRA-ARTICULAR; INTRALESIONAL; INTRAMUSCULAR; SOFT TISSUE at 09:08

## 2025-02-27 NOTE — PROGRESS NOTES
Patient: Jessica Snyder                MRN: [id number]       SSN: [id number]  YOB: 1970        AGE: 54 y.o.        SEX: female  Body mass index is 46.64 kg/m².    PCP: Viet Renae MD  02/27/25            REVIEW OF SYSTEMS:  Constitutional: Negative for fever, chills, weight loss and malaise/fatigue.   HENT: Negative.    Eyes: Negative.    Respiratory: Negative.   Cardiovascular: Negative.   Gastrointestinal: No bowel incontinence or constipation.  Genitourinary: No bladder incontinence or saddle anesthesia.  Skin: Negative.   Neurological: Negative.    Endo/Heme/Allergies: Negative.    Psychiatric/Behavioral: Negative.  Musculoskeletal: As per HPI above.     Past Medical History:   Diagnosis Date    ADD (attention deficit disorder)     no meds    Arthritis     knees, R shoulder    Depression     Left foot pain     Plantar fasciitis, left     Right shoulder pain          Current Outpatient Medications:     celecoxib (CELEBREX) 200 MG capsule, Take 1 capsule by mouth 2 times daily, Disp: 60 capsule, Rfl: 2    celecoxib (CELEBREX) 200 MG capsule, Take 1 capsule by mouth 2 times daily, Disp: 60 capsule, Rfl: 2    aspirin (ASPIRIN 81) 81 MG EC tablet, Take 1 tablet by mouth in the morning and at bedtime, Disp: 60 tablet, Rfl: 3    ondansetron (ZOFRAN) 4 MG tablet, Take 1 tablet by mouth every 8 hours as needed for Nausea or Vomiting, Disp: 30 tablet, Rfl: 2    acetaminophen (TYLENOL) 500 MG tablet, Take 2 tablets by mouth every 6 hours as needed for Pain, Disp: , Rfl:     ammonium lactate (LAC-HYDRIN) 12 % lotion, rub in to affected area well twice a day, Disp: , Rfl:     spironolactone (ALDACTONE) 100 MG tablet, Take 1 tablet by mouth daily Indications: skin, Disp: , Rfl:     Allergies   Allergen Reactions    Codeine Nausea And Vomiting     Patient states she gets jittery, has upset stomach    Other Reaction(s): gi distress, Unknown    \"severe N/V shaking\"    Hydrocodone-Acetaminophen

## 2025-04-29 ENCOUNTER — TELEPHONE (OUTPATIENT)
Age: 55
End: 2025-04-29

## 2025-04-29 DIAGNOSIS — M17.11 PRIMARY OSTEOARTHRITIS OF RIGHT KNEE: Primary | ICD-10-CM

## 2025-04-29 NOTE — TELEPHONE ENCOUNTER
Patient is requesting a refill celecoxib (CELEBREX) 200 MG capsule     Patient tel [phone]      [facility] - [address] [place] [address] [address] - P [phone] - F [phone]

## 2025-04-30 RX ORDER — CELECOXIB 200 MG/1
200 CAPSULE ORAL DAILY
Qty: 60 CAPSULE | Refills: 3 | Status: SHIPPED | OUTPATIENT
Start: 2025-04-30

## 2025-04-30 RX ORDER — CELECOXIB 200 MG/1
200 CAPSULE ORAL 2 TIMES DAILY
Qty: 60 CAPSULE | Refills: 2 | Status: SHIPPED | OUTPATIENT
Start: 2025-04-30

## 2025-04-30 NOTE — TELEPHONE ENCOUNTER
Call and informed patient that mediations has been sent to pharmacy.      Patient verbalized understanding.

## 2025-05-28 ENCOUNTER — OFFICE VISIT (OUTPATIENT)
Age: 55
End: 2025-05-28
Payer: COMMERCIAL

## 2025-05-28 VITALS — HEIGHT: 62 IN | BODY MASS INDEX: 45.64 KG/M2 | WEIGHT: 248 LBS

## 2025-05-28 DIAGNOSIS — Z96.652 STATUS POST TOTAL LEFT KNEE REPLACEMENT: Primary | ICD-10-CM

## 2025-05-28 DIAGNOSIS — M17.11 PRIMARY OSTEOARTHRITIS OF RIGHT KNEE: ICD-10-CM

## 2025-05-28 PROCEDURE — 99213 OFFICE O/P EST LOW 20 MIN: CPT | Performed by: PHYSICIAN ASSISTANT

## 2025-05-28 PROCEDURE — 20611 DRAIN/INJ JOINT/BURSA W/US: CPT | Performed by: PHYSICIAN ASSISTANT

## 2025-05-28 RX ORDER — LIDOCAINE HYDROCHLORIDE 10 MG/ML
3 INJECTION, SOLUTION INFILTRATION; PERINEURAL ONCE
Status: COMPLETED | OUTPATIENT
Start: 2025-05-28 | End: 2025-05-28

## 2025-05-28 RX ORDER — BETAMETHASONE SODIUM PHOSPHATE AND BETAMETHASONE ACETATE 3; 3 MG/ML; MG/ML
6 INJECTION, SUSPENSION INTRA-ARTICULAR; INTRALESIONAL; INTRAMUSCULAR; SOFT TISSUE ONCE
Status: COMPLETED | OUTPATIENT
Start: 2025-05-28 | End: 2025-05-28

## 2025-05-28 RX ADMIN — BETAMETHASONE SODIUM PHOSPHATE AND BETAMETHASONE ACETATE 6 MG: 3; 3 INJECTION, SUSPENSION INTRA-ARTICULAR; INTRALESIONAL; INTRAMUSCULAR; SOFT TISSUE at 10:28

## 2025-05-28 RX ADMIN — LIDOCAINE HYDROCHLORIDE 3 ML: 10 INJECTION, SOLUTION INFILTRATION; PERINEURAL at 10:28

## 2025-05-28 NOTE — PROGRESS NOTES
Patient: Jessica Snyder                MRN: [id number]       SSN: [id number]  YOB: 1970        AGE: 54 y.o.        SEX: female  Body mass index is 45.36 kg/m².    PCP: Viet Renae MD  05/28/25            REVIEW OF SYSTEMS:  Constitutional: Negative for fever, chills, weight loss and malaise/fatigue.   HENT: Negative.    Eyes: Negative.    Respiratory: Negative.   Cardiovascular: Negative.   Gastrointestinal: No bowel incontinence or constipation.  Genitourinary: No bladder incontinence or saddle anesthesia.  Skin: Negative.   Neurological: Negative.    Endo/Heme/Allergies: Negative.    Psychiatric/Behavioral: Negative.  Musculoskeletal: As per HPI above.     Past Medical History:   Diagnosis Date    ADD (attention deficit disorder)     no meds    Arthritis     knees, R shoulder    Depression     Left foot pain     Plantar fasciitis, left     Right shoulder pain          Current Outpatient Medications:     celecoxib (CELEBREX) 200 MG capsule, Take 1 capsule by mouth daily, Disp: 60 capsule, Rfl: 3    celecoxib (CELEBREX) 200 MG capsule, Take 1 capsule by mouth 2 times daily, Disp: 60 capsule, Rfl: 2    celecoxib (CELEBREX) 200 MG capsule, Take 1 capsule by mouth 2 times daily, Disp: 60 capsule, Rfl: 2    celecoxib (CELEBREX) 200 MG capsule, Take 1 capsule by mouth 2 times daily, Disp: 60 capsule, Rfl: 2    aspirin (ASPIRIN 81) 81 MG EC tablet, Take 1 tablet by mouth in the morning and at bedtime, Disp: 60 tablet, Rfl: 3    ondansetron (ZOFRAN) 4 MG tablet, Take 1 tablet by mouth every 8 hours as needed for Nausea or Vomiting, Disp: 30 tablet, Rfl: 2    acetaminophen (TYLENOL) 500 MG tablet, Take 2 tablets by mouth every 6 hours as needed for Pain, Disp: , Rfl:     ammonium lactate (LAC-HYDRIN) 12 % lotion, rub in to affected area well twice a day, Disp: , Rfl:     spironolactone (ALDACTONE) 100 MG tablet, Take 1 tablet by mouth daily Indications: skin, Disp: , Rfl:     Allergies

## 2025-08-07 ENCOUNTER — OFFICE VISIT (OUTPATIENT)
Age: 55
End: 2025-08-07

## 2025-08-07 VITALS — BODY MASS INDEX: 46.15 KG/M2 | HEIGHT: 62 IN | WEIGHT: 250.8 LBS

## 2025-08-07 DIAGNOSIS — M17.11 PRIMARY OSTEOARTHRITIS OF RIGHT KNEE: Primary | ICD-10-CM

## 2025-08-07 RX ORDER — MELOXICAM 15 MG/1
15 TABLET ORAL DAILY
Qty: 30 TABLET | Refills: 3 | Status: SHIPPED | OUTPATIENT
Start: 2025-08-07

## 2025-08-07 RX ORDER — BETAMETHASONE SODIUM PHOSPHATE AND BETAMETHASONE ACETATE 3; 3 MG/ML; MG/ML
6 INJECTION, SUSPENSION INTRA-ARTICULAR; INTRALESIONAL; INTRAMUSCULAR; SOFT TISSUE ONCE
Status: COMPLETED | OUTPATIENT
Start: 2025-08-07 | End: 2025-08-07

## 2025-08-07 RX ADMIN — BETAMETHASONE SODIUM PHOSPHATE AND BETAMETHASONE ACETATE 6 MG: 3; 3 INJECTION, SUSPENSION INTRA-ARTICULAR; INTRALESIONAL; INTRAMUSCULAR; SOFT TISSUE at 09:05

## (undated) DEVICE — SHOULDER SUSPENSION KIT 6 PER BOX

## (undated) DEVICE — Z DISCONTINUED USE ITEM 2150868 IMMOBILIZER SHLDR M BLK BASIC ABD SLNG

## (undated) DEVICE — KIT OR TURNOVER

## (undated) DEVICE — CANNULA ARTHSCP L7CM ID8.25MM TRNSLUC THRD FLX W/ NO SQUIRT

## (undated) DEVICE — 4-PORT MANIFOLD: Brand: NEPTUNE 2

## (undated) DEVICE — KNEE ARTHROSCOPY III-LF: Brand: MEDLINE INDUSTRIES, INC.

## (undated) DEVICE — DRESSING HYDROFIBER AQUACEL AG ADVANTAGE 3.5X14 IN

## (undated) DEVICE — SOLUTION IRRIG 1000ML 0.9% SOD CHL USP POUR PLAS BTL

## (undated) DEVICE — 90-S MAX, SUCTION PROBE, NON-BENDABLE, MAX CUT LEVEL 11: Brand: SERFAS ENERGY

## (undated) DEVICE — (D)PREP SKN CHLRAPRP APPL 26ML -- CONVERT TO ITEM 371833

## (undated) DEVICE — STERILE POLYISOPRENE POWDER-FREE SURGICAL GLOVES: Brand: PROTEXIS

## (undated) DEVICE — SUTURE VCRL SZ 3-0 L27IN ABSRB UD L36MM CT-1 1/2 CIR J258H

## (undated) DEVICE — TAPE,CLOTH/SILK,CURAD,3"X10YD,LF,40/CS: Brand: CURAD

## (undated) DEVICE — 3M™ WARMING BLANKET, LOWER BODY, 10 PER CASE, 42568: Brand: BAIR HUGGER™

## (undated) DEVICE — WEREWOLF FASTSEAL 6.0 HEMOSTASIS WAND: Brand: FASTSEAL 6.0 HEMOSTASIS WAND

## (undated) DEVICE — SUTURE FIBERLINK SZ 2-0 L26IN NONABSORBABLE BLU CLS LOOP AR7235

## (undated) DEVICE — PACK PROCEDURE SURG ORTH SHLDR CUST

## (undated) DEVICE — BUR SHV CUT HLLW 6 FLUT 5.5MM --

## (undated) DEVICE — HOOD WITH PEEL AWAY FACE SHIELD: Brand: T7PLUS

## (undated) DEVICE — SUTURE ABSORBABLE BRAIDED 0 CTXB 36 IN VIO PDS II ZB370

## (undated) DEVICE — SUTURE MCRYL SZ 4-0 L18IN ABSRB UD L19MM PS-2 3/8 CIR PRIM Y496G

## (undated) DEVICE — 3M™ STERI-STRIP™ COMPOUND BENZOIN TINCTURE 40 BAGS/CARTON 4 CARTONS/CASE C1544: Brand: 3M™ STERI-STRIP™

## (undated) DEVICE — (D)GLOVE SURG ORTH 8 PWD LTX -- DISC BY MFR USE ITEM 278015

## (undated) DEVICE — SKIN MARKER,REGULAR TIP WITH RULER AND LABELS: Brand: DEVON

## (undated) DEVICE — SOLUTION IRRIG 3000ML 0.9% SOD CHL FLX CONT 0797208] ICU MEDICAL INC]

## (undated) DEVICE — STRYKER PERFORMANCE SERIES SAGITTAL BLADE: Brand: STRYKER PERFORMANCE SERIES

## (undated) DEVICE — BNDG,ELSTC,MATRIX,STRL,6"X5YD,LF,HOOK&LP: Brand: MEDLINE

## (undated) DEVICE — PAD,ABDOMINAL,8"X10",ST,LF: Brand: MEDLINE

## (undated) DEVICE — TAPE SURG W4INXL11YD 2IN PERF LINERLESS NONWOVEN MEDFIX EZ

## (undated) DEVICE — NEEDLE SUT PASS FOR ROT CUF LABRAL REP MULTFI SCORPION

## (undated) DEVICE — (D)PACK ICE DISP -- DISC BY MFR

## (undated) DEVICE — DISPOSABLE MULTI BAG ADAPTERS Y                                    TUBING, STERILE, 2 TO A SET 6 SETS                                    PER BOX

## (undated) DEVICE — KIT INSRT PERC SPEAR DIL NEEDLE W/ STYL GUIDEPIN STP DRL FOR

## (undated) DEVICE — GOWN,REINFORCED,POLY,AURORA,XXLARGE,STR: Brand: MEDLINE

## (undated) DEVICE — CANNULA THREADED FLEX 8.0 X 72MM: Brand: CLEAR-TRAC

## (undated) DEVICE — PROBE ARTHSCP L220MM 60DEG MEAS FOR SUP CAPSULAR RECON

## (undated) DEVICE — [ARTHROSCOPY PUMP,  DO NOT USE IF PACKAGE IS DAMAGED,  KEEP DRY,  KEEP AWAY FROM SUNLIGHT,  PROTECT FROM HEAT AND RADIOACTIVE SOURCES.]: Brand: FLOSTEADY

## (undated) DEVICE — INTENDED FOR TISSUE SEPARATION, AND OTHER PROCEDURES THAT REQUIRE A SHARP SURGICAL BLADE TO PUNCTURE OR CUT.: Brand: BARD-PARKER SAFETY BLADES SIZE 11, STERILE

## (undated) DEVICE — ADHESIVE SKIN CLOSURE 4X22 CM PREMIERPRO EXOFINFUSION DISP

## (undated) DEVICE — ELECTRODE PT RET AD L9FT HI MOIST COND ADH HYDRGEL CORDED

## (undated) DEVICE — GAUZE,SPONGE,4"X4",16PLY,STRL,LF,10/TRAY: Brand: MEDLINE

## (undated) DEVICE — BLADE SHV DIA4MM RED RESECT FOR GEN DEB REM OF PERIOST FR

## (undated) DEVICE — DYONICS 25 INFLOW TUBE SET, 3 PER BOX

## (undated) DEVICE — FLUID CONTROL SHOULDER DRAPE PACK: Brand: CONVERTORS

## (undated) DEVICE — PREMIUM DRY TRAY LF: Brand: MEDLINE INDUSTRIES, INC.

## (undated) DEVICE — SUTURE VICRYL SZ 0 L36IN ABSRB UD L36MM CT-1 1/2 CIR J946H

## (undated) DEVICE — SUTURE FIBERWIRE SZ 2 W/ TAPERED NEEDLE BLUE L38IN NONABSORB BLU L26.5MM 1/2 CIRCLE AR7200

## (undated) DEVICE — Device: Brand: JELCO

## (undated) DEVICE — NEEDLE SPNL 22GA L3.5IN BLK HUB S STL REG WALL FIT STYL W/

## (undated) DEVICE — SUTURE MONOCRYL SZ 2-0 L36IN ABSRB UD L36MM CT-1 1/2 CIR Y945H

## (undated) DEVICE — ZIMMER® STERILE DISPOSABLE TOURNIQUET CUFF WITH PLC, DUAL PORT, SINGLE BLADDER, 34 IN. (86 CM)

## (undated) DEVICE — BOWL AND CEMENT CARTRIDGE WITH BREAKAWAY FEMORAL NOZZLE: Brand: ACM

## (undated) DEVICE — SPONGE LAP 18X18IN STRL -- 5/PK

## (undated) DEVICE — 3M™ BAIR PAWS FLEX™ WARMING GOWN, STANDARD, 20 PER CASE 81003: Brand: BAIR PAWS™

## (undated) DEVICE — BANDAGE COMPR SELF ADH 5 YDX6 IN TAN STRL PREMIERPRO LF

## (undated) DEVICE — SYRINGE MED 3ML NDL 22GA L1 1/2IN REG BVL SFGLDE

## (undated) DEVICE — HANDPIECE SET WITH HIGH FLOW TIP AND SUCTION TUBE: Brand: INTERPULSE

## (undated) DEVICE — (D)GLOVE SURG TRIFLX 7.5 PWD -- DISC BY MFR USE ITEM 102005

## (undated) DEVICE — SOFT SILICONE HYDROCELLULAR SACRUM DRESSING WITH LOCK AWAY LAYER: Brand: ALLEVYN LIFE SACRUM (LARGE) PACK OF 10

## (undated) DEVICE — INTENDED FOR TISSUE SEPARATION, AND OTHER PROCEDURES THAT REQUIRE A SHARP SURGICAL BLADE TO PUNCTURE OR CUT.: Brand: BARD-PARKER ® STAINLESS STEEL BLADES

## (undated) DEVICE — KENDALL SCD EXPRESS SLEEVES, KNEE LENGTH, MEDIUM: Brand: KENDALL SCD

## (undated) DEVICE — PAD,NON-ADHERENT,3X8,STERILE,LF,1/PK: Brand: MEDLINE

## (undated) DEVICE — SYRINGE MED 30ML STD CLR PLAS LUERLOCK TIP N CTRL DISP

## (undated) DEVICE — GOWN ,SIRUS ,NONREINFORCED 4XL: Brand: MEDLINE

## (undated) DEVICE — PACK SURG BSHR TOT KNEE LF

## (undated) DEVICE — INTENDED FOR TISSUE SEPARATION, AND OTHER PROCEDURES THAT REQUIRE A SHARP SURGICAL BLADE TO PUNCTURE OR CUT.: Brand: BARD-PARKER ® CARBON RIB-BACK BLADES

## (undated) DEVICE — BLADE SHV 3.5MM TOMCAT

## (undated) DEVICE — SUTURE VICRYL SZ 2 L27IN ABSRB VLT L65MM TP-1 1/2 CIR J649G

## (undated) DEVICE — 3M™ STERI-DRAPE™ INSTRUMENT POUCH 1018: Brand: STERI-DRAPE™

## (undated) DEVICE — SPONGE LAP W18XL18IN WHT COT 4 PLY FLD STRUNG RADPQ DISP ST 2 PER PACK

## (undated) DEVICE — Device

## (undated) DEVICE — APPLICATOR MEDICATED 26 CC SOLUTION HI LT ORNG CHLORAPREP

## (undated) DEVICE — SUTURE ABSORBABLE ANTIBACT 1-0 CT-1 24 IN STRATAFIX PDS + SXPP1A443

## (undated) DEVICE — (D)STRIP SKN CLSR 0.5X4IN WHT --

## (undated) DEVICE — (D)GLOVE SURG ORTH 7.5 PWD LTX -- DISC BY MFR USE ITEM 278014

## (undated) DEVICE — ARTHROSCOPIC KNEE HOLDER: Brand: DEVON

## (undated) DEVICE — DRAPE,REIN 53X77,STERILE: Brand: MEDLINE